# Patient Record
Sex: MALE | Race: WHITE
[De-identification: names, ages, dates, MRNs, and addresses within clinical notes are randomized per-mention and may not be internally consistent; named-entity substitution may affect disease eponyms.]

---

## 2017-02-20 ENCOUNTER — HOSPITAL ENCOUNTER (OUTPATIENT)
Dept: HOSPITAL 47 - LABWHC1 | Age: 77
Discharge: HOME | End: 2017-02-20
Attending: NURSE PRACTITIONER
Payer: MEDICARE

## 2017-02-20 DIAGNOSIS — Z00.00: Primary | ICD-10-CM

## 2017-02-20 DIAGNOSIS — N18.3: ICD-10-CM

## 2017-02-20 LAB
ANION GAP SERPL CALC-SCNC: 17 MMOL/L
BUN SERPL-SCNC: 50 MG/DL (ref 9–20)
CALCIUM SPEC-MCNC: 10.1 MG/DL (ref 8.4–10.2)
CHLORIDE SERPL-SCNC: 99 MMOL/L (ref 98–107)
CHOLEST SERPL-MCNC: 140 MG/DL (ref ?–200)
CO2 SERPL-SCNC: 25 MMOL/L (ref 22–30)
GLUCOSE SERPL-MCNC: 338 MG/DL (ref 74–99)
HDLC SERPL-MCNC: 49 MG/DL (ref 40–60)
NON-AFRICAN AMERICAN GFR(MDRD): 44
POTASSIUM SERPL-SCNC: 4.6 MMOL/L (ref 3.5–5.1)
PSA SERPL-MCNC: 0.5 NG/ML (ref 0–4)
SODIUM SERPL-SCNC: 141 MMOL/L (ref 137–145)
TRIGL SERPL-MCNC: 259 MG/DL (ref ?–150)

## 2017-02-20 PROCEDURE — 84153 ASSAY OF PSA TOTAL: CPT

## 2017-02-20 PROCEDURE — 80061 LIPID PANEL: CPT

## 2017-02-20 PROCEDURE — 80048 BASIC METABOLIC PNL TOTAL CA: CPT

## 2017-02-20 PROCEDURE — 36415 COLL VENOUS BLD VENIPUNCTURE: CPT

## 2017-03-03 ENCOUNTER — HOSPITAL ENCOUNTER (EMERGENCY)
Dept: HOSPITAL 47 - EC | Age: 77
Discharge: HOME | End: 2017-03-03
Payer: MEDICARE

## 2017-03-03 VITALS — TEMPERATURE: 97.8 F | RESPIRATION RATE: 20 BRPM

## 2017-03-03 VITALS — DIASTOLIC BLOOD PRESSURE: 78 MMHG | SYSTOLIC BLOOD PRESSURE: 128 MMHG | HEART RATE: 78 BPM

## 2017-03-03 DIAGNOSIS — Z95.5: ICD-10-CM

## 2017-03-03 DIAGNOSIS — N28.9: ICD-10-CM

## 2017-03-03 DIAGNOSIS — I10: ICD-10-CM

## 2017-03-03 DIAGNOSIS — E11.9: ICD-10-CM

## 2017-03-03 DIAGNOSIS — Z79.82: ICD-10-CM

## 2017-03-03 DIAGNOSIS — Z79.84: ICD-10-CM

## 2017-03-03 DIAGNOSIS — H10.13: Primary | ICD-10-CM

## 2017-03-03 DIAGNOSIS — E78.5: ICD-10-CM

## 2017-03-03 DIAGNOSIS — Z79.899: ICD-10-CM

## 2017-03-03 DIAGNOSIS — I25.2: ICD-10-CM

## 2017-03-03 DIAGNOSIS — Z87.891: ICD-10-CM

## 2017-03-03 PROCEDURE — 99283 EMERGENCY DEPT VISIT LOW MDM: CPT

## 2017-03-03 NOTE — ED
Eye Problem HPI





- General


Chief complaint: Eye Problems


Stated complaint: Eye Problems


Time Seen by Provider: 03/03/17 20:46


Source: patient, RN notes reviewed


Mode of arrival: ambulatory


Limitations: no limitations





- History of Present Illness


Initial comments: 





Patient is a 76-year-old male presents to the emergency room for evaluation of 

bilateral eye irritation.  Patient states that he had a routine eye exam by Dr. Montejo earlier this afternoon.  Patient states that they placed numbing drops in 

both of his eyes and examine them.  Patient denies them dilating his eyes 

today.  Patient states he passed his vision test was "flying colors".  Patient 

states around 3 PM he left his appointment.  Patient states around 7/8 PM he 

began having bilateral eye burning and tearing.  Patient states that he feels 

like he has sand in his eyes every time he blinks.  Patient states he's not 

sure if he is having ALLERGIC reaction to the eye drops that he was given.  

Patient denies changes in vision.  Patient denies any pain with movement of his 

eyes.  Patient denies getting anything in his eyes.  Patient states he's been 

trying to wash out his eyes in the shower with no relief of symptoms.  Patient 

states that his eyes will not stop tearing.





- Related Data


 Home Medications











 Medication  Instructions  Recorded  Confirmed


 


Aspirin EC [Ecotrin] 81 mg PO DAILY 08/23/14 03/03/17


 


Glimepiride [Amaryl] 1 mg PO AC-BRKFST 08/23/14 03/03/17


 


Hydrochlorothiazide [Hydrodiuril] 50 mg PO DAILY 08/23/14 03/03/17


 


Metoprolol Succinate (ER) [Toprol 50 mg PO DAILY 08/23/14 03/03/17





XL]   


 


Niacin [Slo-Niacin] 750 mg PO DAILY 08/23/14 03/03/17


 


Potassium Chloride [Klor-Con 10] 10 meq PO DAILY 08/23/14 03/03/17


 


Prasugrel [Effient] 10 mg PO DAILY 08/23/14 03/03/17


 


Quinapril HCl [Accupril] 10 mg PO DAILY 08/23/14 03/03/17


 


Simvastatin [Zocor] 80 mg PO HS 08/23/14 03/03/17


 


amLODIPine [Norvasc] 5 mg PO DAILY 08/23/14 03/03/17


 


buPROPion XL [Wellbutrin XL] 300 mg PO DAILY 08/23/14 03/03/17


 


metFORMIN HCL 1,000 mg PO BID 08/23/14 03/03/17








 Previous Rx's











 Medication  Instructions  Recorded


 


Ciprofloxacin HCl [Cipro] 500 mg PO Q12HR #20 tablet 08/23/14


 


Tobra-Dexamet 0.3-0.1% Eye [place] 1 drops BOTH EYES Q4H 7 Days 03/03/17





[Tobradex Ophth Susp]  











 Allergies











Allergy/AdvReac Type Severity Reaction Status Date / Time


 


No Known Allergies Allergy   Verified 03/03/17 20:44














Review of Systems


ROS Statement: 


Those systems with pertinent positive or pertinent negative responses have been 

documented in the HPI.





ROS Other: All systems not noted in ROS Statement are negative.





Past Medical History


Past Medical History: Diabetes Mellitus, Hyperlipidemia, Hypertension, 

Myocardial Infarction (MI), Renal Disease


Last Myocardial Infarction Date:: 2010


History of Any Multi-Drug Resistant Organisms: None Reported


Past Surgical History: Heart Catheterization With Stent, Joint Replacement


Date of Last Stent Placement:: 2012


Past Psychological History: No Psychological Hx Reported


Smoking Status: Former smoker


Past Alcohol Use History: None Reported


Past Drug Use History: None Reported





General Exam





- General Exam Comments


Initial Comments: 





Sitting in exam room in no acute distress.


Limitations: no limitations


General appearance: alert, in no apparent distress


Head exam: Present: atraumatic, normocephalic, normal inspection


  ** Expanded


Eyelids: Normal Inspection: Bilateral


Pupils: Regular, Round: Bilateral, Reactive: Bilateral


Sclera/Conjunctival: Injection: Bilateral (b/l tearing of eyes)


Visual acuity (R) = 20/: 20


Visual acuity (L) = 20/: 20


With correction: Yes


ENT exam: Present: normal exam


Neck exam: Present: normal inspection


Respiratory exam: Absent: respiratory distress


Extremities exam: Present: normal inspection


Back exam: Present: normal inspection


Neurological exam: Present: alert, oriented X3, CN II-XII intact, normal gait


Psychiatric exam: Present: normal affect, normal mood


Skin exam: Present: warm, dry, intact, normal color.  Absent: rash





Course


 Vital Signs











  03/03/17 03/03/17





  20:41 21:38


 


Temperature 97.8 F 97.8 F


 


Pulse Rate 88 78


 


Respiratory 20 20





Rate  


 


Blood Pressure 113/62 128/78


 


O2 Sat by Pulse 99 98





Oximetry  














Medical Decision Making





- Medical Decision Making





Patient is a 76-year-old male presents to the emergency room for evaluation of 

bilateral eye irritation.  It is likely that patient is having an ALLERGIC 

reaction to the eye drops placed in his eyes earlier this afternoon at Dr. Montejo

's office.  Patient has no changes in vision  Will place patient on TobraDex 

eyedrops to see if the steroid component will help his symptoms.  Advised 

patient to contact his ophthalmologist if symptoms are not improving in 48 

hours.  Patient states he understands everything that was discussed with him.  

Return parameters discussed.  Case discussed with Dr. Mclaughlin.





Disposition


Clinical Impression: 


 Allergic conjunctivitis





Disposition: HOME SELF-CARE


Condition: Good


Instructions:  Conjunctivitis (ED)


Additional Instructions: 


Apply eyedrops as directed.  Please follow up with eye specialist on Monday. If 

any new symptom arises or symptoms worsen, return to ER as soon as possible.  


Prescriptions: 


Tobra-Dexamet 0.3-0.1% Eye [place] [Tobradex Ophth Susp] 1 drops BOTH EYES Q4H 7 

Days


Referrals: 


Lemuel Ortiz MD [Primary Care Provider] - 1-2 days


Raul Montejo MD [STAFF PHYSICIAN] - 1-2 days


Time of Disposition: 21:23

## 2017-06-16 ENCOUNTER — HOSPITAL ENCOUNTER (OUTPATIENT)
Dept: HOSPITAL 47 - LABWHC1 | Age: 77
Discharge: HOME | End: 2017-06-16
Attending: NURSE PRACTITIONER
Payer: MEDICARE

## 2017-06-16 DIAGNOSIS — N18.3: Primary | ICD-10-CM

## 2017-06-16 LAB
ANION GAP SERPL CALC-SCNC: 15 MMOL/L
BUN SERPL-SCNC: 38 MG/DL (ref 9–20)
CALCIUM SPEC-MCNC: 9.6 MG/DL (ref 8.4–10.2)
CHLORIDE SERPL-SCNC: 104 MMOL/L (ref 98–107)
CO2 SERPL-SCNC: 23 MMOL/L (ref 22–30)
GLUCOSE SERPL-MCNC: 175 MG/DL (ref 74–99)
NON-AFRICAN AMERICAN GFR(MDRD): 49
POTASSIUM SERPL-SCNC: 4.1 MMOL/L (ref 3.5–5.1)
SODIUM SERPL-SCNC: 142 MMOL/L (ref 137–145)

## 2017-06-16 PROCEDURE — 36415 COLL VENOUS BLD VENIPUNCTURE: CPT

## 2017-06-16 PROCEDURE — 80048 BASIC METABOLIC PNL TOTAL CA: CPT

## 2017-07-12 ENCOUNTER — HOSPITAL ENCOUNTER (OUTPATIENT)
Dept: HOSPITAL 47 - LABWHC1 | Age: 77
Discharge: HOME | End: 2017-07-12
Payer: MEDICARE

## 2017-07-12 DIAGNOSIS — E11.65: Primary | ICD-10-CM

## 2017-07-12 LAB
ALP SERPL-CCNC: 99 U/L (ref 38–126)
ALT SERPL-CCNC: 37 U/L (ref 21–72)
ANION GAP SERPL CALC-SCNC: 13 MMOL/L
AST SERPL-CCNC: 25 U/L (ref 17–59)
BUN SERPL-SCNC: 21 MG/DL (ref 9–20)
CALCIUM SPEC-MCNC: 9.5 MG/DL (ref 8.4–10.2)
CHLORIDE SERPL-SCNC: 104 MMOL/L (ref 98–107)
CHOLEST SERPL-MCNC: 125 MG/DL (ref ?–200)
CO2 SERPL-SCNC: 25 MMOL/L (ref 22–30)
GLUCOSE SERPL-MCNC: 201 MG/DL (ref 74–99)
HDLC SERPL-MCNC: 51 MG/DL (ref 40–60)
NON-AFRICAN AMERICAN GFR(MDRD): 58
POTASSIUM SERPL-SCNC: 4 MMOL/L (ref 3.5–5.1)
PROT SERPL-MCNC: 7.1 G/DL (ref 6.3–8.2)
SODIUM SERPL-SCNC: 142 MMOL/L (ref 137–145)
TRIGL SERPL-MCNC: 152 MG/DL (ref ?–150)

## 2017-07-12 PROCEDURE — 36415 COLL VENOUS BLD VENIPUNCTURE: CPT

## 2017-07-12 PROCEDURE — 80053 COMPREHEN METABOLIC PANEL: CPT

## 2017-07-12 PROCEDURE — 80061 LIPID PANEL: CPT

## 2017-08-18 ENCOUNTER — HOSPITAL ENCOUNTER (OUTPATIENT)
Dept: HOSPITAL 47 - LABWHC1 | Age: 77
Discharge: HOME | End: 2017-08-18
Attending: NURSE PRACTITIONER
Payer: MEDICARE

## 2017-08-18 DIAGNOSIS — N25.81: ICD-10-CM

## 2017-08-18 DIAGNOSIS — M10.9: ICD-10-CM

## 2017-08-18 DIAGNOSIS — E11.9: ICD-10-CM

## 2017-08-18 DIAGNOSIS — N18.3: ICD-10-CM

## 2017-08-18 DIAGNOSIS — E83.42: ICD-10-CM

## 2017-08-18 DIAGNOSIS — N39.0: ICD-10-CM

## 2017-08-18 DIAGNOSIS — D50.9: Primary | ICD-10-CM

## 2017-08-18 LAB
ANION GAP SERPL CALC-SCNC: 17 MMOL/L
BASOPHILS # BLD AUTO: 0 K/UL (ref 0–0.2)
BASOPHILS NFR BLD AUTO: 1 %
BUN SERPL-SCNC: 37 MG/DL (ref 9–20)
CALCIUM SPEC-MCNC: 9.6 MG/DL (ref 8.4–10.2)
CH: 30
CHCM: 33.8
CHLORIDE SERPL-SCNC: 101 MMOL/L (ref 98–107)
CO2 SERPL-SCNC: 24 MMOL/L (ref 22–30)
EOSINOPHIL # BLD AUTO: 0.1 K/UL (ref 0–0.7)
EOSINOPHIL NFR BLD AUTO: 3 %
ERYTHROCYTE [DISTWIDTH] IN BLOOD BY AUTOMATED COUNT: 3.9 M/UL (ref 4.3–5.9)
ERYTHROCYTE [DISTWIDTH] IN BLOOD: 14.9 % (ref 11.5–15.5)
GLUCOSE SERPL-MCNC: 212 MG/DL (ref 74–99)
HCT VFR BLD AUTO: 34.8 % (ref 39–53)
HDW: 2.53
HEMOGLOBIN A1C: 7.9 % (ref 4.2–6.1)
HGB BLD-MCNC: 11.6 GM/DL (ref 13–17.5)
IRON SERPL-MCNC: 61 UG/DL (ref 49–181)
LUC NFR BLD AUTO: 3 %
LYMPHOCYTES # SPEC AUTO: 1.3 K/UL (ref 1–4.8)
LYMPHOCYTES NFR SPEC AUTO: 25 %
MAGNESIUM SPEC-SCNC: 1.6 MG/DL (ref 1.6–2.3)
MCH RBC QN AUTO: 29.7 PG (ref 25–35)
MCHC RBC AUTO-ENTMCNC: 33.3 G/DL (ref 31–37)
MCV RBC AUTO: 89.3 FL (ref 80–100)
MONOCYTES # BLD AUTO: 0.4 K/UL (ref 0–1)
MONOCYTES NFR BLD AUTO: 8 %
NEUTROPHILS # BLD AUTO: 3 K/UL (ref 1.3–7.7)
NEUTROPHILS NFR BLD AUTO: 60 %
NON-AFRICAN AMERICAN GFR(MDRD): 42
PH UR: 6 [PH] (ref 5–8)
PHOSPHATE SERPL-MCNC: 3.9 MG/DL (ref 2.5–4.5)
POTASSIUM SERPL-SCNC: 4.2 MMOL/L (ref 3.5–5.1)
SODIUM SERPL-SCNC: 142 MMOL/L (ref 137–145)
SP GR UR: 1.01 (ref 1–1.03)
TIBC SERPL-MCNC: 280 UG/DL (ref 261–462)
UA BILLING (MACRO VS. MICRO): (no result)
URATE SERPL-MCNC: 7.4 MG/DL (ref 3.5–8.5)
UROBILINOGEN UR QL STRIP: <2 MG/DL (ref ?–2)
WBC # BLD AUTO: 0.15 10*3/UL
WBC # BLD AUTO: 5 K/UL (ref 3.8–10.6)
WBC (PEROX): 5.56

## 2017-08-18 PROCEDURE — 82306 VITAMIN D 25 HYDROXY: CPT

## 2017-08-18 PROCEDURE — 85025 COMPLETE CBC W/AUTO DIFF WBC: CPT

## 2017-08-18 PROCEDURE — 83036 HEMOGLOBIN GLYCOSYLATED A1C: CPT

## 2017-08-18 PROCEDURE — 83550 IRON BINDING TEST: CPT

## 2017-08-18 PROCEDURE — 84550 ASSAY OF BLOOD/URIC ACID: CPT

## 2017-08-18 PROCEDURE — 83970 ASSAY OF PARATHORMONE: CPT

## 2017-08-18 PROCEDURE — 36415 COLL VENOUS BLD VENIPUNCTURE: CPT

## 2017-08-18 PROCEDURE — 83540 ASSAY OF IRON: CPT

## 2017-08-18 PROCEDURE — 84100 ASSAY OF PHOSPHORUS: CPT

## 2017-08-18 PROCEDURE — 83735 ASSAY OF MAGNESIUM: CPT

## 2017-08-18 PROCEDURE — 81003 URINALYSIS AUTO W/O SCOPE: CPT

## 2017-08-18 PROCEDURE — 82728 ASSAY OF FERRITIN: CPT

## 2017-08-18 PROCEDURE — 80048 BASIC METABOLIC PNL TOTAL CA: CPT

## 2017-10-21 ENCOUNTER — HOSPITAL ENCOUNTER (INPATIENT)
Dept: HOSPITAL 47 - EC | Age: 77
LOS: 2 days | Discharge: HOME | DRG: 641 | End: 2017-10-23
Attending: INTERNAL MEDICINE | Admitting: INTERNAL MEDICINE
Payer: MEDICARE

## 2017-10-21 VITALS — BODY MASS INDEX: 27.3 KG/M2

## 2017-10-21 DIAGNOSIS — Z95.5: ICD-10-CM

## 2017-10-21 DIAGNOSIS — I25.10: ICD-10-CM

## 2017-10-21 DIAGNOSIS — N17.9: ICD-10-CM

## 2017-10-21 DIAGNOSIS — Z87.891: ICD-10-CM

## 2017-10-21 DIAGNOSIS — E86.0: Primary | ICD-10-CM

## 2017-10-21 DIAGNOSIS — E87.2: ICD-10-CM

## 2017-10-21 DIAGNOSIS — I25.2: ICD-10-CM

## 2017-10-21 DIAGNOSIS — Z79.84: ICD-10-CM

## 2017-10-21 DIAGNOSIS — E78.5: ICD-10-CM

## 2017-10-21 DIAGNOSIS — Z79.82: ICD-10-CM

## 2017-10-21 DIAGNOSIS — E11.40: ICD-10-CM

## 2017-10-21 DIAGNOSIS — I10: ICD-10-CM

## 2017-10-21 DIAGNOSIS — R26.2: ICD-10-CM

## 2017-10-21 DIAGNOSIS — Z79.899: ICD-10-CM

## 2017-10-21 DIAGNOSIS — Z86.73: ICD-10-CM

## 2017-10-21 LAB
ALP SERPL-CCNC: 135 U/L (ref 38–126)
ALT SERPL-CCNC: 32 U/L (ref 21–72)
ANION GAP SERPL CALC-SCNC: 15 MMOL/L
APTT BLD: 22.3 SEC (ref 22–30)
AST SERPL-CCNC: 28 U/L (ref 17–59)
BASOPHILS # BLD AUTO: 0 K/UL (ref 0–0.2)
BASOPHILS NFR BLD AUTO: 1 %
BUN SERPL-SCNC: 49 MG/DL (ref 9–20)
CALCIUM SPEC-MCNC: 9.9 MG/DL (ref 8.4–10.2)
CH: 30.9
CHCM: 34.8
CHLORIDE SERPL-SCNC: 101 MMOL/L (ref 98–107)
CO2 SERPL-SCNC: 23 MMOL/L (ref 22–30)
EOSINOPHIL # BLD AUTO: 0.1 K/UL (ref 0–0.7)
EOSINOPHIL NFR BLD AUTO: 2 %
ERYTHROCYTE [DISTWIDTH] IN BLOOD BY AUTOMATED COUNT: 3.52 M/UL (ref 4.3–5.9)
ERYTHROCYTE [DISTWIDTH] IN BLOOD: 14.7 % (ref 11.5–15.5)
GLUCOSE SERPL-MCNC: 177 MG/DL (ref 74–99)
HCT VFR BLD AUTO: 31.4 % (ref 39–53)
HDW: 2.54
HGB BLD-MCNC: 11.1 GM/DL (ref 13–17.5)
INR PPP: 1.1 (ref ?–1.2)
LUC NFR BLD AUTO: 2 %
LYMPHOCYTES # SPEC AUTO: 0.9 K/UL (ref 1–4.8)
LYMPHOCYTES NFR SPEC AUTO: 18 %
MCH RBC QN AUTO: 31.6 PG (ref 25–35)
MCHC RBC AUTO-ENTMCNC: 35.4 G/DL (ref 31–37)
MCV RBC AUTO: 89.3 FL (ref 80–100)
MONOCYTES # BLD AUTO: 0.4 K/UL (ref 0–1)
MONOCYTES NFR BLD AUTO: 7 %
NEUTROPHILS # BLD AUTO: 3.7 K/UL (ref 1.3–7.7)
NEUTROPHILS NFR BLD AUTO: 71 %
NON-AFRICAN AMERICAN GFR(MDRD): 31
PH UR: 5.5 [PH] (ref 5–8)
POTASSIUM SERPL-SCNC: 4 MMOL/L (ref 3.5–5.1)
PROT SERPL-MCNC: 6.9 G/DL (ref 6.3–8.2)
PT BLD: 10.7 SEC (ref 9–12)
SODIUM SERPL-SCNC: 139 MMOL/L (ref 137–145)
SP GR UR: 1.02 (ref 1–1.03)
UA BILLING (MACRO VS. MICRO): (no result)
UROBILINOGEN UR QL STRIP: <2 MG/DL (ref ?–2)
WBC # BLD AUTO: 0.09 10*3/UL
WBC # BLD AUTO: 5.2 K/UL (ref 3.8–10.6)
WBC (PEROX): 5.83

## 2017-10-21 PROCEDURE — 85730 THROMBOPLASTIN TIME PARTIAL: CPT

## 2017-10-21 PROCEDURE — 95819 EEG AWAKE AND ASLEEP: CPT

## 2017-10-21 PROCEDURE — 70450 CT HEAD/BRAIN W/O DYE: CPT

## 2017-10-21 PROCEDURE — 84484 ASSAY OF TROPONIN QUANT: CPT

## 2017-10-21 PROCEDURE — 93880 EXTRACRANIAL BILAT STUDY: CPT

## 2017-10-21 PROCEDURE — 83036 HEMOGLOBIN GLYCOSYLATED A1C: CPT

## 2017-10-21 PROCEDURE — 85025 COMPLETE CBC W/AUTO DIFF WBC: CPT

## 2017-10-21 PROCEDURE — 94760 N-INVAS EAR/PLS OXIMETRY 1: CPT

## 2017-10-21 PROCEDURE — 80053 COMPREHEN METABOLIC PANEL: CPT

## 2017-10-21 PROCEDURE — 70551 MRI BRAIN STEM W/O DYE: CPT

## 2017-10-21 PROCEDURE — 99285 EMERGENCY DEPT VISIT HI MDM: CPT

## 2017-10-21 PROCEDURE — 81003 URINALYSIS AUTO W/O SCOPE: CPT

## 2017-10-21 PROCEDURE — 80048 BASIC METABOLIC PNL TOTAL CA: CPT

## 2017-10-21 PROCEDURE — 71010: CPT

## 2017-10-21 PROCEDURE — 85610 PROTHROMBIN TIME: CPT

## 2017-10-21 PROCEDURE — 80061 LIPID PANEL: CPT

## 2017-10-21 PROCEDURE — 83605 ASSAY OF LACTIC ACID: CPT

## 2017-10-21 PROCEDURE — 96360 HYDRATION IV INFUSION INIT: CPT

## 2017-10-21 PROCEDURE — 36415 COLL VENOUS BLD VENIPUNCTURE: CPT

## 2017-10-21 PROCEDURE — 96361 HYDRATE IV INFUSION ADD-ON: CPT

## 2017-10-21 PROCEDURE — 93005 ELECTROCARDIOGRAM TRACING: CPT

## 2017-10-21 RX ADMIN — ASPIRIN 325 MG ORAL TABLET STA: 325 PILL ORAL at 23:12

## 2017-10-21 RX ADMIN — ASPIRIN 325 MG ORAL TABLET STA MG: 325 PILL ORAL at 23:05

## 2017-10-21 NOTE — XR
EXAMINATION TYPE: XR chest 1V portable

 

DATE OF EXAM: 10/21/2017

 

COMPARISON: 4/29/2010

 

INDICATION: Dizziness fatigue

 

TECHNIQUE: Single frontal view of the chest is obtained.

 

FINDINGS:  

The heart size is normal.  

The pulmonary vasculature is normal.  

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  

No suspicious infiltrates or consolidations are evident.

 

IMPRESSION:  

1. No acute pulmonary process.

## 2017-10-21 NOTE — CT
EXAMINATION TYPE: CT brain wo con

 

DATE OF EXAM: 10/21/2017

 

COMPARISON: NONE

 

INDICATION: Dizziness x 3 weeks getting progressively worse.

 

DLP: 1121.00 mGycm, Automated exposure control for dose reduction was used.

 

CONTRAST: None

 

CT of the brain is performed utilizing 3 mm thick sections through the posterior fossa and 3 mm thick
 sections through the remaining calvarium.  Study is performed within 24 hours of arrival to the hosp
ital. 

 

No abnormal hyperdensity is present to suggest an acute intracranial hemorrhage.

No mass lesion is evident.

No acute infarcts are evident. There is periventricular white matter hypodensity, most likely on the 
basis of chronic white matter ischemic changes. Other etiologies could be considered. Subcortical inf
arct in the left posterior parietal region is not excluded. Series 3 image 36. MRI could be utilized 
for closer evaluation.

Ventricles and sulci are appropriate for the patient age.  

Paranasal sinuses and mastoid air cells within the field-of-view are clear.

 

IMPRESSIONS:

1.   Probable chronic periventricular white matter ischemic type changes. A subcortical infarct is no
t entirely excluded in the left parietal region. MRI could be performed if acute versus subacute vers
us chronic would be of use for the clinical management.

## 2017-10-21 NOTE — ED
General Adult HPI





- General


Chief complaint: Dizziness


Stated complaint: tires easily


Time Seen by Provider: 10/21/17 19:47


Source: patient, family, RN notes reviewed


Mode of arrival: wheelchair


Limitations: no limitations





- History of Present Illness


Initial comments: 





77-year-old male presents with a one-month history of dizziness and 

lightheadedness when standing.  Patient states this has steadily worsened.  

Patient also reports trouble with his gait.  Normally uses a cane at baseline, 

however over the past several weeks he has required a walker.  Patient REPORTS 

3 weeks of right-sided throat pain and ear pain.  Denies fever or chills.  

Denies nausea vomiting or diarrhea.  Denies chest pain or shortness of breath.





- Related Data


 Home Medications











 Medication  Instructions  Recorded  Confirmed


 


Aspirin EC [Ecotrin] 81 mg PO DAILY 08/23/14 03/03/17


 


Glimepiride [Amaryl] 1 mg PO AC-BRKFST 08/23/14 03/03/17


 


Hydrochlorothiazide [Hydrodiuril] 50 mg PO DAILY 08/23/14 03/03/17


 


Metoprolol Succinate (ER) [Toprol 50 mg PO DAILY 08/23/14 03/03/17





XL]   


 


Niacin [Slo-Niacin] 750 mg PO DAILY 08/23/14 03/03/17


 


Potassium Chloride [Klor-Con 10] 10 meq PO DAILY 08/23/14 03/03/17


 


Prasugrel [Effient] 10 mg PO DAILY 08/23/14 03/03/17


 


Quinapril HCl [Accupril] 10 mg PO DAILY 08/23/14 03/03/17


 


Simvastatin [Zocor] 80 mg PO HS 08/23/14 03/03/17


 


amLODIPine [Norvasc] 5 mg PO DAILY 08/23/14 03/03/17


 


buPROPion XL [Wellbutrin XL] 300 mg PO DAILY 08/23/14 03/03/17


 


metFORMIN HCL 1,000 mg PO BID 08/23/14 03/03/17








 Previous Rx's











 Medication  Instructions  Recorded


 


Ciprofloxacin HCl [Cipro] 500 mg PO Q12HR #20 tablet 08/23/14


 


Tobra-Dexamet 0.3-0.1% Eye [place] 1 drops BOTH EYES Q4H 7 Days  ml 03/03/17





[Tobradex Ophth Susp]  











 Allergies











Allergy/AdvReac Type Severity Reaction Status Date / Time


 


No Known Allergies Allergy   Verified 10/21/17 19:41














Review of Systems


ROS Statement: 


Those systems with pertinent positive or pertinent negative responses have been 

documented in the HPI.





ROS Other: All systems not noted in ROS Statement are negative.





Past Medical History


Past Medical History: Diabetes Mellitus, Hyperlipidemia, Hypertension, 

Myocardial Infarction (MI), Renal Disease


Last Myocardial Infarction Date:: 2010


History of Any Multi-Drug Resistant Organisms: None Reported


Past Surgical History: Heart Catheterization With Stent, Joint Replacement


Date of Last Stent Placement:: 2012


Past Psychological History: No Psychological Hx Reported


Smoking Status: Former smoker


Past Alcohol Use History: None Reported


Past Drug Use History: None Reported





General Exam


Limitations: no limitations


General appearance: alert, in no apparent distress


Head exam: Present: atraumatic, normocephalic


Eye exam: Present: normal appearance, PERRL, EOMI


ENT exam: Present: normal exam, mucous membranes dry, other (Right TM mildly 

opacified, no bulging or erythema)


Neck exam: Present: normal inspection.  Absent: tenderness, meningismus


Respiratory exam: Present: normal lung sounds bilaterally.  Absent: respiratory 

distress, wheezes


Cardiovascular Exam: Present: regular rate, normal rhythm


GI/Abdominal exam: Present: soft.  Absent: distended, tenderness


Extremities exam: Present: normal inspection, full ROM, normal capillary 

refill.  Absent: pedal edema, joint swelling


Neurological exam: Present: alert, oriented X3, other (Finger-to-nose ataxia on 

the left, heel-to-shin ataxia on the left)


Psychiatric exam: Present: normal affect, normal mood


Skin exam: Present: warm, dry, intact.  Absent: cyanosis, diaphoretic





Course


 Vital Signs











  10/21/17 10/21/17 10/21/17





  19:32 20:15 21:23


 


Temperature 98.5 F  


 


Pulse Rate 85  69


 


Pulse Rate [  78 





Sitting Cardiac   





Monitor]   


 


Pulse Rate [  82 





Standing   





Cardiac Monitor   





]   


 


Pulse Rate [  74 





Supine Cardiac   





Monitor]   


 


Respiratory 16  18





Rate   


 


Blood Pressure 128/63  123/65


 


Blood Pressure  112/63 





[Right Arm   





Sitting]   


 


Blood Pressure  102/56 





[Right Arm   





Standing]   


 


Blood Pressure  126/65 





[Right Arm   





Supine]   


 


O2 Sat by Pulse 98 99 98





Oximetry   














EKG Findings





- EKG Comments:


EKG Findings:: EKG shows normal sinus rhythm, right bundle-branch block, 

ventricular rate 77, OH interval 182, QRS duration 140, 





Medical Decision Making





- Medical Decision Making





77-year-old male presents with a one-month history of worsening dizziness and 

difficulty with ambulation.  On examination patient does have left upper and 

left lower limb ataxia.  No other focal neurological findings.  CT of the head 

shows possible subacute subcortical infarct.  Chest x-ray is negative.  

Creatinine 2.1 from baseline 1.6.  Mild lactic acidosis 2.6, these are 

consistent with dehydration.  Urinalysis is pending.  Patient is given an 

aspirin.  He will be admitted for neurology evaluation.











Diagnosis: Ataxia, concern for CVA





- Lab Data


Result diagrams: 


 10/21/17 19:55





 10/21/17 19:55


 Lab Results











  10/21/17 10/21/17 10/21/17 Range/Units





  19:50 19:55 19:55 


 


WBC   5.2   (3.8-10.6)  k/uL


 


RBC   3.52 L   (4.30-5.90)  m/uL


 


Hgb   11.1 L   (13.0-17.5)  gm/dL


 


Hct   31.4 L   (39.0-53.0)  %


 


MCV   89.3   (80.0-100.0)  fL


 


MCH   31.6   (25.0-35.0)  pg


 


MCHC   35.4   (31.0-37.0)  g/dL


 


RDW   14.7   (11.5-15.5)  %


 


Plt Count   227   (150-450)  k/uL


 


Neutrophils %   71   %


 


Lymphocytes %   18   %


 


Monocytes %   7   %


 


Eosinophils %   2   %


 


Basophils %   1   %


 


Neutrophils #   3.7   (1.3-7.7)  k/uL


 


Lymphocytes #   0.9 L   (1.0-4.8)  k/uL


 


Monocytes #   0.4   (0-1.0)  k/uL


 


Eosinophils #   0.1   (0-0.7)  k/uL


 


Basophils #   0.0   (0-0.2)  k/uL


 


PT     (9.0-12.0)  sec


 


INR     (<1.2)  


 


APTT     (22.0-30.0)  sec


 


Sodium     (137-145)  mmol/L


 


Potassium     (3.5-5.1)  mmol/L


 


Chloride     ()  mmol/L


 


Carbon Dioxide     (22-30)  mmol/L


 


Anion Gap     mmol/L


 


BUN     (9-20)  mg/dL


 


Creatinine     (0.66-1.25)  mg/dL


 


Est GFR (MDRD) Af Amer     (>60 ml/min/1.73 sqM)  


 


Est GFR (MDRD) Non-Af     (>60 ml/min/1.73 sqM)  


 


Glucose     (74-99)  mg/dL


 


Plasma Lactic Acid Khurram     (0.7-2.0)  mmol/L


 


Calcium     (8.4-10.2)  mg/dL


 


Total Bilirubin     (0.2-1.3)  mg/dL


 


AST     (17-59)  U/L


 


ALT     (21-72)  U/L


 


Alkaline Phosphatase     ()  U/L


 


Troponin I    <0.012  (0.000-0.034)  ng/mL


 


Total Protein     (6.3-8.2)  g/dL


 


Albumin     (3.5-5.0)  g/dL


 


Urine Color  Yellow    


 


Urine Appearance  Clear    (Clear)  


 


Urine pH  5.5    (5.0-8.0)  


 


Ur Specific Gravity  1.016    (1.001-1.035)  


 


Urine Protein  Negative    (Negative)  


 


Urine Glucose (UA)  Trace H    (Negative)  


 


Urine Ketones  Negative    (Negative)  


 


Urine Blood  Negative    (Negative)  


 


Urine Nitrite  Negative    (Negative)  


 


Urine Bilirubin  Negative    (Negative)  


 


Urine Urobilinogen  <2.0    (<2.0)  mg/dL


 


Ur Leukocyte Esterase  Negative    (Negative)  














  10/21/17 10/21/17 10/21/17 Range/Units





  19:55 19:55 19:55 


 


WBC     (3.8-10.6)  k/uL


 


RBC     (4.30-5.90)  m/uL


 


Hgb     (13.0-17.5)  gm/dL


 


Hct     (39.0-53.0)  %


 


MCV     (80.0-100.0)  fL


 


MCH     (25.0-35.0)  pg


 


MCHC     (31.0-37.0)  g/dL


 


RDW     (11.5-15.5)  %


 


Plt Count     (150-450)  k/uL


 


Neutrophils %     %


 


Lymphocytes %     %


 


Monocytes %     %


 


Eosinophils %     %


 


Basophils %     %


 


Neutrophils #     (1.3-7.7)  k/uL


 


Lymphocytes #     (1.0-4.8)  k/uL


 


Monocytes #     (0-1.0)  k/uL


 


Eosinophils #     (0-0.7)  k/uL


 


Basophils #     (0-0.2)  k/uL


 


PT   10.7   (9.0-12.0)  sec


 


INR   1.1   (<1.2)  


 


APTT   22.3   (22.0-30.0)  sec


 


Sodium    139  (137-145)  mmol/L


 


Potassium    4.0  (3.5-5.1)  mmol/L


 


Chloride    101  ()  mmol/L


 


Carbon Dioxide    23  (22-30)  mmol/L


 


Anion Gap    15  mmol/L


 


BUN    49 H  (9-20)  mg/dL


 


Creatinine    2.10 H  (0.66-1.25)  mg/dL


 


Est GFR (MDRD) Af Amer    37  (>60 ml/min/1.73 sqM)  


 


Est GFR (MDRD) Non-Af    31  (>60 ml/min/1.73 sqM)  


 


Glucose    177 H  (74-99)  mg/dL


 


Plasma Lactic Acid Khurram  2.6 H*    (0.7-2.0)  mmol/L


 


Calcium    9.9  (8.4-10.2)  mg/dL


 


Total Bilirubin    0.2  (0.2-1.3)  mg/dL


 


AST    28  (17-59)  U/L


 


ALT    32  (21-72)  U/L


 


Alkaline Phosphatase    135 H  ()  U/L


 


Troponin I     (0.000-0.034)  ng/mL


 


Total Protein    6.9  (6.3-8.2)  g/dL


 


Albumin    4.2  (3.5-5.0)  g/dL


 


Urine Color     


 


Urine Appearance     (Clear)  


 


Urine pH     (5.0-8.0)  


 


Ur Specific Gravity     (1.001-1.035)  


 


Urine Protein     (Negative)  


 


Urine Glucose (UA)     (Negative)  


 


Urine Ketones     (Negative)  


 


Urine Blood     (Negative)  


 


Urine Nitrite     (Negative)  


 


Urine Bilirubin     (Negative)  


 


Urine Urobilinogen     (<2.0)  mg/dL


 


Ur Leukocyte Esterase     (Negative)  














Disposition


Clinical Impression: 


 Cerebrovascular accident, Dehydration, Ataxia





Disposition: AD[facility]ITT[facility] IP TO [facility] [place]


Condition: Stable


Referrals: 


Lemuel Ortiz MD [Primary Care Provider] - 1-2 days


Decision to Admit Reason: Admit from EC


Decision Date: 10/21/17


Decision Time: 21:57

## 2017-10-22 LAB
ANION GAP SERPL CALC-SCNC: 12 MMOL/L
BUN SERPL-SCNC: 38 MG/DL (ref 9–20)
CALCIUM SPEC-MCNC: 9.4 MG/DL (ref 8.4–10.2)
CHLORIDE SERPL-SCNC: 105 MMOL/L (ref 98–107)
CHOLEST SERPL-MCNC: 109 MG/DL (ref ?–200)
CO2 SERPL-SCNC: 23 MMOL/L (ref 22–30)
GLUCOSE BLD-MCNC: 117 MG/DL (ref 75–99)
GLUCOSE BLD-MCNC: 142 MG/DL (ref 75–99)
GLUCOSE BLD-MCNC: 145 MG/DL (ref 75–99)
GLUCOSE BLD-MCNC: 194 MG/DL (ref 75–99)
GLUCOSE SERPL-MCNC: 145 MG/DL (ref 74–99)
HDLC SERPL-MCNC: 39 MG/DL (ref 40–60)
HEMOGLOBIN A1C: 7.6 % (ref 4.2–6.1)
NON-AFRICAN AMERICAN GFR(MDRD): 42
POTASSIUM SERPL-SCNC: 4.1 MMOL/L (ref 3.5–5.1)
SODIUM SERPL-SCNC: 140 MMOL/L (ref 137–145)

## 2017-10-22 RX ADMIN — GLIMEPIRIDE SCH MG: 1 TABLET ORAL at 08:54

## 2017-10-22 RX ADMIN — ASPIRIN 325 MG ORAL TABLET SCH MG: 325 PILL ORAL at 08:54

## 2017-10-22 NOTE — US
EXAMINATION TYPE: US carotid duplex BILAT

 

DATE OF EXAM: 10/22/2017

 

COMPARISON: NONE

 

CLINICAL HISTORY: Stenosis. Dizziness x 3 weeks that is getting worse

 

EXAM MEASUREMENTS: 

 

RIGHT:  Peak Systolic Velocity (PSV) cm/sec

----- Right CCA:  64.3  

----- Right ICA:  73.8     

----- Right ECA:  159.0   

ICA/CCA ratio:  1.1    

 

RIGHT:  End Diastole cm/sec

----- Right CCA:  13.0   

----- Right ICA:  16.1      

----- Right ECA:  9.2     

 

LEFT:  Peak Systolic Velocity (PSV) cm/sec

----- Left CCA:  36.6  

----- Left ICA:  90.8   

----- Left ECA:  48.1  

ICA/CCA ratio:  2.5  

 

LEFT:  End Diastole cm/sec

----- Left CCA:  10.8  

----- Left ICA:  32.6   

----- Left ECA:  6.1 

 

VERTEBRALS (direction of flow):

Right Vertebral: Antegrade

Left Vertebral: Antegrade

 

Rhythm:  Normal

 

Heterogeneous plaque at bilateral bulbs/bifurcation that does not cause any significant stenosis, inc
rease in left mid ICA velocity noted as vessel dives

 

IMPRESSION:  

1. I DO NOT SEE EVIDENCE OF A HEMODYNAMICALLY SIGNIFICANT STENOSIS IN EITHER COMMON OR INTERNAL CAROT
ID SYSTEM.

2. ELEVATED FLOW VELOCITY, RIGHT ECA.   

 

 

Criteria for Assigning % of Stenosis / Diameter reduction

(Estimation based on the indirect measurements of the internal carotid artery velocities (ICA PSV).

1.  Normal (no stenosis)=ICA PSV < 125 cm/s: ratio < 2.0: ICA EDV<40 cm/s.

2. Less than 50% stenosis=ICA PSV < 125 cm/s: ratio < 2.0: ICA EDV<40 cm/s.

3.  50 to 69% stenosis=ICA PSV of 125 to 230 cm/s: ration 2.0 ? 4.0: ICA EDV  cm/s.

4.  Greater than 70% stenosis to near occlusion= ICA PSV > 230 cm/s: ratio > 4.0: ICA EDV > 100 cm/s.
 

5.  Near occlusion= ICA PSV velocities may be low or undetectable: variable ratio and ICA EDV.

6.  Total occlusion=unable to detect flow.

## 2017-10-23 VITALS — RESPIRATION RATE: 18 BRPM | TEMPERATURE: 97.2 F

## 2017-10-23 VITALS — HEART RATE: 74 BPM | DIASTOLIC BLOOD PRESSURE: 67 MMHG | SYSTOLIC BLOOD PRESSURE: 128 MMHG

## 2017-10-23 LAB
ANION GAP SERPL CALC-SCNC: 11 MMOL/L
BASOPHILS # BLD AUTO: 0 K/UL (ref 0–0.2)
BASOPHILS NFR BLD AUTO: 1 %
BUN SERPL-SCNC: 27 MG/DL (ref 9–20)
CALCIUM SPEC-MCNC: 9.1 MG/DL (ref 8.4–10.2)
CH: 29.2
CHCM: 33.3
CHLORIDE SERPL-SCNC: 105 MMOL/L (ref 98–107)
CO2 SERPL-SCNC: 25 MMOL/L (ref 22–30)
EOSINOPHIL # BLD AUTO: 0.2 K/UL (ref 0–0.7)
EOSINOPHIL NFR BLD AUTO: 3 %
ERYTHROCYTE [DISTWIDTH] IN BLOOD BY AUTOMATED COUNT: 3.61 M/UL (ref 4.3–5.9)
ERYTHROCYTE [DISTWIDTH] IN BLOOD: 13.6 % (ref 11.5–15.5)
GLUCOSE BLD-MCNC: 107 MG/DL (ref 75–99)
GLUCOSE BLD-MCNC: 158 MG/DL (ref 75–99)
GLUCOSE SERPL-MCNC: 103 MG/DL (ref 74–99)
HCT VFR BLD AUTO: 31.8 % (ref 39–53)
HDW: 2.62
HGB BLD-MCNC: 10.5 GM/DL (ref 13–17.5)
LUC NFR BLD AUTO: 1 %
LYMPHOCYTES # SPEC AUTO: 1.4 K/UL (ref 1–4.8)
LYMPHOCYTES NFR SPEC AUTO: 23 %
MCH RBC QN AUTO: 29.2 PG (ref 25–35)
MCHC RBC AUTO-ENTMCNC: 33.1 G/DL (ref 31–37)
MCV RBC AUTO: 88.1 FL (ref 80–100)
MONOCYTES # BLD AUTO: 0.4 K/UL (ref 0–1)
MONOCYTES NFR BLD AUTO: 7 %
NEUTROPHILS # BLD AUTO: 4.1 K/UL (ref 1.3–7.7)
NEUTROPHILS NFR BLD AUTO: 66 %
NON-AFRICAN AMERICAN GFR(MDRD): 45
POTASSIUM SERPL-SCNC: 3.8 MMOL/L (ref 3.5–5.1)
SODIUM SERPL-SCNC: 141 MMOL/L (ref 137–145)
WBC # BLD AUTO: 0.09 10*3/UL
WBC # BLD AUTO: 6.2 K/UL (ref 3.8–10.6)
WBC (PEROX): 6.23

## 2017-10-23 RX ADMIN — INSULIN LISPRO SCH UNIT: 100 INJECTION, SOLUTION INTRAVENOUS; SUBCUTANEOUS at 12:19

## 2017-10-23 RX ADMIN — GLIMEPIRIDE SCH MG: 1 TABLET ORAL at 06:25

## 2017-10-23 RX ADMIN — ASPIRIN 325 MG ORAL TABLET SCH MG: 325 PILL ORAL at 08:54

## 2017-10-23 RX ADMIN — INSULIN LISPRO SCH: 100 INJECTION, SOLUTION INTRAVENOUS; SUBCUTANEOUS at 06:24

## 2017-10-23 NOTE — P.PN
Subjective


Progress Note Date: 10/23/17





This patient is a 77-year-old male who was admitted to Hospital yesterday with 

symptoms of dizziness and gait imbalance.  Patient had signs of mild ataxia on 

his use initial presentation to the hospital.  He was noted yesterday on 

neurological exam as having some finger nose dysmetria on his right side.  He 

was recommended to undergo further stroke evaluation to rule out acute stroke.  

He completed MRI of the brain today which is reported negative for acute stroke 

at this time.  We have reviewed the results today with the patient.  He is 

being considered for discharge home later today.  He should continue on aspirin 

daily for secondary stroke prevention.  Patient should be evaluated in the 

outpatient neurology clinic for possibility of peripheral neuropathy in the 

lower extremities on.  His overall prognosis at this time remains guarded.





Objective





- Vital Signs


Vital signs: 


 Vital Signs











Temp  97.2 F L  10/23/17 12:00


 


Pulse  74   10/23/17 12:00


 


Resp  18   10/23/17 12:00


 


BP  128/67   10/23/17 12:00


 


Pulse Ox  99   10/23/17 12:00








 Intake & Output











 10/22/17 10/23/17 10/23/17





 18:59 06:59 18:59


 


Intake Total 430  380


 


Output Total 1250 550 150


 


Balance -820 -550 230


 


Weight  84 kg 84 kg


 


Intake:   


 


  Oral 430  380


 


Output:   


 


  Urine 1250 550 150


 


Other:   


 


  Voiding Method  Urinal Urinal


 


  # Voids 2 1 














- Exam





Physical examination:





PHYSICAL EXAMINATION: Patient is resting comfortably in bed. 


VITAL SIGNS: Blood pressure is [128/67]. Heart rate is [74]. Respiration is [18]

. Temperature is [97.2].


HEENT: Head is atraumatic, neck is supple, there were no carotid bruits.


CHEST: Lungs are clear to auscultation and percussion.  


CARDIAC: S1, S2 normal rate and rhythm. There is no murmur.


ABDOMEN: Soft and nontender. Bowel sounds are present.


EXTREMITIES:  There is no pedal edema.  Peripheral pulses are present.





Neurological examination:





Patient's neurological examination is unchanged from yesterday.





- Labs


CBC & Chem 7: 


 10/23/17 05:47





 10/23/17 05:47


Labs: 


 Abnormal Lab Results - Last 24 Hours (Table)











  10/21/17 10/22/17 10/22/17 Range/Units





  19:55 16:27 21:16 


 


RBC     (4.30-5.90)  m/uL


 


Hgb     (13.0-17.5)  gm/dL


 


Hct     (39.0-53.0)  %


 


BUN     (9-20)  mg/dL


 


Creatinine     (0.66-1.25)  mg/dL


 


Glucose     (74-99)  mg/dL


 


POC Glucose (mg/dL)   145 H  142 H  (75-99)  mg/dL


 


Hemoglobin A1c  7.6 H    (4.2-6.1)  %














  10/23/17 10/23/17 10/23/17 Range/Units





  05:40 05:47 05:47 


 


RBC   3.61 L   (4.30-5.90)  m/uL


 


Hgb   10.5 L   (13.0-17.5)  gm/dL


 


Hct   31.8 L   (39.0-53.0)  %


 


BUN    27 H  (9-20)  mg/dL


 


Creatinine    1.50 H  (0.66-1.25)  mg/dL


 


Glucose    103 H  (74-99)  mg/dL


 


POC Glucose (mg/dL)  107 H    (75-99)  mg/dL


 


Hemoglobin A1c     (4.2-6.1)  %














  10/23/17 Range/Units





  11:59 


 


RBC   (4.30-5.90)  m/uL


 


Hgb   (13.0-17.5)  gm/dL


 


Hct   (39.0-53.0)  %


 


BUN   (9-20)  mg/dL


 


Creatinine   (0.66-1.25)  mg/dL


 


Glucose   (74-99)  mg/dL


 


POC Glucose (mg/dL)  158 H  (75-99)  mg/dL


 


Hemoglobin A1c   (4.2-6.1)  %














Assessment and Plan


(1) Cerebellar stroke syndrome


Current Visit: Yes   Status: Acute   SNOMED Code(s): 093006519


   





(2) Disequilibrium syndrome


Current Visit: Yes   Status: Acute   SNOMED Code(s): 735956768


   





(3) Diabetes mellitus


Current Visit: Yes   Status: Acute   SNOMED Code(s): 17968720


   





(4) Ataxia


Current Visit: Yes   Status: Acute   SNOMED Code(s): 71638016


   


Plan: 





This patient is a 77-year-old male who was admitted to hospital with a one-

month history of dizziness and gait imbalance.  Patient has been showing 

evidence of worsening gait ataxia over the past 3-4 weeks.  He is having 

trouble walking at home.  He was brought into the emergency room for further 

evaluation.  He underwent a computed tomography scan of the brain results of 

which are noted above.  The patient's neurological examination reveals him to 

have slight dysmetria on right finger-nose testing.  He also has dysmetria with 

right leg heel to shin testing.  These findings suggest possibility of 

cerebellar stroke.  We have recommended a complete stroke evaluation for the 

patient.  We would recommend placing the patient on 1 aspirin daily for 

secondary stroke prevention.  We would recommend an MRI of the brain to be done 

for further evaluation.  Patient was able to complete MRI of the brain this 

morning.  MRI report was reviewed and is reported negative for any evidence of 

acute stroke.  Patient still may have some degree of neuropathy in his lower 

extremities contributing to his gait problems.  This can be further evaluated 

in the outpatient neurology clinic.  Patient may be discharged home today and 

should follow-up in the outpatient neurology clinic in 3-4 weeks.  His overall 

prognosis at this time remains guarded.  We will continue close neurological 

follow-up with this patient during this admission.

## 2017-10-23 NOTE — MR
EXAMINATION TYPE: MR brain wo con

 

DATE OF EXAM: 10/23/2017

 

COMPARISON: CT brain from 2 days ago.

 

HISTORY: Dizziness, gait ataxia, rule out cerebellar stroke.

 

TECHNIQUE: Multiplanar, multisequence imaging of the brain and brainstem is performed without IV cont
rast.

 

FINDINGS:  

Diffusion weighted images demonstrate no evidence of a recent infarct or other diffusion abnormality.


 

There is no worrisome extra-axial fluid collection. There is ventricular and sulcal prominence consis
tent with mild diffuse age-related cerebral atrophy. There are focal and confluent areas of T2 hyperi
ntensity seen throughout the deep and periventricular white matter. Lesions are nonspecific in appear
ance and distribution but most likely on basis of product of chronic small vessel ischemic change in 
patient of this age.

 

Midline structures demonstrate normal morphology.  The craniocervical junction appears within normal 
limits. Normal vascular flow voids are present. Dominant left vertebral artery is seen. The visualize
d sinuses are clear and the globes are intact.

 

IMPRESSION: 

1. No evidence of a recent infarct.

2. There is background mild diffuse age-related cerebral atrophy and moderate to severe chronic small
 vessel ischemic change noted.

## 2017-10-23 NOTE — P.HPIM
History of Present Illness


H&P Date: 10/22/17


Chief Complaint: Dizziness








Patient is a 77-year-old male with a known history of coronary artery disease 

status post stent placement, hypertension, hyperlipidemia and diabetes type 2 

and history of CVA presents with a one-month history of dizziness and 

lightheadedness when standing.  Patient states this has steadily worsened.  

Patient also reports trouble with his gait.  Normally uses a cane at baseline, 

however over the past several weeks he has required a walker.  Denies fever or 

chills.  Denies nausea vomiting or diarrhea.  Denies chest pain or shortness of 

breath.  Denied any recent illnesses or sick contacts at home.  Neurology was 

consulted due to ataxia and trouble walking.





Chest x-ray showed no acute coronary process


CT head showed chronic Ventricular white matter ischemic changes.  A 

subcortical infarct is not entirely excluded in the left parietal region.





Review of Systems





Constitutional: Patient denies any fever or chills .  No generalized weakness 

or weight loss.  


Abdomen: Patient denied nausea vomiting and diarrhea and abdominal pain.


Cardiovascular: Patient denies any chest pain or short of breath no 

palpitations.


Respiratory: patient denied any cough is from production.  No shortness of 

breath


Neurologic: Dizziness.  Patient denied any numbness or tingling headache.


Musculoskeletal: Patient denies any complaints of joint swelling or deformity.


Skin: Negative


Psychiatric: Negative


Endocrine: No heat or cold intolerance.  No recent weight gain.


Genitourinary: No dysuria or hematuria.


All other 14 point ROS negative except the above





Past Medical History


Past Medical History: Diabetes Mellitus, Hyperlipidemia, Hypertension, 

Myocardial Infarction (MI), Renal Disease


Last Myocardial Infarction Date:: 2010


History of Any Multi-Drug Resistant Organisms: None Reported


Past Surgical History: Heart Catheterization With Stent, Joint Replacement


Past Anesthesia/Blood Transfusion Reactions: No Reported Reaction


Date of Last Stent Placement:: 2012


Past Psychological History: No Psychological Hx Reported


Smoking Status: Former smoker


Past Alcohol Use History: None Reported


Past Drug Use History: None Reported





- Past Family History


  ** Father


Family Medical History: Myocardial Infarction (MI)





  ** Mother


Family Medical History: Coronary Artery Disease (CAD), CVA/TIA





Medications and Allergies


 Home Medications











 Medication  Instructions  Recorded  Confirmed  Type


 


Aspirin EC [Ecotrin] 81 mg PO HS 08/23/14 10/22/17 History


 


Glimepiride [Amaryl] 1 mg PO AC-BRKFST 08/23/14 10/22/17 History


 


Hydrochlorothiazide [Hydrodiuril] 50 mg PO DAILY 08/23/14 10/22/17 History


 


Metoprolol Succinate (ER) [Toprol 50 mg PO BID 08/23/14 10/22/17 History





XL]    


 


Niacin [Slo-Niacin] 750 mg PO HS 08/23/14 10/22/17 History


 


Potassium Chloride [Klor-Con 10] 10 meq PO HS 08/23/14 10/22/17 History


 


Simvastatin [Zocor] 80 mg PO HS 08/23/14 10/22/17 History


 


amLODIPine [Norvasc] 5 mg PO DAILY 08/23/14 10/22/17 History


 


buPROPion XL [Wellbutrin XL] 300 mg PO DAILY 08/23/14 10/22/17 History


 


metFORMIN  mg PO BID 08/23/14 10/22/17 History


 


Cholecalciferol (Vitamin D3) 2,000 unit PO DAILY 10/21/17 10/22/17 History





[Vitamin D3]    


 


Ergocalciferol (Vitamin D2) 50,000 unit PO MINAYA 10/21/17 10/22/17 History





[Vitamin D2]    


 


Ferrous Sulfate [Iron] 325 mg PO DAILY 10/21/17 10/22/17 History


 


Ranitidine HCl [Zantac] 150 mg PO DAILY 10/21/17 10/22/17 History


 


Magnesium Oxide [Mag-Ox] 400 mg PO DAILY 10/22/17 10/22/17 History


 


Quinapril HCl [Accupril] 10 mg PO DAILY 10/22/17 10/22/17 History











 Allergies











Allergy/AdvReac Type Severity Reaction Status Date / Time


 


No Known Allergies Allergy   Verified 10/22/17 12:14














Physical Exam


Vitals: 


 Vital Signs











  Temp Pulse Pulse Pulse Pulse Resp BP


 


 10/22/17 08:48       


 


 10/22/17 08:00  97.2 F L   71    18 


 


 10/22/17 04:48    68    18 


 


 10/22/17 04:00    68    18 


 


 10/22/17 02:48  96.4 F L   68    18 


 


 10/22/17 00:39  97.5 F L   65    18 


 


 10/21/17 22:55  98.1 F  69     16  111/61


 


 10/21/17 21:23   69     18  123/65


 


 10/21/17 20:15    78  82  74  


 


 10/21/17 19:32  98.5 F  85     16  128/63














  BP BP BP Pulse Ox


 


 10/22/17 08:48  130/63   


 


 10/22/17 08:00  130/63    99


 


 10/22/17 04:48  136/63    98


 


 10/22/17 04:00    


 


 10/22/17 02:48  130/73    94 L


 


 10/22/17 00:39  127/72    99


 


 10/21/17 22:55     98


 


 10/21/17 21:23     98


 


 10/21/17 20:15  112/63  102/56  126/65  99


 


 10/21/17 19:32     98








 Intake and Output











 10/21/17 10/22/17 10/22/17





 22:59 06:59 14:59


 


Intake Total  450 180


 


Output Total  550 450


 


Balance  -100 -270


 


Intake:   


 


  IV  450 


 


    Sodium Chloride 0.9% 1,  450 





    000 ml @ 75 mls/hr IV .   





    [id number] STA Rx#:[id number]   


 


  Oral   180


 


Output:   


 


  Urine  550 450


 


Other:   


 


  # Voids   2


 


  Weight 81.647 kg 84.8 kg 














PHYSICAL EXAMINATION: 


Patient is lying in the bed comfortably, no acute distress, awake alert and 

oriented.. 


HEENT: Normocephalic. Neck is supple. Pupils reactive. Nostrils clear. Oral 

cavity is moist. Ears reveal no drainage. 


Neck reveals no JVD, carotid bruits, or thyromegaly. 


CHEST EXAMINATION: Trachea is central. Symmetrical expansion. Lung fields clear 

to auscultation and percussion. 


CARDIAC: Normal S1, S2 with no gallops. No murmurs 


ABDOMEN: Soft. Bowel sounds normal. No organomegaly. No abdominal bruits. 


Extremities: reveal no edema.  No clubbing or cyanosis


Neurologically awake, alert, oriented x3 with well-coordinated movements.  No 

focal deficits noted


Skin: No rash or skin lesions. 


Psychiatric: Operative.  Nonsuicidal


Musculoskeletal: No joint swelling or deformity.  Normal range of motion.








Results


CBC & Chem 7: 


 10/21/17 19:55





 10/22/17 11:53


Labs: 


 Abnormal Lab Results - Last 24 Hours (Table)











  10/21/17 10/21/17 10/21/17 Range/Units





  19:50 19:55 19:55 


 


RBC   3.52 L   (4.30-5.90)  m/uL


 


Hgb   11.1 L   (13.0-17.5)  gm/dL


 


Hct   31.4 L   (39.0-53.0)  %


 


Lymphocytes #   0.9 L   (1.0-4.8)  k/uL


 


BUN     (9-20)  mg/dL


 


Creatinine     (0.66-1.25)  mg/dL


 


Glucose     (74-99)  mg/dL


 


POC Glucose (mg/dL)     (75-99)  mg/dL


 


Plasma Lactic Acid Khurram    2.6 H*  (0.7-2.0)  mmol/L


 


Alkaline Phosphatase     ()  U/L


 


Triglycerides     (<150)  mg/dL


 


HDL Cholesterol     (40-60)  mg/dL


 


Urine Glucose (UA)  Trace H    (Negative)  














  10/21/17 10/21/17 10/22/17 Range/Units





  19:55 19:55 06:52 


 


RBC     (4.30-5.90)  m/uL


 


Hgb     (13.0-17.5)  gm/dL


 


Hct     (39.0-53.0)  %


 


Lymphocytes #     (1.0-4.8)  k/uL


 


BUN  49 H    (9-20)  mg/dL


 


Creatinine  2.10 H    (0.66-1.25)  mg/dL


 


Glucose  177 H    (74-99)  mg/dL


 


POC Glucose (mg/dL)    117 H  (75-99)  mg/dL


 


Plasma Lactic Acid Khurram     (0.7-2.0)  mmol/L


 


Alkaline Phosphatase  135 H    ()  U/L


 


Triglycerides   294 H   (<150)  mg/dL


 


HDL Cholesterol   39 L   (40-60)  mg/dL


 


Urine Glucose (UA)     (Negative)  














Thrombosis Risk Factor Assmnt





- DVT/VTE Prophylaxis


DVT/VTE Prophylaxis: Pharmacologic Prophylaxis ordered





- Choose All That Apply


Each Risk Factor Represents 3 Points: Age 75 years or older


Thrombosis Risk Factor Assessment Total Risk Factor Score: 3


Thrombosis Risk Factor Assessment Level: Moderate Risk





Assessment and Plan


Assessment: 





#1 dizziness likely due to dehydration and volume depletion.


#2 worsening gait imbalance.  Rule out CVA


#3 acute kidney injury.  Most likely prerenal


#4 lactic acidosis improved.


#5 diabetes type 2 we'll hold metformin due to lactic acidosis continue with 

insulin sliding scale


#6 hypertension controlled





Plan:


Patient was given fluid bolus in the ER and his creatinine level is improving.  

Will monitor lipid labs tomorrow morning.  Due to worsening weakness and 

dizziness and gait imbalance neurology was consulted.  CT head showed a 

subcortical infarct is not entirely excluded in the left Parietal region, c/w 

ASA.  Will start on PTOT and further recommendations based on the clinical 

course.


Time with Patient: Greater than 30

## 2017-10-23 NOTE — P.CNNES
History of Present Illness


Consult date: 10/22/17


Reason for Consult: Patient being evaluated for ataxia and possible stroke.


History of Present Illness: 





This patient is a 77-year-old right-handed white male who over the past 1 month 

has been having episodic bouts of dizziness.  Patient states that when he 

stands and ambulates at home he feels very much off balance.  The symptoms have 

been progressing over the past 1 month.  He does use a walker at home when he 

becomes more symptomatic.  Prior to using the walker he was able to get around 

fairly easily with the use of a cane.  He also has been explained seen crease 

symptoms or shortness of breath and fatigue with exertion.  We suggest he 

should follow-up with his cardiologist as well.  He denies any previous history 

of TIA or stroke.  He does have underlying history of diabetes mellitus and 

hyperlipidemia for which she is being treated.  He states his last hemoglobin 

A1c was in a good range at 7.1.  The patient states that most of his symptoms 

of dizziness are mostly a sense of disequilibrium.  He feels very much off 

balance.  He does not experience any severe nausea vomiting with these 

symptoms.  The patient states that since admission he has been able to  

the room but feels he is still very much off balance and difficult for him to 

get about even in the room.  He does have a history of having undergone a 

cardiac catheterization in the past with stent placement.  When questioned if 

the right side is more involved than the left side in terms of balance and 

equilibrium he does feel the right side gives him m more symptoms.  The patient 

denies any recent falls.  He was brought into the emergency room where he was 

evaluated by Dr. Anderson.  He was sent for a computed tomography scan of the 

brain which revealed chronic periventricular white matter changes.  There was 

possibility of a left parietal lobe infarct which was not clearly seen on the 

CAT scan of the brain.  Patient denies any previous history of stroke.  He 

currently has not experienced any right sided weakness but does complain of 

dysmetria and incoordination with the use of the hands and legs.  The patient 

is now been admitted for a full stroke workup.  Neurology is now been consulted 

for further evaluation and recommendations.  





Review of Systems


Constitutional: Denies chills, Denies fever


Eyes: denies blurred vision, denies pain


Ears, nose, mouth and throat: Denies headache, Denies sore throat


Cardiovascular: Denies chest pain, Denies shortness of breath


Respiratory: Denies cough


Gastrointestinal: Denies abdominal pain, Denies diarrhea, Denies nausea, Denies 

vomiting


Musculoskeletal: Denies myalgias


Integumentary: Denies pruritus, Denies rash


Neurological: Reports ataxia, Reports balance difficulties, Reports gait 

dysfunction, Reports lack of coordination, Denies numbness, Denies weakness


Psychiatric: Denies anxiety, Denies depression


Endocrine: Denies fatigue, Denies weight change





Past Medical History


Past Medical History: Diabetes Mellitus, Hyperlipidemia, Hypertension, 

Myocardial Infarction (MI), Renal Disease


Last Myocardial Infarction Date:: 2010


History of Any Multi-Drug Resistant Organisms: None Reported


Past Surgical History: Heart Catheterization With Stent, Joint Replacement


Past Anesthesia/Blood Transfusion Reactions: No Reported Reaction


Date of Last Stent Placement:: 2012


Past Psychological History: No Psychological Hx Reported


Smoking Status: Former smoker


Past Alcohol Use History: None Reported


Past Drug Use History: None Reported





- Past Family History


  ** Father


Family Medical History: Myocardial Infarction (MI)





  ** Mother


Family Medical History: Coronary Artery Disease (CAD), CVA/TIA





Medications and Allergies


 Home Medications











 Medication  Instructions  Recorded  Confirmed  Type


 


Aspirin EC [Ecotrin Low Dose] 81 mg PO HS 08/23/14 10/22/17 History


 


Glimepiride [Amaryl] 1 mg PO AC-BRKFST 08/23/14 10/22/17 History


 


Metoprolol Succinate (ER) [Toprol 50 mg PO BID 08/23/14 10/22/17 History





XL]    


 


Simvastatin [Zocor] 80 mg PO HS 08/23/14 10/22/17 History


 


amLODIPine [Norvasc] 5 mg PO DAILY 08/23/14 10/22/17 History


 


buPROPion XL [Wellbutrin XL] 300 mg PO DAILY 08/23/14 10/22/17 History


 


metFORMIN  mg PO BID 08/23/14 10/22/17 History


 


Cholecalciferol (Vitamin D3) 2,000 unit PO DAILY 10/21/17 10/22/17 History





[Vitamin D3]    


 


Ergocalciferol (Vitamin D2) 50,000 unit PO MINAYA 10/21/17 10/22/17 History





[Vitamin D2]    


 


Ferrous Sulfate [Iron] 325 mg PO DAILY 10/21/17 10/22/17 History


 


Ranitidine HCl [Zantac] 150 mg PO DAILY 10/21/17 10/22/17 History


 


Magnesium Oxide [Mag-Ox] 400 mg PO DAILY 10/22/17 10/22/17 History


 


Quinapril HCl [Accupril] 10 mg PO DAILY 10/22/17 10/22/17 History











 Allergies











Allergy/AdvReac Type Severity Reaction Status Date / Time


 


No Known Allergies Allergy   Verified 10/22/17 12:14














Physical Examination





- Vital Signs


Vital Signs: 


 Vital Signs











  Temp Pulse Pulse Pulse Pulse Resp BP


 


 10/22/17 08:48       


 


 10/22/17 08:00  97.2 F L   71    18 


 


 10/22/17 04:48    68    18 


 


 10/22/17 04:00    68    18 


 


 10/22/17 02:48  96.4 F L   68    18 


 


 10/22/17 00:39  97.5 F L   65    18 


 


 10/21/17 22:55  98.1 F  69     16  111/61


 


 10/21/17 21:23   69     18  123/65


 


 10/21/17 20:15    78  82  74  


 


 10/21/17 19:32  98.5 F  85     16  128/63














  BP BP BP Pulse Ox


 


 10/22/17 08:48  130/63   


 


 10/22/17 08:00  130/63    99


 


 10/22/17 04:48  136/63    98


 


 10/22/17 04:00    


 


 10/22/17 02:48  130/73    94 L


 


 10/22/17 00:39  127/72    99


 


 10/21/17 22:55     98


 


 10/21/17 21:23     98


 


 10/21/17 20:15  112/63  102/56  126/65  99


 


 10/21/17 19:32     98








 Intake and Output











 10/21/17 10/22/17 10/22/17





 22:59 06:59 14:59


 


Intake Total  450 180


 


Output Total  550 450


 


Balance  -100 -270


 


Intake:   


 


  IV  450 


 


    Sodium Chloride 0.9% 1,  450 





    000 ml @ 75 mls/hr IV .   





    [id number] STA Rx#:[id number]   


 


  Oral   180


 


Output:   


 


  Urine  550 450


 


Other:   


 


  # Voids   2


 


  Weight 81.647 kg 84.8 kg 














- Constitutional


General appearance: average body habitus, cooperative





- EENT


EENT: PERRL, mucous membranes moist





- Respiratory


Respiratory: lungs clear, normal breath sounds





- Cardiovascular


Cardiovascular: regular rate, normal S1, normal S2


Extremities: no peripheral edema bilaterally





- Gastrointestinal


Gastrointestinal: normoactive bowel sounds





- Integumentary


Integumentary: normal





- Neurologic


Cranial nerve examination: PERRL, EOMI, VFF, V1/V2/V3 grossly intact, face 

symmetric, intact shoulder shrug, intact gag reflex, intact corneal reflex, 

normal palatal elevation


Speech examination: intact


Sensorimotor examination: intact


Motor examination - right side: 4/5: biceps, triceps, wrist flexion, wrist 

extension, , hip flexors, knee extensors, dorsiflexion, toe extension (EHL)

, plantarflexion


Motor examination - left side: 4/5: biceps, triceps, wrist flexion, wrist 

extension, , hip flexors, knee extensors, dorsiflexion, toe extension (EHL)

, plantarflexion


Detailed sensory examination: intact


Reflex and gait examination: intact


Reflexes: 1+: ankle, bicep, knee, tricep





- Musculoskeletal


Musculoskeletal: no pain





- Psychiatric


Psychiatric: mood/affect appropriate, cooperative





Results





- Laboratory Findings


CBC and BMP: 


 10/23/17 05:47





 10/23/17 05:47


Abnormal Lab Findings: 


 Abnormal Labs











  10/21/17 10/21/17 10/21/17





  19:50 19:55 19:55


 


RBC   3.52 L 


 


Hgb   11.1 L 


 


Hct   31.4 L 


 


Lymphocytes #   0.9 L 


 


BUN   


 


Creatinine   


 


Glucose   


 


POC Glucose (mg/dL)   


 


Plasma Lactic Acid Khurram    2.6 H*


 


Alkaline Phosphatase   


 


Triglycerides   


 


HDL Cholesterol   


 


Urine Glucose (UA)  Trace H  














  10/21/17 10/21/17 10/22/17





  19:55 19:55 06:52


 


RBC   


 


Hgb   


 


Hct   


 


Lymphocytes #   


 


BUN  49 H  


 


Creatinine  2.10 H  


 


Glucose  177 H  


 


POC Glucose (mg/dL)    117 H


 


Plasma Lactic Acid Khurram   


 


Alkaline Phosphatase  135 H  


 


Triglycerides   294 H 


 


HDL Cholesterol   39 L 


 


Urine Glucose (UA)   














Assessment and Plan


(1) Cerebellar stroke syndrome


Current Visit: Yes   Status: Acute   SNOMED Code(s): 242472728


   





(2) Disequilibrium syndrome


Current Visit: Yes   Status: Acute   SNOMED Code(s): 554570920


   





(3) Diabetes mellitus


Current Visit: Yes   Status: Acute   SNOMED Code(s): 07741709


   





(4) Ataxia


Current Visit: Yes   Status: Acute   SNOMED Code(s): 89274664


   


Plan: 





This patient is a 77-year-old male who was admitted to hospital with a one-

month history of dizziness and gait imbalance.  Patient has been showing 

evidence of worsening gait ataxia over the past 3-4 weeks.  He is having 

trouble walking at home.  He was brought into the emergency room for further 

evaluation.  He underwent a computed tomography scan of the brain results of 

which are noted above.  The patient's neurological examination reveals him to 

have slight dysmetria on right finger-nose testing.  He also has dysmetria with 

right leg heel to shin testing.  These findings suggest possibility of 

cerebellar stroke.  We have recommended a complete stroke evaluation for the 

patient.  We would recommend placing the patient on 1 aspirin daily for 

secondary stroke prevention.  We would recommend an MRI of the brain to be done 

for further evaluation.  His overall prognosis at this time remains guarded.  

We will continue close neurological follow-up with this patient during this 

admission.








Time with Patient: Greater than 30

## 2017-10-24 NOTE — P.DS
Providers


Date of admission: 


10/21/17 21:48





Expected date of discharge: 10/23/17


Attending physician: 


Pernell Brewster





Consults: 





 





10/21/17 21:49


Consult Physician Urgent 


   Consulting Provider: Bethany Landon


   Consult Reason/Comments: Ataxia, CVA


   Do you want consulting provider notified?: Yes, Notify in am











Primary care physician: 


Lemuel Ortiz





Hospital Course: 





Discharge diagnosis


#1 dizziness likely due to dehydration and volume depletion.  Improved.  

Hydrochlorothiazide has been held.


#2 worsening gait imbalance and ataxia.  Ruled out CVA.  MRI negative.  

Possible underlying neuropathy


#3 acute kidney injury.  Most likely prerenal


#4 lactic acidosis improved.


#5 diabetes type 2 we'll hold metformin due to lactic acidosis continue with 

insulin sliding scale


#6 hypertension controlled








Patient is a 77-year-old male with a known history of coronary artery disease 

status post stent placement, hypertension, hyperlipidemia and diabetes type 2 

and history of CVA presents with a one-month history of dizziness and 

lightheadedness when standing.  Patient states this has steadily worsened.  

Patient also reports trouble with his gait.  Normally uses a cane at baseline, 

however over the past several weeks he has required a walker.  Denies fever or 

chills.  Denies nausea vomiting or diarrhea.  Denies chest pain or shortness of 

breath.  Denied any recent illnesses or sick contacts at home.  Neurology was 

consulted due to ataxia and trouble walking.





Chest x-ray showed no acute coronary process


CT head showed chronic Ventricular white matter ischemic changes.  A 

subcortical infarct is not entirely excluded in the left parietal region.





Patient was given fluid bolus in the ER and his creatinine level is improving.  

Due to worsening weakness and dizziness and gait imbalance neurology was 

consulted.  CT head showed a subcortical infarct is not entirely excluded in 

the left Parietal region, c/w ASA.  MRI of the head showed no acute CVA.  start 

ed on PTOT and recommended home with physical therapy.  Otherwise patient is 

stable to be discharged home and follow with neurology clinic.  EEG was ordered 

and done.  Follow-up as an outpatient.





Discharge physical examination was done


Patient Condition at Discharge: Stable





Plan - Discharge Summary


New Discharge Prescriptions: 


Continue


   Glimepiride [Amaryl] 1 mg PO AC-BRKFST


   amLODIPine [Norvasc] 5 mg PO DAILY


   Simvastatin [Zocor] 80 mg PO HS


   Metoprolol Succinate (ER) [Toprol XL] 50 mg PO BID


   Aspirin EC [Ecotrin Low Dose] 81 mg PO HS


   metFORMIN  mg PO BID


   buPROPion XL [Wellbutrin XL] 300 mg PO DAILY


   Ergocalciferol (Vitamin D2) [Vitamin D2] 50,000 unit PO MINAYA


   Ranitidine HCl [Zantac] 150 mg PO DAILY


   Cholecalciferol (Vitamin D3) [Vitamin D3] 2,000 unit PO DAILY


   Ferrous Sulfate [Iron] 325 mg PO DAILY


   Magnesium Oxide [Mag-Ox] 400 mg PO DAILY


   Quinapril HCl [Accupril] 10 mg PO DAILY





Discontinued


   Hydrochlorothiazide [Hydrodiuril] 50 mg PO DAILY


   Potassium Chloride [Klor-Con 10] 10 meq PO HS


   Niacin [Slo-Niacin] 750 mg PO HS


Discharge Medication List





Aspirin EC [Ecotrin Low Dose] 81 mg PO HS 08/23/14 [History]


Glimepiride [Amaryl] 1 mg PO AC-BRKFST 08/23/14 [History]


Metoprolol Succinate (ER) [Toprol XL] 50 mg PO BID 08/23/14 [History]


Simvastatin [Zocor] 80 mg PO HS 08/23/14 [History]


amLODIPine [Norvasc] 5 mg PO DAILY 08/23/14 [History]


buPROPion XL [Wellbutrin XL] 300 mg PO DAILY 08/23/14 [History]


metFORMIN  mg PO BID 08/23/14 [History]


Cholecalciferol (Vitamin D3) [Vitamin D3] 2,000 unit PO DAILY 10/21/17 [History]


Ergocalciferol (Vitamin D2) [Vitamin D2] 50,000 unit PO MINAYA 10/21/17 [History]


Ferrous Sulfate [Iron] 325 mg PO DAILY 10/21/17 [History]


Ranitidine HCl [Zantac] 150 mg PO DAILY 10/21/17 [History]


Magnesium Oxide [Mag-Ox] 400 mg PO DAILY 10/22/17 [History]


Quinapril HCl [Accupril] 10 mg PO DAILY 10/22/17 [History]








Follow up Appointment(s)/Referral(s): 


Lemuel Ortiz MD [Primary Care Provider] - 10/25/17 11:10 am


Bethany Landon MD [STAFF PHYSICIAN] - 1 Week (The office will call you with 

appointment date/time.)


Patient Instructions/Handouts:  Stroke (DC)


Discharge Disposition: HOME WITH HOME HEALTH SERVICES

## 2017-11-03 NOTE — EEG
ELECTROENCEPHALOGRAM REPORT



DATE OF EEG:

10/23/2017



REFERRING PHYSICIAN:

Dr. Brewster.



ELECTROENCEPHALOGRAPHIC EXAMINATION REPORT:



INDICATION FOR EXAMINATION:

This patient is a 77-year-old male being evaluated for dizziness and gait ataxia for

over 1 month.  The patient is being evaluated for possible stroke.



AGE: 77.



EEG FINDINGS:

A routine 21 channel awake digital EEG recording was accomplished utilizing the 10 - 20

international system with bipolar and referential montages.  The background activity in

the most alert resting state consists of a low to medium amplitude, fairly well-

developed and well-sustained 7 hertz activity over the posterior head regions.  This

posterior rhythm attenuates to eye opening.  There is a small amount of low amplitude

18 - 20 Hz beta activity seen maximally over the anterior head regions.  Muscle and

movement artifact was observed on a few occasions during the tracing.



Hyperventilation was not performed.  Photic stimulation at flash frequencies of 2 - 30

Hz produced a good symmetrical occipital driving response.  No epileptiform discharges

were seen.



IMPRESSION:

This EEG is within normal limits for the patient's age.  The EEG failed to reveal any

focal, lateralized, or epileptiform abnormalities.  Clinical correlation is

recommended.





MMODL / IJN: [id number] / Job#: [id number]

## 2018-01-05 ENCOUNTER — HOSPITAL ENCOUNTER (OUTPATIENT)
Dept: HOSPITAL 47 - LABWHC1 | Age: 78
Discharge: HOME | End: 2018-01-05
Attending: PHYSICIAN ASSISTANT
Payer: MEDICARE

## 2018-01-05 ENCOUNTER — HOSPITAL ENCOUNTER (OUTPATIENT)
Dept: HOSPITAL 47 - RADUSWWP | Age: 78
Discharge: HOME | End: 2018-01-05
Payer: MEDICARE

## 2018-01-05 DIAGNOSIS — N18.3: Primary | ICD-10-CM

## 2018-01-05 DIAGNOSIS — L89.95: ICD-10-CM

## 2018-01-05 DIAGNOSIS — N28.89: ICD-10-CM

## 2018-01-05 DIAGNOSIS — E11.9: Primary | ICD-10-CM

## 2018-01-05 LAB
ALBUMIN SERPL-MCNC: 3.7 G/DL (ref 3.5–5)
ALP SERPL-CCNC: 92 U/L (ref 38–126)
ALT SERPL-CCNC: 45 U/L (ref 21–72)
ANION GAP SERPL CALC-SCNC: 10 MMOL/L
AST SERPL-CCNC: 23 U/L (ref 17–59)
BASOPHILS # BLD AUTO: 0 K/UL (ref 0–0.2)
BASOPHILS NFR BLD AUTO: 0 %
BUN SERPL-SCNC: 42 MG/DL (ref 9–20)
CALCIUM SPEC-MCNC: 9.6 MG/DL (ref 8.4–10.2)
CHLORIDE SERPL-SCNC: 94 MMOL/L (ref 98–107)
CO2 SERPL-SCNC: 31 MMOL/L (ref 22–30)
EOSINOPHIL # BLD AUTO: 0.2 K/UL (ref 0–0.7)
EOSINOPHIL NFR BLD AUTO: 3 %
ERYTHROCYTE [DISTWIDTH] IN BLOOD BY AUTOMATED COUNT: 3.43 M/UL (ref 4.3–5.9)
ERYTHROCYTE [DISTWIDTH] IN BLOOD: 14.1 % (ref 11.5–15.5)
GLUCOSE SERPL-MCNC: 298 MG/DL (ref 74–99)
HCT VFR BLD AUTO: 30.8 % (ref 39–53)
HGB BLD-MCNC: 10 GM/DL (ref 13–17.5)
LYMPHOCYTES # SPEC AUTO: 1 K/UL (ref 1–4.8)
LYMPHOCYTES NFR SPEC AUTO: 17 %
MCH RBC QN AUTO: 29.2 PG (ref 25–35)
MCHC RBC AUTO-ENTMCNC: 32.5 G/DL (ref 31–37)
MCV RBC AUTO: 89.8 FL (ref 80–100)
MONOCYTES # BLD AUTO: 0.4 K/UL (ref 0–1)
MONOCYTES NFR BLD AUTO: 6 %
NEUTROPHILS # BLD AUTO: 4.6 K/UL (ref 1.3–7.7)
NEUTROPHILS NFR BLD AUTO: 73 %
PLATELET # BLD AUTO: 180 K/UL (ref 150–450)
POTASSIUM SERPL-SCNC: 4 MMOL/L (ref 3.5–5.1)
PROT SERPL-MCNC: 6.1 G/DL (ref 6.3–8.2)
SODIUM SERPL-SCNC: 135 MMOL/L (ref 137–145)
WBC # BLD AUTO: 6.2 K/UL (ref 3.8–10.6)

## 2018-01-05 PROCEDURE — 85652 RBC SED RATE AUTOMATED: CPT

## 2018-01-05 PROCEDURE — 85025 COMPLETE CBC W/AUTO DIFF WBC: CPT

## 2018-01-05 PROCEDURE — 80053 COMPREHEN METABOLIC PANEL: CPT

## 2018-01-05 PROCEDURE — 83036 HEMOGLOBIN GLYCOSYLATED A1C: CPT

## 2018-01-05 PROCEDURE — 76770 US EXAM ABDO BACK WALL COMP: CPT

## 2018-01-05 PROCEDURE — 36415 COLL VENOUS BLD VENIPUNCTURE: CPT

## 2018-01-05 NOTE — US
EXAMINATION TYPE: US kidneys/renal and bladder

 

DATE OF EXAM: 1/5/2018

 

COMPARISON: US

 

CLINICAL HISTORY: CKD Stage 3 N18.3. CKD

 

EXAM MEASUREMENTS:

 

Right Kidney:  9.6 x 5.2 x 5.1 cm

Left Kidney: 11.6 x 5.5 x 5.6 cm

 

 

**Difficult scan, pt in alot of pain and had to be scanned sitting upright**

 

Right Kidney: Cortical thinning  

Left Kidney: Cortical thinning, lower pole gassed out  

Bladder: wnl

**Bilateral Jets seen: No, only right jet visualized

 

 

There is no evidence for hydronephrosis at this point in time.  No nephrolithiasis is seen.  No german
s are identified.  The urinary bladder is anechoic.  Bilateral ureteral jets are seen.

 

 

 

IMPRESSION:

Bilateral renal cortical thinning.

## 2018-01-06 LAB — HBA1C MFR BLD: 7.4 % (ref 4–6)

## 2018-01-16 ENCOUNTER — HOSPITAL ENCOUNTER (OUTPATIENT)
Dept: HOSPITAL 47 - LABWHC1 | Age: 78
Discharge: HOME | End: 2018-01-16
Attending: NURSE PRACTITIONER
Payer: MEDICARE

## 2018-01-16 DIAGNOSIS — E83.42: ICD-10-CM

## 2018-01-16 DIAGNOSIS — N39.0: ICD-10-CM

## 2018-01-16 DIAGNOSIS — M10.9: ICD-10-CM

## 2018-01-16 DIAGNOSIS — D50.9: ICD-10-CM

## 2018-01-16 DIAGNOSIS — N25.81: ICD-10-CM

## 2018-01-16 DIAGNOSIS — E11.22: Primary | ICD-10-CM

## 2018-01-16 DIAGNOSIS — N18.3: ICD-10-CM

## 2018-01-16 LAB
ANION GAP SERPL CALC-SCNC: 9 MMOL/L
BASOPHILS # BLD AUTO: 0 K/UL (ref 0–0.2)
BASOPHILS NFR BLD AUTO: 0 %
BUN SERPL-SCNC: 27 MG/DL (ref 9–20)
CALCIUM SPEC-MCNC: 9.3 MG/DL (ref 8.4–10.2)
CHLORIDE SERPL-SCNC: 96 MMOL/L (ref 98–107)
CO2 SERPL-SCNC: 30 MMOL/L (ref 22–30)
EOSINOPHIL # BLD AUTO: 0.2 K/UL (ref 0–0.7)
EOSINOPHIL NFR BLD AUTO: 5 %
ERYTHROCYTE [DISTWIDTH] IN BLOOD BY AUTOMATED COUNT: 3.35 M/UL (ref 4.3–5.9)
ERYTHROCYTE [DISTWIDTH] IN BLOOD: 14.2 % (ref 11.5–15.5)
GLUCOSE SERPL-MCNC: 266 MG/DL (ref 74–99)
GLUCOSE UR QL: (no result)
HBA1C MFR BLD: 7.8 % (ref 4–6)
HCT VFR BLD AUTO: 29.9 % (ref 39–53)
HGB BLD-MCNC: 9.7 GM/DL (ref 13–17.5)
LYMPHOCYTES # SPEC AUTO: 0.6 K/UL (ref 1–4.8)
LYMPHOCYTES NFR SPEC AUTO: 17 %
MAGNESIUM SPEC-SCNC: 1.4 MG/DL (ref 1.6–2.3)
MCH RBC QN AUTO: 29 PG (ref 25–35)
MCHC RBC AUTO-ENTMCNC: 32.5 G/DL (ref 31–37)
MCV RBC AUTO: 89.3 FL (ref 80–100)
MONOCYTES # BLD AUTO: 0.3 K/UL (ref 0–1)
MONOCYTES NFR BLD AUTO: 7 %
NEUTROPHILS # BLD AUTO: 2.5 K/UL (ref 1.3–7.7)
NEUTROPHILS NFR BLD AUTO: 68 %
PH UR: 5.5 [PH] (ref 5–8)
PLATELET # BLD AUTO: 261 K/UL (ref 150–450)
POTASSIUM SERPL-SCNC: 4 MMOL/L (ref 3.5–5.1)
PTH-INTACT SERPL-MCNC: 31.1 PG/ML (ref 14–72)
SODIUM SERPL-SCNC: 135 MMOL/L (ref 137–145)
SP GR UR: 1.01 (ref 1–1.03)
URATE SERPL-MCNC: 6.5 MG/DL (ref 3.5–8.5)
UROBILINOGEN UR QL STRIP: <2 MG/DL (ref ?–2)
WBC # BLD AUTO: 3.7 K/UL (ref 3.8–10.6)

## 2018-01-16 PROCEDURE — 84100 ASSAY OF PHOSPHORUS: CPT

## 2018-01-16 PROCEDURE — 36415 COLL VENOUS BLD VENIPUNCTURE: CPT

## 2018-01-16 PROCEDURE — 85025 COMPLETE CBC W/AUTO DIFF WBC: CPT

## 2018-01-16 PROCEDURE — 83540 ASSAY OF IRON: CPT

## 2018-01-16 PROCEDURE — 83550 IRON BINDING TEST: CPT

## 2018-01-16 PROCEDURE — 83735 ASSAY OF MAGNESIUM: CPT

## 2018-01-16 PROCEDURE — 83970 ASSAY OF PARATHORMONE: CPT

## 2018-01-16 PROCEDURE — 84550 ASSAY OF BLOOD/URIC ACID: CPT

## 2018-01-16 PROCEDURE — 80048 BASIC METABOLIC PNL TOTAL CA: CPT

## 2018-01-16 PROCEDURE — 81003 URINALYSIS AUTO W/O SCOPE: CPT

## 2018-01-16 PROCEDURE — 82728 ASSAY OF FERRITIN: CPT

## 2018-01-16 PROCEDURE — 83036 HEMOGLOBIN GLYCOSYLATED A1C: CPT

## 2018-01-16 PROCEDURE — 82306 VITAMIN D 25 HYDROXY: CPT

## 2018-04-04 ENCOUNTER — HOSPITAL ENCOUNTER (OUTPATIENT)
Dept: HOSPITAL 47 - LABWHC1 | Age: 78
Discharge: HOME | End: 2018-04-04
Attending: NURSE PRACTITIONER
Payer: MEDICARE

## 2018-04-04 DIAGNOSIS — N18.3: Primary | ICD-10-CM

## 2018-04-04 LAB
ANION GAP SERPL CALC-SCNC: 12 MMOL/L
BUN SERPL-SCNC: 26 MG/DL (ref 9–20)
CALCIUM SPEC-MCNC: 9.3 MG/DL (ref 8.4–10.2)
CHLORIDE SERPL-SCNC: 101 MMOL/L (ref 98–107)
CO2 SERPL-SCNC: 28 MMOL/L (ref 22–30)
GLUCOSE SERPL-MCNC: 181 MG/DL (ref 74–99)
POTASSIUM SERPL-SCNC: 4 MMOL/L (ref 3.5–5.1)
SODIUM SERPL-SCNC: 141 MMOL/L (ref 137–145)

## 2018-04-04 PROCEDURE — 36415 COLL VENOUS BLD VENIPUNCTURE: CPT

## 2018-04-04 PROCEDURE — 80048 BASIC METABOLIC PNL TOTAL CA: CPT

## 2018-06-19 ENCOUNTER — HOSPITAL ENCOUNTER (OUTPATIENT)
Dept: HOSPITAL 47 - LABWHC1 | Age: 78
Discharge: HOME | End: 2018-06-19
Attending: NURSE PRACTITIONER
Payer: MEDICARE

## 2018-06-19 DIAGNOSIS — D50.9: ICD-10-CM

## 2018-06-19 DIAGNOSIS — E11.22: ICD-10-CM

## 2018-06-19 DIAGNOSIS — E83.42: ICD-10-CM

## 2018-06-19 DIAGNOSIS — M10.9: Primary | ICD-10-CM

## 2018-06-19 DIAGNOSIS — N18.3: ICD-10-CM

## 2018-06-19 DIAGNOSIS — N25.81: ICD-10-CM

## 2018-06-19 DIAGNOSIS — N39.0: ICD-10-CM

## 2018-06-19 LAB
ANION GAP SERPL CALC-SCNC: 12 MMOL/L
BASOPHILS # BLD AUTO: 0 K/UL (ref 0–0.2)
BASOPHILS NFR BLD AUTO: 1 %
BUN SERPL-SCNC: 39 MG/DL (ref 9–20)
CALCIUM SPEC-MCNC: 9.4 MG/DL (ref 8.4–10.2)
CHLORIDE SERPL-SCNC: 100 MMOL/L (ref 98–107)
CO2 SERPL-SCNC: 28 MMOL/L (ref 22–30)
EOSINOPHIL # BLD AUTO: 0.1 K/UL (ref 0–0.7)
EOSINOPHIL NFR BLD AUTO: 3 %
ERYTHROCYTE [DISTWIDTH] IN BLOOD BY AUTOMATED COUNT: 3.52 M/UL (ref 4.3–5.9)
ERYTHROCYTE [DISTWIDTH] IN BLOOD: 13.3 % (ref 11.5–15.5)
GLUCOSE SERPL-MCNC: 166 MG/DL (ref 74–99)
HBA1C MFR BLD: 7.6 % (ref 4–6)
HCT VFR BLD AUTO: 31.4 % (ref 39–53)
HGB BLD-MCNC: 10.6 GM/DL (ref 13–17.5)
LYMPHOCYTES # SPEC AUTO: 1.1 K/UL (ref 1–4.8)
LYMPHOCYTES NFR SPEC AUTO: 22 %
MAGNESIUM SPEC-SCNC: 2 MG/DL (ref 1.6–2.3)
MCH RBC QN AUTO: 30.3 PG (ref 25–35)
MCHC RBC AUTO-ENTMCNC: 33.9 G/DL (ref 31–37)
MCV RBC AUTO: 89.4 FL (ref 80–100)
MONOCYTES # BLD AUTO: 0.3 K/UL (ref 0–1)
MONOCYTES NFR BLD AUTO: 7 %
NEUTROPHILS # BLD AUTO: 3.2 K/UL (ref 1.3–7.7)
NEUTROPHILS NFR BLD AUTO: 66 %
PH UR: 7 [PH] (ref 5–8)
PLATELET # BLD AUTO: 230 K/UL (ref 150–450)
POTASSIUM SERPL-SCNC: 4.1 MMOL/L (ref 3.5–5.1)
PTH-INTACT SERPL-MCNC: 40.4 PG/ML (ref 14–72)
SODIUM SERPL-SCNC: 140 MMOL/L (ref 137–145)
SP GR UR: 1.01 (ref 1–1.03)
URATE SERPL-MCNC: 7 MG/DL (ref 3.5–8.5)
UROBILINOGEN UR QL STRIP: <2 MG/DL (ref ?–2)
WBC # BLD AUTO: 4.8 K/UL (ref 3.8–10.6)

## 2018-06-19 PROCEDURE — 80048 BASIC METABOLIC PNL TOTAL CA: CPT

## 2018-06-19 PROCEDURE — 83970 ASSAY OF PARATHORMONE: CPT

## 2018-06-19 PROCEDURE — 83540 ASSAY OF IRON: CPT

## 2018-06-19 PROCEDURE — 81003 URINALYSIS AUTO W/O SCOPE: CPT

## 2018-06-19 PROCEDURE — 83735 ASSAY OF MAGNESIUM: CPT

## 2018-06-19 PROCEDURE — 82306 VITAMIN D 25 HYDROXY: CPT

## 2018-06-19 PROCEDURE — 84550 ASSAY OF BLOOD/URIC ACID: CPT

## 2018-06-19 PROCEDURE — 85025 COMPLETE CBC W/AUTO DIFF WBC: CPT

## 2018-06-19 PROCEDURE — 82728 ASSAY OF FERRITIN: CPT

## 2018-06-19 PROCEDURE — 84100 ASSAY OF PHOSPHORUS: CPT

## 2018-06-19 PROCEDURE — 83036 HEMOGLOBIN GLYCOSYLATED A1C: CPT

## 2018-06-19 PROCEDURE — 83550 IRON BINDING TEST: CPT

## 2018-06-19 PROCEDURE — 36415 COLL VENOUS BLD VENIPUNCTURE: CPT

## 2018-07-05 ENCOUNTER — HOSPITAL ENCOUNTER (OUTPATIENT)
Dept: HOSPITAL 47 - RADXRMAIN | Age: 78
Discharge: HOME | End: 2018-07-05
Attending: PHYSICIAN ASSISTANT
Payer: MEDICARE

## 2018-07-05 DIAGNOSIS — S20.212A: Primary | ICD-10-CM

## 2018-07-05 PROCEDURE — 71046 X-RAY EXAM CHEST 2 VIEWS: CPT

## 2018-07-05 NOTE — XR
EXAMINATION TYPE: XR chest 2V

 

DATE OF EXAM: 7/5/2018

 

COMPARISON: Prior dated 10/21/2017

 

HISTORY: Chest contusion, trauma and pain

 

TECHNIQUE:  Frontal and lateral views of the chest are obtained.

 

FINDINGS:  There is no focal air space opacity, pleural effusion, or pneumothorax seen.  The cardiac 
silhouette size is within normal limits.   The osseous structures are intact. Calcified granuloma aga
in noted left upper lobe. Flowing anterior osteophytes in the anterior margin of the thoracic spine s
uggests diffuse idiopathic skeletal hyperostosis.

 

IMPRESSION:  No acute cardiopulmonary process.

## 2018-10-27 ENCOUNTER — HOSPITAL ENCOUNTER (EMERGENCY)
Dept: HOSPITAL 47 - EC | Age: 78
Discharge: HOME | End: 2018-10-27
Payer: MEDICARE

## 2018-10-27 VITALS — RESPIRATION RATE: 19 BRPM | HEART RATE: 75 BPM | DIASTOLIC BLOOD PRESSURE: 82 MMHG | SYSTOLIC BLOOD PRESSURE: 144 MMHG

## 2018-10-27 VITALS — TEMPERATURE: 98.7 F

## 2018-10-27 DIAGNOSIS — Z82.3: ICD-10-CM

## 2018-10-27 DIAGNOSIS — E78.5: ICD-10-CM

## 2018-10-27 DIAGNOSIS — G31.9: ICD-10-CM

## 2018-10-27 DIAGNOSIS — S01.01XA: Primary | ICD-10-CM

## 2018-10-27 DIAGNOSIS — Z91.048: ICD-10-CM

## 2018-10-27 DIAGNOSIS — I10: ICD-10-CM
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
## 2018-10-27 DIAGNOSIS — I25.2: ICD-10-CM

## 2018-10-27 DIAGNOSIS — Z87.891: ICD-10-CM

## 2018-10-27 DIAGNOSIS — Z79.84: ICD-10-CM

## 2018-10-27 DIAGNOSIS — Z79.899: ICD-10-CM

## 2018-10-27 DIAGNOSIS — E11.9: ICD-10-CM
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
## 2018-10-27 DIAGNOSIS — Z95.5: ICD-10-CM

## 2018-10-27 DIAGNOSIS — Z79.82: ICD-10-CM

## 2018-10-27 PROCEDURE — 99284 EMERGENCY DEPT VISIT MOD MDM: CPT

## 2018-10-27 PROCEDURE — 12001 RPR S/N/AX/GEN/TRNK 2.5CM/<: CPT

## 2018-10-27 PROCEDURE — 70450 CT HEAD/BRAIN W/O DYE: CPT

## 2018-10-27 NOTE — ED
General Adult HPI





- General


Chief complaint: Head Injury


Stated complaint: Fall


Time Seen by Provider: 10/27/18 19:19


Source: patient, family


Mode of arrival: wheelchair


Limitations: no limitations





- History of Present Illness


Initial comments: 





Patient is a 78-year-old male presenting for head trauma.  He states that 

approximately 5:10 PM, he was grocery shopping and loading stuff into his car.  

He was using his walker when he had it get stuck on the concrete and he fell 

into the grass.  However, he fell to the side and the left part of his head 

struck a fire hydrant.  There was no loss of consciousness and he also denies 

any headache, nausea/vomiting and states that his wife encouraged him to come 

here.





- Related Data


 Home Medications











 Medication  Instructions  Recorded  Confirmed


 


Aspirin EC [Ecotrin Low Dose] 81 mg PO HS 08/23/14 10/27/18


 


Simvastatin [Zocor] 80 mg PO HS 08/23/14 10/27/18


 


amLODIPine [Norvasc] 5 mg PO QAM 08/23/14 10/27/18


 


buPROPion XL [Wellbutrin XL] 300 mg PO QAM 08/23/14 10/27/18


 


Cholecalciferol (Vitamin D3) 2,000 unit PO DAILY 10/21/17 10/27/18





[Vitamin D3]   


 


Ergocalciferol (Vitamin D2) 50,000 unit PO MINAYA 10/21/17 10/27/18





[Vitamin D2]   


 


Ranitidine HCl [Zantac] 150 mg PO QAM 10/21/17 10/27/18


 


Magnesium Oxide [Mag-Ox] 400 mg PO DAILY 10/22/17 10/27/18


 


Quinapril HCl [Accupril] 10 mg PO QAM 10/22/17 10/27/18


 


Glimepiride [Amaryl] 6 mg PO QAM 12/18/17 10/27/18


 


Hydrochlorothiazide [Hydrodiuril] 50 mg PO QAM 12/18/17 10/27/18


 


Niacin [Slo-Niacin] 750 mg PO HS 12/18/17 10/27/18


 


Potassium Chloride [K-Tab ER] 10 meq PO HS 12/18/17 10/27/18


 


Gabapentin [Neurontin] 300 mg PO DAILY 01/26/18 10/27/18


 


Metoprolol Succinate (ER) [Toprol 50 mg PO BID 01/26/18 10/27/18





Xl]   








 Previous Rx's











 Medication  Instructions  Recorded


 


HYDROcodone/APAP 5-325MG [Norco 5] 1 - 2 each PO Q4-6H PRN #60 tab 12/20/17











 Allergies











Allergy/AdvReac Type Severity Reaction Status Date / Time


 


iodine Allergy  Rash/Hives Verified 10/27/18 19:16














Review of Systems


ROS Statement: 


Those systems with pertinent positive or pertinent negative responses have been 

documented in the HPI.


Constitutional: Negative for chills, fatigue and fever.





HENT: Negative for congestion. 





Respiratory: Negative for chest tightness, shortness of breath and wheezing. 

Negative for cough





Cardiovascular: Negative for chest pain and palpitations.





Gastrointestinal: Negative for abdominal pain. Negative for abdominal distention

, diarrhea, nausea and vomiting.





Genitourinary: Negative for dysuria.





Musculoskeletal: Negative for back pain, neck pain and neck stiffness.





Skin: Positive for wound and color change.





Neurological: Negative for dizziness, speech difficulty, weakness and light-

headedness.  Negative for headache





Psychiatric/Behavioral: Negative for agitation and confusion. Negative for 

anxiety


ROS Other: All systems not noted in ROS Statement are negative.





Past Medical History


Past Medical History: Blood Disorder, Diabetes Mellitus, Hyperlipidemia, 

Hypertension, Myocardial Infarction (MI), Renal Disease


Additional Past Medical History / Comment(s): ANEMIA.


Last Myocardial Infarction Date:: Nov 2009


History of Any Multi-Drug Resistant Organisms: None Reported


Past Surgical History: Heart Catheterization With Stent, Joint Replacement


Past Anesthesia/Blood Transfusion Reactions: No Reported Reaction


Date of Last Stent Placement:: 2012


Past Psychological History: No Psychological Hx Reported


Smoking Status: Former smoker


Past Alcohol Use History: None Reported


Past Drug Use History: None Reported





- Past Family History


  ** Father


Family Medical History: Myocardial Infarction (MI)





  ** Mother


Family Medical History: Coronary Artery Disease (CAD), CVA/TIA





General Exam





- General Exam Comments


Initial Comments: 





Constitutional: Pt is oriented to person, place, and time. Pt appears well-

developed and well-nourished. No distress.





HENT:





Head: Normocephalic with a 2 cm diameter hematoma on the left parietal 

occipital region.  There is a 1 cm laceration in the center of the hematoma.





Eyes: EOM are normal.  Pupils equal bilaterally measuring 3 mm and reactive





Neck: Normal range of motion. Neck supple.





Cardiovascular: Normal rate, regular rhythm, S1 normal, S2 normal and normal 

heart sounds.  Exam reveals no gallop and no friction rub. No murmur heard.





Pulmonary/Chest: Effort normal and breath sounds normal. No tachypnea and no 

bradypnea. No respiratory distress. No wheezes or rales noted.





Abdominal: Soft. Bowel sounds are normal. Pt exhibits no shifting dullness, no 

distension, no pulsatile liver, no fluid wave, no abdominal bruit and no 

ascites. There is no tenderness. There is no rigidity, no rebound, no guarding, 

no tenderness at McBurney's point and negative Heller's sign.





Musculoskeletal: Normal range of motion.





Neurological: Pt is alert and oriented to person, place, and time. No cranial 

nerve deficit.





Skin: Skin is warm and dry. No rash noted. Pt is not diaphoretic. No erythema. 

No pallor.





Psychiatric: Pt has a normal mood and affect. Pt behavior is normal. Thought 

content normal.


Limitations: no limitations





Course


 Vital Signs











  10/27/18 10/27/18





  19:10 21:25


 


Temperature 98.7 F 


 


Pulse Rate 76 75


 


Respiratory 20 19





Rate  


 


Blood Pressure 105/58 144/82


 


O2 Sat by Pulse 100 100





Oximetry  














Procedures





- Laceration


  ** Laceration #1


Time Out Performed: Yes


Indication: laceration


Site: scalp


Description: linear


Depth: simple, single layer


Sedation/Analgesia: none


Anesthetic Used: lidocaine 1%


Anesthesia Technique: local infiltration


Amount (mls): 2


Pre-repair: wound explored, irrigated extensively


Type of Sutures: other (staples)


Number of Sutures: 4


Patient Tolerated Procedure: well





Medical Decision Making





- Medical Decision Making





CT of the head was performed which showed no evidence of emergent pathology 

although did show atrophy consistent with patient's advanced age.  CT of the 

neck was not performed as the patient had no tenderness or pain.  Wound was 

repaired as noted in the procedure section of this note and patient and wife 

were counseled to look out for signs and symptoms including but not limited to 

altered mental status, nausea and vomiting or worsening headache and that if 

these occur, they should return to emergency department for further evaluation.

  They're also advised to follow up with PCP in next 1-2 days which they were 

agreeable to.





Disposition


Clinical Impression: 


 Closed head injury, Scalp laceration





Disposition: HOME SELF-CARE


Condition: Good


Instructions:  Fall Prevention (ED)


Is patient prescribed a controlled substance at d/c from ED?: No


Referrals: 


Lemuel Ortiz MD [Primary Care Provider] - 1-2 days


Time of Disposition: 21:10

## 2018-10-27 NOTE — CT
EXAMINATION TYPE: CT brain wo con

 

DATE OF EXAM: 10/27/2018

 

COMPARISON: 10/21/2017

 

HISTORY: Fall.

 

CT DLP: 1157.4 mGycm

Automated exposure control for dose reduction was used.

 

FINDINGS: 

There is cerebral cortical atrophy. There is no mass effect nor midline shift. There is no sign of in
tracranial hemorrhage. There is patchy hypodensity in the periventricular white matter. The calvarium
 is intact.

 

IMPRESSION: 

CEREBRAL ATROPHY AND CHRONIC SMALL VESSEL ISCHEMIA. NO CHANGE. NO ACUTE ABNORMALITY.

## 2018-12-21 ENCOUNTER — HOSPITAL ENCOUNTER (OUTPATIENT)
Dept: HOSPITAL 47 - EC | Age: 78
Setting detail: OBSERVATION
LOS: 3 days | Discharge: HOME HEALTH | End: 2018-12-24
Attending: HOSPITALIST | Admitting: HOSPITALIST
Payer: MEDICARE

## 2018-12-21 VITALS — BODY MASS INDEX: 27 KG/M2

## 2018-12-21 DIAGNOSIS — Z82.3: ICD-10-CM
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
## 2018-12-21 DIAGNOSIS — I10: ICD-10-CM

## 2018-12-21 DIAGNOSIS — Z98.41: ICD-10-CM

## 2018-12-21 DIAGNOSIS — M25.512: ICD-10-CM

## 2018-12-21 DIAGNOSIS — N28.9: ICD-10-CM

## 2018-12-21 DIAGNOSIS — E66.9: ICD-10-CM

## 2018-12-21 DIAGNOSIS — Z91.048: ICD-10-CM

## 2018-12-21 DIAGNOSIS — I25.10: ICD-10-CM

## 2018-12-21 DIAGNOSIS — M19.011: ICD-10-CM

## 2018-12-21 DIAGNOSIS — Z91.81: ICD-10-CM

## 2018-12-21 DIAGNOSIS — E11.9: ICD-10-CM

## 2018-12-21 DIAGNOSIS — M19.012: ICD-10-CM

## 2018-12-21 DIAGNOSIS — Z95.5: ICD-10-CM

## 2018-12-21 DIAGNOSIS — Z79.899: ICD-10-CM

## 2018-12-21 DIAGNOSIS — Z96.1: ICD-10-CM

## 2018-12-21 DIAGNOSIS — E78.5: ICD-10-CM

## 2018-12-21 DIAGNOSIS — E86.0: ICD-10-CM

## 2018-12-21 DIAGNOSIS — I25.2: ICD-10-CM

## 2018-12-21 DIAGNOSIS — Z87.891: ICD-10-CM

## 2018-12-21 DIAGNOSIS — M79.18: Primary | ICD-10-CM

## 2018-12-21 DIAGNOSIS — Z79.4: ICD-10-CM

## 2018-12-21 DIAGNOSIS — Z82.49: ICD-10-CM

## 2018-12-21 DIAGNOSIS — R29.6: ICD-10-CM

## 2018-12-21 DIAGNOSIS — D64.9: ICD-10-CM
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
## 2018-12-21 DIAGNOSIS — Z79.82: ICD-10-CM

## 2018-12-21 DIAGNOSIS — S00.81XA: ICD-10-CM

## 2018-12-21 DIAGNOSIS — Z98.42: ICD-10-CM

## 2018-12-21 DIAGNOSIS — N17.9: ICD-10-CM

## 2018-12-21 DIAGNOSIS — M25.511: ICD-10-CM

## 2018-12-21 LAB
ALBUMIN SERPL-MCNC: 3.9 G/DL (ref 3.5–5)
ALP SERPL-CCNC: 97 U/L (ref 38–126)
ALT SERPL-CCNC: 34 U/L (ref 21–72)
ANION GAP SERPL CALC-SCNC: 11 MMOL/L
AST SERPL-CCNC: 30 U/L (ref 17–59)
BASOPHILS # BLD AUTO: 0 K/UL (ref 0–0.2)
BASOPHILS NFR BLD AUTO: 0 %
BUN SERPL-SCNC: 50 MG/DL (ref 9–20)
CALCIUM SPEC-MCNC: 9.2 MG/DL (ref 8.4–10.2)
CHLORIDE SERPL-SCNC: 103 MMOL/L (ref 98–107)
CO2 SERPL-SCNC: 23 MMOL/L (ref 22–30)
EOSINOPHIL # BLD AUTO: 0.2 K/UL (ref 0–0.7)
EOSINOPHIL NFR BLD AUTO: 2 %
ERYTHROCYTE [DISTWIDTH] IN BLOOD BY AUTOMATED COUNT: 3.64 M/UL (ref 4.3–5.9)
ERYTHROCYTE [DISTWIDTH] IN BLOOD: 13.7 % (ref 11.5–15.5)
GLUCOSE BLD-MCNC: 198 MG/DL (ref 75–99)
GLUCOSE SERPL-MCNC: 176 MG/DL (ref 74–99)
HCT VFR BLD AUTO: 32.1 % (ref 39–53)
HGB BLD-MCNC: 10.7 GM/DL (ref 13–17.5)
INR PPP: 1 (ref ?–1.2)
LYMPHOCYTES # SPEC AUTO: 1 K/UL (ref 1–4.8)
LYMPHOCYTES NFR SPEC AUTO: 12 %
MCH RBC QN AUTO: 29.3 PG (ref 25–35)
MCHC RBC AUTO-ENTMCNC: 33.3 G/DL (ref 31–37)
MCV RBC AUTO: 88.2 FL (ref 80–100)
MONOCYTES # BLD AUTO: 0.6 K/UL (ref 0–1)
MONOCYTES NFR BLD AUTO: 7 %
NEUTROPHILS # BLD AUTO: 6.3 K/UL (ref 1.3–7.7)
NEUTROPHILS NFR BLD AUTO: 76 %
PLATELET # BLD AUTO: 230 K/UL (ref 150–450)
POTASSIUM SERPL-SCNC: 4.1 MMOL/L (ref 3.5–5.1)
PROT SERPL-MCNC: 6.7 G/DL (ref 6.3–8.2)
PT BLD: 11.1 SEC (ref 9–12)
SODIUM SERPL-SCNC: 137 MMOL/L (ref 137–145)
WBC # BLD AUTO: 8.4 K/UL (ref 3.8–10.6)

## 2018-12-21 PROCEDURE — 83036 HEMOGLOBIN GLYCOSYLATED A1C: CPT

## 2018-12-21 PROCEDURE — 96375 TX/PRO/DX INJ NEW DRUG ADDON: CPT

## 2018-12-21 PROCEDURE — 72125 CT NECK SPINE W/O DYE: CPT

## 2018-12-21 PROCEDURE — 96374 THER/PROPH/DIAG INJ IV PUSH: CPT

## 2018-12-21 PROCEDURE — 71250 CT THORAX DX C-: CPT

## 2018-12-21 PROCEDURE — 93005 ELECTROCARDIOGRAM TRACING: CPT

## 2018-12-21 PROCEDURE — 80048 BASIC METABOLIC PNL TOTAL CA: CPT

## 2018-12-21 PROCEDURE — 80053 COMPREHEN METABOLIC PANEL: CPT

## 2018-12-21 PROCEDURE — 96372 THER/PROPH/DIAG INJ SC/IM: CPT

## 2018-12-21 PROCEDURE — 73030 X-RAY EXAM OF SHOULDER: CPT

## 2018-12-21 PROCEDURE — 85610 PROTHROMBIN TIME: CPT

## 2018-12-21 PROCEDURE — 96361 HYDRATE IV INFUSION ADD-ON: CPT

## 2018-12-21 PROCEDURE — 97163 PT EVAL HIGH COMPLEX 45 MIN: CPT

## 2018-12-21 PROCEDURE — 99285 EMERGENCY DEPT VISIT HI MDM: CPT

## 2018-12-21 PROCEDURE — 97116 GAIT TRAINING THERAPY: CPT

## 2018-12-21 PROCEDURE — 36415 COLL VENOUS BLD VENIPUNCTURE: CPT

## 2018-12-21 PROCEDURE — 85025 COMPLETE CBC W/AUTO DIFF WBC: CPT

## 2018-12-21 PROCEDURE — 85027 COMPLETE CBC AUTOMATED: CPT

## 2018-12-21 PROCEDURE — 73080 X-RAY EXAM OF ELBOW: CPT

## 2018-12-21 PROCEDURE — 73060 X-RAY EXAM OF HUMERUS: CPT

## 2018-12-21 PROCEDURE — 70450 CT HEAD/BRAIN W/O DYE: CPT

## 2018-12-21 PROCEDURE — 96376 TX/PRO/DX INJ SAME DRUG ADON: CPT

## 2018-12-21 RX ADMIN — INSULIN DETEMIR SCH UNIT: 100 INJECTION, SOLUTION SUBCUTANEOUS at 23:43

## 2018-12-21 RX ADMIN — INSULIN ASPART SCH UNIT: 100 INJECTION, SOLUTION INTRAVENOUS; SUBCUTANEOUS at 23:42

## 2018-12-21 RX ADMIN — Medication SCH MG: at 23:38

## 2018-12-21 RX ADMIN — METOPROLOL SUCCINATE SCH MG: 50 TABLET, EXTENDED RELEASE ORAL at 23:38

## 2018-12-21 NOTE — XR
EXAMINATION TYPE: XR humerus bilateral

 

DATE OF EXAM: 12/21/2018

 

COMPARISON: NONE

 

HISTORY: Pain

 

TECHNIQUE: 2 views each humerus

 

FINDINGS: I see no fracture nor dislocation. There is no focal bone destruction.

 

IMPRESSION: No acute abnormality of the left and right humerus. Osteoarthritis noted in the right cindi
ulder joint.

## 2018-12-21 NOTE — XR
EXAMINATION TYPE: XR shoulder complete BILAT

 

DATE OF EXAM: 12/21/2018

 

COMPARISON: NONE

 

HISTORY: Shoulder pain

 

TECHNIQUE: 3 views each shoulder

 

FINDINGS: I see no fracture nor dislocation. There is slight widening of the left side AC joint space
. This is consistent with old injury. There is spurring at the glenohumeral joint bilaterally and mor
e on the right side.

 

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 No fracture seen.

## 2018-12-21 NOTE — CT
EXAMINATION TYPE: CT brain cspine wo con

 

DATE OF EXAM: 12/21/2018

 

COMPARISON: CT brain 10/20/1718

 

HISTORY: Fall.

 

CT DLP: 1396 mGycm

Automated exposure control for dose reduction was used.

 

TECHNIQUE: CT scan of the head and cervical spine are performed without contrast.

 

FINDINGS:   There is cerebral cortical atrophy. There is no mass effect nor midline shift. There is n
o sign of intracranial hemorrhage. Calvarium is intact. There is patchy hypodensity in the periventri
cular white matter.

 

There is extensive hypertrophic anterior bridging osteophyte formation from C3 to C6. There is anteri
or mild subluxation of C6 in relation to C7. There is no compression fracture. Skull base is intact. 
Facet joints are intact. There is hypertrophic facet arthropathy in the lower cervical spine.

 

IMPRESSION:

Cerebral atrophy and chronic small vessel ischemia. No change.

 

Spondylotic changes with large hypertrophic anterior bridging osteophytes. No fracture seen. Degenera
tive mild subluxation at C6-7.

## 2018-12-21 NOTE — ED
General Adult HPI





- General


Chief complaint: Fall


Stated complaint: Fall


Source: patient, family, EMS


Mode of arrival: EMS


Limitations: no limitations





- History of Present Illness


Initial comments: 





Dictation was produced using dragon dictation software. please excuse any 

grammatical, word or spelling errors. 





Chief Complaint: 78-year-old presents with bilateral shoulder pain.





History of Present Illness: Patient is an 78-year-old  male presents 

after fall.  Patient was about to use the bathroom when he tripped.  He fell 

forward causing him to strike his head against the toilet bowl.  Patient states 

it happened so fast.  After the event he states he had severe bilateral 

shoulder pain.  He states the pain radiates from bilateral shoulders down to 

bilateral elbows.  Patient denies any chest pain.  Denies any neck pain.  He 

does report that his head is slightly sore.  EMS was called patient was brought 

back to the emergency department.  He received 10 mg of IV morphine with 

minimal improvement of his symptoms.  The ports that he suffers from frequent 

falls.


The ROS documented in this emergency department record has been reviewed and 

confirmed by me.  Those systems with pertinent positive or negative responses 

have been documented in the HPI.  All other systems are other negative and/or 

noncontributory.





- Related Data


 Home Medications











 Medication  Instructions  Recorded  Confirmed


 


Aspirin EC [Ecotrin Low Dose] 81 mg PO HS 08/23/14 12/21/18


 


amLODIPine [Norvasc] 5 mg PO QAM 08/23/14 12/21/18


 


buPROPion XL [Wellbutrin XL] 300 mg PO QAM 08/23/14 12/21/18


 


Cholecalciferol (Vitamin D3) 2,000 unit PO DAILY 10/21/17 12/21/18





[Vitamin D3]   


 


Ergocalciferol (Vitamin D2) 50,000 unit PO MINAYA 10/21/17 12/21/18





[Vitamin D2]   


 


Ranitidine HCl [Zantac] 150 mg PO QAM 10/21/17 12/21/18


 


Quinapril HCl [Accupril] 10 mg PO QAM 10/22/17 12/21/18


 


Glimepiride [Amaryl] 4 mg PO QAM 12/18/17 12/21/18


 


Hydrochlorothiazide [Hydrodiuril] 50 mg PO QAM 12/18/17 12/21/18


 


Potassium Chloride [K-Tab ER] 10 meq PO HS 12/18/17 12/21/18


 


Docusate [Colace] 100 mg PO DAILY 12/21/18 12/21/18


 


Insulin Aspart [Novolog] 5 unit SQ QAM PRN 12/21/18 12/21/18


 


Insulin Glargine [Lantus] 6 - 8 unit SQ HS 12/21/18 12/21/18


 


Magnesium 200 mg PO BID 12/21/18 12/21/18


 


Metoprolol Succinate (ER) [Toprol 50 mg PO BID 12/21/18 12/21/18





Xl]   


 


Niacin 500 mg PO DAILY 12/21/18 12/21/18


 


Simvastatin 40 mg PO DAILY 12/21/18 12/21/18











 Allergies











Allergy/AdvReac Type Severity Reaction Status Date / Time


 


iodine Allergy  Rash/Hives Verified 12/21/18 18:13














Review of Systems


ROS Statement: 


Those systems with pertinent positive or pertinent negative responses have been 

documented in the HPI.





ROS Other: All systems not noted in ROS Statement are negative.





Past Medical History


Past Medical History: Blood Disorder, Diabetes Mellitus, Hyperlipidemia, 

Hypertension, Myocardial Infarction (MI), Renal Disease


Additional Past Medical History / Comment(s): ANEMIA.


Last Myocardial Infarction Date:: Nov 2009


History of Any Multi-Drug Resistant Organisms: None Reported


Past Surgical History: Heart Catheterization With Stent, Joint Replacement


Past Anesthesia/Blood Transfusion Reactions: No Reported Reaction


Date of Last Stent Placement:: 2012


Past Psychological History: No Psychological Hx Reported


Smoking Status: Former smoker


Past Alcohol Use History: None Reported


Past Drug Use History: None Reported





- Past Family History


  ** Father


Family Medical History: Myocardial Infarction (MI)





  ** Mother


Family Medical History: Coronary Artery Disease (CAD), CVA/TIA





General Exam





- General Exam Comments


Initial Comments: 





PHYSICAL EXAM:


General Impression: Alert and oriented x3, acute distress secondary to pain


HEENT: Abrasions to the anterior forehead, extra-ocular movements intact, 

pupils equal and reactive to light bilaterally, mucous membranes moist.


Cardiovascular: Heart regular rate and rhythm, S1&S2 audible, no murmurs, rubs 

or gallops


Chest: Lungs clear to auscultation bilaterally, no rhonchi, no wheeze, no rales


Abdomen: Bowel sounds present, abdomen soft, non-tender, non-distended, no 

organomegaly


Musculoskeletal: Pulses present and equal in all extremities, no peripheral 

edema, point tenderness of the bilateral shoulders, bilateral elbows bilateral 

humerus.


Motor: Power 5/5 bilaterally, no focal deficits noted


Neurological: CN II-XII grossly intact, no focal motor or sensory deficits noted


Skin: Intact with no visualized rashes


Psych: Normal affect and mood


Limitations: no limitations





Course


 Vital Signs











  12/21/18 12/21/18





  17:24 18:19


 


Temperature 97.5 F L 


 


Pulse Rate 79 78


 


Respiratory 18 18





Rate  


 


Blood Pressure 143/78 141/78


 


O2 Sat by Pulse 100 99





Oximetry  














Medical Decision Making





- Medical Decision Making











ED course: 78-year-old Male presents with bilateral upper body pain status post 

fall.  Vital signs upon arrival are within acceptable limits.Laboratory 

evaluation obtained.  CBC, coag panel, metabolic panel was obtained showing no 

acute processes.   has baseline renal markers.  Rest metabolic panel is 

unremarkable.  Elbow x-ray bilateral, shoulder x-ray bilateral, humerus x-ray 

bilateral, chest CT and head and C-spine CT shows no acute processes.  His have 

multiple areas of degenerative disease however.  Given Percocet.  Patient 

reevaluated with mild improvement in her pain symptoms.  Family did not feel 

comfortable taking him home given that he is a high fall risk.  Patient to be 

admitted for physical therapy, occupational therapy and pain control.  





EKG interpretation: Ventricular rate 76, sinus rhythm with right bundle branch 

block, VT interval 134, , QTc 501. No VT prolongation,  no ST or T-wave 

changes noted.  EKG compared to 10/21/2017 showing no changes.  Overall, this 

EKG is unremarkable








- Lab Data


Result diagrams: 


 12/21/18 18:01





 12/21/18 18:01


 Lab Results











  12/21/18 12/21/18 12/21/18 Range/Units





  18:01 18:01 18:01 


 


WBC  8.4    (3.8-10.6)  k/uL


 


RBC  3.64 L    (4.30-5.90)  m/uL


 


Hgb  10.7 L    (13.0-17.5)  gm/dL


 


Hct  32.1 L    (39.0-53.0)  %


 


MCV  88.2    (80.0-100.0)  fL


 


MCH  29.3    (25.0-35.0)  pg


 


MCHC  33.3    (31.0-37.0)  g/dL


 


RDW  13.7    (11.5-15.5)  %


 


Plt Count  230    (150-450)  k/uL


 


Neutrophils %  76    %


 


Lymphocytes %  12    %


 


Monocytes %  7    %


 


Eosinophils %  2    %


 


Basophils %  0    %


 


Neutrophils #  6.3    (1.3-7.7)  k/uL


 


Lymphocytes #  1.0    (1.0-4.8)  k/uL


 


Monocytes #  0.6    (0-1.0)  k/uL


 


Eosinophils #  0.2    (0-0.7)  k/uL


 


Basophils #  0.0    (0-0.2)  k/uL


 


PT    11.1  (9.0-12.0)  sec


 


INR    1.0  (<1.2)  


 


Sodium   137   (137-145)  mmol/L


 


Potassium   4.1   (3.5-5.1)  mmol/L


 


Chloride   103   ()  mmol/L


 


Carbon Dioxide   23   (22-30)  mmol/L


 


Anion Gap   11   mmol/L


 


BUN   50 H   (9-20)  mg/dL


 


Creatinine   1.65 H   (0.66-1.25)  mg/dL


 


Est GFR (CKD-EPI)AfAm   45   (>60 ml/min/1.73 sqM)  


 


Est GFR (CKD-EPI)NonAf   39   (>60 ml/min/1.73 sqM)  


 


Glucose   176 H   (74-99)  mg/dL


 


Calcium   9.2   (8.4-10.2)  mg/dL


 


Total Bilirubin   0.4   (0.2-1.3)  mg/dL


 


AST   30   (17-59)  U/L


 


ALT   34   (21-72)  U/L


 


Alkaline Phosphatase   97   ()  U/L


 


Total Protein   6.7   (6.3-8.2)  g/dL


 


Albumin   3.9   (3.5-5.0)  g/dL














Disposition


Clinical Impression: 


 Fall





Disposition: ADMITTED AS IP TO THIS HOSP


Condition: Fair


Referrals: 


Lemuel Ortiz MD [Primary Care Provider] - 1-2 days


Decision Time: 20:04

## 2018-12-21 NOTE — CT
EXAMINATION TYPE: CT chest wo con

 

DATE OF EXAM: 12/21/2018

 

COMPARISON: None

 

HISTORY: Bilateral shoulder pain.

 

CT DLP: 506 mGycm.  Automated Exposure Control for Dose Reduction was Utilized.

 

TECHNIQUE:  CT scan of the thorax is performed without IV contrast.

 

FINDINGS:

The lungs are clear of infiltrate. There are calcified splenic granulomata. There is no pleural effus
ion. Heart size is normal. There is no pericardial effusion. There are calcified mediastinal granulom
luz. There is no mediastinal adenopathy. There are no hilar masses. There is no evidence of a pulmona
ry mass. There is some narrowing at both shoulder joints with spur formation. I see no focal bone andre
truction. There is some spurring at the AC joints. There is partial visualization of a 2 cm lucent le
alba in the L2 vertebral body on the left side.

IMPRESSION: Old granulomatous disease. No acute abnormality in the chest. Nonobstructing small right 
renal calculus in the lower pole.

 

Lytic lesion in the L2 vertebra could be a change compared to the old MR scan of 12/31/2010 and 2017.
 Follow-up is recommended.

## 2018-12-21 NOTE — XR
EXAMINATION TYPE: XR elbow complete bilateral

 

DATE OF EXAM: 12/21/2018

 

COMPARISON: NONE

 

HISTORY: Bilateral pain

 

TECHNIQUE: 3 views each elbow

 

FINDINGS: There is spurring on the olecranon process of the ulna bilaterally. I see no fracture nor d
islocation. There is no sign of joint effusion. There are no erosions. Joint spaces are fairly normal
.

 

IMPRESSION: Degenerative spurring. No fracture seen.

## 2018-12-22 LAB
GLUCOSE BLD-MCNC: 161 MG/DL (ref 75–99)
GLUCOSE BLD-MCNC: 206 MG/DL (ref 75–99)
GLUCOSE BLD-MCNC: 229 MG/DL (ref 75–99)
GLUCOSE BLD-MCNC: 231 MG/DL (ref 75–99)
HBA1C MFR BLD: 7.7 % (ref 4–6)

## 2018-12-22 RX ADMIN — ACETAMINOPHEN PRN MG: 325 TABLET, FILM COATED ORAL at 20:45

## 2018-12-22 RX ADMIN — INSULIN ASPART SCH UNIT: 100 INJECTION, SOLUTION INTRAVENOUS; SUBCUTANEOUS at 12:41

## 2018-12-22 RX ADMIN — INSULIN ASPART SCH UNIT: 100 INJECTION, SOLUTION INTRAVENOUS; SUBCUTANEOUS at 22:36

## 2018-12-22 RX ADMIN — METOPROLOL SUCCINATE SCH MG: 50 TABLET, EXTENDED RELEASE ORAL at 22:32

## 2018-12-22 RX ADMIN — INSULIN DETEMIR SCH UNIT: 100 INJECTION, SOLUTION SUBCUTANEOUS at 22:36

## 2018-12-22 RX ADMIN — METOPROLOL SUCCINATE SCH MG: 50 TABLET, EXTENDED RELEASE ORAL at 08:02

## 2018-12-22 RX ADMIN — INSULIN ASPART SCH UNIT: 100 INJECTION, SOLUTION INTRAVENOUS; SUBCUTANEOUS at 08:03

## 2018-12-22 RX ADMIN — LIDOCAINE SCH PATCH: 50 PATCH TOPICAL at 16:27

## 2018-12-22 RX ADMIN — Medication SCH MG: at 08:02

## 2018-12-22 RX ADMIN — Medication SCH MG: at 22:31

## 2018-12-22 RX ADMIN — INSULIN ASPART SCH UNIT: 100 INJECTION, SOLUTION INTRAVENOUS; SUBCUTANEOUS at 18:08

## 2018-12-22 NOTE — P.HPIM
History of Present Illness


H&P Date: 12/22/18


Chief Complaint: Bilateral shoulder pain status post fall





78-year-old gentleman comes in with complaints of fall.  He said that he has 

issues with his back and he has problems with his balance.  He has a history of 

falls previously.  He says that he was trying to use the restroom when he 

tripped and fell in the bathroom he struck his head against the toilet bowel 

and he thinks that he also struck his bilateral shoulders but he is not sure.  

He said that he started to have pain and has forehead and has bilateral 

shoulder.  He was thus brought in here for further evaluation and management.  

Patient says that he does not complain of any pain in his legs but his 

bilateral shoulders are hurting him a lot even slight touch.  He otherwise does 

not complain of any chest pain or racing heart, no cough or shortness of breath

, no abdominal pain, no nausea and vomiting, no diarrhea constipation, no 

tingling numbness of any of the extremities, no itch or rash.





Review of Systems


All systems: negative





Past Medical History


Past Medical History: Blood Disorder, Diabetes Mellitus, Hyperlipidemia, 

Hypertension, Myocardial Infarction (MI), Renal Disease


Additional Past Medical History / Comment(s): ANEMIA.


Last Myocardial Infarction Date:: Nov 2009


History of Any Multi-Drug Resistant Organisms: None Reported


Past Surgical History: Heart Catheterization With Stent, Joint Replacement


Additional Past Surgical History / Comment(s): 3-4 stents, total right knee, 

cataracts with lens implants


Past Anesthesia/Blood Transfusion Reactions: No Reported Reaction


Date of Last Stent Placement:: 2012


Past Psychological History: No Psychological Hx Reported


Smoking Status: Former smoker


Past Alcohol Use History: None Reported


Past Drug Use History: None Reported





- Past Family History


  ** Father


Family Medical History: Myocardial Infarction (MI)





  ** Mother


Family Medical History: Coronary Artery Disease (CAD), CVA/TIA





Medications and Allergies


 Home Medications











 Medication  Instructions  Recorded  Confirmed  Type


 


Aspirin EC [Ecotrin Low Dose] 81 mg PO HS 08/23/14 12/21/18 History


 


amLODIPine [Norvasc] 5 mg PO QAM 08/23/14 12/21/18 History


 


buPROPion XL [Wellbutrin XL] 300 mg PO QAM 08/23/14 12/21/18 History


 


Cholecalciferol (Vitamin D3) 2,000 unit PO DAILY 10/21/17 12/21/18 History





[Vitamin D3]    


 


Ergocalciferol (Vitamin D2) 50,000 unit PO MINAYA 10/21/17 12/21/18 History





[Vitamin D2]    


 


Ranitidine HCl [Zantac] 150 mg PO QAM 10/21/17 12/21/18 History


 


Quinapril HCl [Accupril] 10 mg PO QAM 10/22/17 12/21/18 History


 


Glimepiride [Amaryl] 4 mg PO QAM 12/18/17 12/21/18 History


 


Hydrochlorothiazide [Hydrodiuril] 50 mg PO QAM 12/18/17 12/21/18 History


 


Potassium Chloride [K-Tab ER] 10 meq PO HS 12/18/17 12/21/18 History


 


Docusate [Colace] 100 mg PO DAILY 12/21/18 12/21/18 History


 


Insulin Aspart [Novolog] 5 unit SQ QAM PRN 12/21/18 12/21/18 History


 


Insulin Glargine [Lantus] 6 - 8 unit SQ HS 12/21/18 12/21/18 History


 


Magnesium 200 mg PO BID 12/21/18 12/21/18 History


 


Metoprolol Succinate (ER) [Toprol 50 mg PO BID 12/21/18 12/21/18 History





Xl]    


 


Niacin 500 mg PO DAILY 12/21/18 12/21/18 History


 


Simvastatin 40 mg PO DAILY 12/21/18 12/21/18 History











 Allergies











Allergy/AdvReac Type Severity Reaction Status Date / Time


 


iodine Allergy  Rash/Hives Verified 12/21/18 18:13














Physical Exam


Vitals: 


 Vital Signs











  Temp Pulse Pulse Pulse Resp BP BP


 


 12/22/18 07:00  97.0 F L    67  16   120/66


 


 12/21/18 23:00  97.6 F   83   18   185/83


 


 12/21/18 20:44  98.8 F  82    18  144/66 


 


 12/21/18 19:30   80    18  157/88 


 


 12/21/18 18:19   78    18  141/78 


 


 12/21/18 17:24  97.5 F L  79    18  143/78 














  Pulse Ox


 


 12/22/18 07:00  100


 


 12/21/18 23:00  98


 


 12/21/18 20:44  97


 


 12/21/18 19:30  98


 


 12/21/18 18:19  99


 


 12/21/18 17:24  100








 Intake and Output











 12/21/18 12/22/18 12/22/18





 22:59 06:59 14:59


 


Output Total 200 300 


 


Balance -200 -300 


 


Output:   


 


  Urine 200 300 


 


Other:   


 


  Weight 80.739 kg  











On exam, alert and oriented x3. 


HEENT:  Conjunctivae normal. eyes normal.  Patient is having a bruise on his 

left forehead with slight ecchymosis


NECK:  No JVD. No thyroid enlargement. No LNs


CARDIOVASCULAR:  S1, S2 muffled. No murmur


RESPIRATION: Breath sounds diminished in the bases. No rhonchi or crackles. No 

bronchial breathing.


ABDOMEN:  Soft,  nontender . No guarding. no masses palpable. No ascites, No 

hepatosplenomegaly.Bowel sounds heard.


LEGS:  No edema. no swelling 


NERVOUS SYSTEM:  Cranial N 2-12 grossly normal. Moves all 4 limbs. No focal 

deficits. No sensory deficit. No signs of cerebellar dysfucntion.


Skin: no ulcer no rash 


Joints: No active swelling. No inflammation.


Musculoskeletal : Patient is having pain in his bilateral shoulders.  He is 

able to move his shoulder joint but he's having pain on palpating his deltoid 

muscle and his bicep muscles more on the left side.


Lymphatic system. No LN neck axilla or groin.





Results


CBC & Chem 7: 


 12/21/18 18:01





 12/21/18 18:01


Labs: 


 Abnormal Lab Results - Last 24 Hours (Table)











  12/21/18 12/21/18 12/21/18 Range/Units





  18:01 18:01 22:02 


 


RBC  3.64 L    (4.30-5.90)  m/uL


 


Hgb  10.7 L    (13.0-17.5)  gm/dL


 


Hct  32.1 L    (39.0-53.0)  %


 


BUN   50 H   (9-20)  mg/dL


 


Creatinine   1.65 H   (0.66-1.25)  mg/dL


 


Glucose   176 H   (74-99)  mg/dL


 


POC Glucose (mg/dL)    198 H  (75-99)  mg/dL














  12/22/18 12/22/18 Range/Units





  07:35 11:18 


 


RBC    (4.30-5.90)  m/uL


 


Hgb    (13.0-17.5)  gm/dL


 


Hct    (39.0-53.0)  %


 


BUN    (9-20)  mg/dL


 


Creatinine    (0.66-1.25)  mg/dL


 


Glucose    (74-99)  mg/dL


 


POC Glucose (mg/dL)  161 H  206 H  (75-99)  mg/dL














Thrombosis Risk Factor Assmnt





- Choose All That Apply


Any of the Below Risk Factors Present?: Yes


Each Factor Represents 1 point: Obesity (BMI >25)


Other Risk Factors: Yes


Each Risk Factor Represents 3 Points: Age 75 years or older


Other congenital or acquired thrombophilia - If yes, enter type in comment: No


Thrombosis Risk Factor Assessment Total Risk Factor Score: 4


Thrombosis Risk Factor Assessment Level: Moderate Risk





Assessment and Plan


Assessment: 





- Fall


- Bilateral shoulder muscle pain


- Diabetes


- Hypertension


- Hyperlipidemia


- History of CAD


Plan: 





- We'll admit the patient MedSurg with telemetry


- The pain that the patient is having in the bilateral shoulder looks like 

muscular skeletal pain.


- PTOT is already consulted patient initially refused PTOT in the morning as he 

was having pain and he would not let the therapy touch


- The plan for now is pain control, have PTOT eval at the patient for possible 

placement.  The family and the patient really interested to go to short-term 

rehab.  


- We'll resume the patient's home medications 


- DVT and GI prophylaxis 


- We'll order for laboratory the morning 


- Patient is full code 





Time with Patient: Greater than 30

## 2018-12-23 LAB
ANION GAP SERPL CALC-SCNC: 9 MMOL/L
BUN SERPL-SCNC: 26 MG/DL (ref 9–20)
CALCIUM SPEC-MCNC: 9 MG/DL (ref 8.4–10.2)
CHLORIDE SERPL-SCNC: 103 MMOL/L (ref 98–107)
CO2 SERPL-SCNC: 27 MMOL/L (ref 22–30)
ERYTHROCYTE [DISTWIDTH] IN BLOOD BY AUTOMATED COUNT: 3.47 M/UL (ref 4.3–5.9)
ERYTHROCYTE [DISTWIDTH] IN BLOOD: 13.9 % (ref 11.5–15.5)
GLUCOSE BLD-MCNC: 120 MG/DL (ref 75–99)
GLUCOSE BLD-MCNC: 153 MG/DL (ref 75–99)
GLUCOSE BLD-MCNC: 209 MG/DL (ref 75–99)
GLUCOSE BLD-MCNC: 340 MG/DL (ref 75–99)
GLUCOSE SERPL-MCNC: 115 MG/DL (ref 74–99)
HCT VFR BLD AUTO: 31.1 % (ref 39–53)
HGB BLD-MCNC: 10.9 GM/DL (ref 13–17.5)
MCH RBC QN AUTO: 31.5 PG (ref 25–35)
MCHC RBC AUTO-ENTMCNC: 35.1 G/DL (ref 31–37)
MCV RBC AUTO: 89.6 FL (ref 80–100)
PLATELET # BLD AUTO: 198 K/UL (ref 150–450)
POTASSIUM SERPL-SCNC: 3.8 MMOL/L (ref 3.5–5.1)
SODIUM SERPL-SCNC: 139 MMOL/L (ref 137–145)
WBC # BLD AUTO: 7.9 K/UL (ref 3.8–10.6)

## 2018-12-23 RX ADMIN — INSULIN ASPART SCH: 100 INJECTION, SOLUTION INTRAVENOUS; SUBCUTANEOUS at 08:09

## 2018-12-23 RX ADMIN — INSULIN DETEMIR SCH UNIT: 100 INJECTION, SOLUTION SUBCUTANEOUS at 20:47

## 2018-12-23 RX ADMIN — METOPROLOL SUCCINATE SCH MG: 50 TABLET, EXTENDED RELEASE ORAL at 08:12

## 2018-12-23 RX ADMIN — FAMOTIDINE SCH MG: 10 INJECTION, SOLUTION INTRAVENOUS at 20:47

## 2018-12-23 RX ADMIN — INSULIN ASPART SCH UNIT: 100 INJECTION, SOLUTION INTRAVENOUS; SUBCUTANEOUS at 18:13

## 2018-12-23 RX ADMIN — METOPROLOL SUCCINATE SCH MG: 50 TABLET, EXTENDED RELEASE ORAL at 20:47

## 2018-12-23 RX ADMIN — INSULIN ASPART SCH UNIT: 100 INJECTION, SOLUTION INTRAVENOUS; SUBCUTANEOUS at 20:48

## 2018-12-23 RX ADMIN — Medication SCH MG: at 08:12

## 2018-12-23 RX ADMIN — ACETAMINOPHEN PRN MG: 325 TABLET, FILM COATED ORAL at 08:19

## 2018-12-23 RX ADMIN — INSULIN ASPART SCH UNIT: 100 INJECTION, SOLUTION INTRAVENOUS; SUBCUTANEOUS at 12:54

## 2018-12-23 RX ADMIN — HEPARIN SODIUM SCH UNIT: 5000 INJECTION, SOLUTION INTRAVENOUS; SUBCUTANEOUS at 20:47

## 2018-12-23 RX ADMIN — LIDOCAINE SCH PATCH: 50 PATCH TOPICAL at 08:13

## 2018-12-23 RX ADMIN — Medication SCH MG: at 20:47

## 2018-12-23 NOTE — P.PN
Subjective





78-year-old gentleman comes in with complaints of fall.  He said that he has 

issues with his back and he has problems with his balance.  He has a history of 

falls previously.  He says that he was trying to use the restroom when he 

tripped and fell in the bathroom he struck his head against the toilet bowel 

and he thinks that he also struck his bilateral shoulders but he is not sure.  

He said that he started to have pain and has forehead and has bilateral 

shoulder.  He was thus brought in here for further evaluation and management.  

Patient says that he does not complain of any pain in his legs but his 

bilateral shoulders are hurting him a lot even slight touch.  He otherwise does 

not complain of any chest pain or racing heart, no cough or shortness of breath

, no abdominal pain, no nausea and vomiting, no diarrhea constipation, no 

tingling numbness of any of the extremities, no itch or rash.





12/23/2018


Patient presents with fall and muscular pain.  He has negative x-ray of the 

elbow, shoulder and humerus for acute fracture or dislocation.  Imaging showing 

only osteoarthritis.  He had CT of the brain and the spine without contrast 

which was negative for acute event such showing only cerebral atrophy.  While 

CT of the chest without, contrast showing MINIMAL DISC DISEASE WITH NO ACUTE 

ABNORMALITY.  LABS REVIEWED WERE UNREMARKABLE EXCEPT FOR MILD ANEMIA AND 

HEMOGLOBIN 10.9.  HE HAD ACUTE KIDNEY INJURY WITH CREATININE 1.65, CAME DOWN TO 

1.1 WITH IV FLUIDS.  Today his pain is more improved and he couldn't move his 

both arms above his head.  He is eating and drinking better.  He didn't have 

bowel movement yet.  No urinary complaints.  He agrees to work with physical 

therapy








CONSTITUTIONAL: No fever, no malaise, no fatigue. 


HEENT: No recent visual problems or hearing problems. Denied any sore throat. 


CARDIOVASCULAR: No  orthopnea, PND, no palpitations, no syncope. 


PULMONARY: No shortness of breath, no cough, no hemoptysis. 


GASTROINTESTINAL: No diarrhea, no nausea, no vomiting, no abdominal pain. 

Normoactive bowel sounds. 


NEUROLOGICAL: No headaches, no weakness, no numbness. 


HEMATOLOGICAL: Denies any bleeding or petechiae. 


GENITOURINARY: Denies any burning micturition, frequency, or urgency. 


MUSCULOSKELETAL/RHEUMATOLOGICAL: Denies any joint pain, swelling, or any muscle 

pain. 


ENDOCRINE: Denies any polyuria or polydipsia.





Medications reviewed and including insulin, Zantac, and he is seen, Lantus, 

Colace, magnesium, vitamin D, aspirin, Norvasc, Zocor, potassium chloride, 

glimepiride, quinapril, Lopressor, Wellbutrin, and hydrochlorothiazide.








Objective





- Vital Signs


Vital signs: 


 Vital Signs











Temp  97.4 F L  12/23/18 05:57


 


Pulse  63   12/23/18 05:57


 


Resp  17   12/23/18 05:57


 


BP  168/80   12/23/18 05:57


 


Pulse Ox  99   12/23/18 05:57








 Intake & Output











 12/22/18 12/23/18 12/23/18





 18:59 06:59 18:59


 


Intake Total 800  


 


Output Total 600 650 


 


Balance 200 -650 


 


Intake:   


 


  Intake, IV Titration 800  





  Amount   


 


    Sodium Chloride 0.9% 1, 800  





    000 ml @ 100 mls/hr IV .   





    Q10H ONE Rx#:[id number]   


 


Output:   


 


  Urine 600 650 


 


Other:   


 


  Voiding Method  Toilet 





  Urinal 














- Exam





GENERAL: The patient is alert and oriented x3, not in any acute distress. Well 

developed, well nourished. 


HEENT: Pupils are round and equally reacting to light. EOMI. No scleral 

icterus. No conjunctival pallor. Normocephalic, atraumatic. No pharyngeal 

erythema. No thyromegaly. 


CARDIOVASCULAR: S1 and S2 present. No murmurs, rubs, or gallops. 


PULMONARY: Chest is clear to auscultation, no wheezing or crackles. 


ABDOMEN: Soft, nontender, nondistended, normoactive bowel sounds. No palpable 

organomegaly. 


MUSCULOSKELETAL: No joint swelling or deformity. 


EXTREMITIES: No cyanosis, clubbing, or pedal edema. 


NEUROLOGICAL: Gross neurological examination did not reveal any focal deficits. 


SKIN: No rashes.





- Labs


CBC & Chem 7: 


 12/23/18 07:03





 12/23/18 07:03


Labs: 


 Abnormal Lab Results - Last 24 Hours (Table)











  12/21/18 12/22/18 12/22/18 Range/Units





  18:01 16:58 20:39 


 


RBC     (4.30-5.90)  m/uL


 


Hgb     (13.0-17.5)  gm/dL


 


Hct     (39.0-53.0)  %


 


BUN     (9-20)  mg/dL


 


Glucose     (74-99)  mg/dL


 


POC Glucose (mg/dL)   231 H  229 H  (75-99)  mg/dL


 


Hemoglobin A1c  7.7 H    (4.0-6.0)  %














  12/23/18 12/23/18 12/23/18 Range/Units





  07:03 07:03 07:35 


 


RBC  3.47 L    (4.30-5.90)  m/uL


 


Hgb  10.9 L    (13.0-17.5)  gm/dL


 


Hct  31.1 L    (39.0-53.0)  %


 


BUN   26 H   (9-20)  mg/dL


 


Glucose   115 H   (74-99)  mg/dL


 


POC Glucose (mg/dL)    120 H  (75-99)  mg/dL


 


Hemoglobin A1c     (4.0-6.0)  %














Assessment and Plan


Assessment: 





- Fall


- Bilateral shoulder muscle pain


- Acute kidney injury, resolved


- Dehydration, resolving.


- Diabetes


- Hypertension


- Hyperlipidemia


- History of CAD





Plan: 





This is a pleasant 80 years old male who presents because of fall, we'll 

continue with physical and occupational evaluation.  Continue with sugar 

monitoring and insulin therapy.


Labs and medication were reviewed..  Continue same treatment.  Continue with 

symptomatic treatment.  Resume home medication.  Monitor lytes and vitals.  DVT 

and GI prophylaxis.  Further recommendations of the clinical course of the 

patient


DVT prophylaxis: Subcutaneous heparin


GI Prophylaxis: Pepcid


PT/OT: Pending


Prognosis is guarded








=

## 2018-12-24 VITALS
DIASTOLIC BLOOD PRESSURE: 75 MMHG | HEART RATE: 62 BPM | RESPIRATION RATE: 16 BRPM | SYSTOLIC BLOOD PRESSURE: 166 MMHG | TEMPERATURE: 98.1 F

## 2018-12-24 LAB
GLUCOSE BLD-MCNC: 221 MG/DL (ref 75–99)
GLUCOSE BLD-MCNC: 93 MG/DL (ref 75–99)

## 2018-12-24 RX ADMIN — INSULIN ASPART SCH UNIT: 100 INJECTION, SOLUTION INTRAVENOUS; SUBCUTANEOUS at 13:00

## 2018-12-24 RX ADMIN — HEPARIN SODIUM SCH UNIT: 5000 INJECTION, SOLUTION INTRAVENOUS; SUBCUTANEOUS at 07:51

## 2018-12-24 RX ADMIN — LIDOCAINE SCH PATCH: 50 PATCH TOPICAL at 07:50

## 2018-12-24 RX ADMIN — Medication SCH MG: at 07:51

## 2018-12-24 RX ADMIN — INSULIN ASPART SCH: 100 INJECTION, SOLUTION INTRAVENOUS; SUBCUTANEOUS at 07:32

## 2018-12-24 RX ADMIN — METOPROLOL SUCCINATE SCH MG: 50 TABLET, EXTENDED RELEASE ORAL at 07:51

## 2018-12-24 RX ADMIN — FAMOTIDINE SCH MG: 10 INJECTION, SOLUTION INTRAVENOUS at 07:51

## 2018-12-24 NOTE — P.DS
Providers


Date of admission: 


12/21/18 20:04





Expected date of discharge: 12/24/18


Attending physician: 


Dalila Gil





Primary care physician: 


Lemuel Ortiz





Hospital Course: 





Discharge diagnosis


- Status post mechanical Fall


- Bilateral shoulder muscle pain.  Improved


- Acute kidney injury, resolved


- Dehydration, resolving.  Hydrochlorothiazide will be held at home.


- Diabetes


- Hypertension


- Hyperlipidemia


- History of CAD





Hospital course


78-year-old gentleman comes in with complaints of fall.  He said that he has 

issues with his back and he has problems with his balance.  He has a history of 

falls previously.  He says that he was trying to use the restroom when he 

tripped and fell in the bathroom he struck his head against the toilet bowel 

and he thinks that he also struck his bilateral shoulders but he is not sure.  

He said that he started to have pain and has forehead and has bilateral 

shoulder.  He was thus brought in here for further evaluation and management.  

Patient says that he does not complain of any pain in his legs but his 

bilateral shoulders are hurting him a lot even slight touch.  He otherwise does 

not complain of any chest pain or racing heart, no cough or shortness of breath

, no abdominal pain, no nausea and vomiting, no diarrhea constipation, no 

tingling numbness of any of the extremities, no itch or rash.





12/23/2018


Patient presents with fall and muscular pain.  He has negative x-ray of the 

elbow, shoulder and humerus for acute fracture or dislocation.  Imaging showing 

only osteoarthritis.  He had CT of the brain and the spine without contrast 

which was negative for acute event such showing only cerebral atrophy.  While 

CT of the chest without, contrast showing MINIMAL DISC DISEASE WITH NO ACUTE 

ABNORMALITY.  LABS REVIEWED WERE UNREMARKABLE EXCEPT FOR MILD ANEMIA AND 

HEMOGLOBIN 10.9.  HE HAD ACUTE KIDNEY INJURY WITH CREATININE 1.65, CAME DOWN TO 

1.1 WITH IV FLUIDS.  Today his pain is more improved and he couldn't move his 

both arms above his head.  He is eating and drinking better.  He didn't have 

bowel movement yet.  No urinary complaints.  He agrees to work with physical 

therapy





12/24/2018


Patient denied any complaints of chest pain or shortness of breath.  Able to 

ambulate with walker.  Patient denied any inpatient rehab transfer.  Patient 

was continued on IV fluids and renal function is much improved now.  

Hydrochlorothiazide will be held at home.  Continue with metoprolol, Norvasc 

and Accupril at home.  Patient is otherwise stable to be discharged home with 

home physical therapy.  Discussed with his family at bedside in detail.  

Otherwise no acute overnight issues.





Physical examination


GENERAL: The patient is alert and oriented x3, not in any acute distress. Well 

developed, well nourished. 


HEENT: Pupils are round and equally reacting to light. EOMI. No scleral 

icterus. No conjunctival pallor. Normocephalic, atraumatic. No pharyngeal 

erythema. No thyromegaly. 


CARDIOVASCULAR: S1 and S2 present. No murmurs, rubs, or gallops. 


PULMONARY: Chest is clear to auscultation, no wheezing or crackles. 


ABDOMEN: Soft, nontender, nondistended, normoactive bowel sounds. No palpable 

organomegaly. 


MUSCULOSKELETAL: No joint swelling or deformity. 


EXTREMITIES: No cyanosis, clubbing, or pedal edema. 


NEUROLOGICAL: Gross neurological examination did not reveal any focal deficits. 


SKIN: No rashes.  Bruise on the forehead is resolving.








 Vital Signs - 24 hr











  12/23/18 12/24/18 12/24/18





  23:00 06:26 07:40


 


Temperature 98.5 F 98.6 F 


 


Pulse Rate [ 83 64 





Pulse Oximetery   





]   


 


Respiratory 18 18 18





Rate   


 


Blood Pressure 166/80 173/80 





[Left Arm]   


 


O2 Sat by Pulse 99 100 





Oximetry   














  12/24/18





  14:17


 


Temperature 98.1 F


 


Pulse Rate [ 62





Pulse Oximetery 





] 


 


Respiratory 16





Rate 


 


Blood Pressure 166/75





[Left Arm] 


 


O2 Sat by Pulse 98





Oximetry 











Patient Condition at Discharge: Fair





Plan - Discharge Summary


New Discharge Prescriptions: 


Continue


   amLODIPine [Norvasc] 5 mg PO QAM


   Aspirin EC [Ecotrin Low Dose] 81 mg PO HS


   buPROPion XL [Wellbutrin XL] 300 mg PO QAM


   Ergocalciferol (Vitamin D2) [Vitamin D2] 50,000 unit PO MINAYA


   Ranitidine HCl [Zantac] 150 mg PO QAM


   Cholecalciferol (Vitamin D3) [Vitamin D3] 2,000 unit PO DAILY


   Quinapril HCl [Accupril] 10 mg PO QAM


   Glimepiride [Amaryl] 4 mg PO QAM


   Niacin 500 mg PO DAILY


   Insulin Glargine [Lantus] 6 - 8 unit SQ HS


   Docusate [Colace] 100 mg PO DAILY


   Magnesium 200 mg PO BID


   Simvastatin 40 mg PO DAILY


   Metoprolol Succinate (ER) [Toprol XL] 50 mg PO BID


   Insulin Aspart [NovoLOG] 5 unit SQ QAM PRN


     PRN Reason: Blood Sugar - High





Discontinued


   Potassium Chloride [K-Tab ER] 10 meq PO HS


   Hydrochlorothiazide [Hydrodiuril] 50 mg PO QAM


Discharge Medication List





Aspirin EC [Ecotrin Low Dose] 81 mg PO HS 08/23/14 [History]


amLODIPine [Norvasc] 5 mg PO QAM 08/23/14 [History]


buPROPion XL [Wellbutrin XL] 300 mg PO QAM 08/23/14 [History]


Cholecalciferol (Vitamin D3) [Vitamin D3] 2,000 unit PO DAILY 10/21/17 [History]


Ergocalciferol (Vitamin D2) [Vitamin D2] 50,000 unit PO MINAYA 10/21/17 [History]


Ranitidine HCl [Zantac] 150 mg PO QAM 10/21/17 [History]


Quinapril HCl [Accupril] 10 mg PO QAM 10/22/17 [History]


Glimepiride [Amaryl] 4 mg PO QAM 12/18/17 [History]


Docusate [Colace] 100 mg PO DAILY 12/21/18 [History]


Insulin Aspart [NovoLOG] 5 unit SQ QAM PRN 12/21/18 [History]


Insulin Glargine [Lantus] 6 - 8 unit SQ HS 12/21/18 [History]


Magnesium 200 mg PO BID 12/21/18 [History]


Metoprolol Succinate (ER) [Toprol XL] 50 mg PO BID 12/21/18 [History]


Niacin 500 mg PO DAILY 12/21/18 [History]


Simvastatin 40 mg PO DAILY 12/21/18 [History]








Follow up Appointment(s)/Referral(s): 


Lemuel Ortiz MD [Primary Care Provider] - 1-2 days


[facility], [NON-STAFF] - 1 Week


Patient Instructions/Handouts:  Lumbar Spinal Stenosis (ED), Fall Prevention (DC

)


Discharge Disposition: HOME WITH HOME HEALTH SERVICES

## 2019-02-08 ENCOUNTER — HOSPITAL ENCOUNTER (OUTPATIENT)
Dept: HOSPITAL 47 - LABWHC1 | Age: 79
Discharge: HOME | End: 2019-02-08
Attending: INTERNAL MEDICINE
Payer: MEDICARE

## 2019-02-08 DIAGNOSIS — D63.1: ICD-10-CM

## 2019-02-08 DIAGNOSIS — N18.3: Primary | ICD-10-CM

## 2019-02-08 LAB
ANION GAP SERPL CALC-SCNC: 7.5 MMOL/L (ref 4–12)
BASOPHILS # BLD AUTO: 0.1 K/UL (ref 0–0.2)
BASOPHILS NFR BLD AUTO: 1 %
BUN SERPL-SCNC: 29 MG/DL (ref 9–27)
CALCIUM SPEC-MCNC: 9.4 MG/DL (ref 8.7–10.3)
CHLORIDE SERPL-SCNC: 105 MMOL/L (ref 96–109)
CO2 SERPL-SCNC: 27.5 MMOL/L (ref 21.6–31.8)
EOSINOPHIL # BLD AUTO: 0.2 K/UL (ref 0–0.7)
EOSINOPHIL NFR BLD AUTO: 4 %
ERYTHROCYTE [DISTWIDTH] IN BLOOD BY AUTOMATED COUNT: 3.66 M/UL (ref 4.3–5.9)
ERYTHROCYTE [DISTWIDTH] IN BLOOD: 14 % (ref 11.5–15.5)
GLUCOSE SERPL-MCNC: 133 MG/DL (ref 70–110)
HCT VFR BLD AUTO: 32.9 % (ref 39–53)
HGB BLD-MCNC: 10.5 GM/DL (ref 13–17.5)
LYMPHOCYTES # SPEC AUTO: 0.9 K/UL (ref 1–4.8)
LYMPHOCYTES NFR SPEC AUTO: 19 %
MAGNESIUM SPEC-SCNC: 1.8 MG/DL (ref 1.5–2.4)
MCH RBC QN AUTO: 28.8 PG (ref 25–35)
MCHC RBC AUTO-ENTMCNC: 32.1 G/DL (ref 31–37)
MCV RBC AUTO: 89.7 FL (ref 80–100)
MONOCYTES # BLD AUTO: 0.3 K/UL (ref 0–1)
MONOCYTES NFR BLD AUTO: 7 %
NEUTROPHILS # BLD AUTO: 3.1 K/UL (ref 1.3–7.7)
NEUTROPHILS NFR BLD AUTO: 67 %
PH UR: 5.5 [PH] (ref 5–8)
PLATELET # BLD AUTO: 206 K/UL (ref 150–450)
POTASSIUM SERPL-SCNC: 4.2 MMOL/L (ref 3.5–5.5)
PTH-INTACT SERPL-MCNC: 53.6 PG/ML (ref 14–72)
SODIUM SERPL-SCNC: 140 MMOL/L (ref 135–145)
SP GR UR: 1.01 (ref 1–1.03)
URATE SERPL-MCNC: 5.6 MG/DL (ref 3.7–8.7)
UROBILINOGEN UR QL STRIP: <2 MG/DL (ref ?–2)
WBC # BLD AUTO: 4.6 K/UL (ref 3.8–10.6)

## 2019-02-08 PROCEDURE — 81003 URINALYSIS AUTO W/O SCOPE: CPT

## 2019-02-08 PROCEDURE — 80048 BASIC METABOLIC PNL TOTAL CA: CPT

## 2019-02-08 PROCEDURE — 36415 COLL VENOUS BLD VENIPUNCTURE: CPT

## 2019-02-08 PROCEDURE — 84550 ASSAY OF BLOOD/URIC ACID: CPT

## 2019-02-08 PROCEDURE — 82728 ASSAY OF FERRITIN: CPT

## 2019-02-08 PROCEDURE — 82306 VITAMIN D 25 HYDROXY: CPT

## 2019-02-08 PROCEDURE — 83970 ASSAY OF PARATHORMONE: CPT

## 2019-02-08 PROCEDURE — 83735 ASSAY OF MAGNESIUM: CPT

## 2019-02-08 PROCEDURE — 83540 ASSAY OF IRON: CPT

## 2019-02-08 PROCEDURE — 83550 IRON BINDING TEST: CPT

## 2019-02-08 PROCEDURE — 84100 ASSAY OF PHOSPHORUS: CPT

## 2019-02-08 PROCEDURE — 85025 COMPLETE CBC W/AUTO DIFF WBC: CPT

## 2019-03-28 ENCOUNTER — HOSPITAL ENCOUNTER (INPATIENT)
Dept: HOSPITAL 47 - EC | Age: 79
LOS: 3 days | Discharge: HOME HEALTH | DRG: 247 | End: 2019-03-31
Attending: HOSPITALIST | Admitting: HOSPITALIST
Payer: MEDICARE

## 2019-03-28 VITALS — BODY MASS INDEX: 27.5 KG/M2

## 2019-03-28 DIAGNOSIS — E78.5: ICD-10-CM

## 2019-03-28 DIAGNOSIS — L97.519: ICD-10-CM

## 2019-03-28 DIAGNOSIS — E11.42: ICD-10-CM

## 2019-03-28 DIAGNOSIS — Z87.891: ICD-10-CM

## 2019-03-28 DIAGNOSIS — Z95.5: ICD-10-CM

## 2019-03-28 DIAGNOSIS — I25.2: ICD-10-CM

## 2019-03-28 DIAGNOSIS — Z88.8: ICD-10-CM

## 2019-03-28 DIAGNOSIS — L89.151: ICD-10-CM

## 2019-03-28 DIAGNOSIS — Z79.4: ICD-10-CM

## 2019-03-28 DIAGNOSIS — Z79.899: ICD-10-CM

## 2019-03-28 DIAGNOSIS — I35.0: ICD-10-CM

## 2019-03-28 DIAGNOSIS — Z96.651: ICD-10-CM

## 2019-03-28 DIAGNOSIS — Z82.3: ICD-10-CM

## 2019-03-28 DIAGNOSIS — I25.84: ICD-10-CM

## 2019-03-28 DIAGNOSIS — I45.10: ICD-10-CM

## 2019-03-28 DIAGNOSIS — I21.4: Primary | ICD-10-CM

## 2019-03-28 DIAGNOSIS — Z87.448: ICD-10-CM

## 2019-03-28 DIAGNOSIS — Z82.49: ICD-10-CM

## 2019-03-28 DIAGNOSIS — Z98.41: ICD-10-CM

## 2019-03-28 DIAGNOSIS — I10: ICD-10-CM

## 2019-03-28 DIAGNOSIS — E11.621: ICD-10-CM

## 2019-03-28 DIAGNOSIS — E11.628: ICD-10-CM

## 2019-03-28 DIAGNOSIS — Z96.1: ICD-10-CM

## 2019-03-28 DIAGNOSIS — L03.319: ICD-10-CM

## 2019-03-28 DIAGNOSIS — Z98.42: ICD-10-CM

## 2019-03-28 DIAGNOSIS — D64.9: ICD-10-CM

## 2019-03-28 DIAGNOSIS — I25.10: ICD-10-CM

## 2019-03-28 DIAGNOSIS — E11.51: ICD-10-CM

## 2019-03-28 DIAGNOSIS — Z79.82: ICD-10-CM

## 2019-03-28 LAB
ALBUMIN SERPL-MCNC: 3.8 G/DL (ref 3.5–5)
ALP SERPL-CCNC: 95 U/L (ref 38–126)
ALT SERPL-CCNC: 28 U/L (ref 21–72)
ANION GAP SERPL CALC-SCNC: 9 MMOL/L
APTT BLD: 18.5 SEC (ref 22–30)
AST SERPL-CCNC: 28 U/L (ref 17–59)
BASOPHILS # BLD AUTO: 0 K/UL (ref 0–0.2)
BASOPHILS NFR BLD AUTO: 0 %
BUN SERPL-SCNC: 26 MG/DL (ref 9–20)
CALCIUM SPEC-MCNC: 9.4 MG/DL (ref 8.4–10.2)
CHLORIDE SERPL-SCNC: 106 MMOL/L (ref 98–107)
CO2 SERPL-SCNC: 23 MMOL/L (ref 22–30)
EOSINOPHIL # BLD AUTO: 0.1 K/UL (ref 0–0.7)
EOSINOPHIL NFR BLD AUTO: 2 %
ERYTHROCYTE [DISTWIDTH] IN BLOOD BY AUTOMATED COUNT: 3.6 M/UL (ref 4.3–5.9)
ERYTHROCYTE [DISTWIDTH] IN BLOOD: 14 % (ref 11.5–15.5)
GLUCOSE BLD-MCNC: 114 MG/DL (ref 75–99)
GLUCOSE BLD-MCNC: 153 MG/DL (ref 75–99)
GLUCOSE SERPL-MCNC: 176 MG/DL (ref 74–99)
HCT VFR BLD AUTO: 32 % (ref 39–53)
HGB BLD-MCNC: 10.8 GM/DL (ref 13–17.5)
INR PPP: 1 (ref ?–1.2)
LYMPHOCYTES # SPEC AUTO: 0.8 K/UL (ref 1–4.8)
LYMPHOCYTES NFR SPEC AUTO: 15 %
MAGNESIUM SPEC-SCNC: 1.8 MG/DL (ref 1.6–2.3)
MCH RBC QN AUTO: 30.1 PG (ref 25–35)
MCHC RBC AUTO-ENTMCNC: 33.8 G/DL (ref 31–37)
MCV RBC AUTO: 88.9 FL (ref 80–100)
MONOCYTES # BLD AUTO: 0.3 K/UL (ref 0–1)
MONOCYTES NFR BLD AUTO: 6 %
NEUTROPHILS # BLD AUTO: 4.3 K/UL (ref 1.3–7.7)
NEUTROPHILS NFR BLD AUTO: 75 %
PLATELET # BLD AUTO: 201 K/UL (ref 150–450)
POTASSIUM SERPL-SCNC: 4.2 MMOL/L (ref 3.5–5.1)
PROT SERPL-MCNC: 6.4 G/DL (ref 6.3–8.2)
PT BLD: 10.6 SEC (ref 9–12)
SODIUM SERPL-SCNC: 138 MMOL/L (ref 137–145)
WBC # BLD AUTO: 5.8 K/UL (ref 3.8–10.6)

## 2019-03-28 PROCEDURE — 85025 COMPLETE CBC W/AUTO DIFF WBC: CPT

## 2019-03-28 PROCEDURE — 96365 THER/PROPH/DIAG IV INF INIT: CPT

## 2019-03-28 PROCEDURE — 71046 X-RAY EXAM CHEST 2 VIEWS: CPT

## 2019-03-28 PROCEDURE — 80061 LIPID PANEL: CPT

## 2019-03-28 PROCEDURE — 85730 THROMBOPLASTIN TIME PARTIAL: CPT

## 2019-03-28 PROCEDURE — 85610 PROTHROMBIN TIME: CPT

## 2019-03-28 PROCEDURE — 83735 ASSAY OF MAGNESIUM: CPT

## 2019-03-28 PROCEDURE — 94760 N-INVAS EAR/PLS OXIMETRY 1: CPT

## 2019-03-28 PROCEDURE — 71045 X-RAY EXAM CHEST 1 VIEW: CPT

## 2019-03-28 PROCEDURE — 93923 UPR/LXTR ART STDY 3+ LVLS: CPT

## 2019-03-28 PROCEDURE — 93458 L HRT ARTERY/VENTRICLE ANGIO: CPT

## 2019-03-28 PROCEDURE — 99285 EMERGENCY DEPT VISIT HI MDM: CPT

## 2019-03-28 PROCEDURE — 93005 ELECTROCARDIOGRAM TRACING: CPT

## 2019-03-28 PROCEDURE — 96376 TX/PRO/DX INJ SAME DRUG ADON: CPT

## 2019-03-28 PROCEDURE — 93922 UPR/L XTREMITY ART 2 LEVELS: CPT

## 2019-03-28 PROCEDURE — 80053 COMPREHEN METABOLIC PANEL: CPT

## 2019-03-28 PROCEDURE — 93306 TTE W/DOPPLER COMPLETE: CPT

## 2019-03-28 PROCEDURE — 80048 BASIC METABOLIC PNL TOTAL CA: CPT

## 2019-03-28 PROCEDURE — 96366 THER/PROPH/DIAG IV INF ADDON: CPT

## 2019-03-28 PROCEDURE — 36415 COLL VENOUS BLD VENIPUNCTURE: CPT

## 2019-03-28 PROCEDURE — 84484 ASSAY OF TROPONIN QUANT: CPT

## 2019-03-28 PROCEDURE — 84134 ASSAY OF PREALBUMIN: CPT

## 2019-03-28 RX ADMIN — METOPROLOL TARTRATE SCH MG: 25 TABLET, FILM COATED ORAL at 20:26

## 2019-03-28 RX ADMIN — NITROGLYCERIN PRN MG: 0.4 TABLET SUBLINGUAL at 18:06

## 2019-03-28 RX ADMIN — INSULIN DETEMIR SCH UNIT: 100 INJECTION, SOLUTION SUBCUTANEOUS at 22:34

## 2019-03-28 RX ADMIN — INSULIN ASPART SCH: 100 INJECTION, SOLUTION INTRAVENOUS; SUBCUTANEOUS at 21:29

## 2019-03-28 RX ADMIN — NITROGLYCERIN PRN MG: 0.4 TABLET SUBLINGUAL at 20:25

## 2019-03-28 NOTE — ED
General Adult HPI





- General


Chief complaint: Chest Pain


Stated complaint: Chest pain


Time Seen by Provider: 03/28/19 13:04


Source: patient


Mode of arrival: ambulatory


Limitations: no limitations





- History of Present Illness


Initial comments: 





Dictation was produced using dragon dictation software. please excuse any 

grammatical, word or spelling errors. 





Chief Complaint: 78-year-old male past medical history of diabetes dyslipidemia 

hypertension and cardiac infarction presents with chest heaviness.





History of Present Illness: Patient is 78-year-old male who presents with chest 

heaviness.  Patient states that the pain is located to the substernal area.  He 

doesn't complain of pain more a heaviness sensation to his chest.  He states the

pain is diffuse to his anterior chest.  No radiation to the shoulders or neck.  

Patient denies any diaphoresis or nausea.  Patient has history of coronary 

artery disease and has 3 stents.  His cardiologist is Dr. Arguello.  She did take 

sublingual nitroglycerin with improvement of his symptoms 1 hour prior to 

arrival.








The ROS documented in this emergency department record has been reviewed and 

confirmed by me.  Those systems with pertinent positive or negative responses 

have been documented in the HPI.  All other systems are other negative and/or 

noncontributory.








PHYSICAL EXAM:


General Impression: Alert and oriented x3, not in acute distress


HEENT: Normocephalic atraumatic, extra-ocular movements intact, pupils equal and

reactive to light bilaterally, mucous membranes moist.


Cardiovascular: Heart regular rate and rhythm, S1&S2 audible, no murmurs, rubs 

or gallops


Chest: Lungs clear to auscultation bilaterally, no rhonchi, no wheeze, no rales


Abdomen: Bowel sounds present, abdomen soft, non-tender, non-distended, no 

organomegaly


Musculoskeletal: Pulses present and equal in all extremities, no peripheral 

edema


Motor:  no focal deficits noted


Neurological: CN II-XII grossly intact, no focal motor or sensory deficits noted


Skin: Intact with no visualized rashes


Psych: Normal affect and mood





ED course: 78-year-old male with multiple cardiac risk factors presents with 

concerning chest pain relieved with nitroglycerin.  Vital signs upon arrival are

within acceptable limits.  Presentation is concerning for acute coronary 

syndrome.


Laboratory evaluation obtained.  CBC, coag panel unremarkable.  Metabolic panel 

is negative there is a glucose level 176.  Troponin 0.7.  EKG is not suggestive 

of ST segment elevation MI.  Patient does have findings of right bundle branch 

block that appeared chronic.  Patient given aspirin and started on heparin.  

Attempt is made to contact cardiology so that they may be aware of patient per 

request by hospitalist.  Multiple pages were sent out however we were not able 

to establish contact with cardiology.  Nonetheless, cardiology on consult.  

Patient to be maintained on cardiac monitor.





EKG interpretation: Ventricular rate 85, normal sinus rhythm, MT interval 180, 

, , right bundle branch block. No MT prolongation, no QTC 

prolongation, no ST or T-wave changes noted. Overall, this EKG is unremarkable








- Related Data


                                Home Medications











 Medication  Instructions  Recorded  Confirmed


 


Aspirin EC [Ecotrin Low Dose] 81 mg PO HS 08/23/14 03/28/19


 


amLODIPine [Norvasc] 5 mg PO QAM 08/23/14 03/28/19


 


buPROPion XL [Wellbutrin XL] 300 mg PO QAM 08/23/14 03/28/19


 


Cholecalciferol (Vitamin D3) 2,000 unit PO DAILY 10/21/17 03/28/19





[Vitamin D3]   


 


Ergocalciferol (Vitamin D2) 50,000 unit PO MINAYA 10/21/17 03/28/19





[Vitamin D2]   


 


Ranitidine HCl [Zantac] 150 mg PO QAM 10/21/17 03/28/19


 


Quinapril HCl [Accupril] 10 mg PO QAM 10/22/17 03/28/19


 


Glimepiride [Amaryl] 4 mg PO QAM 12/18/17 03/28/19


 


Docusate [Colace] 100 mg PO DAILY 12/21/18 03/28/19


 


Insulin Aspart [NovoLOG] 5 unit SQ QAM PRN 12/21/18 03/28/19


 


Insulin Glargine [Lantus] 6 - 8 unit SQ HS 12/21/18 03/28/19


 


Magnesium 200 mg PO BID 12/21/18 03/28/19


 


Metoprolol Succinate (ER) [Toprol 50 mg PO BID 12/21/18 03/28/19





XL]   


 


Simvastatin 40 mg PO DAILY 12/21/18 03/28/19











                                    Allergies











Allergy/AdvReac Type Severity Reaction Status Date / Time


 


iodine Allergy  Rash/Hives Verified 03/28/19 13:57














Review of Systems


ROS Statement: 


Those systems with pertinent positive or pertinent negative responses have been 

documented in the HPI.





ROS Other: All systems not noted in ROS Statement are negative.





Past Medical History


Past Medical History: Blood Disorder, Diabetes Mellitus, Hyperlipidemia, 

Hypertension, Myocardial Infarction (MI), Renal Disease


Additional Past Medical History / Comment(s): ANEMIA.


Last Myocardial Infarction Date:: Nov 2009


History of Any Multi-Drug Resistant Organisms: None Reported


Past Surgical History: Heart Catheterization With Stent, Joint Replacement


Additional Past Surgical History / Comment(s): 3-4 stents, total right knee, 

cataracts with lens implants


Past Anesthesia/Blood Transfusion Reactions: No Reported Reaction


Date of Last Stent Placement:: 2012


Past Psychological History: No Psychological Hx Reported


Smoking Status: Former smoker


Past Alcohol Use History: None Reported


Past Drug Use History: None Reported





- Past Family History


  ** Father


Family Medical History: Myocardial Infarction (MI)





  ** Mother


Family Medical History: Coronary Artery Disease (CAD), CVA/TIA





General Exam


Limitations: no limitations





Course


                                   Vital Signs











  03/28/19 03/28/19 03/28/19





  12:59 14:11 15:02


 


Temperature 97.8 F  


 


Pulse Rate 91 81 84


 


Respiratory 20 18 18





Rate   


 


Blood Pressure 135/69 149/70 154/75


 


O2 Sat by Pulse 99 99 100





Oximetry   














Medical Decision Making





- Lab Data


Result diagrams: 


                                 03/28/19 13:28





                                 03/28/19 13:28


                                   Lab Results











  03/28/19 03/28/19 03/28/19 Range/Units





  13:28 13:28 13:28 


 


WBC  5.8    (3.8-10.6)  k/uL


 


RBC  3.60 L    (4.30-5.90)  m/uL


 


Hgb  10.8 L    (13.0-17.5)  gm/dL


 


Hct  32.0 L    (39.0-53.0)  %


 


MCV  88.9    (80.0-100.0)  fL


 


MCH  30.1    (25.0-35.0)  pg


 


MCHC  33.8    (31.0-37.0)  g/dL


 


RDW  14.0    (11.5-15.5)  %


 


Plt Count  201    (150-450)  k/uL


 


Neutrophils %  75    %


 


Lymphocytes %  15    %


 


Monocytes %  6    %


 


Eosinophils %  2    %


 


Basophils %  0    %


 


Neutrophils #  4.3    (1.3-7.7)  k/uL


 


Lymphocytes #  0.8 L    (1.0-4.8)  k/uL


 


Monocytes #  0.3    (0-1.0)  k/uL


 


Eosinophils #  0.1    (0-0.7)  k/uL


 


Basophils #  0.0    (0-0.2)  k/uL


 


PT    10.6  (9.0-12.0)  sec


 


INR    1.0  (<1.2)  


 


APTT    18.5 L  (22.0-30.0)  sec


 


Sodium   138   (137-145)  mmol/L


 


Potassium   4.2   (3.5-5.1)  mmol/L


 


Chloride   106   ()  mmol/L


 


Carbon Dioxide   23   (22-30)  mmol/L


 


Anion Gap   9   mmol/L


 


BUN   26 H   (9-20)  mg/dL


 


Creatinine   1.19   (0.66-1.25)  mg/dL


 


Est GFR (CKD-EPI)AfAm   66   (>60 ml/min/1.73 sqM)  


 


Est GFR (CKD-EPI)NonAf   57   (>60 ml/min/1.73 sqM)  


 


Glucose   176 H   (74-99)  mg/dL


 


Calcium   9.4   (8.4-10.2)  mg/dL


 


Magnesium   1.8   (1.6-2.3)  mg/dL


 


Total Bilirubin   0.3   (0.2-1.3)  mg/dL


 


AST   28   (17-59)  U/L


 


ALT   28   (21-72)  U/L


 


Alkaline Phosphatase   95   ()  U/L


 


Troponin I     (0.000-0.034)  ng/mL


 


Total Protein   6.4   (6.3-8.2)  g/dL


 


Albumin   3.8   (3.5-5.0)  g/dL














  03/28/19 Range/Units





  13:28 


 


WBC   (3.8-10.6)  k/uL


 


RBC   (4.30-5.90)  m/uL


 


Hgb   (13.0-17.5)  gm/dL


 


Hct   (39.0-53.0)  %


 


MCV   (80.0-100.0)  fL


 


MCH   (25.0-35.0)  pg


 


MCHC   (31.0-37.0)  g/dL


 


RDW   (11.5-15.5)  %


 


Plt Count   (150-450)  k/uL


 


Neutrophils %   %


 


Lymphocytes %   %


 


Monocytes %   %


 


Eosinophils %   %


 


Basophils %   %


 


Neutrophils #   (1.3-7.7)  k/uL


 


Lymphocytes #   (1.0-4.8)  k/uL


 


Monocytes #   (0-1.0)  k/uL


 


Eosinophils #   (0-0.7)  k/uL


 


Basophils #   (0-0.2)  k/uL


 


PT   (9.0-12.0)  sec


 


INR   (<1.2)  


 


APTT   (22.0-30.0)  sec


 


Sodium   (137-145)  mmol/L


 


Potassium   (3.5-5.1)  mmol/L


 


Chloride   ()  mmol/L


 


Carbon Dioxide   (22-30)  mmol/L


 


Anion Gap   mmol/L


 


BUN   (9-20)  mg/dL


 


Creatinine   (0.66-1.25)  mg/dL


 


Est GFR (CKD-EPI)AfAm   (>60 ml/min/1.73 sqM)  


 


Est GFR (CKD-EPI)NonAf   (>60 ml/min/1.73 sqM)  


 


Glucose   (74-99)  mg/dL


 


Calcium   (8.4-10.2)  mg/dL


 


Magnesium   (1.6-2.3)  mg/dL


 


Total Bilirubin   (0.2-1.3)  mg/dL


 


AST   (17-59)  U/L


 


ALT   (21-72)  U/L


 


Alkaline Phosphatase   ()  U/L


 


Troponin I  0.700 H*  (0.000-0.034)  ng/mL


 


Total Protein   (6.3-8.2)  g/dL


 


Albumin   (3.5-5.0)  g/dL














Disposition


Clinical Impression: 


 NSTEMI (non-ST elevated myocardial infarction)





Disposition: ADMITTED AS IP TO THIS HOSP


Condition: Critical


Referrals: 


Lemuel Ortiz MD [Primary Care Provider] - 1-2 days


Decision Time: 16:16

## 2019-03-28 NOTE — XR
EXAMINATION TYPE: XR chest 2V

 

DATE OF EXAM: 3/28/2019

 

COMPARISON: 7/5/2018

 

HISTORY: 78-year-old male with chest pain

 

TECHNIQUE:  AP and lateral views

 

FINDINGS:  

Heart normal size. Aorta and pulmonary vasculature are within normal limits. Hypertrophic changes at 
the right greater than left first rib ends was present previously. Calcified granuloma left midlung. 
No consolidation or pleural effusion.

 

 

IMPRESSION:  

Prior granulomatous disease. Densities projecting at the medial upper lobes relating to bony hypertro
phy at the first rib ends. No acute process seen.

## 2019-03-29 LAB
CHOLEST SERPL-MCNC: 104 MG/DL (ref ?–200)
GLUCOSE BLD-MCNC: 140 MG/DL (ref 75–99)
GLUCOSE BLD-MCNC: 195 MG/DL (ref 75–99)
GLUCOSE BLD-MCNC: 244 MG/DL (ref 75–99)
GLUCOSE BLD-MCNC: 308 MG/DL (ref 75–99)
HDLC SERPL-MCNC: 55 MG/DL (ref 40–60)
LDLC SERPL CALC-MCNC: 30 MG/DL (ref 0–99)
TRIGL SERPL-MCNC: 96 MG/DL (ref ?–150)

## 2019-03-29 PROCEDURE — 0JBQ0ZZ EXCISION OF RIGHT FOOT SUBCUTANEOUS TISSUE AND FASCIA, OPEN APPROACH: ICD-10-PCS

## 2019-03-29 PROCEDURE — 4A023N7 MEASUREMENT OF CARDIAC SAMPLING AND PRESSURE, LEFT HEART, PERCUTANEOUS APPROACH: ICD-10-PCS

## 2019-03-29 PROCEDURE — B2111ZZ FLUOROSCOPY OF MULTIPLE CORONARY ARTERIES USING LOW OSMOLAR CONTRAST: ICD-10-PCS

## 2019-03-29 PROCEDURE — 027034Z DILATION OF CORONARY ARTERY, ONE ARTERY WITH DRUG-ELUTING INTRALUMINAL DEVICE, PERCUTANEOUS APPROACH: ICD-10-PCS

## 2019-03-29 RX ADMIN — GLIMEPIRIDE SCH MG: 4 TABLET ORAL at 10:06

## 2019-03-29 RX ADMIN — INSULIN DETEMIR SCH UNIT: 100 INJECTION, SOLUTION SUBCUTANEOUS at 21:53

## 2019-03-29 RX ADMIN — ATORVASTATIN CALCIUM SCH MG: 80 TABLET, FILM COATED ORAL at 21:51

## 2019-03-29 RX ADMIN — BUPROPION HYDROCHLORIDE SCH MG: 300 TABLET, FILM COATED, EXTENDED RELEASE ORAL at 10:09

## 2019-03-29 RX ADMIN — INSULIN ASPART SCH UNIT: 100 INJECTION, SOLUTION INTRAVENOUS; SUBCUTANEOUS at 11:58

## 2019-03-29 RX ADMIN — TICAGRELOR SCH MG: 90 TABLET ORAL at 21:51

## 2019-03-29 RX ADMIN — INSULIN ASPART SCH: 100 INJECTION, SOLUTION INTRAVENOUS; SUBCUTANEOUS at 07:55

## 2019-03-29 RX ADMIN — INSULIN ASPART SCH UNIT: 100 INJECTION, SOLUTION INTRAVENOUS; SUBCUTANEOUS at 17:22

## 2019-03-29 RX ADMIN — INSULIN ASPART SCH UNIT: 100 INJECTION, SOLUTION INTRAVENOUS; SUBCUTANEOUS at 21:52

## 2019-03-29 RX ADMIN — METOPROLOL TARTRATE SCH MG: 25 TABLET, FILM COATED ORAL at 21:51

## 2019-03-29 RX ADMIN — LISINOPRIL SCH MG: 5 TABLET ORAL at 10:06

## 2019-03-29 RX ADMIN — FAMOTIDINE SCH MG: 20 TABLET, FILM COATED ORAL at 10:06

## 2019-03-29 RX ADMIN — METOPROLOL TARTRATE SCH MG: 25 TABLET, FILM COATED ORAL at 10:06

## 2019-03-29 NOTE — CONS
CONSULTATION



DATE OF SERVICE:

03/29/2022.



REASON FOR CONSULTATION:

Bilateral feet and sacral wounds.



HISTORY OF PRESENT ILLNESS:

The patient is an 78-year-old  male presenting to the ER at [facility] yesterday, with chief complaints of chest pain and heaviness.  The patient's pain

has been in the substernal area with no significant radiation. The patient describes

the pain to be with intensity of about 4 to 5 over 10. The patient did have shortness

of breath.  No cough or sputum production.  No nausea, no vomiting.  No abdominal pain

or any diarrhea.  With these symptoms the patient has been evaluated by the ER

physician and has been admitted to the hospital with chest pain to  for acute MI.

The patient also has wound on the right left toe dorsal aspect which the patient has

had for a couple of weeks and apparently started after the patient used his _____ shoes

continuously for about 4 days when the weather was really cold.  The patient did have a

blister on the dorsal aspect of the right toe that subsequently came off with small

ulcer formation.  The patient denies having any foul-smelling drainage, fluid or

significant swelling or redness.  The patient did have x-rays of the right foot that

did not show evidence of any bony changes.  Subsequently has been evaluated by Podiatry

with removal of the dry scab.  Infectious Disease was consulted for further

recommendation and local wound care and need for antibiotic therapy.  The patient was

also complaining of irritation to his sacral area and did have previous history of

sacral pressure ulcer.  Currently with dull aching pain to the _____ and no radiation.

No skin breakdown or any drainage.



REVIEW OF SYSTEMS:

Positive points have been mentioned in HPI.  The rest of the systems review is

negative.



PAST MEDICAL HISTORY:

Hypertension, hyperlipidemia, diabetes mellitus, MI, renal insufficiency, possible

history PTCA with stent, right knee replacement, cataracts with lens implants.



SOCIAL HISTORY:

Remote history of smoking. No drinking or drug use.



FAMILY HISTORY:

Father had history of coronary artery disease.  Also history of CVA and TIA.



ALLERGIES:

To IODINE.



MEDICATIONS:

Medications include the patient is currently:

1. Tylenol.

2. Levsin.

3. Norvasc.

4. Aspirin.

5. Lipitor.

6. Wellbutrin.

7. Colace.

8. Pepcid.

9. Dilaudid.

10.NovoLog.

11.Levemir.

12.______.

13.Lopressor.

14.Brilinta.

15.Ambien.



PHYSICAL EXAMINATION:

Blood pressure is 115/71 with a pulse of 73, temperature 96.9, he is 93% on room air.

GENERAL DESCRIPTION: An elderly male lying in bed in no distress.  No tachypnea or

accessory muscle of respiration use.

HEENT:  Shows pallor. No scleral icterus.  Oral mucosa is dry.  No pharyngeal erythema

or thrush.

NECK: Trachea central. No thyromegaly.

LUNGS: Unlabored breathing. Clear to auscultation anteriorly.  No wheeze or crackle.

HEART: S1, S2.  Regular rate and rhythm.

ABDOMEN:  Soft. No tenderness or rigidity.

EXTREMITIES: No edema of the feet. Right great toe on the dorsum did have small wound

with no slough tissue, no surrounding erythema, fluctuation, induration or drainage.

Examination of sacral area, the patient did have erythema to the sacral area. No

evidence of any ulcer formation.

NEUROLOGIC: The patient is awake, alert, oriented x3. Mood and affect normal.



LABS:

Hemoglobin is 10.5, ______ 5.8.  BUN of 26, creatinine is 1.19.  Troponin elevated.

_____ normal. Electrolytes are normal.



DIAGNOSTIC IMPRESSION AND PLAN:

1. Patient with right diabetic foot infection _____ with no evidence of any

    cellulitis.  Will recommend local wound care.

2. The patient with stage I sacral pressure ulcer with _____ and cellulitis of the

    deep tissue but no evidence of any ulceration.



PLAN:

1. Calmoseptine lotion to the sacral area.

2. Local wound care to the right big toe dorsum with Medihoney, to be changed daily.

3. No need for any systemic antibiotic therapy.

4. We will follow up on clinical condition and adjust her medications further if

    needed.

Thank you for this consultation.  Will follow this patient along with you.





MMODL / IJN: [id number] / Job#: [id number]

## 2019-03-29 NOTE — PN
PROGRESS NOTE



DATE OF SERVICE:

03/29/2019



DATE OF SERVICE:

This 78-year-old gentleman who was admitted with chest pain with possible acute non ST

segment elevation myocardial infarction with troponin 2.400, had troponin being

elevated.  The patient underwent cardiac catheterization as well as stenting of the

ostium and proximal LAD with reduction of stenosis 99-0 percent by Dr. Jo.  The

patient was also seen by Dr. Manning for diabetic Camilo grade 2 ulceration of the right

foot with peripheral neuropathy.  There is no history of chest pain or palpitation.

The patient complaining of some shortness of breath at this time.



PAST MEDICAL HISTORY:

Reviewed.



REVIEW OF SYSTEM:

Cardiovascular System:  As mentioned earlier.  RESPIRATORY: As mentioned earlier. GI no

nausea or vomiting.  no dysuria.  Central nervous system: As mentioned earlier.



CURRENT MEDICATIONS:

Reviewed and include:

1. Tylenol #3 p.r.n.

2. Maalox 30 mL q.4 p.r.n.

3. Xanax 0.5 q.6h p.r.n.

4. Xanax 0.5 q.i.d.

5. Norvasc 5 mg q.a.m.

6. Aspirin 81 mg.

7. Lipitor 80 mg q.h.s.

8. Atropine p.r.n.

9. Wellbutrin 300 mg q.h.s.

10.Calmoseptine.

11.Colace.

12.Pepcid.

13.Amaryl.

14.Dilaudid p.r.n.

15.NovoLog.

16.Levemir.

17.Zestril.

18.Lopressor.

19.Nitrostat.

20.Brilinta.

21.Ambien.



PHYSICAL EXAM:

Patient is alert, oriented x3.  Pulse 76, blood pressure 109/64, respiration 18,

temperature 97 degrees, pulse ox 98% on 2 L.  HEENT:  Conjunctivae normal. Oral mucosa

moist.  Neck is no jugular venous distention.  No carotid bruit. No lymph node

enlargement.  Cardiovascular System: S1, S2 muffled.  Respirations:  Breath sounds

diminished in the bases. A few scattered rhonchi.  No crackles.

ABDOMEN:  Soft, nontender.  Legs:  Right foot diabetic ulcer present.  Nervous system:

Peripheral neuropathy.



LABS:

Creatinine is 1.19, otherwise glucose 244 and troponin 5.  PT and INR noted.

Hemoglobin 10.8.



ASSESSMENT:

1. Chest pain, acute non ST-segment elevation myocardial infarction with troponin 5

    status post cardiac cath and stenting of the LAD.

2. Wide-complex QRS complexes in the EKG.

3. Diabetes mellitus type 2.

4. Hypertension.

5. Hyperlipidemia.

6. History of coronary artery disease, stent.

7. Remote history of nicotine dependence.

8. Diabetic Camilo grade 2 ulceration on the right side with peripheral neuropathy.



RECOMMENDATIONS AND DISCUSSION:

In this 78-year-old gentleman who presented with multiple complex medical issues, at

this time.  We will continue to monitor.  Otherwise continue with antiplatelet agents,

Cardiology.  Repeat labs.  The blood sugar will be monitored closely and insulin to

scale will also be instituted and DVT prophylaxis.  Prognosis guarded because of

multiple complex medical issues.  Dual antiplatelet treatment and further

recommendations to follow. A copy of dictation being forwarded to Dr. Lemuel Ortiz who

is the primary care physician.





MMODL / IJN: [id number] / Job#: [id number]

## 2019-03-29 NOTE — P.GSHP
History of Present Illness


H&P Date: 03/29/19


Chief Complaint: Diabetic Camilo grade 2 ulceration right foot with peripheral 

neuropathy





Patient presents today for care of an ulceration on the right hallux.  He was 

admitted to the hospital yesterday with shortness of breath and this morning 

cardiac catheterization.  Patient is in bed and resting comfortably.  Review of 

past medical history shows no counter indication to foot care.  Patient states 

that this ulcer has been present for at least 3-4 weeks.  Patient states he does

not know the etiology.  He is being treated by his podiatrist who stated that he

should go to wound care for this as it was not responding.  He was to be seen in

the wound care center today but was admitted therefore I was consulted to see 

this at bedside.





Past Medical History


Past Medical History: Blood Disorder, Diabetes Mellitus, Hyperlipidemia, 

Hypertension, Myocardial Infarction (MI), Renal Disease


Additional Past Medical History / Comment(s): ANEMIA.


Last Myocardial Infarction Date:: Nov 2009


History of Any Multi-Drug Resistant Organisms: None Reported


Past Surgical History: Heart Catheterization With Stent, Joint Replacement


Additional Past Surgical History / Comment(s): 3-4 stents, total right knee, 

cataracts with lens implants


Past Anesthesia/Blood Transfusion Reactions: No Reported Reaction


Date of Last Stent Placement:: 2012


Past Psychological History: No Psychological Hx Reported


Smoking Status: Former smoker


Past Alcohol Use History: None Reported


Past Drug Use History: None Reported





- Past Family History


  ** Father


Family Medical History: Myocardial Infarction (MI)





  ** Mother


Family Medical History: Coronary Artery Disease (CAD), CVA/TIA





Medications and Allergies


                                Home Medications











 Medication  Instructions  Recorded  Confirmed  Type


 


Aspirin EC [Ecotrin Low Dose] 81 mg PO HS 08/23/14 03/28/19 History


 


amLODIPine [Norvasc] 5 mg PO QAM 08/23/14 03/28/19 History


 


buPROPion XL [Wellbutrin XL] 300 mg PO QAM 08/23/14 03/28/19 History


 


Cholecalciferol (Vitamin D3) 2,000 unit PO DAILY 10/21/17 03/28/19 History





[Vitamin D3]    


 


Ergocalciferol (Vitamin D2) 50,000 unit PO MINAYA 10/21/17 03/28/19 History





[Vitamin D2]    


 


Ranitidine HCl [Zantac] 150 mg PO QAM 10/21/17 03/28/19 History


 


Quinapril HCl [Accupril] 10 mg PO QAM 10/22/17 03/28/19 History


 


Glimepiride [Amaryl] 4 mg PO QAM 12/18/17 03/28/19 History


 


Docusate [Colace] 100 mg PO DAILY 12/21/18 03/28/19 History


 


Insulin Aspart [NovoLOG] 5 unit SQ QAM PRN 12/21/18 03/28/19 History


 


Insulin Glargine [Lantus] 6 - 8 unit SQ HS 12/21/18 03/28/19 History


 


Magnesium 200 mg PO BID 12/21/18 03/28/19 History


 


Metoprolol Succinate (ER) [Toprol 50 mg PO BID 12/21/18 03/28/19 History





XL]    


 


Simvastatin 40 mg PO DAILY 12/21/18 03/28/19 History








                                    Allergies











Allergy/AdvReac Type Severity Reaction Status Date / Time


 


iodine Allergy  Rash/Hives Verified 03/28/19 13:57














Surgical - Exam


                                   Vital Signs











Temp Pulse Resp BP Pulse Ox


 


 97.8 F   91   20   135/69   99 


 


 03/28/19 12:59  03/28/19 12:59  03/28/19 12:59  03/28/19 12:59  03/28/19 12:59














- Cardiovascular





Diminished pedal pulses bilateral with no digital hair 10.  Skin temperature 

texture tumor is decreased bilateral.  Sub plexus venous filling time is delayed

10.  There is no pedal edema bilateral.





- Integumentary





Patient has a full-thickness ulceration on the dorsal medial aspect of the IPJ 

of the right hallux.  There is no surrounding erythema or edema to suggest 

infection.  There is no purulence.  There is necrotic tissue surrounding the 

ulceration and it encompasses both the marginal and central area.  After 

debridement the ulcer was noted to be through the dermis into the deep fascia.  

There was no exposed osseous tissue or tendon.  The remaining integument was 

unremarkable





- Neurologic





Patient has diminished epicritic and pallesthetic sensations bilateral in a 

stocking and glove distribution up to including the mid foot bilateral.  Light 

touch deep tendon reflexes somewhat diminished bilateral.  2 point tactile dim

inished at the medial malleolus bilateral.  Vibratory diminished at the medial 

malleolus bilateral.





- Musculoskeletal





Patient has a gastroc equinus bilateral.  There is hammering of digits globally 

1 through 5 bilateral.  She has a flexible pes planus foot type bilateral.  All 

inverters everters plantar flexed dorsiflexors grossly normal and symmetrical 

bilateral.





Results





- Labs





                                 03/28/19 13:28





                                 03/28/19 13:28


                  Abnormal Lab Results - Last 24 Hours (Table)











  03/28/19 03/28/19 03/28/19 Range/Units





  13:28 13:28 13:28 


 


RBC  3.60 L    (4.30-5.90)  m/uL


 


Hgb  10.8 L    (13.0-17.5)  gm/dL


 


Hct  32.0 L    (39.0-53.0)  %


 


Lymphocytes #  0.8 L    (1.0-4.8)  k/uL


 


APTT    18.5 L  (22.0-30.0)  sec


 


BUN   26 H   (9-20)  mg/dL


 


Glucose   176 H   (74-99)  mg/dL


 


POC Glucose (mg/dL)     (75-99)  mg/dL


 


Troponin I     (0.000-0.034)  ng/mL














  03/28/19 03/28/19 03/28/19 Range/Units





  13:28 17:19 20:16 


 


RBC     (4.30-5.90)  m/uL


 


Hgb     (13.0-17.5)  gm/dL


 


Hct     (39.0-53.0)  %


 


Lymphocytes #     (1.0-4.8)  k/uL


 


APTT     (22.0-30.0)  sec


 


BUN     (9-20)  mg/dL


 


Glucose     (74-99)  mg/dL


 


POC Glucose (mg/dL)   114 H   (75-99)  mg/dL


 


Troponin I  0.700 H*   2.440 H*  (0.000-0.034)  ng/mL














  03/28/19 03/28/19 03/29/19 Range/Units





  20:16 20:32 02:19 


 


RBC     (4.30-5.90)  m/uL


 


Hgb     (13.0-17.5)  gm/dL


 


Hct     (39.0-53.0)  %


 


Lymphocytes #     (1.0-4.8)  k/uL


 


APTT  81.8 H    (22.0-30.0)  sec


 


BUN     (9-20)  mg/dL


 


Glucose     (74-99)  mg/dL


 


POC Glucose (mg/dL)   153 H   (75-99)  mg/dL


 


Troponin I    5.000 H*  (0.000-0.034)  ng/mL














  03/29/19 03/29/19 03/29/19 Range/Units





  06:04 06:33 11:32 


 


RBC     (4.30-5.90)  m/uL


 


Hgb     (13.0-17.5)  gm/dL


 


Hct     (39.0-53.0)  %


 


Lymphocytes #     (1.0-4.8)  k/uL


 


APTT   43.5 H   (22.0-30.0)  sec


 


BUN     (9-20)  mg/dL


 


Glucose     (74-99)  mg/dL


 


POC Glucose (mg/dL)  140 H   195 H  (75-99)  mg/dL


 


Troponin I     (0.000-0.034)  ng/mL








                                 Diabetes panel











  03/28/19 03/29/19 Range/Units





  13:28 06:33 


 


Sodium  138   (137-145)  mmol/L


 


Potassium  4.2   (3.5-5.1)  mmol/L


 


Chloride  106   ()  mmol/L


 


Carbon Dioxide  23   (22-30)  mmol/L


 


BUN  26 H   (9-20)  mg/dL


 


Creatinine  1.19   (0.66-1.25)  mg/dL


 


Glucose  176 H   (74-99)  mg/dL


 


Calcium  9.4   (8.4-10.2)  mg/dL


 


AST  28   (17-59)  U/L


 


ALT  28   (21-72)  U/L


 


Alkaline Phosphatase  95   ()  U/L


 


Total Protein  6.4   (6.3-8.2)  g/dL


 


Albumin  3.8   (3.5-5.0)  g/dL


 


Triglycerides   96  (<150)  mg/dL


 


HDL Cholesterol   55  (40-60)  mg/dL








                                  Calcium panel











  03/28/19 Range/Units





  13:28 


 


Calcium  9.4  (8.4-10.2)  mg/dL


 


Albumin  3.8  (3.5-5.0)  g/dL








                                 Pituitary panel











  03/28/19 Range/Units





  13:28 


 


Sodium  138  (137-145)  mmol/L


 


Potassium  4.2  (3.5-5.1)  mmol/L


 


Chloride  106  ()  mmol/L


 


Carbon Dioxide  23  (22-30)  mmol/L


 


BUN  26 H  (9-20)  mg/dL


 


Creatinine  1.19  (0.66-1.25)  mg/dL


 


Glucose  176 H  (74-99)  mg/dL


 


Calcium  9.4  (8.4-10.2)  mg/dL








                                  Adrenal panel











  03/28/19 Range/Units





  13:28 


 


Sodium  138  (137-145)  mmol/L


 


Potassium  4.2  (3.5-5.1)  mmol/L


 


Chloride  106  ()  mmol/L


 


Carbon Dioxide  23  (22-30)  mmol/L


 


BUN  26 H  (9-20)  mg/dL


 


Creatinine  1.19  (0.66-1.25)  mg/dL


 


Glucose  176 H  (74-99)  mg/dL


 


Calcium  9.4  (8.4-10.2)  mg/dL


 


Total Bilirubin  0.3  (0.2-1.3)  mg/dL


 


AST  28  (17-59)  U/L


 


ALT  28  (21-72)  U/L


 


Alkaline Phosphatase  95  ()  U/L


 


Total Protein  6.4  (6.3-8.2)  g/dL


 


Albumin  3.8  (3.5-5.0)  g/dL














Assessment and Plan


Assessment: 





Camilo grade 2 ulceration right hallux with diabetic neuropathy and peripheral 

vascular disease


Plan: 





Exam.  Discussed with patient findings and treatment plan.  Today using a 

sterile 15 blade we debrided the wound of all necrotic tissue please refer for 

to wound care note.  We will start the patient on medical Honey and offloading. 

Ordered appropriate labs radiographs and vascular studies.  Patient is to be 

followed up in the wound care center once discharge.  Thank you for this 

referral

## 2019-03-29 NOTE — CONS
CONSULTATION



Mr. Lau is an 78-year-old male with known history of hypertension,

hyperlipidemia, diabetes mellitus, who is followed on a regular basis with Dr. Mast,

has a history of coronary artery disease, prior percutaneous revascularization,

although details of that are not available to me, who presented to the emergency room

with symptoms of chest discomfort, not associated with any other symptoms.  The patient

took nitroglycerin with some improvement in his symptoms.  He is pain free at this

time.  Patient is limited in physical activity because of neuropathy.  He has no clear

PND, orthopnea.  No peripheral edema.  No significant dizziness.  No syncope.  His

coronary risk factors are positive for hypertension, hyperlipidemia and diabetes

mellitus.  He is a nonsmoker.



MEDICATION:

His medications at home include bupropion, Norvasc 5 mg daily, simvastatin 40 mg daily,

Zantac 150 mg daily, Accupril 10 mg daily, metoprolol succinate 50 mg twice a day,

insulin, glimepiride 4 mg daily, aspirin, Colace, and vitamin D.



REVIEW OF SYSTEMS:

RESPIRATORY SYSTEM: He has no recent documented asthma, emphysema or bronchitis.

GI SYSTEM: No recent GI bleeding.  No peptic ulcer disease.

 SYSTEM: No dysuria or hematuria.

NERVOUS SYSTEM: No stroke or seizure.



PHYSICAL EXAMINATION:

He is an 78-year-old male, alert, oriented, in no apparent distress.  Blood pressure

120/70 with the heart rate in the 80s.

HEAD:  Normocephalic.

EYES: Sclerae nonicteric.

NECK: Good carotid upstroke. No bruit. No jugular venous distention.

LUNGS:  Clear to auscultation.

HEART:  Regular rate and rhythm. S1, S2.  No S3 with systolic murmur heard at the base

2/6, ejection type. No diastolic murmur.

ABDOMEN:  Soft, nontender.  Positive bowel sounds. No organomegaly.

EXTREMITIES:  No edema.



LAB DATA:

Lab data revealed a peak troponin of 5, cholesterol 104, LDL of 30.  BUN and creatinine

of 26 and 1.19.  Hemoglobin of 10.8.



EKG revealed a sinus mechanism with right bundle branch block and T-wave and and ST

depression in the anterior precordial leads that is dynamic.



IMPRESSION:

1. Non ST-segment elevation myocardial infarction in a patient with known history of

    coronary artery disease.  Full details of his prior anatomy is not available to me

    at this time.

2. History of hypertension.

3. Hyperlipidemia.

4. Diabetes mellitus.

5. History of neuropathy.



RECOMMENDATION:

From the cardiac standpoint, I will obtain echocardiogram with Doppler.  I will

recommend to proceed with coronary angiography.  I discussed those findings with the

patient. He is in full understanding and agreement.  The procedure will be done by Dr. Mast.



Thank you for this consult.  We will follow with you.





MMPANL / IJN: [id number] / Job#: [id number]

## 2019-03-29 NOTE — XR
EXAMINATION TYPE: XR foot complete RT

 

DATE OF EXAM: 3/29/2019

 

COMPARISON: NONE

 

HISTORY: ulcer on top of great toe

 

TECHNIQUE: Three views are submitted.

 

FINDINGS:

The osseous structures are intact.   There is vascular calcifications and a calcaneal spur. No destru
ctive changes. Hammertoe deformities are noted. Soft tissue edema seen. There is no acute fracture or
 dislocation.   Joint spaces are preserved.

 

IMPRESSION:

1. Soft tissue edema without evidence of destructive change to suggest osteomyelitis. Correlate with 
triple phase bone scan as clinically warranted.

## 2019-03-29 NOTE — PTCA
PERCUTANEOUSTRANS CORORONARY ANGIOGRAPHY



Mr. Lau is an 78-year-old male with a known history of coronary artery disease,

multivessel coronary angioplasty and stenting in the past who presented with symptoms

of chest discomfort and evidence of non ST-segment elevation myocardial infarction.  In

view of his presentation symptoms, he underwent cardiac catheterization by Dr. Mast

and was found to have critical stenosis involving the ostium of the LAD with diffuse

disease and calcified vessels. In view of his anatomy and his presentation,

recommendation was made regarding coronary angioplasty and stenting. The procedure as

well as the risks and the complications were discussed with the patient who is in full

understanding and agreement.



PROCEDURE:

A 6-Kazakh FR4 guiding catheter introduced in the system after cannulating the left

main a 0.014 balanced medium weight J-wire was advanced and positioned in the distal

left circumflex.  Subsequently another 0.014 balanced medium weight J-wire was advanced

into the LAD and positioned distally. Subsequently a 2.5 x 12 mm Trek balloon was

advanced and one inflation at 10 atmospheres was done. Following that the balloon was

removed and a 2.75 x 15 mm Xience Juani stent was deployed, post dilated at 16

atmospheres. Following that, the balloon was removed and a 3.0 x 12 mm NC Trek balloon

was advanced and one inflation at a maximum of 14 atmospheres was done. After the last

inflation, after appropriate wait, the balloon and the guidewire were withdrawn back in

the guiding catheter.  Images were obtained, repeated.  Those images reveal stable

successful stenting.  At that point, the guiding catheter, the balloon and the

guidewire were removed.  The sheath was removed. Hemostasis was obtained with

deployment of an Angio-Seal.  There was no immediate complication. Patient is returned

to his room in stable condition.  Of note, the patient received Angiomax per protocol

as well as oral loading dose of Brilinta.  He had chest discomfort with the inflation

that resolved at the end of the procedure.



RESULTS:

Successful stenting of the ostium and proximal LAD with reduction of stenosis from 99%

to 0%.



RECOMMENDATION:

Patient will be continued on aspirin, Brilinta, beta blocker, ACE inhibitor, statin.

The importance of dual antiplatelet treatment were discussed with the patient and in

full understanding and agreement.



MMODL / IJN: [id number] / Job#: [id number]

## 2019-03-29 NOTE — P.WCSRGD
Wound Ctr Surgical Debridement


Date of Service: 03/29/19





Type of debridement: surgical


Pre op diagnosis: Camilo grade 2 ulcer of right hallux with arterial sclerotic 

vessel disease


Post op diagnosis:Same


Anesthesia: 5% lidocaine gel applied to the ulcer; 


Absence of obvious signs of infection: 


Extent of necrotic, devitalized or non-viable tissue: .  Moderate


Other material in the wound that is expected to inhibit healing or promote 

adjacent tissue breakdown: Necrotic tissue marginally and centrally


Degree of epithelialization: 30%


Tissue removal:  subcutaneous tissue 


Method and instrument: Surgical debridement with total removal of adherent 

necrotic material or exudate abnormal and necrotic wound bed tissue and abnormal

skin is requiring excision at the wound wound margin until healthy tissue was 

exposed utilizing a    scalpel cutting into the deep fascia planes and 

subcutaneous tissue into and including viable healthy living  tissue removing 

all necrotic tissue marginally and centrally


Character of the wound after debridement:  Dimensions: 0.6 x 1.0 x 0.2 cm 

Description of necrotic material present: Moderate Description of tissue 

removed: Necrotic tissue marginally and centrally Degree of epithelialization: 

30%


Pre-debridement measurement: 0.4 x 0.8 x 1.0 cm


Postoperative debridement measurement: Same as above


Control of bleeding:Bleeding was easily controlled with saline moistened gauze 

and light pressure less then 2 mL hematogenous fluid


Post debridement dressing: Medical Honey


Patient tolerated procedure well

## 2019-03-29 NOTE — HP
HISTORY AND PHYSICAL



DATE OF SERVICE:

03/28/2019



CHIEF COMPLAINT:

Chest pain.



HISTORY OF PRESENT ILLNESS:

This 78-year-old gentleman being followed by Dr. Lemuel Ortiz in the outpatient 
setting

had a past medical history of diabetes mellitus, hypertension, hyperlipidemia,

myocardial infarction, and CAD and stent history. The patient was admitted with 
severe

chest pain which was felt in the anterior part of the chest, which was pressure 
type of

pain without any radiation felt about 5 out of 10 in intensity.  Patient came to
[facility].  Initial troponin was found to be 0.700 and repeat was found to 
be

2.440.  The EKG showed wide-complex QRS complexes and as well as some ST-T 
changes.

The patient was given nitro with some pain earlier, but because of recurrence of
pain

nitro drip was initiated. The patient is being closely monitored. Cardiology has
also

been consulted.  There is no history of fever, rigors.  No history of headache, 
loss of

consciousness, or seizures. No history of any hematochezia or melena at this 
time.



PAST MEDICAL HISTORY:

History of diabetes mellitus, hypertension, hyperlipidemia, myocardial 
infarction,

history of renal disease, history of CAD, stent.



MEDICATIONS:

Home medications are:

1. Wellbutrin  mg q.a.m.

2. Norvasc 5 mg q.a.m.

3. Simvastatin 40 mg p.o. daily.

4. Zantac 150 mg q.a.m.

5. Accupril 10 mg q.a.m.

6. Toprol XL 50 mg p.o. b.i.d.

7. Magnesium 200 mg p.o. b.i.d.

8. Lantus 6 to 8 units subcu q.h.s.

9. NovoLog 5 units subcu p.r.n..

10.Amaryl 4 mg q.a.m.

11.Vitamin D 50,000 p.o. Sunday.

12.Colace 100 mg p.o. daily.

13.Vitamin D3, 2000 daily.

14.Ecotrin 81 mg q.h.s.



ALLERGIES:

IODINE.



FAMILY HISTORY:

History of myocardial infarction in the family.



SOCIAL HISTORY:

Previous history of smoking.



REVIEW OF SYSTEMS:

ENT: No diminished hearing or diminished vision.

CARDIOVASCULAR SYSTEM: As mentioned earlier.

RESPIRATORY SYSTEM:  No cough or hemoptysis.

GI:  As mentioned earlier.

: No dysuria.

NERVOUS SYSTEM:  No numbness or weakness.

ALLERGY/IMMUNOLOGY:  No asthma.

HEMATOLOGY/ONCOLOGY: No history of anemia.

ENDOCRINE:  Diabetes.

CONSTITUTIONAL: As mentioned earlier.

DERMATOLOGY: Negative.

RHEUMATOLOGY:  Negative.

PSYCHIATRY: As mentioned earlier.



PHYSICAL EXAMINATION:

The patient is alert and oriented x3. Pulse 89, blood pressure 159/89, 
respiration 18,

temperature 98.5, pulse ox 99% on 2 L.

HEENT: Conjunctivae normal.

NECK: No jugular venous distention.

CARDIOVASCULAR:  S1, S2 muffled. No murmur, no thrills. No S3, no S4.

RESPIRATORY:  Breath sounds diminished at the bases. A few scattered rhonchi and

crackles.

ABDOMEN:  Soft, nontender.  No mass palpable.

LEGS: No edema.  No swelling.

NERVOUS SYSTEM: Higher functions as mentioned earlier. Moves all 4 limbs. 
LYMPHATICS:

No lymphadenopathy of the neck, axillae or groin.

SKIN:  No ulcer, rash or bleeding.

JOINTS: No active deforming arthropathy.



LABS:

WBC 5.8, hemoglobin 10.8. Sodium 138, potassium 4.2. Glucose and troponin noted.
 EKG

reviewed.



ASSESSMENT:

1. Chest pain possible acute non-ST-segment elevation myocardial infarction with

    troponin 2.400.

2. Right complex QRS complexes on the EKG.

3. Diabetes mellitus type 2.

4. Hypertension.

5. Hyperlipidemia.

6. History of coronary artery disease, stent.

7. Remote history of nicotine dependence.



RECOMMENDATIONS AND DISCUSSION:

In this 78-year-old gentleman who presented with multiple complex medical 
issues, will

monitor the patient closely. Continue the current medications, continue 
symptomatic

treatment. Otherwise at this time I recommend close followup and IV heparin and 
IV

nitroglycerin.  Cardiology consultation.  We will also continue with 
antiplatelet

agents and as well as Lipitor and beta blockers too. Overall prognosis guarded 
because

of multiple complex medical issues. Further recommendations to follow. A copy of

dictation forwarded to Dr. Ortiz who is the primary physician.





MMLARA / HOMERN: [id number] / Job#: [id number]

[email or web address]

## 2019-03-29 NOTE — XR
EXAMINATION TYPE: XR chest 1V portable

 

DATE OF EXAM: 3/29/2019

 

COMPARISON: 3/28/2019

 

HISTORY: Heart failure

 

TECHNIQUE: Single frontal view of the chest is obtained.

 

FINDINGS: There is some minimal infiltrate in the medial left lower lobe. The other lung fields are c
lear. Heart and mediastinum are normal. There is no pleural effusion. IMPRESSION:  There is new small
 infiltrate left lower lobe compared to old exam. Normal heart.

## 2019-03-29 NOTE — CC
CARDIAC CATHETERIZATION REPORT



INDICATION:

Non ST-segment elevation MI in a patient with known coronary artery disease, status

post multivessel angioplasty.



PROCEDURE NOTE:

After obtaining informed consent, left heart catheterization, coronary angiogram are

performed via the right femoral artery using standard Veronika catheters. The patient

tolerated the procedure well without any obvious immediate complications.



FINDINGS:

1. HEMODYNAMICS: Left ventricular central aortic pressure is 130/70 mm.

2. LEFT VENTRICULOGRAM: Left ventriculogram is not performed.

3. ANGIOGRAPHIC DATA:

Left Main Coronary Artery: Left main coronary artery appears calcified, but is free of

significant stenosis.  Divides into left anterior descending coronary artery and

circumflex coronary artery.

LAD shows a 95% stenosis in its ostial portion with diffuse disease involving mid and

distal LAD.

Circumflex coronary artery shows moderate disease in the proximal part with a 70%

stenosis in the OM branch and diffuse disease in rest of the vessel. Previously stented

segment appears patent

Right coronary artery is a large dominant vessel that shows mild to moderate

atherosclerotic plaque in its proximal portion and subtotal occlusion of the distal

PDA.



CONCLUSION:

1. Severe three-vessel coronary artery disease as described above.

2. Proximal left anterior descending artery lesion, which is probably responsible for

    the patient's clinical presentation and acute myocardial infarction.



PLAN:

I reviewed angiographic data with the patient and the on-call interventionist.  We

believe the best option at this stage is to proceed with angioplasty of the proximal

LAD  understanding that it is high risk angioplasty. The patient is not a good surgical

candidate given the diffuse disease, advanced age and memory that seems somewhat

impaired.  I reviewed these issues at length with the patient.  Patient wishes to go

through angioplasty at this time.





MMODL / IJN: [id number] / Job#: [id number]

## 2019-03-30 LAB
ANION GAP SERPL CALC-SCNC: 10 MMOL/L
BASOPHILS # BLD AUTO: 0 K/UL (ref 0–0.2)
BASOPHILS NFR BLD AUTO: 0 %
BUN SERPL-SCNC: 29 MG/DL (ref 9–20)
CALCIUM SPEC-MCNC: 9.1 MG/DL (ref 8.4–10.2)
CHLORIDE SERPL-SCNC: 105 MMOL/L (ref 98–107)
CO2 SERPL-SCNC: 23 MMOL/L (ref 22–30)
EOSINOPHIL # BLD AUTO: 0.1 K/UL (ref 0–0.7)
EOSINOPHIL NFR BLD AUTO: 1 %
ERYTHROCYTE [DISTWIDTH] IN BLOOD BY AUTOMATED COUNT: 3.65 M/UL (ref 4.3–5.9)
ERYTHROCYTE [DISTWIDTH] IN BLOOD: 14.4 % (ref 11.5–15.5)
GLUCOSE BLD-MCNC: 177 MG/DL (ref 75–99)
GLUCOSE BLD-MCNC: 182 MG/DL (ref 75–99)
GLUCOSE BLD-MCNC: 186 MG/DL (ref 75–99)
GLUCOSE BLD-MCNC: 210 MG/DL (ref 75–99)
GLUCOSE SERPL-MCNC: 168 MG/DL (ref 74–99)
HCT VFR BLD AUTO: 32.4 % (ref 39–53)
HGB BLD-MCNC: 10.7 GM/DL (ref 13–17.5)
LYMPHOCYTES # SPEC AUTO: 0.7 K/UL (ref 1–4.8)
LYMPHOCYTES NFR SPEC AUTO: 5 %
MCH RBC QN AUTO: 29.4 PG (ref 25–35)
MCHC RBC AUTO-ENTMCNC: 33.1 G/DL (ref 31–37)
MCV RBC AUTO: 88.8 FL (ref 80–100)
MONOCYTES # BLD AUTO: 0.6 K/UL (ref 0–1)
MONOCYTES NFR BLD AUTO: 5 %
NEUTROPHILS # BLD AUTO: 12.3 K/UL (ref 1.3–7.7)
NEUTROPHILS NFR BLD AUTO: 89 %
PLATELET # BLD AUTO: 213 K/UL (ref 150–450)
POTASSIUM SERPL-SCNC: 4.1 MMOL/L (ref 3.5–5.1)
SODIUM SERPL-SCNC: 138 MMOL/L (ref 137–145)
WBC # BLD AUTO: 13.8 K/UL (ref 3.8–10.6)

## 2019-03-30 RX ADMIN — ASPIRIN 81 MG CHEWABLE TABLET SCH MG: 81 TABLET CHEWABLE at 09:15

## 2019-03-30 RX ADMIN — INSULIN DETEMIR SCH UNIT: 100 INJECTION, SOLUTION SUBCUTANEOUS at 20:48

## 2019-03-30 RX ADMIN — METOPROLOL TARTRATE SCH MG: 25 TABLET, FILM COATED ORAL at 20:49

## 2019-03-30 RX ADMIN — LISINOPRIL SCH MG: 5 TABLET ORAL at 09:15

## 2019-03-30 RX ADMIN — TICAGRELOR SCH MG: 90 TABLET ORAL at 09:15

## 2019-03-30 RX ADMIN — INSULIN ASPART SCH UNIT: 100 INJECTION, SOLUTION INTRAVENOUS; SUBCUTANEOUS at 12:13

## 2019-03-30 RX ADMIN — INSULIN ASPART SCH UNIT: 100 INJECTION, SOLUTION INTRAVENOUS; SUBCUTANEOUS at 06:43

## 2019-03-30 RX ADMIN — GLIMEPIRIDE SCH MG: 4 TABLET ORAL at 09:15

## 2019-03-30 RX ADMIN — FAMOTIDINE SCH MG: 20 TABLET, FILM COATED ORAL at 09:15

## 2019-03-30 RX ADMIN — ATORVASTATIN CALCIUM SCH MG: 80 TABLET, FILM COATED ORAL at 20:49

## 2019-03-30 RX ADMIN — INSULIN ASPART SCH UNIT: 100 INJECTION, SOLUTION INTRAVENOUS; SUBCUTANEOUS at 17:41

## 2019-03-30 RX ADMIN — METOPROLOL TARTRATE SCH MG: 25 TABLET, FILM COATED ORAL at 09:15

## 2019-03-30 RX ADMIN — BUPROPION HYDROCHLORIDE SCH MG: 300 TABLET, FILM COATED, EXTENDED RELEASE ORAL at 09:15

## 2019-03-30 RX ADMIN — INSULIN ASPART SCH UNIT: 100 INJECTION, SOLUTION INTRAVENOUS; SUBCUTANEOUS at 20:49

## 2019-03-30 RX ADMIN — TICAGRELOR SCH MG: 90 TABLET ORAL at 20:49

## 2019-03-30 NOTE — P.PN
Subjective


Progress Note Date: 03/30/19


This is an 78-year-old  gentleman with known history of hypertension, 

hyperlipidemia, diabetes, who follows regularly with Dr. Arguello in the office.  He

presented to the hospital with a non-ST elevation myocardial infarction, was 

taken to the cardiac catheterization lab yesterday where he underwent 

angioplasty and stenting of the left anterior descending artery.  The patient 

was seen and examined this morning, he is doing well, denies any chest pain or 

difficulty in breathing.  Hemodynamically he is stable.  Blood pressure 104/70 

with a heart rate in the 80s, 90% on room air.  White blood cell count 13.8, 

hemoglobin 10.7, platelet count 213.  Sodium 138, potassium 4.1, BUN 29 and 

creatinine 1.3.  EKG from this morning shows a normal sinus rhythm with no 

changes from post-PCI.  Patient is undergoing an arterial duplex study at this 

morning because of a wound on his right foot.








Objective





- Vital Signs


Vital signs: 


                                   Vital Signs











Temp  97.8 F   03/30/19 08:00


 


Pulse  85   03/30/19 08:00


 


Resp  18   03/30/19 08:00


 


BP  103/70   03/30/19 08:00


 


Pulse Ox  98   03/30/19 08:00








                                 Intake & Output











 03/29/19 03/30/19 03/30/19





 18:59 06:59 18:59


 


Intake Total 1371.891  


 


Output Total 350 350 


 


Balance 1021.891 -350 


 


Weight 82.1 kg 82.3 kg 


 


Intake:   


 


  .9  


 


  Intake, IV Titration 876.991  





  Amount   


 


    Heparin Sod,Pork in 0.45% 76.991  





    NaCl 25,000 unit In 0.45   





    % NaCl 1 250ml.bag @ 12   





    UNITS/KG/HR 9.798 mls/hr   





    IV .Q24H OPAL Rx#:   





    [id number]   


 


    Sodium Chloride 0.9% 1, 800  





    000 ml @ 75 mls/hr IV .   





    [id number] OPAL Rx#:[id number]   


 


  Oral 360  


 


Output:   


 


  Urine 350 350 














- Exam


PHYSICAL EXAMINATION: 





GENERAL: 78-year-old  gentleman in no acute distress at the time of my 

examination





HEENT: Head is atraumatic, normocephalic.  Pupils equal, round.  Sclera 

anicteric. Conjunctiva are clear.  Mucous membranes of the mouth are moist.  

Neck is supple.  There is no elevated jugular venous pressure.  No carotid  

bruit is heard.





HEART EXAMINATION: Heart S1 S2 1 systolic ejection murmur is heard





CHEST EXAMINATION: Lungs are clear to auscultation and precussion. No chest wall

tenderness is noted on palpation or with deep breathing.





ABDOMEN:  Soft, nontender. Bowel sounds are heard. No organomegaly noted.


 


EXTREMITIES: 1+ peripheral pulses with no evidence of peripheral edema and no 

calf tenderness noted.  Right groin is soft, no evidence of any hematoma.  

Patient does have a dressing in place to the toes on his right lower extremity.





NEUROLOGIC patient is awake, alert and oriented 3 .


 


.


 











- Labs


CBC & Chem 7: 


                                 03/30/19 07:30





                                 03/30/19 07:30


Labs: 


                  Abnormal Lab Results - Last 24 Hours (Table)











  03/29/19 03/29/19 03/29/19 Range/Units





  11:32 16:45 20:58 


 


WBC     (3.8-10.6)  k/uL


 


RBC     (4.30-5.90)  m/uL


 


Hgb     (13.0-17.5)  gm/dL


 


Hct     (39.0-53.0)  %


 


Neutrophils #     (1.3-7.7)  k/uL


 


Lymphocytes #     (1.0-4.8)  k/uL


 


BUN     (9-20)  mg/dL


 


Creatinine     (0.66-1.25)  mg/dL


 


Glucose     (74-99)  mg/dL


 


POC Glucose (mg/dL)  195 H  244 H  308 H  (75-99)  mg/dL














  03/30/19 03/30/19 03/30/19 Range/Units





  06:01 07:30 07:30 


 


WBC   13.8 H   (3.8-10.6)  k/uL


 


RBC   3.65 L   (4.30-5.90)  m/uL


 


Hgb   10.7 L   (13.0-17.5)  gm/dL


 


Hct   32.4 L   (39.0-53.0)  %


 


Neutrophils #   12.3 H   (1.3-7.7)  k/uL


 


Lymphocytes #   0.7 L   (1.0-4.8)  k/uL


 


BUN    29 H  (9-20)  mg/dL


 


Creatinine    1.34 H  (0.66-1.25)  mg/dL


 


Glucose    168 H  (74-99)  mg/dL


 


POC Glucose (mg/dL)  210 H    (75-99)  mg/dL














Assessment and Plan


Plan: 


Assessment and plan


#1 non-ST elevation MI, status post injury plasty and stenting of the left 

anterior descending artery


#2 hypertension


#3 diabetes


#4 hyperlipidemia


#5 diabetic foot ulcer on the right foot








Plan


Echocardiogram with Doppler study was performed which revealed a severely 

impaired left ventricular systolic function, ejection fraction 20-25% with 

evidence of anterior septal and apical hypokinesia.  LA is severely dilated, 

there is moderate aortic stenosis by gradient.  From cardiology's perspective, 

we'll recommend to continue the patient on his current medications.  We will 

continue to observe for another 24 hours and plan for discharge home in the 

morning if stable.








DNP note has been reviewed, I agree with a documented findings and plan of care.

 Patient was seen and examined.

## 2019-03-30 NOTE — PN
PROGRESS NOTE



DATE OF SERVICE:

03/30/2019



This 78-year-old gentleman who was admitted with acute non ST segment elevation

myocardial infarction, had cardiac catheterization and stenting at this time.  No chest

pain.  No palpitations.  No fever.  The patient also has diabetes mellitus and some

renal failure.  No chest pain.  No palpitations.  No fever.  The creatinine is at 1.34

at this time.



EXAM:

Alert and oriented x3.  Pulse 85, blood pressure 130/70, respiratory rate 16,

temperature 98.6, pulse ox 96% on room air.

HEENT: Conjunctivae normal.

NECK: No jugular venous distention.

CARDIOVASCULAR: S1, S2 muffled.

RESPIRATIONS: Breath sounds diminished in the bases. A few scattered rhonchi and

crackles.

ABDOMEN:  Soft, nontender.

CENTRAL NERVOUS SYSTEM: No focal deficits.



LAB:

WBC 13.8, hemoglobin is 10.7, creatinine is 1.34.  Troponin is noted.



ASSESSMENT:

1. Chest pain possible acute non ST segment elevation myocardial infarction with

    troponin elevation up to 5 status post cardiac catheterization and stenting of the

    left anterior descending coronary artery.

2. Wide-complex QRS complicated complex in the EKG.

3. Increased creatinine with possible acute renal failure possibly contrast induced.

4. Diabetes type 2.

5. Hypertension.

6. Hyperlipidemia.

7. History of coronary artery disease, stent.

8. Remote history of nicotine dependence.

9. 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



RECOMMENDATIONS AND DISCUSSION:

Continue current medications, management and symptomatic treatment, continue with IV

fluids.  Otherwise, I would recommend to monitor the patient closely and avoid the

nephro toxic medications.  Repeat labs tomorrow.  Guarded prognosis.  Further

recommendations to follow.  Closely follow with Cardiology.  Continue with dual

antiplatelet treatment.





MMODL / IJN: [id number] / Job#: [id number]

## 2019-03-30 NOTE — ECHOF
Referral Reason:mi



MEASUREMENTS

--------

HEIGHT: 172.7 cm

WEIGHT: 81.7 kg

BP: 120/78

IVSd:   1.5 cm     (0.6 - 1.1)

LVIDd:   4.3 cm     (3.9 - 5.3)

LVPWd:   1.4 cm     (0.6 - 1.1)

IVSs:   1.4 cm

LVIDs:   2.7 cm

LVPWs:   1.7 cm

LA Diam:   5.3 cm     (2.7 - 3.8)

LAESV Index (A-L):   41.65 ml/m

Ao Diam:   3.4 cm     (2.0 - 3.7)

MV EXCURSION:   28.807 mm     (> 18.000)

MV EF SLOPE:   90 mm/s     (70 - 150)

MV E Elias:   0.79 m/s

MV DecT:   175 ms

MV A Elias:   0.73 m/s

MV E/A Ratio:   1.08 

AV maxP.28 mmHg

AV meanP.44 mmHg

RAP:   5.00 mmHg

RVSP:   26.90 mmHg







FINDINGS

--------

Sinus rhythm.

This was a techncally difficult study with suboptimal views, , Lumason utilized for enhancement of im
ages.

The left ventricular size is normal.   There is borderline concentric left ventricular hypertrophy.  
 There is moderate global hypokinesis of LV .   Overall left ventricular systolic function is severel
y impaired with, an EF between 20 - 25 %.   Anterseptal Hypokinesis   Septal Hypokinesis   [place] Hypok
inesis.

The right ventricle is normal in size.

The left atrium is markedly dilated.   LA is severely dilated >40 ml/m2

The right atrial size is normal.

The aortic valve is very sclerotic. There is at least moderate aortic stenosis present by gradient, b
ut the stenosis could be under estimated due to poor LVEF.

Mild mitral annular calcification present.   Mild mitral regurgitation is present.

Mild tricuspid regurgitation present.   There is no evidence of pulmonary hypertension.   The right v
entricular systolic pressure, as measured by Doppler, is 26.90mmHg.

Trace/mild (physiologic)  pulmonic regurgitation.

The aortic root size is normal.

There is no pericardial effusion.



CONCLUSIONS

--------

1. This was a techncally difficult study with suboptimal views, , Lumason utilized for enhancement of
 images.

2. The left ventricular size is normal.

3. There is borderline concentric left ventricular hypertrophy.

4. There is moderate global hypokinesis of LV .

5. Overall left ventricular systolic function is severely impaired with, an EF between 20 - 25 %.

6. Anterseptal Hypokinesis

7. Septal Hypokinesis

8. [place] Hypokinesis.

9. The right ventricle is normal in size.

10. The left atrium is markedly dilated.

11. LA is severely dilated >40 ml/m2

12. The right atrial size is normal.

13. The aortic valve is very sclerotic. There is at least moderate aortic stenosis present by gradien
t, but the stenosis could be under estimated due to poor LVEF.

14. Mild mitral annular calcification present.

15. Mild mitral regurgitation is present.

16. Mild tricuspid regurgitation present.

17. There is no evidence of pulmonary hypertension.

18. The right ventricular systolic pressure, as measured by Doppler, is 26.90mmHg.

19. Trace/mild (physiologic)  pulmonic regurgitation.

20. The aortic root size is normal.

21. There is no pericardial effusion.





SONOGRAPHER: Omaira Ayala RDCS

## 2019-03-31 VITALS — HEART RATE: 66 BPM | TEMPERATURE: 98.4 F | DIASTOLIC BLOOD PRESSURE: 68 MMHG | SYSTOLIC BLOOD PRESSURE: 123 MMHG

## 2019-03-31 VITALS — RESPIRATION RATE: 18 BRPM

## 2019-03-31 LAB
ANION GAP SERPL CALC-SCNC: 7 MMOL/L
BASOPHILS # BLD AUTO: 0 K/UL (ref 0–0.2)
BASOPHILS NFR BLD AUTO: 0 %
BUN SERPL-SCNC: 31 MG/DL (ref 9–20)
CALCIUM SPEC-MCNC: 8.6 MG/DL (ref 8.4–10.2)
CHLORIDE SERPL-SCNC: 107 MMOL/L (ref 98–107)
CO2 SERPL-SCNC: 24 MMOL/L (ref 22–30)
EOSINOPHIL # BLD AUTO: 0.1 K/UL (ref 0–0.7)
EOSINOPHIL NFR BLD AUTO: 1 %
ERYTHROCYTE [DISTWIDTH] IN BLOOD BY AUTOMATED COUNT: 3.4 M/UL (ref 4.3–5.9)
ERYTHROCYTE [DISTWIDTH] IN BLOOD: 14.5 % (ref 11.5–15.5)
GLUCOSE BLD-MCNC: 74 MG/DL (ref 75–99)
GLUCOSE BLD-MCNC: 88 MG/DL (ref 75–99)
GLUCOSE SERPL-MCNC: 83 MG/DL (ref 74–99)
HCT VFR BLD AUTO: 29.9 % (ref 39–53)
HGB BLD-MCNC: 9.8 GM/DL (ref 13–17.5)
LYMPHOCYTES # SPEC AUTO: 0.8 K/UL (ref 1–4.8)
LYMPHOCYTES NFR SPEC AUTO: 8 %
MCH RBC QN AUTO: 29 PG (ref 25–35)
MCHC RBC AUTO-ENTMCNC: 32.9 G/DL (ref 31–37)
MCV RBC AUTO: 87.9 FL (ref 80–100)
MONOCYTES # BLD AUTO: 0.6 K/UL (ref 0–1)
MONOCYTES NFR BLD AUTO: 5 %
NEUTROPHILS # BLD AUTO: 9.5 K/UL (ref 1.3–7.7)
NEUTROPHILS NFR BLD AUTO: 86 %
PLATELET # BLD AUTO: 187 K/UL (ref 150–450)
POTASSIUM SERPL-SCNC: 4 MMOL/L (ref 3.5–5.1)
SODIUM SERPL-SCNC: 138 MMOL/L (ref 137–145)
WBC # BLD AUTO: 11.1 K/UL (ref 3.8–10.6)

## 2019-03-31 RX ADMIN — TICAGRELOR SCH MG: 90 TABLET ORAL at 07:46

## 2019-03-31 RX ADMIN — GLIMEPIRIDE SCH MG: 4 TABLET ORAL at 07:46

## 2019-03-31 RX ADMIN — INSULIN ASPART SCH: 100 INJECTION, SOLUTION INTRAVENOUS; SUBCUTANEOUS at 11:43

## 2019-03-31 RX ADMIN — FAMOTIDINE SCH MG: 20 TABLET, FILM COATED ORAL at 07:46

## 2019-03-31 RX ADMIN — METOPROLOL TARTRATE SCH MG: 25 TABLET, FILM COATED ORAL at 07:46

## 2019-03-31 RX ADMIN — ASPIRIN 81 MG CHEWABLE TABLET SCH MG: 81 TABLET CHEWABLE at 07:46

## 2019-03-31 RX ADMIN — INSULIN ASPART SCH: 100 INJECTION, SOLUTION INTRAVENOUS; SUBCUTANEOUS at 06:16

## 2019-03-31 RX ADMIN — BUPROPION HYDROCHLORIDE SCH MG: 300 TABLET, FILM COATED, EXTENDED RELEASE ORAL at 07:46

## 2019-03-31 NOTE — P.PN
Subjective


Progress Note Date: 03/31/19





IMPRESSION / ASSESSMENT: 


#1 non-ST elevation MI, status post injury plasty and stenting of the left anter

ior descending artery


#2 hypertension


#3 diabetes


#4 hyperlipidemia


#5 diabetic foot ulcer on the right foot








PLAN:


Patient is cleared from cardiology for discharge home


Continue aspirin 81 mg daily, Lipitor increased to 80 mg at bedtime, metoprolol 

50 mg twice daily, Brilinta 90 mg twice daily.  Continue Norvasc.


Follow-up with cardiology in 1 week





HPI


This is an 78-year-old  gentleman with known history of hypertension, 

hyperlipidemia, diabetes, who follows regularly with Dr. Arguello in the office.  He

presented to the hospital with a non-ST elevation myocardial infarction, was 

taken to the cardiac catheterization lab yesterday where he underwent 

angioplasty and stenting of the left anterior descending artery.  The patient 

was seen and examined this morning, he is doing well, denies any chest pain or 

difficulty in breathing.  Hemodynamically he is stable.  Blood pressure 104/70 

with a heart rate in the 80s, 90% on room air.  White blood cell count 13.8, 

hemoglobin 10.7, platelet count 213.  Sodium 138, potassium 4.1, BUN 29 and 

creatinine 1.3.  EKG from this morning shows a normal sinus rhythm with no 

changes from post-PCI.  Patient is undergoing an arterial duplex study at this 

morning because of a wound on his right foot.





3/31: Patient denies any shortness (he has been ambulating.  No shortness of 

breath, palpitations, lightheadedness or dizziness.  Cardiac monitor has been a 

sinus rhythm.  Heart rate in the 80s to 90s and blood pressure 128/74.  

Medications been reviewed and metoprolol will be increased to 50 mg twice daily 

which is his home dose.  Prescriptions will be sent to his pharmacy for Brilinta

and atorvastatin. Echocardiogram with Doppler study was performed which revealed

a severely impaired left ventricular systolic function, ejection fraction 20-25%

with evidence of anterior septal and apical hypokinesia.  LA is severely 

dilated, there is moderate aortic stenosis by gradient.





ROS: 


No fever chills or rigors, 


no cough, phlegm or expectoration, 


no nausea, vomiting or diarrhea, 


no hematuria, dysuria, 


no musculoskeletal complaints, 


no strokes or seizures, 


no skin lesions.





EXAMINATION:


Gen: This is an 78-year-old  male.  He is sitting up in a recliner 

appears to be comfortable and in no acute distress.


HEENT: Head is atraumatic, normocephalic. Pupils equal, round. Sclerae is 

anicteric. 


NECK: Supple. No JVD. No lymphadenopathy. No thyromegaly. 


LUNGS: Distant and faint crackles in the left base is been using with me.  No 

intercostal retractions.


HEART: Regular rate and rhythm. No murmur. 


ABDOMEN: Soft. Bowel sounds are present. No masses.  No tenderness.


EXTREMITIES: No pedal edema.  No calf tenderness.


NEUROLOGICAL: Patient is awake, alert and oriented x3. Cranial nerves 2 through 

12 are grossly intact. 











Nurse practitioner note has been reviewed, I agree with documented findings and 

plan of care.  Patient was seen and examined.








Objective





- Vital Signs


Vital signs: 


                                   Vital Signs











Temp  97.7 F   03/31/19 05:00


 


Pulse  91   03/31/19 07:52


 


Resp  18   03/31/19 07:52


 


BP  128/74   03/31/19 07:52


 


Pulse Ox  97   03/31/19 07:52








                                 Intake & Output











 03/30/19 03/31/19 03/31/19





 18:59 06:59 18:59


 


Intake Total 590  


 


Output Total 1950  


 


Balance -1360  


 


Weight  82.8 kg 


 


Intake:   


 


  Oral 590  


 


Output:   


 


  Urine 1950  


 


Other:   


 


  Voiding Method  Toilet 


 


  # Voids  1 














- Labs


CBC & Chem 7: 


                                 03/31/19 06:21





                                 03/31/19 06:21


Labs: 


                  Abnormal Lab Results - Last 24 Hours (Table)











  03/30/19 03/30/19 03/30/19 Range/Units





  11:48 17:00 20:34 


 


WBC     (3.8-10.6)  k/uL


 


RBC     (4.30-5.90)  m/uL


 


Hgb     (13.0-17.5)  gm/dL


 


Hct     (39.0-53.0)  %


 


Neutrophils #     (1.3-7.7)  k/uL


 


Lymphocytes #     (1.0-4.8)  k/uL


 


BUN     (9-20)  mg/dL


 


Creatinine     (0.66-1.25)  mg/dL


 


POC Glucose (mg/dL)  186 H  177 H  182 H  (75-99)  mg/dL














  03/31/19 03/31/19 Range/Units





  06:21 06:21 


 


WBC  11.1 H   (3.8-10.6)  k/uL


 


RBC  3.40 L   (4.30-5.90)  m/uL


 


Hgb  9.8 L   (13.0-17.5)  gm/dL


 


Hct  29.9 L   (39.0-53.0)  %


 


Neutrophils #  9.5 H   (1.3-7.7)  k/uL


 


Lymphocytes #  0.8 L   (1.0-4.8)  k/uL


 


BUN   31 H  (9-20)  mg/dL


 


Creatinine   1.45 H  (0.66-1.25)  mg/dL


 


POC Glucose (mg/dL)    (75-99)  mg/dL

## 2019-04-01 NOTE — DS
DISCHARGE SUMMARY



DATE OF SERVICE:

03/31/2019.



FINAL DIAGNOSES:

1. Chest pain possible acute non ST-segment elevation myocardial infarction with

    troponin elevated up to 5 status post cardiac catheterization, stenting of the LAD.

2. Wide-complex QRS complexes in the EKG.

3. Increased creatinine with possible acute renal failure possibly contrast induced

    improved.

4. Diabetes mellitus type 2.

5. Hypertension.

6. Hyperlipidemia.

7. History of coronary artery disease, stent.

8. Remote history of nicotine dependence.

9. Diabetes Wegener's grade 2 ulceration the right foot with peripheral neuropathy.



DISCHARGE DISPOSITION:

The patient is being discharged in stable condition with guarded prognosis.



HISTORY OF PRESENT ILLNESS:

This 78-year-old gentleman with a past medical history of multiple medical problems was

admitted with chest pain and acute features of acute non ST segment elevation

myocardial infarction.  Cardiology saw the patient.  Recommend cardiac catheterization

and stenting of the LAD.  The patient improved significantly.



On exam, vital signs stable.  Cardiovascular: S1, S2 muffled.  Abdomen soft.  Central

nervous system: No focal deficits.



Creatinine is 1.45, which is stable after IV fluids.  Cardiology cleared the patient

for discharge.  The patient will be discharged in stable condition with guarded

prognosis with further plans to follow up in the outpatient setting closely.



DISCHARGE ADVICE AND MEDICATIONS:

1. Discharge diet is cardiac diet.

2. Activity limited until followup.

3. Follow up with Cardiology as recommended.

4. Follow up with primary physician Dr. Ortiz in 2-3 days with CBC and BMP.

5. Follow the renal functions closely.



MEDICATIONS ARE:

1. Quinapril 10 mg q.a.m.

2. Amaryl 4 mg q.a.m.

3. Colace 100 mg b.i.d.

4. Ecotrin 81 mg q.h.s.

5. Lantus 68 units q.h.s.

6. Magnesium 200 mg.

7. Norvasc 5 mg q.a.m.

8. NovoLog 5 units a.c. q.a.m. p.r.n.

9. Toprol-XL 50 mg b.i.d.

10.Vitamin D2 50,000.

11.Vitamin D3 2000 daily.

12.Wellbutrin  mg q.a.m.

13.Zantac 150 mg q.a.m.

14.Brilinta 90 mg p.o. b.i.d.

15.Lipitor 80 mg p.o. daily.

Once again, the patient is being discharged in stable condition with guarded prognosis.

.





MMODL / IJN: [id number] / Job#: [id number]

## 2019-04-18 ENCOUNTER — HOSPITAL ENCOUNTER (OUTPATIENT)
Dept: HOSPITAL 47 - CATHCVL | Age: 79
Discharge: HOME | End: 2019-04-18
Attending: INTERNAL MEDICINE
Payer: MEDICARE

## 2019-04-18 VITALS — DIASTOLIC BLOOD PRESSURE: 58 MMHG | SYSTOLIC BLOOD PRESSURE: 115 MMHG

## 2019-04-18 VITALS — HEART RATE: 60 BPM

## 2019-04-18 VITALS — BODY MASS INDEX: 26.6 KG/M2

## 2019-04-18 VITALS — RESPIRATION RATE: 16 BRPM | TEMPERATURE: 98.3 F

## 2019-04-18 DIAGNOSIS — Z79.82: ICD-10-CM

## 2019-04-18 DIAGNOSIS — Z79.4: ICD-10-CM

## 2019-04-18 DIAGNOSIS — E78.2: ICD-10-CM

## 2019-04-18 DIAGNOSIS — Z72.0: ICD-10-CM

## 2019-04-18 DIAGNOSIS — Z79.02: ICD-10-CM

## 2019-04-18 DIAGNOSIS — Z79.899: ICD-10-CM

## 2019-04-18 DIAGNOSIS — Z95.5: ICD-10-CM

## 2019-04-18 DIAGNOSIS — I10: ICD-10-CM

## 2019-04-18 DIAGNOSIS — I25.10: ICD-10-CM

## 2019-04-18 DIAGNOSIS — Z82.49: ICD-10-CM

## 2019-04-18 DIAGNOSIS — E11.9: ICD-10-CM

## 2019-04-18 DIAGNOSIS — I08.3: Primary | ICD-10-CM

## 2019-04-18 LAB — GLUCOSE BLD-MCNC: 130 MG/DL (ref 75–99)

## 2019-04-18 PROCEDURE — 93312 ECHO TRANSESOPHAGEAL: CPT

## 2019-04-18 PROCEDURE — 93325 DOPPLER ECHO COLOR FLOW MAPG: CPT

## 2019-04-18 PROCEDURE — 93320 DOPPLER ECHO COMPLETE: CPT

## 2019-04-18 NOTE — ECHOT
TRANSESOPHAGEAL ECHOCARDIOGRAM



INDICATION:

Aortic stenosis.



PROCEDURE NOTE:

After obtaining informed consent, transesophageal echocardiogram was performed in left

lateral position using an Omniplane probe.  Local and IV sedation were obtained using

Xylocaine spray and intravenous Versed. Patient tolerated the procedure well without

any obvious immediate complications.  Patient received moderate conscious sedation.

Total sedation time was 15 minutes.



FINDINGS:

1. Aortic Valve: Aortic valve is a 3-leaflet valve appears calcified with severe

    restriction in leaflet mobility.  By planimetry, the valve area is about 0.9 square

    centimeters consistent with severe aortic stenosis.  There is mild aortic

    regurgitation noted.

2. Mitral valve shows _____calcification with mild mitral regurgitation.

3. Tricuspid valve shows mild tricuspid regurgitation.

4. Interatrial septum is free of left-to-right shunt by color-flow Doppler or right-to-

    left shunt by agitated saline contrast study.

5. Left ventricle has normal size. It shows hypokinesis involving the anteroseptum

    with mild LV dysfunction with an ejection fraction of around 50%.

6. Aorta shows mild-to-moderate atherosclerotic changes.



CONCLUSION:

Severe aortic stenosis involving a heavily calcified aortic valve.





MMODL / IJN: [id number] / Job#: [id number]

## 2019-09-25 ENCOUNTER — HOSPITAL ENCOUNTER (OUTPATIENT)
Dept: HOSPITAL 47 - RADXRMAIN | Age: 79
Discharge: HOME | End: 2019-09-25
Attending: PHYSICIAN ASSISTANT
Payer: MEDICARE

## 2019-09-25 DIAGNOSIS — J20.9: Primary | ICD-10-CM

## 2019-09-25 PROCEDURE — 71046 X-RAY EXAM CHEST 2 VIEWS: CPT

## 2019-09-25 NOTE — XR
EXAMINATION TYPE: XR chest 2V

 

DATE OF EXAM: 9/25/2019

 

COMPARISON: 3/29/2019

 

TECHNIQUE: PA and lateral views submitted.

 

HISTORY: Cough

 

FINDINGS:

The lungs are clear and  there is no pneumothorax, pleural effusion, or focal pneumonia.  Stable calc
ified granuloma left upper lobe. Arthropathy of the shoulders. No overt failure. Heart size normal. H
ypertrophic and degenerative change of the spine.

 

IMPRESSION: 

1. No acute process.

## 2019-10-11 ENCOUNTER — HOSPITAL ENCOUNTER (OUTPATIENT)
Dept: HOSPITAL 47 - LABWHC1 | Age: 79
Discharge: HOME | End: 2019-10-11
Attending: INTERNAL MEDICINE
Payer: MEDICARE

## 2019-10-11 DIAGNOSIS — E11.65: Primary | ICD-10-CM

## 2019-10-11 LAB
CHOLEST SERPL-MCNC: 97 MG/DL (ref 0–200)
HBA1C MFR BLD: 7.3 % (ref 4–6)
HDLC SERPL-MCNC: 44 MG/DL (ref 40–60)
LDLC SERPL CALC-MCNC: 39.2 MG/DL (ref 0–131)
TRIGL SERPL-MCNC: 69 MG/DL (ref 0–149)
VLDLC SERPL CALC-MCNC: 13.8 MG/DL (ref 5–40)

## 2019-10-11 PROCEDURE — 80061 LIPID PANEL: CPT

## 2019-10-11 PROCEDURE — 83036 HEMOGLOBIN GLYCOSYLATED A1C: CPT

## 2019-10-11 PROCEDURE — 84443 ASSAY THYROID STIM HORMONE: CPT

## 2019-10-11 PROCEDURE — 36415 COLL VENOUS BLD VENIPUNCTURE: CPT

## 2020-06-18 ENCOUNTER — HOSPITAL ENCOUNTER (INPATIENT)
Dept: HOSPITAL 47 - EC | Age: 80
LOS: 3 days | Discharge: HOME | DRG: 247 | End: 2020-06-21
Attending: HOSPITALIST | Admitting: HOSPITALIST
Payer: MEDICARE

## 2020-06-18 VITALS — BODY MASS INDEX: 26.5 KG/M2

## 2020-06-18 DIAGNOSIS — Z91.041: ICD-10-CM

## 2020-06-18 DIAGNOSIS — N18.3: ICD-10-CM

## 2020-06-18 DIAGNOSIS — Z96.651: ICD-10-CM

## 2020-06-18 DIAGNOSIS — Z87.891: ICD-10-CM

## 2020-06-18 DIAGNOSIS — Z95.5: ICD-10-CM

## 2020-06-18 DIAGNOSIS — Z79.02: ICD-10-CM

## 2020-06-18 DIAGNOSIS — D63.1: ICD-10-CM
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
## 2020-06-18 DIAGNOSIS — I25.10: ICD-10-CM

## 2020-06-18 DIAGNOSIS — Z96.642: ICD-10-CM

## 2020-06-18 DIAGNOSIS — E78.5: ICD-10-CM

## 2020-06-18 DIAGNOSIS — E87.1: ICD-10-CM

## 2020-06-18 DIAGNOSIS — M19.90: ICD-10-CM

## 2020-06-18 DIAGNOSIS — I42.9: ICD-10-CM

## 2020-06-18 DIAGNOSIS — F41.9: ICD-10-CM

## 2020-06-18 DIAGNOSIS — Z82.49: ICD-10-CM

## 2020-06-18 DIAGNOSIS — G47.30: ICD-10-CM

## 2020-06-18 DIAGNOSIS — T38.0X5A: ICD-10-CM

## 2020-06-18 DIAGNOSIS — E11.22: ICD-10-CM

## 2020-06-18 DIAGNOSIS — I08.0: ICD-10-CM

## 2020-06-18 DIAGNOSIS — T82.855A: ICD-10-CM

## 2020-06-18 DIAGNOSIS — Z11.59: ICD-10-CM

## 2020-06-18 DIAGNOSIS — Z79.82: ICD-10-CM

## 2020-06-18 DIAGNOSIS — E11.65: ICD-10-CM

## 2020-06-18 DIAGNOSIS — Z96.1: ICD-10-CM

## 2020-06-18 DIAGNOSIS — I21.4: Primary | ICD-10-CM

## 2020-06-18 DIAGNOSIS — Z82.3: ICD-10-CM

## 2020-06-18 DIAGNOSIS — G47.00: ICD-10-CM

## 2020-06-18 DIAGNOSIS — E11.649: ICD-10-CM

## 2020-06-18 DIAGNOSIS — I13.0: ICD-10-CM

## 2020-06-18 DIAGNOSIS — I27.20: ICD-10-CM

## 2020-06-18 DIAGNOSIS — Z80.9: ICD-10-CM

## 2020-06-18 DIAGNOSIS — I25.2: ICD-10-CM

## 2020-06-18 DIAGNOSIS — K21.9: ICD-10-CM

## 2020-06-18 DIAGNOSIS — I50.22: ICD-10-CM

## 2020-06-18 DIAGNOSIS — Z79.4: ICD-10-CM

## 2020-06-18 DIAGNOSIS — Z79.899: ICD-10-CM

## 2020-06-18 LAB
ALBUMIN SERPL-MCNC: 3.7 G/DL (ref 3.5–5)
ALP SERPL-CCNC: 155 U/L (ref 38–126)
ALT SERPL-CCNC: 21 U/L (ref 4–49)
ANION GAP SERPL CALC-SCNC: 12 MMOL/L
APTT BLD: 22 SEC (ref 22–30)
AST SERPL-CCNC: 39 U/L (ref 17–59)
BASOPHILS # BLD AUTO: 0 K/UL (ref 0–0.2)
BASOPHILS NFR BLD AUTO: 0 %
BUN SERPL-SCNC: 37 MG/DL (ref 9–20)
CALCIUM SPEC-MCNC: 8.9 MG/DL (ref 8.4–10.2)
CHLORIDE SERPL-SCNC: 100 MMOL/L (ref 98–107)
CO2 SERPL-SCNC: 19 MMOL/L (ref 22–30)
EOSINOPHIL # BLD AUTO: 0 K/UL (ref 0–0.7)
EOSINOPHIL NFR BLD AUTO: 0 %
ERYTHROCYTE [DISTWIDTH] IN BLOOD BY AUTOMATED COUNT: 3.48 M/UL (ref 4.3–5.9)
ERYTHROCYTE [DISTWIDTH] IN BLOOD: 14.3 % (ref 11.5–15.5)
GLUCOSE SERPL-MCNC: 466 MG/DL (ref 74–99)
GLUCOSE UR QL: (no result)
HCT VFR BLD AUTO: 31.3 % (ref 39–53)
HGB BLD-MCNC: 10.3 GM/DL (ref 13–17.5)
INR PPP: 1 (ref ?–1.2)
LYMPHOCYTES # SPEC AUTO: 0.5 K/UL (ref 1–4.8)
LYMPHOCYTES NFR SPEC AUTO: 4 %
MAGNESIUM SPEC-SCNC: 2.1 MG/DL (ref 1.6–2.3)
MCH RBC QN AUTO: 29.5 PG (ref 25–35)
MCHC RBC AUTO-ENTMCNC: 32.8 G/DL (ref 31–37)
MCV RBC AUTO: 90 FL (ref 80–100)
MONOCYTES # BLD AUTO: 0.5 K/UL (ref 0–1)
MONOCYTES NFR BLD AUTO: 4 %
NEUTROPHILS # BLD AUTO: 11.5 K/UL (ref 1.3–7.7)
NEUTROPHILS NFR BLD AUTO: 92 %
PH UR: 5 [PH] (ref 5–8)
PLATELET # BLD AUTO: 240 K/UL (ref 150–450)
POTASSIUM SERPL-SCNC: 4.9 MMOL/L (ref 3.5–5.1)
PROT SERPL-MCNC: 6.5 G/DL (ref 6.3–8.2)
PT BLD: 10.3 SEC (ref 9–12)
SODIUM SERPL-SCNC: 131 MMOL/L (ref 137–145)
SP GR UR: 1.02 (ref 1–1.03)
UROBILINOGEN UR QL STRIP: <2 MG/DL (ref ?–2)
WBC # BLD AUTO: 12.5 K/UL (ref 3.8–10.6)

## 2020-06-18 PROCEDURE — 80048 BASIC METABOLIC PNL TOTAL CA: CPT

## 2020-06-18 PROCEDURE — 85025 COMPLETE CBC W/AUTO DIFF WBC: CPT

## 2020-06-18 PROCEDURE — 85347 COAGULATION TIME ACTIVATED: CPT

## 2020-06-18 PROCEDURE — 99285 EMERGENCY DEPT VISIT HI MDM: CPT

## 2020-06-18 PROCEDURE — 93458 L HRT ARTERY/VENTRICLE ANGIO: CPT

## 2020-06-18 PROCEDURE — 93005 ELECTROCARDIOGRAM TRACING: CPT

## 2020-06-18 PROCEDURE — 84484 ASSAY OF TROPONIN QUANT: CPT

## 2020-06-18 PROCEDURE — 96365 THER/PROPH/DIAG IV INF INIT: CPT

## 2020-06-18 PROCEDURE — 85730 THROMBOPLASTIN TIME PARTIAL: CPT

## 2020-06-18 PROCEDURE — 83735 ASSAY OF MAGNESIUM: CPT

## 2020-06-18 PROCEDURE — 71046 X-RAY EXAM CHEST 2 VIEWS: CPT

## 2020-06-18 PROCEDURE — 36415 COLL VENOUS BLD VENIPUNCTURE: CPT

## 2020-06-18 PROCEDURE — 96366 THER/PROPH/DIAG IV INF ADDON: CPT

## 2020-06-18 PROCEDURE — 83690 ASSAY OF LIPASE: CPT

## 2020-06-18 PROCEDURE — 81003 URINALYSIS AUTO W/O SCOPE: CPT

## 2020-06-18 PROCEDURE — 96361 HYDRATE IV INFUSION ADD-ON: CPT

## 2020-06-18 PROCEDURE — 85610 PROTHROMBIN TIME: CPT

## 2020-06-18 PROCEDURE — 80053 COMPREHEN METABOLIC PANEL: CPT

## 2020-06-18 PROCEDURE — 96376 TX/PRO/DX INJ SAME DRUG ADON: CPT

## 2020-06-18 PROCEDURE — 93306 TTE W/DOPPLER COMPLETE: CPT

## 2020-06-18 PROCEDURE — 82009 KETONE BODYS QUAL: CPT

## 2020-06-18 NOTE — XR
EXAMINATION TYPE: XR chest 2V

 

DATE OF EXAM: 6/18/2020

 

COMPARISON: 9/25/2019

 

HISTORY: Cough

 

TECHNIQUE: 2 views

 

FINDINGS: The heart and mediastinum are normal. Lungs are clear. Diaphragm is normal. Bony thorax is 
intact. There is small calcified granuloma in the left upper lobe.

 

IMPRESSION: No active cardiopulmonary disease. No change.

## 2020-06-18 NOTE — ED
Chest Pain HPI





- General


Chief Complaint: Chest Pain


Stated Complaint: Chest pain


Time Seen by Provider: 06/18/20 17:35


Source: patient, family, EMS


Mode of arrival: EMS


Limitations: no limitations





- History of Present Illness


Initial Comments: 





The patient is an 79-year-old male with past medical history of diabetes, 

hypertension and MI who presents to the emergency room with reported chest pain.

 He states that he felt a chest pressure last night however it has been 

intermittent.  Pain has been a little bit more persistent today.  He describes 

it as a 2 out of 10.  Denies chest pain or shortness of breath.  No ripping or 

tearing sensation to his back.  Denies any unilateral numbness or weakness.  He 

denies cough or hemoptysis.  No fevers or chills.  Does have a history of MI in 

the past.  Has 4 stents in his heart.  Follows with Dr. Mast.  Last cath was 

2018.  Upon EMS arrival he is given aspirin and nitro.  He also got a to 50 mL 

fluid bolus.  Accu-Chek was performed and demonstrates a sugar of 517.  Patient 

is unsure if he took his insulin today.  Per his medication record states he's 

was be on Lantus and NovoLog.  Patient is unsure if he he took either of these 

medications.  He denies any abdominal pain.  There are no other alleviating, 

precipitating or modifying factors





- Related Data


                                Home Medications











 Medication  Instructions  Recorded  Confirmed


 


Aspirin EC [Ecotrin Low Dose] 81 mg PO HS 08/23/14 06/18/20


 


amLODIPine [Norvasc] 5 mg PO QAM 08/23/14 06/18/20


 


buPROPion XL [Wellbutrin XL] 300 mg PO QAM 08/23/14 06/18/20


 


Quinapril HCl [Accupril] 10 mg PO QAM 10/22/17 06/18/20


 


Docusate [Colace] 100 mg PO DAILY PRN 12/21/18 06/18/20


 


Insulin Aspart [NovoLOG] 5 unit SQ QAM PRN 12/21/18 06/18/20


 


Insulin Glargine [Lantus] 4 - 6 unit SQ HS 12/21/18 06/18/20


 


Metoprolol Succinate (ER) [Toprol 50 mg PO BID 12/21/18 06/18/20





XL]   


 


Atorvastatin [Lipitor] 80 mg PO HS 06/18/20 06/18/20


 


Cholecalciferol [Vitamin D3 (25 1,000 unit PO QAM 06/18/20 06/18/20





Mcg = 1000 Iu)]   


 


Clopidogrel [Plavix] 75 mg PO QAM 06/18/20 06/18/20


 


Fish Oil/Dha/Epa [Fish Oil 1,200 1 cap PO QAM 06/18/20 06/18/20





mg Fish Oil]   


 


Furosemide [Lasix] 20 mg PO DAILY 06/18/20 06/18/20


 


Glimepiride [Amaryl] 4 mg PO QAM 06/18/20 06/18/20


 


Magnesium Oxide [Mag-Ox] 200 mg PO BID 06/18/20 06/18/20


 


Potassium Chloride ER [K-Dur 10] 10 meq PO DAILY 06/18/20 06/18/20


 


traMADol HCL [Ultram] 50 mg PO Q4-6H PRN 06/18/20 06/18/20








                                  Previous Rx's











 Medication  Instructions  Recorded


 


Nitroglycerin Sl Tabs [Nitrostat] 0.4 mg SUBLINGUAL Q5M PRN #20 tab 06/21/20











                                    Allergies











Allergy/AdvReac Type Severity Reaction Status Date / Time


 


iodine Allergy Unknown Unknown Verified 06/18/20 19:31














Review of Systems


ROS Statement: 


Those systems with pertinent positive or pertinent negative responses have been 

documented in the HPI.





ROS Other: All systems not noted in ROS Statement are negative.





EKG Findings





- EKG Comments:


EKG Findings:: EKG demonstrates normal sinus rhythm with a ventricular rate of 

85.  .  .  QTC of 497.  There is a right bundle branch block 

present.  Q wave with inverted T-wave in lead 3.  This is present on previous 

EKG.  EKG appears improved in the anterior and lateral leads as there is no more

 ST depression.





Past Medical History


Past Medical History: Diabetes Mellitus, GERD/Reflux, Hyperlipidemia, Myocardial

 Infarction (MI), Osteoarthritis (OA), Renal Disease, Sleep Apnea/CPAP/BIPAP


Additional Past Medical History / Comment(s): ANEMIA. MI x 2, heart murmer, no 

cpap used, constipation,


Last Myocardial Infarction Date:: 3/28/19


History of Any Multi-Drug Resistant Organisms: None Reported


Past Surgical History: Heart Catheterization With Stent, Joint Replacement, 

Orthopedic Surgery


Additional Past Surgical History / Comment(s): 4 cardiac stents, total right 

knee replacement, monse cataracts with lens implants, left shoulder surgery, left 

hip replacement,


Past Anesthesia/Blood Transfusion Reactions: No Reported Reaction


Date of Last Stent Placement:: 3/2019


Past Psychological History: No Psychological Hx Reported


Smoking Status: Former smoker





- Past Family History


  ** Father


Family Medical History: Myocardial Infarction (MI)





  ** Mother


Family Medical History: Coronary Artery Disease (CAD), CVA/TIA





  ** Sister(s)


Family Medical History: Cancer





General Exam


Limitations: no limitations


General appearance: alert, in no apparent distress


Head exam: Present: atraumatic, normocephalic, normal inspection


Eye exam: Present: normal appearance, PERRL, EOMI.  Absent: scleral icterus, 

conjunctival injection, periorbital swelling


ENT exam: Present: normal exam, mucous membranes moist


Neck exam: Present: normal inspection.  Absent: tenderness, meningismus, 

lymphadenopathy


Respiratory exam: Present: normal lung sounds bilaterally.  Absent: respiratory 

distress, wheezes, rales, rhonchi, stridor


Cardiovascular Exam: Present: regular rate, normal rhythm, normal heart sounds. 

 Absent: systolic murmur, diastolic murmur, rubs, gallop, clicks


GI/Abdominal exam: Present: soft, normal bowel sounds.  Absent: distended, 

tenderness, guarding, rebound, rigid


Extremities exam: Present: normal inspection, full ROM, normal capillary refill.

  Absent: tenderness, pedal edema, joint swelling, calf tenderness


Back exam: Present: normal inspection


Neurological exam: Present: alert, oriented X3, CN II-XII intact


Psychiatric exam: Present: normal affect, normal mood


Skin exam: Present: warm, dry, intact, normal color.  Absent: rash





Course


                                   Vital Signs











  06/18/20 06/18/20 06/18/20





  17:33 19:45 21:30


 


Temperature 97.9 F  


 


Pulse Rate 89 69 66


 


Respiratory 18 18 16





Rate   


 


Blood Pressure 125/72 126/72 130/80


 


O2 Sat by Pulse 98 97 97





Oximetry   














Chest Pain MDM





- MDM





Upon arrival the patient is placed into room 3.  A thorough history and physical

 exam was performed.  12-lead EKG is performed and demonstrates improvement in 

ischemic findings.  Laboratory studies are 12.5.  Sodium 131.  Creatinine 1.5.  

Glucose is 466.  Troponin elevated at 2.5.  Acetone is negative.  Chest x-ray 

demonstrates no acute cardio process.  Patient is reevaluated and continues to 

have chest pain however is graded as a 1 out of 10.  I did discuss the case with

 Dr. Mast at 2018.  He does agree to heparinization and admission.  Patient 

will remain nothing by mouth.  I discussed the case with [facility] who agreed to admit

 the patient.  Patient was then transferred to the floor in stable condition





Disposition


Clinical Impression: 


 Chest pain, Acute non-ST elevation myocardial infarction (NSTEMI), 

Hyperglycemia





Disposition: ADMITTED AS IP TO THIS HOSP


Condition: Stable


Is patient prescribed a controlled substance at d/c from ED?: No


Decision to Admit Reason: Admit from EC


Decision Date: 06/18/20


Decision Time: 20:21

## 2020-06-19 LAB
ANION GAP SERPL CALC-SCNC: 5 MMOL/L
ANION GAP SERPL CALC-SCNC: 6 MMOL/L
APTT BLD: 88.2 SEC (ref 22–30)
BASOPHILS # BLD AUTO: 0 K/UL (ref 0–0.2)
BASOPHILS NFR BLD AUTO: 0 %
BUN SERPL-SCNC: 33 MG/DL (ref 9–20)
BUN SERPL-SCNC: 33 MG/DL (ref 9–20)
CALCIUM SPEC-MCNC: 8.6 MG/DL (ref 8.4–10.2)
CALCIUM SPEC-MCNC: 8.7 MG/DL (ref 8.4–10.2)
CHLORIDE SERPL-SCNC: 107 MMOL/L (ref 98–107)
CHLORIDE SERPL-SCNC: 108 MMOL/L (ref 98–107)
CO2 SERPL-SCNC: 23 MMOL/L (ref 22–30)
CO2 SERPL-SCNC: 24 MMOL/L (ref 22–30)
EOSINOPHIL # BLD AUTO: 0 K/UL (ref 0–0.7)
EOSINOPHIL NFR BLD AUTO: 0 %
ERYTHROCYTE [DISTWIDTH] IN BLOOD BY AUTOMATED COUNT: 3.37 M/UL (ref 4.3–5.9)
ERYTHROCYTE [DISTWIDTH] IN BLOOD: 14.6 % (ref 11.5–15.5)
GLUCOSE BLD-MCNC: 126 MG/DL (ref 75–99)
GLUCOSE BLD-MCNC: 202 MG/DL (ref 75–99)
GLUCOSE BLD-MCNC: 208 MG/DL (ref 75–99)
GLUCOSE BLD-MCNC: 288 MG/DL (ref 75–99)
GLUCOSE BLD-MCNC: 308 MG/DL (ref 75–99)
GLUCOSE SERPL-MCNC: 143 MG/DL (ref 74–99)
GLUCOSE SERPL-MCNC: 168 MG/DL (ref 74–99)
HCT VFR BLD AUTO: 30 % (ref 39–53)
HGB BLD-MCNC: 9.5 GM/DL (ref 13–17.5)
INR PPP: 1.1 (ref ?–1.2)
LYMPHOCYTES # SPEC AUTO: 0.6 K/UL (ref 1–4.8)
LYMPHOCYTES NFR SPEC AUTO: 5 %
MCH RBC QN AUTO: 28.3 PG (ref 25–35)
MCHC RBC AUTO-ENTMCNC: 31.7 G/DL (ref 31–37)
MCV RBC AUTO: 89.1 FL (ref 80–100)
MONOCYTES # BLD AUTO: 0.6 K/UL (ref 0–1)
MONOCYTES NFR BLD AUTO: 4 %
NEUTROPHILS # BLD AUTO: 12.4 K/UL (ref 1.3–7.7)
NEUTROPHILS NFR BLD AUTO: 90 %
PLATELET # BLD AUTO: 217 K/UL (ref 150–450)
POTASSIUM SERPL-SCNC: 4.2 MMOL/L (ref 3.5–5.1)
POTASSIUM SERPL-SCNC: 4.6 MMOL/L (ref 3.5–5.1)
PT BLD: 11.2 SEC (ref 9–12)
SODIUM SERPL-SCNC: 136 MMOL/L (ref 137–145)
SODIUM SERPL-SCNC: 137 MMOL/L (ref 137–145)
WBC # BLD AUTO: 13.7 K/UL (ref 3.8–10.6)

## 2020-06-19 PROCEDURE — 4A023N7 MEASUREMENT OF CARDIAC SAMPLING AND PRESSURE, LEFT HEART, PERCUTANEOUS APPROACH: ICD-10-PCS

## 2020-06-19 PROCEDURE — B2111ZZ FLUOROSCOPY OF MULTIPLE CORONARY ARTERIES USING LOW OSMOLAR CONTRAST: ICD-10-PCS

## 2020-06-19 PROCEDURE — 027034Z DILATION OF CORONARY ARTERY, ONE ARTERY WITH DRUG-ELUTING INTRALUMINAL DEVICE, PERCUTANEOUS APPROACH: ICD-10-PCS

## 2020-06-19 RX ADMIN — INSULIN ASPART SCH: 100 INJECTION, SOLUTION INTRAVENOUS; SUBCUTANEOUS at 13:59

## 2020-06-19 RX ADMIN — METOPROLOL SUCCINATE SCH MG: 50 TABLET, EXTENDED RELEASE ORAL at 08:36

## 2020-06-19 RX ADMIN — FENTANYL CITRATE ONE MCG: 50 INJECTION, SOLUTION INTRAMUSCULAR; INTRAVENOUS at 12:01

## 2020-06-19 RX ADMIN — FENTANYL CITRATE ONE MCG: 50 INJECTION, SOLUTION INTRAMUSCULAR; INTRAVENOUS at 11:35

## 2020-06-19 RX ADMIN — Medication SCH MG: at 20:37

## 2020-06-19 RX ADMIN — Medication SCH MG: at 08:36

## 2020-06-19 RX ADMIN — INSULIN ASPART SCH UNIT: 100 INJECTION, SOLUTION INTRAVENOUS; SUBCUTANEOUS at 17:40

## 2020-06-19 RX ADMIN — INSULIN DETEMIR SCH UNIT: 100 INJECTION, SOLUTION SUBCUTANEOUS at 20:37

## 2020-06-19 RX ADMIN — ATORVASTATIN CALCIUM SCH: 80 TABLET, FILM COATED ORAL at 08:45

## 2020-06-19 RX ADMIN — ASPIRIN 81 MG CHEWABLE TABLET SCH: 81 TABLET CHEWABLE at 12:52

## 2020-06-19 RX ADMIN — CLOPIDOGREL BISULFATE SCH MG: 75 TABLET ORAL at 08:36

## 2020-06-19 RX ADMIN — LISINOPRIL SCH MG: 10 TABLET ORAL at 09:02

## 2020-06-19 RX ADMIN — FUROSEMIDE SCH MG: 20 TABLET ORAL at 08:36

## 2020-06-19 RX ADMIN — INSULIN ASPART SCH UNIT: 100 INJECTION, SOLUTION INTRAVENOUS; SUBCUTANEOUS at 20:37

## 2020-06-19 RX ADMIN — BUPROPION HYDROCHLORIDE SCH MG: 300 TABLET, FILM COATED, EXTENDED RELEASE ORAL at 08:34

## 2020-06-19 RX ADMIN — METOPROLOL SUCCINATE SCH MG: 50 TABLET, EXTENDED RELEASE ORAL at 20:38

## 2020-06-19 RX ADMIN — INSULIN ASPART SCH: 100 INJECTION, SOLUTION INTRAVENOUS; SUBCUTANEOUS at 14:00

## 2020-06-19 RX ADMIN — INSULIN ASPART SCH UNIT: 100 INJECTION, SOLUTION INTRAVENOUS; SUBCUTANEOUS at 07:00

## 2020-06-19 NOTE — PTCA
PERCUTANEOUSTRANS CORORONARY ANGIOGRAPHY



Mr. Lau is an 79-year-old male with known history of coronary artery disease

who presented with non-ST-segment-elevation myocardial infarction, underwent cardiac

catheterization and was found to have critical stenosis involving the proximal right

coronary artery.  In view of that, recommendation was made regarding angioplasty and

stenting.  The procedure, its risks and complications were discussed with the patient,

who was in full understanding and agreement.



PROCEDURE DESCRIPTION:

A 6-Frisian FR4 guiding catheter was introduced into the system. After cannulating the

right coronary artery, a 0.014 balanced medium weight J-wire was advanced across the

lesion, positioned distally, then a 2.5 x 12 mm Trek balloon was advanced and one

inflation at 10 atmospheres was done. Following that the balloon was removed and a 3.0

x 15 mm Xience Juani stent was deployed, post-dilated at 16 atmospheres.  Following

that, a 3.5 x 12 mm NC Emerge balloon was advanced and one inflation at 16 atmospheres

was done.  Following that, the balloon was removed and a 3.75 x 12 mm NC Trek balloon

was advanced and one inflation at 14 atmospheres was done.  After the last inflation,

after appropriate wait, the balloon and the guidewire were withdrawn back into the

guiding catheter.  Images were obtained and repeated. Those images revealed stable

successful stenting.  At that point, left ventricular end-diastolic pressure was

calculated.  Following that, catheter and sheath were removed.  Hemostasis was obtained

with deployment of a TR band.  There was no immediate complication. Patient was

returned to his room in stable condition.  Of note, the patient had no chest

discomfort, but he had severe right hip discomfort, which is chronic for him.  He

received a total of 8000 units of intravenous heparin and his ACT was monitored.



RESULTS:

Successful stenting of the proximal segment of the right coronary artery with reduction

of stenosis from 99% to less than 5%.



RECOMMENDATIONS:

Patient will be continued on aspirin, Plavix, beta blockers and statin. The importance

of dual antiplatelet treatment was discussed with the patient, who is in full

understanding and agreement.  He will be re-evaluated regarding the need to undergo re-

evaluation of his LAD territory.



Duration of procedure was 44 minutes.



DONNIE / HOMERN: [id number] / Job#: [id number]

## 2020-06-19 NOTE — ECHOF
Referral Reason:NSTEMI



MEASUREMENTS

--------

HEIGHT: 177.8 cm

WEIGHT: 83.5 kg

BP: 123/64

RVIDd:   4.2 cm     (< 3.3)

IVSd:   1.9 cm     (0.6 - 1.1)

LVIDd:   4.0 cm     (3.9 - 5.3)

LVPWd:   1.6 cm     (0.6 - 1.1)

IVSs:   2.1 cm

LVIDs:   2.3 cm

LVPWs:   2.2 cm

LAESV Index (A-L):   60.43 ml/m

Ao Diam:   3.2 cm     (2.0 - 3.7)

AV Cusp:   1.4 cm     (1.5 - 2.6)

MV EXCURSION:   15.618 mm     (> 18.000)

MV EF SLOPE:   123 mm/s     (70 - 150)

EPSS:   0.5 cm

MV E Elias:   1.25 m/s

MV DecT:   276 ms

MV A Elias:   0.61 m/s

MV E/A Ratio:   2.06 

AV maxP.50 mmHg

AV meanP.91 mmHg

RAP:   5.00 mmHg

RVSP:   32.02 mmHg







FINDINGS

--------

This was a technically difficult study with suboptimal apical views.

The left ventricular size is normal.   There is moderate concentric left ventricular hypertrophy.   O
verall left ventricular systolic function is normal with, an EF between 55 - 60 %.   The diastolic fi
lling pattern is normal for the age of the patient 25.11.

The right ventricle is moderately enlarged.

LA is severely dilated >40 ml/m2

The right atrium is mildly enlarged.

xx ml of Lumason was utilized for enhancement of images.

Interatrial and interventricular septum intact.

Trace amount of aortic regurgitation.    There is moderate-to-severe aortic stenosis present.   Peak/
mean gradient across the Aortic Valve is 62.50mmHg / 36.91mmHg.

Mild mitral annular calcification present.   Moderate mitral regurgitation is present.

Mild tricuspid regurgitation present.   There is borderline pulmonary hypertension.   The right ventr
icular systolic pressure, as measured by Doppler, is 32.02mmHg.

There is no pulmonic regurgitation present.

The aortic root size is normal.

IVC Not well visulized.

There is no pericardial effusion.



CONCLUSIONS

--------

1. This was a technically difficult study with suboptimal apical views.

2. The left ventricular size is normal.

3. There is moderate concentric left ventricular hypertrophy.

4. Overall left ventricular systolic function is normal with, an EF between 55 - 60 %.

5. The diastolic filling pattern is normal for the age of the patient 25.11

6. The right ventricle is moderately enlarged.

7. LA is severely dilated >40 ml/m2

8. The right atrium is mildly enlarged.

9. xx ml of Lumason was utilized for enhancement of images.

10. Interatrial and interventricular septum intact.

11. Trace amount of aortic regurgitation.

12. There is moderate-to-severe aortic stenosis present.

13. Peak/mean gradient across the Aortic Valve is 62.50mmHg / 36.91mmHg.

14. Mild mitral annular calcification present.

15. Moderate mitral regurgitation is present.

16. Mild tricuspid regurgitation present.

17. There is borderline pulmonary hypertension.

18. The right ventricular systolic pressure, as measured by Doppler, is 32.02mmHg.

19. There is no pulmonic regurgitation present.

20. The aortic root size is normal.

21. IVC Not well visulized.

22. There is no pericardial effusion.





SONOGRAPHER: Carey Murray RDCS

## 2020-06-19 NOTE — P.HPIM
History of Present Illness


Patient is a pleasant 79-year-old male came in with compensative chest pain 

pressure-like sensation patient does have significant EKG changes of T-wave 

inversions in inferior leads and the ST depressions in anteroseptal leads.  

Patient is found to have elevated troponin up to now around 30 patient chest 

pain resolved at this time.  Patient was also having highly elevated blood 

sugars, patient was started on prednisone leading to elevated blood sugars 

hasn't spread and patient was started on prednisone for his hip pain patient is 

a due to undergo hip surgery in month of September.  Patient denied any fever 

chills nausea vomiting abdominal pain.





Review of Systems








REVIEW OF SYSTEMS: 


CONSTITUTIONAL: No fever, no malaise, no fatigue. 


HEENT: No recent visual problems or hearing problems. Denied any sore throat. 


CARDIOVASCULAR: No  orthopnea, PND, no palpitations, no syncope. 


PULMONARY: No shortness of breath, no cough, no hemoptysis. 


GASTROINTESTINAL: No diarrhea, no nausea, no vomiting, no abdominal pain. 


NEUROLOGICAL: No headaches, no weakness, no numbness. 


HEMATOLOGICAL: Denies any bleeding or petechiae. 


GENITOURINARY: Denies any burning micturition, frequency, or urgency. 


MUSCULOSKELETAL/RHEUMATOLOGICAL: Denies any joint pain, swelling, or any muscle 

pain. 


ENDOCRINE: Denies any polyuria or polydipsia. 





The rest of the 14-point review of systems is negative.











Past Medical History


Past Medical History: Diabetes Mellitus, GERD/Reflux, Hyperlipidemia, Myocardial

Infarction (MI), Osteoarthritis (OA), Renal Disease, Sleep Apnea/CPAP/BIPAP


Additional Past Medical History / Comment(s): ANEMIA. MI x 2, heart murmer, no 

cpap used, constipation,


Last Myocardial Infarction Date:: 3/28/19


History of Any Multi-Drug Resistant Organisms: None Reported


Past Surgical History: Heart Catheterization With Stent, Joint Replacement, 

Orthopedic Surgery


Additional Past Surgical History / Comment(s): 4 cardiac stents, total right 

knee replacement, monse cataracts with lens implants, left shoulder surgery, left 

hip replacement,


Past Anesthesia/Blood Transfusion Reactions: No Reported Reaction


Date of Last Stent Placement:: 3/2019


Past Psychological History: No Psychological Hx Reported


Smoking Status: Former smoker





- Past Family History


  ** Father


Family Medical History: Myocardial Infarction (MI)





  ** Mother


Family Medical History: Coronary Artery Disease (CAD), CVA/TIA





  ** Sister(s)


Family Medical History: Cancer





Medications and Allergies


                                Home Medications











 Medication  Instructions  Recorded  Confirmed  Type


 


Aspirin EC [Ecotrin Low Dose] 81 mg PO HS 08/23/14 06/18/20 History


 


amLODIPine [Norvasc] 5 mg PO QAM 08/23/14 06/18/20 History


 


buPROPion XL [Wellbutrin XL] 300 mg PO QAM 08/23/14 06/18/20 History


 


Quinapril HCl [Accupril] 10 mg PO QAM 10/22/17 06/18/20 History


 


Docusate [Colace] 100 mg PO DAILY PRN 12/21/18 06/18/20 History


 


Insulin Aspart [NovoLOG] 5 unit SQ QAM PRN 12/21/18 06/18/20 History


 


Insulin Glargine [Lantus] 4 - 6 unit SQ HS 12/21/18 06/18/20 History


 


Metoprolol Succinate (ER) [Toprol 50 mg PO BID 12/21/18 06/18/20 History





XL]    


 


Atorvastatin [Lipitor] 80 mg PO HS 06/18/20 06/18/20 History


 


Cholecalciferol [Vitamin D3 (25 1,000 unit PO QAM 06/18/20 06/18/20 History





Mcg = 1000 Iu)]    


 


Clopidogrel [Plavix] 75 mg PO QAM 06/18/20 06/18/20 History


 


Fish Oil/Dha/Epa [Fish Oil 1,200 1 cap PO QAM 06/18/20 06/18/20 History





mg Fish Oil]    


 


Furosemide [Lasix] 20 mg PO DAILY 06/18/20 06/18/20 History


 


Glimepiride [Amaryl] 4 mg PO QAM 06/18/20 06/18/20 History


 


Magnesium Oxide [Mag-Ox] 200 mg PO BID 06/18/20 06/18/20 History


 


Potassium Chloride ER [K-Dur 10] 10 meq PO DAILY 06/18/20 06/18/20 History


 


predniSONE [Deltasone] See Taper PO DAILY 06/18/20 06/18/20 History


 


traMADol HCL [Ultram] 50 mg PO Q4-6H PRN 06/18/20 06/18/20 History








                                    Allergies











Allergy/AdvReac Type Severity Reaction Status Date / Time


 


iodine Allergy Unknown Unknown Verified 06/18/20 19:31














Physical Exam


Vitals: 


                                   Vital Signs











  Temp Pulse Pulse Resp BP BP Pulse Ox


 


 06/19/20 03:43  98.2 F   64  18   140/63  99


 


 06/19/20 00:00  98.1 F   71  18   149/67  99


 


 06/18/20 21:30   66   16  130/80   97


 


 06/18/20 19:45   69   18  126/72   97


 


 06/18/20 17:33  97.9 F  89   18  125/72   98








                                Intake and Output











 06/18/20 06/19/20 06/19/20





 22:59 06:59 14:59


 


Intake Total  292 


 


Output Total  125 


 


Balance  167 


 


Intake:   


 


  Intake, IV Titration  292 





  Amount   


 


    Heparin Sod,Pork in 0.45%  67 





    NaCl 25,000 unit In 0.45   





    % NaCl 1 250ml.bag @ 11.   





    789 UNITS/KG/HR 10 mls/hr   





    IV .Q24H OPAL Rx#:   





    [id number]   


 


    Sodium Chloride 0.9% 1,  225 





    000 ml @ 75 mls/hr IV .   





    P66Q31H OPAL Rx#:[id number]   


 


Output:   


 


  Urine  125 


 


Other:   


 


  Voiding Method  Urinal 


 


  Weight 84.822 kg 83.9 kg 

















PHYSICAL EXAMINATION: 





GENERAL: The patient is alert and oriented x3, not in any acute distress. Well 

developed, well nourished. 


HEENT: Pupils are round and equally reacting to light. EOMI. No scleral icterus.

 No conjunctival pallor. Normocephalic, atraumatic. No pharyngeal erythema. No 

thyromegaly. 


CARDIOVASCULAR: S1 and S2 present. No murmurs, rubs, or gallops. 


PULMONARY: Chest is clear to auscultation, no wheezing or crackles. 


ABDOMEN: Soft, nontender, nondistended, normoactive bowel sounds. No palpable 

organomegaly. 


MUSCULOSKELETAL: No joint swelling or deformity.


EXTREMITIES: No cyanosis, clubbing, or pedal edema. 


NEUROLOGICAL: Gross neurological examination did not reveal any focal deficits. 


SKIN: No rashes. 

















Results


CBC & Chem 7: 


                                 06/18/20 17:50





                                 06/18/20 17:50


Labs: 


                  Abnormal Lab Results - Last 24 Hours (Table)











  06/18/20 06/18/20 06/18/20 Range/Units





  17:50 17:50 17:50 


 


WBC  12.5 H    (3.8-10.6)  k/uL


 


RBC  3.48 L    (4.30-5.90)  m/uL


 


Hgb  10.3 L    (13.0-17.5)  gm/dL


 


Hct  31.3 L    (39.0-53.0)  %


 


Neutrophils #  11.5 H    (1.3-7.7)  k/uL


 


Lymphocytes #  0.5 L    (1.0-4.8)  k/uL


 


APTT     (22.0-30.0)  sec


 


Sodium   131 L   (137-145)  mmol/L


 


Carbon Dioxide   19 L   (22-30)  mmol/L


 


BUN   37 H   (9-20)  mg/dL


 


Creatinine   1.51 H   (0.66-1.25)  mg/dL


 


Glucose   466 H   (74-99)  mg/dL


 


POC Glucose (mg/dL)     (75-99)  mg/dL


 


Alkaline Phosphatase   155 H   ()  U/L


 


Troponin I    2.540 H*  (0.000-0.034)  ng/mL


 


Urine Glucose (UA)     (Negative)  














  06/18/20 06/19/20 06/19/20 Range/Units





  18:31 00:23 01:12 


 


WBC     (3.8-10.6)  k/uL


 


RBC     (4.30-5.90)  m/uL


 


Hgb     (13.0-17.5)  gm/dL


 


Hct     (39.0-53.0)  %


 


Neutrophils #     (1.3-7.7)  k/uL


 


Lymphocytes #     (1.0-4.8)  k/uL


 


APTT    37.3 H  (22.0-30.0)  sec


 


Sodium     (137-145)  mmol/L


 


Carbon Dioxide     (22-30)  mmol/L


 


BUN     (9-20)  mg/dL


 


Creatinine     (0.66-1.25)  mg/dL


 


Glucose     (74-99)  mg/dL


 


POC Glucose (mg/dL)   308 H   (75-99)  mg/dL


 


Alkaline Phosphatase     ()  U/L


 


Troponin I     (0.000-0.034)  ng/mL


 


Urine Glucose (UA)  4+ H    (Negative)  














  06/19/20 06/19/20 Range/Units





  01:12 06:57 


 


WBC    (3.8-10.6)  k/uL


 


RBC    (4.30-5.90)  m/uL


 


Hgb    (13.0-17.5)  gm/dL


 


Hct    (39.0-53.0)  %


 


Neutrophils #    (1.3-7.7)  k/uL


 


Lymphocytes #    (1.0-4.8)  k/uL


 


APTT    (22.0-30.0)  sec


 


Sodium    (137-145)  mmol/L


 


Carbon Dioxide    (22-30)  mmol/L


 


BUN    (9-20)  mg/dL


 


Creatinine    (0.66-1.25)  mg/dL


 


Glucose    (74-99)  mg/dL


 


POC Glucose (mg/dL)   208 H  (75-99)  mg/dL


 


Alkaline Phosphatase    ()  U/L


 


Troponin I  32.600 H*   (0.000-0.034)  ng/mL


 


Urine Glucose (UA)    (Negative)  














Thrombosis Risk Factor Assmnt





- Choose All That Apply


Any of the Below Risk Factors Present?: Yes


Each Factor Represents 1 point: Acute MI


Other Risk Factors: Yes


Each Risk Factor Represents 3 Points: Age 75 years or older


Other congenital or acquired thrombophilia - If yes, enter type in comment: No


Thrombosis Risk Factor Assessment Total Risk Factor Score: 4


Thrombosis Risk Factor Assessment Level: Moderate Risk





Assessment and Plan


Plan: 


-Acute non-ST elevation microinfarction: Patient is on IV heparin which will be 

continued patient probably will undergo cardiac catheterization later today.


-Hypoglycemia secondary to prednisone patient was started on home regimen but 

increase the dose of Lantus tonight and the patient the will be monitored with 

the before meals and at bedtime blood sugars.  Patient will also be on sliding 

scale remains


-Possibility of acute renal failure.  Patient appears to have chronic kidney 

disease stage III baseline creatinine is between 1.2 and 1.4 present creatinine 

is 1.5 patient received IV fluids overnight.  Which were discontinued at this 

time


-Hyponatremia possibly of hypovolemic hyponatremia expected to have improved by 

now with IV fluids


-Congestive heart failure chronic systolic dysfunction ischemic myopathy EF of 

around 20-25% not in acute exacerbation at this time patient is actually 

hypovolemic probably from diuresis from hyperglycemia, for Now I discontinued IV

 fluids they can be restarted with close monitoring if needed from cardiology 

perspective


-Diabetic nephropathy next and-hyperlipidemia


-Sleep apnea uses CPAP machine which will be continued

## 2020-06-19 NOTE — CC
CARDIAC CATHETERIZATION REPORT



Mr. Lau is an 79-year-old male with a known history of coronary artery disease,

status post multivessel stenting, who presented with symptoms of chest discomfort and

evidence of non ST-segment elevation myocardial infarction.  In view of that,

recommendation made regarding cardiac catheterization.  The procedures, risks, and

complication were discussed with the patient who is in full understanding and

agreement.



PROCEDURE:

Patient was brought to the cath lab in a fasting semi-sedated state after receiving

fentanyl and Benadryl and achieving moderate conscious sedated state.  Using Xylocaine

anesthesia and Seldinger technique, a 6-[place] sheath was introduced in the right

radial artery.  Selective right and left coronary angiography performed using 5-[place]

3.5 bend right and left Veronika catheter, multiple views of the coronary artery

including hemiaxial views were obtained. Following that, angioplasty and stenting was

performed.  Following that, a 5-[place] tight pigtail catheter was introduced in the

left ventricle and left ventricular end-diastolic pressure was calculated.  Following

that, catheter and sheath were removed.  Hemostasis was obtained with deployment of a

TR band.  There was no immediate complication.  Patient is returned to his room in

stable condition.  Of note, the patient received a total of 8000 units of intravenous

heparin during the procedure.  His ACT was monitored and he received intra-arterial

verapamil.



FINDINGS:

FLUOROSCOPY: There was severe calcification involving all the coronary arteries.



LEFT MAIN:  This is a large-sized vessel, bifurcating into left circumflex, left

anterior descending artery.  Left main coronary artery has no evidence of high-grade

stenosis.



LEFT ANTERIOR DESCENDING ARTERY:  This is a heavily calcified vessel.  The ostium of

the LAD that was stented has in-stent restenosis of about 60%.  There is another plaque

in the mid LAD of 40% to 50%.  The mid and distal LAD is diffusely diseased.



LEFT CIRCUMFLEX:  This is a nondominant vessel giving rise to 3 obtuse marginal branch.

The left circumflex is diffusely diseased.  The stented segment in the mid circumflex

is patent.  The proximal circumflex has an area of stenosis of about 70%.  The rest of

the vessel has no high-grade stenosis.



RIGHT CORONARY ARTERY:  This is a large dominant vessel, bifurcating distally PDA and

posterolateral segment and branches.  The right coronary artery proximally has an

eccentric 99% stenosis.  There is mild intimal disease in the mid and distal segment.



COLLATERALS:  There is collateral from the left coronary system toward the right

coronary artery distally.



HEMODYNAMICS: There was a 20-25 mm peak gradient across the aortic valve.  The left

ventricular end-diastolic pressure was 20 mmHg.



CONCLUSION:

1. Heavily calcified coronary arteries.

2. Critical stenosis involving the right coronary artery proximally.

3. Moderate in-stent 3 stenosis of the proximal LAD with moderate to significant

    disease in the proximal left circumflex.

4. Mild to moderate aortic stenosis.



RECOMMENDATION:

In view of finding anatomy, I recommend proceeding with angioplasty and stenting of the

LAD.  The procedures, risks, and complications were discussed with the patient who is

in full understanding and agreement.





MMODL / IJN: [id number] / Job#: [id number]

## 2020-06-19 NOTE — CONS
CONSULTATION



Mr. Lau is an 79-year-old male who is followed by Dr. Mast, has a known

history of coronary artery disease who presented with symptoms of severe chest

discomfort and had elevation of his troponin.  The patient last underwent cardiac

catheterization in March 2019.  He has a known history of coronary artery disease,

status post percutaneous revascularization and presented at that time with symptoms of

non ST-segment elevation myocardial infarction, underwent cardiac catheterization by

Dr. Mast and was found to have coronary artery disease and underwent stenting of his

proximal ostial LAD using a 2.75 x 15 mm Xience Juani stent that was post-dilated with

a 3.0 balloon.  The patient is limited in his inactivity, has severe hip discomfort and

has been evaluated to undergo surgical intervention by Dr. Witt yesterday, had

severe discomfort in the chest.  His discomfort was associated with dyspnea.  He denies

any dizziness or palpitation.  He has a history of cardiomyopathy according to his last

echocardiogram as well as aortic stenosis.  He denies any dizziness or palpitation.  No

syncope.  No PND, orthopnea nor peripheral edema.  His coronary risk factor is negative

for smoking.  He has a history of hypertension, hyperlipidemia and diabetes mellitus.



His medications include aspirin once a day, Lipitor 80 mg daily, Plavix 75 mg daily,

Lasix 20 mg daily, Amaryl, insulin, metoprolol succinate 50 mg twice a day, potassium,

Accupril 10 mg daily, amlodipine 5 mg daily, bupropion and Ultram.



REVIEW OF SYSTEMS:

RESPIRATORY SYSTEM: He has dyspnea on exertion.  No recent wheezing, cough.

GI SYSTEM:  No recent GI bleeding, no peptic ulcer disease.

 SYSTEM:  No dysuria or hematuria.

NERVOUS SYSTEM:  No history of seizure.



PHYSICAL EXAMINATION:

He is an 79-year-old male, alert, oriented, in no apparent distress.  Blood pressure

130/60 with a heart rate in the 60s.

HEAD:  Normocephalic.

EYES:  Sclerae nonicteric.

NECK:  With bruit noted on the right side.

LUNGS:  Clear to auscultation.

HEART:  Regular rate and rhythm, S1, S2.  No S3 with systolic ejection murmur, 2/6 late

peaking, no diastolic murmur, no rub.

ABDOMEN:  Soft, nontender, positive bowel sounds, no organomegaly.

EXTREMITIES:  No edema.



LAB DATA:

Revealed BUN and creatinine of 37 and 1.51.  Troponin 2.4 and then 32.6, hemoglobin of

14.9, hemoglobin of 10.3. EKG revealed a sinus mechanism with right bundle branch block

and QS in the inferior leads.  Compared to the EKG from last time he had significant T-

wave inversion anteriorly, that has resolved.  The T-wave inversion inferiorly appears

slightly more prominent.  His chest x-ray shows no acute infiltrate.



IMPRESSION:

1. Acute non ST-segment elevation myocardial infarction in a patient with known

    history of multivessel coronary artery disease.

2. Aortic stenosis.

3. Cardiomyopathy.

4. Severe related pain.

5. Hypertension.

6. Hyperlipidemia.

7. Diabetes mellitus.



RECOMMENDATION:

I have recommended to proceed with coronary angiography to assess his status and guide

his treatment.  The rationale behind the procedures, risks, and complication were

discussed with the patient who is in full understanding and agreement.



Thank you for this consult.  Will follow with you.





DONNIE / KARON: [id number] / Job#: [id number]

## 2020-06-20 LAB
ANION GAP SERPL CALC-SCNC: 7 MMOL/L
BASOPHILS # BLD AUTO: 0 K/UL (ref 0–0.2)
BASOPHILS NFR BLD AUTO: 0 %
BUN SERPL-SCNC: 37 MG/DL (ref 9–20)
CALCIUM SPEC-MCNC: 8.3 MG/DL (ref 8.4–10.2)
CHLORIDE SERPL-SCNC: 108 MMOL/L (ref 98–107)
CO2 SERPL-SCNC: 23 MMOL/L (ref 22–30)
EOSINOPHIL # BLD AUTO: 0 K/UL (ref 0–0.7)
EOSINOPHIL NFR BLD AUTO: 0 %
ERYTHROCYTE [DISTWIDTH] IN BLOOD BY AUTOMATED COUNT: 3.09 M/UL (ref 4.3–5.9)
ERYTHROCYTE [DISTWIDTH] IN BLOOD: 14.6 % (ref 11.5–15.5)
GLUCOSE BLD-MCNC: 138 MG/DL (ref 75–99)
GLUCOSE BLD-MCNC: 187 MG/DL (ref 75–99)
GLUCOSE BLD-MCNC: 213 MG/DL (ref 75–99)
GLUCOSE BLD-MCNC: 222 MG/DL (ref 75–99)
GLUCOSE SERPL-MCNC: 165 MG/DL (ref 74–99)
HCT VFR BLD AUTO: 27.9 % (ref 39–53)
HGB BLD-MCNC: 9.1 GM/DL (ref 13–17.5)
LYMPHOCYTES # SPEC AUTO: 0.6 K/UL (ref 1–4.8)
LYMPHOCYTES NFR SPEC AUTO: 5 %
MCH RBC QN AUTO: 29.6 PG (ref 25–35)
MCHC RBC AUTO-ENTMCNC: 32.8 G/DL (ref 31–37)
MCV RBC AUTO: 90.2 FL (ref 80–100)
MONOCYTES # BLD AUTO: 0.6 K/UL (ref 0–1)
MONOCYTES NFR BLD AUTO: 5 %
NEUTROPHILS # BLD AUTO: 10.6 K/UL (ref 1.3–7.7)
NEUTROPHILS NFR BLD AUTO: 88 %
PLATELET # BLD AUTO: 214 K/UL (ref 150–450)
POTASSIUM SERPL-SCNC: 4.5 MMOL/L (ref 3.5–5.1)
SODIUM SERPL-SCNC: 138 MMOL/L (ref 137–145)
WBC # BLD AUTO: 12 K/UL (ref 3.8–10.6)

## 2020-06-20 RX ADMIN — METOPROLOL SUCCINATE SCH MG: 50 TABLET, EXTENDED RELEASE ORAL at 20:47

## 2020-06-20 RX ADMIN — INSULIN DETEMIR SCH UNIT: 100 INJECTION, SOLUTION SUBCUTANEOUS at 20:58

## 2020-06-20 RX ADMIN — Medication SCH MG: at 20:46

## 2020-06-20 RX ADMIN — Medication SCH MG: at 09:19

## 2020-06-20 RX ADMIN — INSULIN ASPART SCH UNIT: 100 INJECTION, SOLUTION INTRAVENOUS; SUBCUTANEOUS at 20:47

## 2020-06-20 RX ADMIN — METOPROLOL SUCCINATE SCH MG: 50 TABLET, EXTENDED RELEASE ORAL at 09:19

## 2020-06-20 RX ADMIN — BUPROPION HYDROCHLORIDE SCH MG: 300 TABLET, FILM COATED, EXTENDED RELEASE ORAL at 09:20

## 2020-06-20 RX ADMIN — ASPIRIN 81 MG CHEWABLE TABLET SCH MG: 81 TABLET CHEWABLE at 20:46

## 2020-06-20 RX ADMIN — FUROSEMIDE SCH MG: 20 TABLET ORAL at 09:20

## 2020-06-20 RX ADMIN — LISINOPRIL SCH MG: 10 TABLET ORAL at 09:19

## 2020-06-20 RX ADMIN — INSULIN ASPART SCH UNIT: 100 INJECTION, SOLUTION INTRAVENOUS; SUBCUTANEOUS at 17:30

## 2020-06-20 RX ADMIN — INSULIN ASPART SCH UNIT: 100 INJECTION, SOLUTION INTRAVENOUS; SUBCUTANEOUS at 12:47

## 2020-06-20 RX ADMIN — INSULIN ASPART SCH UNIT: 100 INJECTION, SOLUTION INTRAVENOUS; SUBCUTANEOUS at 07:06

## 2020-06-20 RX ADMIN — CLOPIDOGREL BISULFATE SCH MG: 75 TABLET ORAL at 09:20

## 2020-06-20 RX ADMIN — ATORVASTATIN CALCIUM SCH MG: 80 TABLET, FILM COATED ORAL at 20:46

## 2020-06-20 NOTE — P.PN
Subjective


Progress Note Date: 06/20/20





This is an 79-year-old  male with past medical history of coronary 

artery disease status post multivessel stenting,


Patient presented to the hospital with chest discomfort and evidence of non-ST 

elevated myocardial infarction.  He subsequently underwent heart catheterization

that revealed heavily calcified coronary arteries.  Critical stenosis involving 

the right coronary artery proximally.  Moderate in-stent restenosis of the 

proximal LAD with moderate to significant disease in the proximal left 

circumflex.  Mild to moderate aortic stenosis.  Patient's subsequent underwent 

angioplasty and stenting of the LAD.  At the time of this evaluation, patient 

denies having any chest pain or shortness of breath.  He does complain of some 

hip pain that has been ongoing and chronic.  Echocardiogram reveals EF of 55-

60%, moderate to severe aortic stenosis, moderate mitral regurgitation, mild 

tricuspid regurgitation, borderline pulmonary hypertension.  Patient has been 

afebrile, heart rate in the 60s and 70s, blood pressure 120/57, pulse ox 99% on 

room air.





Physical Examination


Gen: This is an 79-year-old  male.  He is resting in bed and appears to

be comfortable and in no acute distress.


HEENT: Head is atraumatic, normocephalic. Pupils equal, round. Sclerae is 

anicteric. 


NECK: Supple. No JVD. No lymphadenopathy. No thyromegaly. 


LUNGS: Clear to auscultation. No wheezes or rhonchi.  No intercostal 

retractions.


HEART: Regular rate and rhythm.  Systolic ejection murmur. 


ABDOMEN: Soft. Bowel sounds are present. No masses.  No tenderness.


EXTREMITIES: No pedal edema.  No calf tenderness.


NEUROLOGICAL: Patient is awake, alert and oriented x3. Cranial nerves 2 through 

12 are grossly intact. 





Assessment and Plan


Acute non-ST elevated myocardial infarction


History of multivessel coronary artery disease


Aortic stenosis


Cardiomyopathy


Hypertension


Hyperlipidemia


Diabetes mellitus type 2





Plan:


Continue Plavix, aspirin 81 mg daily, Lipitor 80 mg daily, lisinopril 10 mg 

daily, Toprol-XL 50 g twice daily


Continue Lasix 20 mg oral daily


Continue to monitor patient overnight with anticipate discharge tomorrow


Further recommendations to follow based upon clinical course





Impression and plan of care have been directed as dictated by the signing 

physician.  Ciara Miranda nurse practitioner acting as scribe for signing 

physician.








Objective





- Vital Signs


Vital signs: 


                                   Vital Signs











Temp  98.0 F   06/20/20 08:00


 


Pulse  70   06/20/20 08:00


 


Resp  20   06/20/20 08:00


 


BP  119/64   06/20/20 08:00


 


Pulse Ox  98   06/20/20 08:00








                                 Intake & Output











 06/19/20 06/20/20 06/20/20





 18:59 06:59 18:59


 


Intake Total 976.644 740 240


 


Output Total 500  


 


Balance 476.644 740 240


 


Weight 83.9 kg 85.3 kg 


 


Intake:   


 


    


 


    Sodium Chloride 0.9% 1, 500  





    000 ml In Empty Bag 1 bag   





    @ 1 ML/KG/HR 83.9 mls/hr   





    IV .X01K03L ONE Rx#:   





    [id number]   


 


  Intake, IV Titration 90.644 200 





  Amount   


 


    Heparin Sod,Pork in 0.45% 90.644  





    NaCl 25,000 unit In 0.45   





    % NaCl 1 250ml.bag @ 11.   





    789 UNITS/KG/HR 10 mls/hr   





    IV .Q24H [facility] Rx#:   





    [id number]   


 


    Sodium Chloride 0.9% 1,  200 





    000 ml @ 100 mls/hr IV .   





    Q10H [facility] Rx#:[id number]   


 


  Oral 236 540 240


 


Output:   


 


  Urine 500  


 


Other:   


 


  Voiding Method Urinal Urinal 


 


  # Voids   0


 


  # Bowel Movements   0














- Labs


CBC & Chem 7: 


                                 06/20/20 06:35





                                 06/20/20 06:35


Labs: 


                  Abnormal Lab Results - Last 24 Hours (Table)











  06/19/20 06/19/20 06/19/20 Range/Units





  13:58 17:04 20:12 


 


WBC     (3.8-10.6)  k/uL


 


RBC     (4.30-5.90)  m/uL


 


Hgb     (13.0-17.5)  gm/dL


 


Hct     (39.0-53.0)  %


 


Neutrophils #     (1.3-7.7)  k/uL


 


Lymphocytes #     (1.0-4.8)  k/uL


 


Chloride     ()  mmol/L


 


BUN     (9-20)  mg/dL


 


Creatinine     (0.66-1.25)  mg/dL


 


Glucose     (74-99)  mg/dL


 


POC Glucose (mg/dL)  126 H  202 H  288 H  (75-99)  mg/dL


 


Calcium     (8.4-10.2)  mg/dL














  06/20/20 06/20/20 06/20/20 Range/Units





  06:31 06:35 06:35 


 


WBC   12.0 H   (3.8-10.6)  k/uL


 


RBC   3.09 L   (4.30-5.90)  m/uL


 


Hgb   9.1 L   (13.0-17.5)  gm/dL


 


Hct   27.9 L   (39.0-53.0)  %


 


Neutrophils #   10.6 H   (1.3-7.7)  k/uL


 


Lymphocytes #   0.6 L   (1.0-4.8)  k/uL


 


Chloride    108 H  ()  mmol/L


 


BUN    37 H  (9-20)  mg/dL


 


Creatinine    1.30 H  (0.66-1.25)  mg/dL


 


Glucose    165 H  (74-99)  mg/dL


 


POC Glucose (mg/dL)  187 H    (75-99)  mg/dL


 


Calcium    8.3 L  (8.4-10.2)  mg/dL

## 2020-06-20 NOTE — P.PN
Subjective


Progress Note Date: 06/20/20


Principal diagnosis: 





coronary artery disease status post stenting





Mr. Lau is a pleasant 79-year-old male with a past medical history of 

diabetes mellitus, hypertension, hyperlipidemia, sleep apnea admitted to the 

hospital with chest pain.  Patient was found to have T-wave inversions in the 

inferior leads and ST depression in anteroseptal leads with elevated troponin.  

He subsequently had a heart catheterization that revealed heavily calcified 

coronary arteries with critical stenosis of the right coronary artery and 

moderate in-stent restenosis of proximal LAD so he underwent stenting of LAD.  

Patient also had echocardiogram done showing ejection fraction of 55-60% with 

moderate to severe aortic stenosis, moderate mitral regurgitation and mild 

tricuspid regurgitation and borderline pulmonary hypertension.





On 06/20/2020 - patient is sitting up in his bed and having his dinner.  He does

not appear to be in acute distress.  No acute issues reported by nursing staff 

overnight.  On review of systems patient denies having any fevers chills or 

rigors.  No chest pain or palpitations.  No cough or difficulty in breathing. No

nausea vomiting or diarrhea.  No dysuria or hematuria.on reviewing the patient's

vitals temperature is 97.8, heart rate 65, respiratory rate 17, blood pressure 

120/77,  saturating at 99% on room air.labs have been reviewed and show a white 

count of 12 hemoglobin stable around 9.  Creatinine is 1.30.





Active Medications





Al Hydroxide/Mg Hydroxide (Maalox)  30 ml PO Q4HR PRN


   PRN Reason: Heartburn


Alprazolam (Xanax)  0.25 mg PO Q6HR PRN


   PRN Reason: Mild Anxiety


Alprazolam (Xanax)  0.5 mg PO Q6HR PRN


   PRN Reason: Moderate Anxiety


Amlodipine Besylate (Norvasc)  5 mg PO [facility]


   Last Admin: 06/20/20 09:21 Dose:  5 mg


   Documented by: 


Aspirin (Aspirin)  81 mg PO [facility]


   Last Admin: 06/19/20 12:52 Dose:  Not Given


   Documented by: 


Atorvastatin Calcium (Lipitor)  80 mg PO [facility]


   Last Admin: 06/19/20 08:45 Dose:  Not Given


   Documented by: 


Atropine Sulfate (Atropine)  0.5 mg IV ONCE PRN


   PRN Reason: Symptomatic Bradycardia


Bupropion HCl (Wellbutrin Xl)  300 mg PO [facility]


   Last Admin: 06/20/20 09:20 Dose:  300 mg


   Documented by: 


Clopidogrel Bisulfate (Plavix)  75 mg PO QAM [facility]


   Last Admin: 06/20/20 09:20 Dose:  75 mg


   Documented by: 


Furosemide (Lasix)  20 mg PO DAILY [facility]


   Last Admin: 06/20/20 09:20 Dose:  20 mg


   Documented by: 


Insulin Aspart (Novolog)  5 unit SQ QAM PRN


   PRN Reason: Blood Sugar over 150


Insulin Aspart (Novolog)  0 unit SQ ACHS [facility]; Protocol


   Last Admin: 06/20/20 17:30 Dose:  3 unit


   Documented by: 


Insulin Aspart (Novolog)  5 unit SQ AC-TID [facility]


   Last Admin: 06/20/20 17:30 Dose:  5 unit


   Documented by: 


Insulin Detemir (Levemir)  10 unit SQ HS [facility]


   Last Admin: 06/19/20 20:37 Dose:  10 unit


   Documented by: 


Lisinopril (Zestril)  10 mg PO QAM [facility]


   Last Admin: 06/20/20 09:19 Dose:  10 mg


   Documented by: 


Magnesium Oxide (Mag-Ox)  200 mg PO BID [facility]


   Last Admin: 06/20/20 09:19 Dose:  200 mg


   Documented by: 


Metoprolol Succinate (Toprol Xl)  50 mg PO BID [facility]


   Last Admin: 06/20/20 09:19 Dose:  50 mg


   Documented by: 


Miscellaneous Information (Rx Info: Iv Contrast Was Given)  1 each MISCELLANE 

DAILY PRN


   PRN Reason: Per Protocol


   Stop: 06/21/20 12:49


Naloxone HCl (Narcan)  0.2 mg IV Q2M PRN


   PRN Reason: Opioid Reversal


Nitroglycerin (Nitrostat)  0.4 mg SUBLINGUAL Q5M PRN


   PRN Reason: Chest Pain


Nitroglycerin (Nitrostat)  0.4 mg SUBLINGUAL Q5M PRN


   PRN Reason: Chest Pain


Tramadol HCl (Ultram)  50 mg PO Q4H PRN


   PRN Reason: Pain


   Last Admin: 06/20/20 09:20 Dose:  50 mg


   Documented by: 


Zolpidem Tartrate (Ambien)  5 mg PO HS PRN


   PRN Reason: Insomnia











Objective





- Vital Signs


Vital signs: 


                                   Vital Signs











Temp  97.8 F   06/20/20 12:00


 


Pulse  67   06/20/20 15:28


 


Resp  19   06/20/20 15:28


 


BP  132/67   06/20/20 15:28


 


Pulse Ox  97   06/20/20 15:28








                                 Intake & Output











 06/19/20 06/20/20 06/20/20





 18:59 06:59 18:59


 


Intake Total 976.644 740 480


 


Output Total 500  


 


Balance 476.644 740 480


 


Weight 83.9 kg 85.3 kg 


 


Intake:   


 


    


 


    Sodium Chloride 0.9% 1, 500  





    000 ml In Empty Bag 1 bag   





    @ 1 ML/KG/HR 83.9 mls/hr   





    IV .[id number] [facility] Rx#:   





    [id number]   


 


  Intake, IV Titration 90.644 200 





  Amount   


 


    Heparin Sod,Pork in 0.45% 90.644  





    NaCl 25,000 unit In 0.45   





    % NaCl 1 250ml.bag @ 11.   





    789 UNITS/KG/HR 10 mls/hr   





    IV .Q24H [facility] Rx#:   





    [id number]   


 


    Sodium Chloride 0.9% 1,  200 





    000 ml @ 100 mls/hr IV .   





    Q10H [facility] Rx#:[id number]   


 


  Oral 236 540 480


 


Output:   


 


  Urine 500  


 


Other:   


 


  Voiding Method Urinal Urinal 


 


  # Voids   0


 


  # Bowel Movements   0














- Exam


PHYSICAL EXAM





Gen: Appears to be of appropriate age.He is resting in bed and appears to be 

comfortable and in no acute distress.


HEENT: Head is atraumatic, normocephalic. Pupils equal, round. Sclerae is 

anicteric. 


NECK: Supple. No JVD. No lymphadenopathy. No thyromegaly. 


LUNGS: Clear to auscultation. No wheezes or rhonchi.  No intercostal 

retractions.


HEART: Regular rate and rhythm.  Systolic ejection murmur. 


ABDOMEN: Soft. Bowel sounds are present. No masses.  No tenderness.


EXTREMITIES: No pedal edema.  No calf tenderness.


NEUROLOGICAL: Patient is awake, alert and oriented x3. no focal deficits on 

gross exam.








- Labs


CBC & Chem 7: 


                                 06/20/20 06:35





                                 06/20/20 06:35


Labs: 


                  Abnormal Lab Results - Last 24 Hours (Table)











  06/19/20 06/20/20 06/20/20 Range/Units





  20:12 06:31 06:35 


 


WBC    12.0 H  (3.8-10.6)  k/uL


 


RBC    3.09 L  (4.30-5.90)  m/uL


 


Hgb    9.1 L  (13.0-17.5)  gm/dL


 


Hct    27.9 L  (39.0-53.0)  %


 


Neutrophils #    10.6 H  (1.3-7.7)  k/uL


 


Lymphocytes #    0.6 L  (1.0-4.8)  k/uL


 


Chloride     ()  mmol/L


 


BUN     (9-20)  mg/dL


 


Creatinine     (0.66-1.25)  mg/dL


 


Glucose     (74-99)  mg/dL


 


POC Glucose (mg/dL)  288 H  187 H   (75-99)  mg/dL


 


Calcium     (8.4-10.2)  mg/dL














  06/20/20 06/20/20 06/20/20 Range/Units





  06:35 12:09 16:35 


 


WBC     (3.8-10.6)  k/uL


 


RBC     (4.30-5.90)  m/uL


 


Hgb     (13.0-17.5)  gm/dL


 


Hct     (39.0-53.0)  %


 


Neutrophils #     (1.3-7.7)  k/uL


 


Lymphocytes #     (1.0-4.8)  k/uL


 


Chloride  108 H    ()  mmol/L


 


BUN  37 H    (9-20)  mg/dL


 


Creatinine  1.30 H    (0.66-1.25)  mg/dL


 


Glucose  165 H    (74-99)  mg/dL


 


POC Glucose (mg/dL)   222 H  213 H  (75-99)  mg/dL


 


Calcium  8.3 L    (8.4-10.2)  mg/dL














Assessment and Plan


Assessment: 





ASSESSMENT





Acute non-ST elevation MI


History of multiple basal coronary artery disease


Moderate to severe aortic stenosis


CK D stage III


Cardiomyopathy


Hyperglycemia due to his recent steroid use


Hypertension


Arthritis mellitus


Hyperlipidemia


Normocytic anemia





PLAN: patient is status post stenting of LAD to be continued on dual antip

latelet therapy, statin, ACE inhibitor and beta blocker.  Echocardiogram done 

yesterday showing ejection fraction of 55-60%.  Cardiology on board following 

the patient.Continue the current medication regimen. Anticipate per discharge in

the next 24-48 hours.

## 2020-06-21 VITALS
TEMPERATURE: 98.6 F | SYSTOLIC BLOOD PRESSURE: 132 MMHG | DIASTOLIC BLOOD PRESSURE: 70 MMHG | HEART RATE: 65 BPM | RESPIRATION RATE: 19 BRPM

## 2020-06-21 LAB
ANION GAP SERPL CALC-SCNC: 7 MMOL/L
BASOPHILS # BLD AUTO: 0 K/UL (ref 0–0.2)
BASOPHILS NFR BLD AUTO: 0 %
BUN SERPL-SCNC: 33 MG/DL (ref 9–20)
CALCIUM SPEC-MCNC: 8.4 MG/DL (ref 8.4–10.2)
CHLORIDE SERPL-SCNC: 106 MMOL/L (ref 98–107)
CO2 SERPL-SCNC: 25 MMOL/L (ref 22–30)
EOSINOPHIL # BLD AUTO: 0.1 K/UL (ref 0–0.7)
EOSINOPHIL NFR BLD AUTO: 1 %
ERYTHROCYTE [DISTWIDTH] IN BLOOD BY AUTOMATED COUNT: 3.25 M/UL (ref 4.3–5.9)
ERYTHROCYTE [DISTWIDTH] IN BLOOD: 14.5 % (ref 11.5–15.5)
GLUCOSE BLD-MCNC: 82 MG/DL (ref 75–99)
GLUCOSE BLD-MCNC: 91 MG/DL (ref 75–99)
GLUCOSE SERPL-MCNC: 73 MG/DL (ref 74–99)
HCT VFR BLD AUTO: 28.9 % (ref 39–53)
HGB BLD-MCNC: 9.6 GM/DL (ref 13–17.5)
LYMPHOCYTES # SPEC AUTO: 1 K/UL (ref 1–4.8)
LYMPHOCYTES NFR SPEC AUTO: 11 %
MCH RBC QN AUTO: 29.6 PG (ref 25–35)
MCHC RBC AUTO-ENTMCNC: 33.2 G/DL (ref 31–37)
MCV RBC AUTO: 89 FL (ref 80–100)
MONOCYTES # BLD AUTO: 0.5 K/UL (ref 0–1)
MONOCYTES NFR BLD AUTO: 5 %
NEUTROPHILS # BLD AUTO: 6.9 K/UL (ref 1.3–7.7)
NEUTROPHILS NFR BLD AUTO: 81 %
PLATELET # BLD AUTO: 200 K/UL (ref 150–450)
POTASSIUM SERPL-SCNC: 3.9 MMOL/L (ref 3.5–5.1)
SODIUM SERPL-SCNC: 138 MMOL/L (ref 137–145)
WBC # BLD AUTO: 8.5 K/UL (ref 3.8–10.6)

## 2020-06-21 RX ADMIN — INSULIN ASPART SCH: 100 INJECTION, SOLUTION INTRAVENOUS; SUBCUTANEOUS at 07:46

## 2020-06-21 RX ADMIN — LISINOPRIL SCH MG: 10 TABLET ORAL at 08:35

## 2020-06-21 RX ADMIN — CLOPIDOGREL BISULFATE SCH MG: 75 TABLET ORAL at 08:35

## 2020-06-21 RX ADMIN — METOPROLOL SUCCINATE SCH MG: 50 TABLET, EXTENDED RELEASE ORAL at 08:35

## 2020-06-21 RX ADMIN — BUPROPION HYDROCHLORIDE SCH MG: 300 TABLET, FILM COATED, EXTENDED RELEASE ORAL at 08:35

## 2020-06-21 RX ADMIN — FUROSEMIDE SCH MG: 20 TABLET ORAL at 08:35

## 2020-06-21 RX ADMIN — INSULIN ASPART SCH UNIT: 100 INJECTION, SOLUTION INTRAVENOUS; SUBCUTANEOUS at 12:36

## 2020-06-21 RX ADMIN — Medication SCH MG: at 08:36

## 2020-06-21 RX ADMIN — INSULIN ASPART SCH: 100 INJECTION, SOLUTION INTRAVENOUS; SUBCUTANEOUS at 12:31

## 2020-06-21 NOTE — P.PN
Subjective


Progress Note Date: 06/21/20





This is an 79-year-old  male with past medical history of coronary 

artery disease status post multivessel stenting,


Patient presented to the hospital with chest discomfort and evidence of non-ST 

elevated myocardial infarction.  He subsequently underwent heart catheterization

that revealed heavily calcified coronary arteries.  Critical stenosis involving 

the right coronary artery proximally.  Moderate in-stent restenosis of the 

proximal LAD with moderate to significant disease in the proximal left 

circumflex.  Mild to moderate aortic stenosis.  Patient's subsequent underwent 

angioplasty and stenting of the LAD.  At the time of this evaluation, patient 

denies having any chest pain or shortness of breath.  He does complain of some 

hip pain that has been ongoing and chronic.  Echocardiogram reveals EF of 55-

60%, moderate to severe aortic stenosis, moderate mitral regurgitation, mild 

tricuspid regurgitation, borderline pulmonary hypertension.  Patient has been 

afebrile, heart rate in the 60s and 70s, blood pressure 120/57, pulse ox 99% on 

room air.





6/21: Patient is seen today in follow-up.  He is denying any chest pain or 

shortness of breath.  No lightheadedness or dizziness.  He has been 

hemodynamically stable, afebrile, heart rate 65, blood pressure 132/70, pulse ox

99% on room air.  Repeat lab work reveals hemoglobin of 9.6, BUN 33 and 

creatinine 1.32.





Physical Examination


Gen: This is an 79-year-old  male.  He is resting in bed and appears to

be comfortable and in no acute distress.


HEENT: Head is atraumatic, normocephalic. Pupils equal, round. Sclerae is 

anicteric. 


NECK: Supple. No JVD. No lymphadenopathy. No thyromegaly. 


LUNGS: Clear to auscultation. No wheezes or rhonchi.  No intercostal 

retractions.


HEART: Regular rate and rhythm.  Systolic ejection murmur. 


ABDOMEN: Soft. Bowel sounds are present. No masses.  No tenderness.


EXTREMITIES: No pedal edema.  No calf tenderness.


NEUROLOGICAL: Patient is awake, alert and oriented x3. Cranial nerves 2 through 

12 are grossly intact. 





Assessment and Plan


Acute non-ST elevated myocardial infarction


History of multivessel coronary artery disease


Aortic stenosis


Cardiomyopathy


Hypertension


Hyperlipidemia


Diabetes mellitus type 2





Plan:


Continue Plavix, aspirin 81 mg daily, Lipitor 80 mg daily, lisinopril 10 mg 

daily, Toprol-XL 50 g twice daily


Continue Lasix 20 mg oral daily


Patient is cleared for discharge home.  Continue current medications.  Follow up

with Dr. Jo in one week





Impression and plan of care have been directed as dictated by the signing 

physician.  Ciara Miranda nurse practitioner acting as scribe for signing 

physician.








Objective





- Vital Signs


Vital signs: 


                                   Vital Signs











Temp  98.6 F   06/21/20 08:00


 


Pulse  65   06/21/20 08:00


 


Resp  19   06/21/20 08:00


 


BP  132/70   06/21/20 08:00


 


Pulse Ox  99   06/21/20 08:00








                                 Intake & Output











 06/20/20 06/21/20 06/21/20





 18:59 06:59 18:59


 


Intake Total 720  240


 


Output Total 1200 1700 


 


Balance -480 -1700 240


 


Weight  84.5 kg 


 


Intake:   


 


  Oral 720  240


 


Output:   


 


  Urine 1200 1700 


 


Other:   


 


  Voiding Method Urinal Urinal Urinal


 


  # Voids 0 2 1


 


  # Bowel Movements 0 1 














- Labs


CBC & Chem 7: 


                                 06/21/20 06:16





                                 06/21/20 06:16


Labs: 


                  Abnormal Lab Results - Last 24 Hours (Table)











  06/20/20 06/20/20 06/20/20 Range/Units





  12:09 16:35 20:27 


 


RBC     (4.30-5.90)  m/uL


 


Hgb     (13.0-17.5)  gm/dL


 


Hct     (39.0-53.0)  %


 


BUN     (9-20)  mg/dL


 


Creatinine     (0.66-1.25)  mg/dL


 


Glucose     (74-99)  mg/dL


 


POC Glucose (mg/dL)  222 H  213 H  138 H  (75-99)  mg/dL














  06/21/20 06/21/20 Range/Units





  06:16 06:16 


 


RBC  3.25 L   (4.30-5.90)  m/uL


 


Hgb  9.6 L   (13.0-17.5)  gm/dL


 


Hct  28.9 L   (39.0-53.0)  %


 


BUN   33 H  (9-20)  mg/dL


 


Creatinine   1.32 H  (0.66-1.25)  mg/dL


 


Glucose   73 L  (74-99)  mg/dL


 


POC Glucose (mg/dL)    (75-99)  mg/dL

## 2020-06-22 NOTE — P.DS
Providers


Date of admission: 


06/18/20 20:21





Attending physician: 


Dalila Gil





Consults: 





                                        





06/18/20 20:25


Consult Physician Urgent 


   Consulting Provider: [facility]


   Consult Reason/Comments: acute chest pain, nstemi


   Do you want consulting provider notified?: Yes





06/19/20 12:49


Consult Physician Routine 


   Consulting Provider: [facility]


   Consult Reason/Comments: Post Interventional patient


   Do you want consulting provider notified?: Already Contacted











Primary care physician: 


Lemuel Ortiz





[facility] Course: 





Mr. Lau is a pleasant 79-year-old male with a past medical history of 

diabetes mellitus, hypertension, hyperlipidemia, sleep apnea admitted to the 

hospital with chest pain.  Patient was found to have T-wave inversions in the 

inferior leads and ST depression in anteroseptal leads with elevated troponin.  

He subsequently had a heart catheterization that revealed heavily calcified 

coronary arteries with critical stenosis of the right coronary artery and 

moderate in-stent restenosis of proximal LAD so he underwent stenting of LAD.  

Patient also had echocardiogram done showing ejection fraction of 55-60% with 

moderate to severe aortic stenosis, moderate mitral regurgitation and mild 

tricuspid regurgitation and borderline pulmonary hypertension.Post-cath patient 

did not have any complications.  He was cleared by cardiology to be discharged 

home.








                               Vital Signs - 24 hr











  06/21/20 06/21/20





  04:00 08:00


 


Temperature 98.1 F 98.6 F


 


Pulse Rate [ 73 65





Pulse Oximetery  





]  


 


Respiratory 18 19





Rate  


 


Blood Pressure 130/64 132/70





[Left Arm]  


 


O2 Sat by Pulse 95 99





Oximetry  














PHYSICAL EXAM





Gen: Appears to be of appropriate age.He is resting in bed and appears to be 

comfortable and in no acute distress.


HEENT: Head is atraumatic, normocephalic. Pupils equal, round. Sclerae is 

anicteric. 


NECK: Supple. No JVD. No lymphadenopathy. No thyromegaly. 


LUNGS: Clear to auscultation. No wheezes or rhonchi.  No intercostal 

retractions.


HEART: Regular rate and rhythm.  Systolic ejection murmur. 


ABDOMEN: Soft. Bowel sounds are present. No masses.  No tenderness.


EXTREMITIES: No pedal edema.  No calf tenderness.


NEUROLOGICAL: Patient is awake, alert and oriented x3. no focal deficits on 

gross exam.





DISCHARGE DIAGNOSIS





Acute non-ST elevation MI


History of multiple basal coronary artery disease


Moderate to severe aortic stenosis


CK D stage III


Cardiomyopathy


Hyperglycemia due to his recent steroid use


Hypertension


Arthritis mellitus


Hyperlipidemia


Normocytic anemia








PLAN: Discussed in detail with the patient regarding compliance with dual 

antiplatelet therapy (aspirin and Plavix).  Advised follow-up with cardiology in

1 to 2 weeks.  Advised follow-up with primary care physician in 3 to 4 days.


Patient Condition at Discharge: Stable





Plan - Discharge Summary


Discharge Rx Participant: No


New Discharge Prescriptions: 


New


   Nitroglycerin Sl Tabs [Nitrostat] 0.4 mg SUBLINGUAL Q5M PRN #20 tab


     PRN Reason: Chest Pain





Continue


   amLODIPine [Norvasc] 5 mg PO QAM


   Aspirin EC [Ecotrin Low Dose] 81 mg PO HS


   buPROPion XL [Wellbutrin XL] 300 mg PO QAM


   Quinapril HCl [Accupril] 10 mg PO QAM


   Insulin Glargine [Lantus] 4 - 6 unit SQ HS


   Docusate [Colace] 100 mg PO DAILY PRN


     PRN Reason: Constipation


   Metoprolol Succinate (ER) [Toprol XL] 50 mg PO BID


   Insulin Aspart [NovoLOG] 5 unit SQ QAM PRN


     PRN Reason: Blood Sugar over 150


   Atorvastatin [Lipitor] 80 mg PO HS


   Cholecalciferol [Vitamin D3 (25 Mcg = 1000 Iu)] 1,000 unit PO QAM


   traMADol HCL [Ultram] 50 mg PO Q4-6H PRN


     PRN Reason: Pain


   Potassium Chloride ER [K-Dur 10] 10 meq PO DAILY


   Magnesium Oxide [Mag-Ox] 200 mg PO BID


   Glimepiride [Amaryl] 4 mg PO QAM


   Furosemide [Lasix] 20 mg PO DAILY


   Fish Oil/Dha/Epa [Fish Oil 1,200 mg Fish Oil] 1 cap PO QAM


   Clopidogrel [Plavix] 75 mg PO QAM





Discontinued


   predniSONE [Deltasone] See Taper PO DAILY


Discharge Medication List





Aspirin EC [Ecotrin Low Dose] 81 mg PO HS 08/23/14 [History]


amLODIPine [Norvasc] 5 mg PO QAM 08/23/14 [History]


buPROPion XL [Wellbutrin XL] 300 mg PO QAM 08/23/14 [History]


Quinapril HCl [Accupril] 10 mg PO QAM 10/22/17 [History]


Docusate [Colace] 100 mg PO DAILY PRN 12/21/18 [History]


Insulin Aspart [NovoLOG] 5 unit SQ QAM PRN 12/21/18 [History]


Insulin Glargine [Lantus] 4 - 6 unit SQ HS 12/21/18 [History]


Metoprolol Succinate (ER) [Toprol XL] 50 mg PO BID 12/21/18 [History]


Atorvastatin [Lipitor] 80 mg PO HS 06/18/20 [History]


Cholecalciferol [Vitamin D3 (25 Mcg = 1000 Iu)] 1,000 unit PO QAM 06/18/20 [

History]


Clopidogrel [Plavix] 75 mg PO QAM 06/18/20 [History]


Fish Oil/Dha/Epa [Fish Oil 1,200 mg Fish Oil] 1 cap PO QAM 06/18/20 [History]


Furosemide [Lasix] 20 mg PO DAILY 06/18/20 [History]


Glimepiride [Amaryl] 4 mg PO QAM 06/18/20 [History]


Magnesium Oxide [Mag-Ox] 200 mg PO BID 06/18/20 [History]


Potassium Chloride ER [K-Dur 10] 10 meq PO DAILY 06/18/20 [History]


traMADol HCL [Ultram] 50 mg PO Q4-6H PRN 06/18/20 [History]


Nitroglycerin Sl Tabs [Nitrostat] 0.4 mg SUBLINGUAL Q5M PRN #20 tab 06/21/20 

[Rx]








Follow up Appointment(s)/Referral(s): 


Lemuel Ortiz MD [Primary Care Provider] - 1-2 days (Please call the office and

make a follow up appointment; office closed at time of discharge)


Josue Jo MD [STAFF PHYSICIAN] - 1 Week


(Please call the office to make an appoinment; office closed at time of 

discharge.


)


[facility] Visiting Nurse, [NON-STAFF] -  (Visiting nurses will contact you within 24 

hours of discharge to schedule a time to come see you at home.)


Patient Instructions/Handouts:  Heart Attack (DC), Chest Pain (DC), Heart 

Healthy Diet (DC), Diabetic Hyperglycemia (DC)


Discharge Disposition: HOME SELF-CARE

## 2020-06-27 ENCOUNTER — HOSPITAL ENCOUNTER (INPATIENT)
Dept: HOSPITAL 47 - EC | Age: 80
LOS: 4 days | Discharge: NURSING, REHAB OR LONG-TERM CARE | DRG: 91 | End: 2020-07-01
Attending: INTERNAL MEDICINE | Admitting: INTERNAL MEDICINE
Payer: MEDICARE

## 2020-06-27 DIAGNOSIS — G47.30: ICD-10-CM

## 2020-06-27 DIAGNOSIS — E78.5: ICD-10-CM

## 2020-06-27 DIAGNOSIS — M43.16: ICD-10-CM

## 2020-06-27 DIAGNOSIS — Z95.5: ICD-10-CM

## 2020-06-27 DIAGNOSIS — Z82.49: ICD-10-CM

## 2020-06-27 DIAGNOSIS — E86.0: ICD-10-CM

## 2020-06-27 DIAGNOSIS — I42.9: ICD-10-CM

## 2020-06-27 DIAGNOSIS — Z79.02: ICD-10-CM

## 2020-06-27 DIAGNOSIS — M19.90: ICD-10-CM

## 2020-06-27 DIAGNOSIS — E87.1: ICD-10-CM

## 2020-06-27 DIAGNOSIS — Z98.41: ICD-10-CM

## 2020-06-27 DIAGNOSIS — Z79.899: ICD-10-CM

## 2020-06-27 DIAGNOSIS — E11.22: ICD-10-CM

## 2020-06-27 DIAGNOSIS — T40.605A: ICD-10-CM

## 2020-06-27 DIAGNOSIS — Z91.19: ICD-10-CM

## 2020-06-27 DIAGNOSIS — I35.0: ICD-10-CM

## 2020-06-27 DIAGNOSIS — I45.10: ICD-10-CM

## 2020-06-27 DIAGNOSIS — Z87.891: ICD-10-CM

## 2020-06-27 DIAGNOSIS — D63.1: ICD-10-CM

## 2020-06-27 DIAGNOSIS — Z79.4: ICD-10-CM

## 2020-06-27 DIAGNOSIS — E11.41: ICD-10-CM

## 2020-06-27 DIAGNOSIS — Z82.3: ICD-10-CM

## 2020-06-27 DIAGNOSIS — M51.26: ICD-10-CM

## 2020-06-27 DIAGNOSIS — K59.00: ICD-10-CM

## 2020-06-27 DIAGNOSIS — Z79.82: ICD-10-CM

## 2020-06-27 DIAGNOSIS — Z96.642: ICD-10-CM

## 2020-06-27 DIAGNOSIS — E11.649: ICD-10-CM

## 2020-06-27 DIAGNOSIS — Z90.5: ICD-10-CM

## 2020-06-27 DIAGNOSIS — K21.9: ICD-10-CM

## 2020-06-27 DIAGNOSIS — I12.9: ICD-10-CM

## 2020-06-27 DIAGNOSIS — N18.3: ICD-10-CM

## 2020-06-27 DIAGNOSIS — F05: ICD-10-CM

## 2020-06-27 DIAGNOSIS — G89.29: ICD-10-CM

## 2020-06-27 DIAGNOSIS — Z91.041: ICD-10-CM

## 2020-06-27 DIAGNOSIS — I21.4: ICD-10-CM

## 2020-06-27 DIAGNOSIS — M16.11: ICD-10-CM

## 2020-06-27 DIAGNOSIS — Z11.59: ICD-10-CM

## 2020-06-27 DIAGNOSIS — F41.9: ICD-10-CM

## 2020-06-27 DIAGNOSIS — G92: Primary | ICD-10-CM

## 2020-06-27 DIAGNOSIS — I25.10: ICD-10-CM

## 2020-06-27 DIAGNOSIS — Z96.1: ICD-10-CM

## 2020-06-27 DIAGNOSIS — Z98.42: ICD-10-CM

## 2020-06-27 DIAGNOSIS — Z96.651: ICD-10-CM

## 2020-06-27 LAB
ALBUMIN SERPL-MCNC: 3.7 G/DL (ref 3.5–5)
ALP SERPL-CCNC: 105 U/L (ref 38–126)
ALT SERPL-CCNC: 17 U/L (ref 4–49)
ANION GAP SERPL CALC-SCNC: 10 MMOL/L
APTT BLD: 23.7 SEC (ref 22–30)
AST SERPL-CCNC: 21 U/L (ref 17–59)
BASOPHILS # BLD AUTO: 0 K/UL (ref 0–0.2)
BASOPHILS NFR BLD AUTO: 0 %
BUN SERPL-SCNC: 24 MG/DL (ref 9–20)
CALCIUM SPEC-MCNC: 8.8 MG/DL (ref 8.4–10.2)
CHLORIDE SERPL-SCNC: 97 MMOL/L (ref 98–107)
CO2 SERPL-SCNC: 25 MMOL/L (ref 22–30)
EOSINOPHIL # BLD AUTO: 0.1 K/UL (ref 0–0.7)
EOSINOPHIL NFR BLD AUTO: 1 %
ERYTHROCYTE [DISTWIDTH] IN BLOOD BY AUTOMATED COUNT: 3.48 M/UL (ref 4.3–5.9)
ERYTHROCYTE [DISTWIDTH] IN BLOOD: 14.6 % (ref 11.5–15.5)
GLUCOSE BLD-MCNC: 148 MG/DL (ref 75–99)
GLUCOSE SERPL-MCNC: 201 MG/DL (ref 74–99)
GLUCOSE UR QL: (no result)
HCT VFR BLD AUTO: 30.4 % (ref 39–53)
HGB BLD-MCNC: 9.9 GM/DL (ref 13–17.5)
INR PPP: 1 (ref ?–1.2)
LYMPHOCYTES # SPEC AUTO: 0.9 K/UL (ref 1–4.8)
LYMPHOCYTES NFR SPEC AUTO: 12 %
MAGNESIUM SPEC-SCNC: 2 MG/DL (ref 1.6–2.3)
MCH RBC QN AUTO: 28.3 PG (ref 25–35)
MCHC RBC AUTO-ENTMCNC: 32.4 G/DL (ref 31–37)
MCV RBC AUTO: 87.5 FL (ref 80–100)
MONOCYTES # BLD AUTO: 0.5 K/UL (ref 0–1)
MONOCYTES NFR BLD AUTO: 7 %
NEUTROPHILS # BLD AUTO: 6.1 K/UL (ref 1.3–7.7)
NEUTROPHILS NFR BLD AUTO: 79 %
PH UR: 6 [PH] (ref 5–8)
PLATELET # BLD AUTO: 260 K/UL (ref 150–450)
POTASSIUM SERPL-SCNC: 4.6 MMOL/L (ref 3.5–5.1)
PROT SERPL-MCNC: 6.3 G/DL (ref 6.3–8.2)
PT BLD: 10.4 SEC (ref 9–12)
SODIUM SERPL-SCNC: 132 MMOL/L (ref 137–145)
SP GR UR: 1.01 (ref 1–1.03)
UROBILINOGEN UR QL STRIP: <2 MG/DL (ref ?–2)
WBC # BLD AUTO: 7.8 K/UL (ref 3.8–10.6)

## 2020-06-27 PROCEDURE — 85025 COMPLETE CBC W/AUTO DIFF WBC: CPT

## 2020-06-27 PROCEDURE — 71046 X-RAY EXAM CHEST 2 VIEWS: CPT

## 2020-06-27 PROCEDURE — 85730 THROMBOPLASTIN TIME PARTIAL: CPT

## 2020-06-27 PROCEDURE — 83605 ASSAY OF LACTIC ACID: CPT

## 2020-06-27 PROCEDURE — 99285 EMERGENCY DEPT VISIT HI MDM: CPT

## 2020-06-27 PROCEDURE — 36415 COLL VENOUS BLD VENIPUNCTURE: CPT

## 2020-06-27 PROCEDURE — 85610 PROTHROMBIN TIME: CPT

## 2020-06-27 PROCEDURE — 84443 ASSAY THYROID STIM HORMONE: CPT

## 2020-06-27 PROCEDURE — 96374 THER/PROPH/DIAG INJ IV PUSH: CPT

## 2020-06-27 PROCEDURE — 84439 ASSAY OF FREE THYROXINE: CPT

## 2020-06-27 PROCEDURE — 93306 TTE W/DOPPLER COMPLETE: CPT

## 2020-06-27 PROCEDURE — 80053 COMPREHEN METABOLIC PANEL: CPT

## 2020-06-27 PROCEDURE — 70450 CT HEAD/BRAIN W/O DYE: CPT

## 2020-06-27 PROCEDURE — 82746 ASSAY OF FOLIC ACID SERUM: CPT

## 2020-06-27 PROCEDURE — 93005 ELECTROCARDIOGRAM TRACING: CPT

## 2020-06-27 PROCEDURE — 80048 BASIC METABOLIC PNL TOTAL CA: CPT

## 2020-06-27 PROCEDURE — 81003 URINALYSIS AUTO W/O SCOPE: CPT

## 2020-06-27 PROCEDURE — 83036 HEMOGLOBIN GLYCOSYLATED A1C: CPT

## 2020-06-27 PROCEDURE — 93880 EXTRACRANIAL BILAT STUDY: CPT

## 2020-06-27 PROCEDURE — 83735 ASSAY OF MAGNESIUM: CPT

## 2020-06-27 PROCEDURE — 82607 VITAMIN B-12: CPT

## 2020-06-27 RX ADMIN — HEPARIN SODIUM SCH UNIT: 5000 INJECTION, SOLUTION INTRAVENOUS; SUBCUTANEOUS at 21:57

## 2020-06-27 RX ADMIN — METOPROLOL SUCCINATE SCH: 50 TABLET, EXTENDED RELEASE ORAL at 21:43

## 2020-06-27 RX ADMIN — ASPIRIN 81 MG CHEWABLE TABLET SCH MG: 81 TABLET CHEWABLE at 21:57

## 2020-06-27 RX ADMIN — Medication SCH: at 21:43

## 2020-06-27 RX ADMIN — FAMOTIDINE SCH MG: 10 INJECTION, SOLUTION INTRAVENOUS at 21:57

## 2020-06-27 RX ADMIN — INSULIN ASPART SCH UNIT: 100 INJECTION, SOLUTION INTRAVENOUS; SUBCUTANEOUS at 21:57

## 2020-06-27 RX ADMIN — ATORVASTATIN CALCIUM SCH: 80 TABLET, FILM COATED ORAL at 21:44

## 2020-06-27 NOTE — ED
General Adult HPI





- General


Chief complaint: Weakness


Stated complaint: Unsteady Gait


Time Seen by Provider: 06/27/20 16:24


Source: patient, EMS, RN notes reviewed, old records reviewed


Mode of arrival: EMS


Limitations: no limitations





- History of Present Illness


Initial comments: 





79-year-old male presents with gait instability, weakness, difficult getting 

around at home.  His symptoms seem to have worsened over the past 24 hours.  He 

states that he has some weakness in his right leg and has had chronic pain 

issues in this leg for some time and is scheduled for a hip replacement in 

August.  He denies new or worsening pain.  Denies fever or chills.  Denies chest

pain or dyspnea.  Denies upper extremity weakness.  Denies abdominal pain.  

Patient is uncertain if he were to fall if his wife will be able to help him up.





- Related Data


                                Home Medications











 Medication  Instructions  Recorded  Confirmed


 


Aspirin EC [Ecotrin Low Dose] 81 mg PO HS 08/23/14 06/18/20


 


amLODIPine [Norvasc] 5 mg PO QAM 08/23/14 06/18/20


 


buPROPion XL [Wellbutrin XL] 300 mg PO QAM 08/23/14 06/18/20


 


Quinapril HCl [Accupril] 10 mg PO QAM 10/22/17 06/18/20


 


Docusate [Colace] 100 mg PO DAILY PRN 12/21/18 06/18/20


 


Insulin Aspart [NovoLOG] 5 unit SQ QAM PRN 12/21/18 06/18/20


 


Insulin Glargine [Lantus] 4 - 6 unit SQ HS 12/21/18 06/18/20


 


Metoprolol Succinate (ER) [Toprol 50 mg PO BID 12/21/18 06/18/20





XL]   


 


Atorvastatin [Lipitor] 80 mg PO HS 06/18/20 06/18/20


 


Cholecalciferol [Vitamin D3 (25 1,000 unit PO QAM 06/18/20 06/18/20





Mcg = 1000 Iu)]   


 


Clopidogrel [Plavix] 75 mg PO QAM 06/18/20 06/18/20


 


Fish Oil/Dha/Epa [Fish Oil 1,200 1 cap PO QAM 06/18/20 06/18/20





mg Fish Oil]   


 


Furosemide [Lasix] 20 mg PO DAILY 06/18/20 06/18/20


 


Glimepiride [Amaryl] 4 mg PO QAM 06/18/20 06/18/20


 


Magnesium Oxide [Mag-Ox] 200 mg PO BID 06/18/20 06/18/20


 


Potassium Chloride ER [K-Dur 10] 10 meq PO DAILY 06/18/20 06/18/20


 


traMADol HCL [Ultram] 50 mg PO Q4-6H PRN 06/18/20 06/18/20








                                  Previous Rx's











 Medication  Instructions  Recorded


 


Nitroglycerin Sl Tabs [Nitrostat] 0.4 mg SUBLINGUAL Q5M PRN #20 tab 06/21/20











                                    Allergies











Allergy/AdvReac Type Severity Reaction Status Date / Time


 


iodine Allergy Unknown Unknown Verified 06/18/20 19:31














Review of Systems


ROS Statement: 


Those systems with pertinent positive or pertinent negative responses have been 

documented in the HPI.





ROS Other: All systems not noted in ROS Statement are negative.





Past Medical History


Past Medical History: Diabetes Mellitus, GERD/Reflux, Hyperlipidemia, Myocardial

 Infarction (MI), Osteoarthritis (OA), Renal Disease, Sleep Apnea/CPAP/BIPAP


Additional Past Medical History / Comment(s): ANEMIA. MI x 2, heart murmer, no 

cpap used, constipation,


Last Myocardial Infarction Date:: 3/28/19


History of Any Multi-Drug Resistant Organisms: None Reported


Past Surgical History: Heart Catheterization With Stent, Joint Replacement, 

Orthopedic Surgery


Additional Past Surgical History / Comment(s): 4 cardiac stents, total right 

knee replacement, monse cataracts with lens implants, left shoulder surgery, left 

hip replacement,


Past Anesthesia/Blood Transfusion Reactions: No Reported Reaction


Date of Last Stent Placement:: 3/2019


Past Psychological History: No Psychological Hx Reported


Smoking Status: Former smoker





- Past Family History


  ** Father


Family Medical History: Myocardial Infarction (MI)





  ** Mother


Family Medical History: Coronary Artery Disease (CAD), CVA/TIA





  ** Sister(s)


Family Medical History: Cancer





General Exam


Limitations: no limitations





Course


                                   Vital Signs











  06/27/20 06/27/20





  16:25 18:03


 


Temperature 98.0 F 


 


Pulse Rate 68 68


 


Respiratory 16 16





Rate  


 


Blood Pressure 128/63 144/73


 


O2 Sat by Pulse 98 97





Oximetry  














EKG Findings





- EKG Comments:


EKG Findings:: EKG: Sinus rhythm with a very small P wave, right bundle branch 

block, rate of 66, TX interval appears normal, QRS duration is 136, QTC is 471, 

no ST segment elevation.  No significant change compared to prior 10 days ago.





Medical Decision Making





- Medical Decision Making





79-year-old male with gait instability, difficulty ambulating, chronic right hip

 pain.  Patient is having increased difficulty at home with his wife ambulating,

 and there is concern for fall.  He has chronic right hip pain and is scheduled 

for hip replacement in August.  He had recent stent placement and is currently 

on Plavix.  Workup in the emergency department reveals sinus blood which is 

improved from prior at 9.9, no leukocytosis, sodium of 132, creatinine 1.4 which

 is baseline.  Mild lactic acid 2.7.  Urinalysis is pending.








Given the patient's difficulty ambulating, worsening weakness and gait 

instability will be admitted for possible placement.  Patient and wife are a

greeable with this decision.  He is discussed with Dr. De Jesus who will admit.





- Lab Data


Result diagrams: 


                                 06/27/20 17:00





                                 06/27/20 17:00


                                   Lab Results











  06/27/20 06/27/20 06/27/20 Range/Units





  17:00 17:00 17:00 


 


WBC  7.8    (3.8-10.6)  k/uL


 


RBC  3.48 L    (4.30-5.90)  m/uL


 


Hgb  9.9 L    (13.0-17.5)  gm/dL


 


Hct  30.4 L    (39.0-53.0)  %


 


MCV  87.5    (80.0-100.0)  fL


 


MCH  28.3    (25.0-35.0)  pg


 


MCHC  32.4    (31.0-37.0)  g/dL


 


RDW  14.6    (11.5-15.5)  %


 


Plt Count  260    (150-450)  k/uL


 


Neutrophils %  79    %


 


Lymphocytes %  12    %


 


Monocytes %  7    %


 


Eosinophils %  1    %


 


Basophils %  0    %


 


Neutrophils #  6.1    (1.3-7.7)  k/uL


 


Lymphocytes #  0.9 L    (1.0-4.8)  k/uL


 


Monocytes #  0.5    (0-1.0)  k/uL


 


Eosinophils #  0.1    (0-0.7)  k/uL


 


Basophils #  0.0    (0-0.2)  k/uL


 


PT   10.4   (9.0-12.0)  sec


 


INR   1.0   (<1.2)  


 


APTT   23.7   (22.0-30.0)  sec


 


Sodium    132 L  (137-145)  mmol/L


 


Potassium    4.6  (3.5-5.1)  mmol/L


 


Chloride    97 L  ()  mmol/L


 


Carbon Dioxide    25  (22-30)  mmol/L


 


Anion Gap    10  mmol/L


 


BUN    24 H  (9-20)  mg/dL


 


Creatinine    1.40 H  (0.66-1.25)  mg/dL


 


Est GFR (CKD-EPI)AfAm    54  (>60 ml/min/1.73 sqM)  


 


Est GFR (CKD-EPI)NonAf    47  (>60 ml/min/1.73 sqM)  


 


Glucose    201 H  (74-99)  mg/dL


 


Plasma Lactic Acid Khurram     (0.7-2.0)  mmol/L


 


Calcium    8.8  (8.4-10.2)  mg/dL


 


Magnesium    2.0  (1.6-2.3)  mg/dL


 


Total Bilirubin    0.4  (0.2-1.3)  mg/dL


 


AST    21  (17-59)  U/L


 


ALT    17  (4-49)  U/L


 


Alkaline Phosphatase    105  ()  U/L


 


Total Protein    6.3  (6.3-8.2)  g/dL


 


Albumin    3.7  (3.5-5.0)  g/dL














  06/27/20 Range/Units





  17:00 


 


WBC   (3.8-10.6)  k/uL


 


RBC   (4.30-5.90)  m/uL


 


Hgb   (13.0-17.5)  gm/dL


 


Hct   (39.0-53.0)  %


 


MCV   (80.0-100.0)  fL


 


MCH   (25.0-35.0)  pg


 


MCHC   (31.0-37.0)  g/dL


 


RDW   (11.5-15.5)  %


 


Plt Count   (150-450)  k/uL


 


Neutrophils %   %


 


Lymphocytes %   %


 


Monocytes %   %


 


Eosinophils %   %


 


Basophils %   %


 


Neutrophils #   (1.3-7.7)  k/uL


 


Lymphocytes #   (1.0-4.8)  k/uL


 


Monocytes #   (0-1.0)  k/uL


 


Eosinophils #   (0-0.7)  k/uL


 


Basophils #   (0-0.2)  k/uL


 


PT   (9.0-12.0)  sec


 


INR   (<1.2)  


 


APTT   (22.0-30.0)  sec


 


Sodium   (137-145)  mmol/L


 


Potassium   (3.5-5.1)  mmol/L


 


Chloride   ()  mmol/L


 


Carbon Dioxide   (22-30)  mmol/L


 


Anion Gap   mmol/L


 


BUN   (9-20)  mg/dL


 


Creatinine   (0.66-1.25)  mg/dL


 


Est GFR (CKD-EPI)AfAm   (>60 ml/min/1.73 sqM)  


 


Est GFR (CKD-EPI)NonAf   (>60 ml/min/1.73 sqM)  


 


Glucose   (74-99)  mg/dL


 


Plasma Lactic Acid Khurram  2.7 H*  (0.7-2.0)  mmol/L


 


Calcium   (8.4-10.2)  mg/dL


 


Magnesium   (1.6-2.3)  mg/dL


 


Total Bilirubin   (0.2-1.3)  mg/dL


 


AST   (17-59)  U/L


 


ALT   (4-49)  U/L


 


Alkaline Phosphatase   ()  U/L


 


Total Protein   (6.3-8.2)  g/dL


 


Albumin   (3.5-5.0)  g/dL














Disposition


Clinical Impression: 


 Right hip pain, Right leg weakness, Gait instability





Disposition: ADMITTED AS IP TO THIS [facility]


Condition: Stable


Is patient prescribed a controlled substance at d/c from ED?: No


Referrals: 


Lemuel Ortiz MD [Primary Care Provider] - 1-2 days


Decision to Admit Reason: Admit from EC


Decision Date: 06/27/20


Decision Time: 18:16

## 2020-06-27 NOTE — CT
EXAMINATION TYPE: CT brain wo con

 

DATE OF EXAM: 6/27/2020

 

COMPARISON: 12/21/2018

 

HISTORY: UNSTEADY GAIT, WEAKNESS

 

CT DLP: 1056.4 mGycm

Automated exposure control for dose reduction was used.

 

There is cerebral cortical atrophy. There is no mass effect nor midline shift. There is no sign of in
tracranial hemorrhage. There is patchy hypodensity in the periventricular white matter. The calvarium
 is intact.

 

IMPRESSION:

Cerebral atrophy and chronic small vessel ischemia. No acute intracranial abnormality. No change comp
ared to old exam.

## 2020-06-27 NOTE — XR
EXAMINATION TYPE: XR chest 2V

 

DATE OF EXAM: 6/27/2020

 

COMPARISON: NONE

 

HISTORY: Chest tightness

 

TECHNIQUE: 2 views

 

FINDINGS: Heart and mediastinum are normal. Lungs are clear of infiltrate. There are small calcified 
pulmonary granulomata. There are no hilar masses. Bony thorax is intact.

 

IMPRESSION: No active cardiopulmonary disease.

## 2020-06-27 NOTE — US
EXAMINATION TYPE: US carotid duplex BILAT

 

DATE OF EXAM: 6/27/2020

 

COMPARISON: US

 

CLINICAL HISTORY: stroke. Stroke

 

EXAM MEASUREMENTS: 

 

RIGHT:  Peak Systolic Velocity (PSV) cm/sec

----- Right CCA:  62.7  

----- Right ICA:  95.6     

----- Right ECA:  204.2   

ICA/CCA ratio:  1.5    

 

RIGHT:  End Diastole cm/sec

----- Right CCA:  12.1   

----- Right ICA:  25.8      

----- Right ECA:  18.4     

 

LEFT:  Peak Systolic Velocity (PSV) cm/sec

----- Left CCA:  46.2  

----- Left ICA:  95.6   

----- Left ECA:  287.0  

ICA/CCA ratio:  2.1  

 

LEFT:  End Diastole cm/sec

----- Left CCA:  12.1  

----- Left ICA:  19.3   

----- Left ECA:  22.9 

 

VERTEBRALS (direction of flow):

Right Vertebral: Antegrade

Left Vertebral: Antegrade

 

Rhythm:  Normal

 

Elevated velocities bilateral ECA's, otherwise no significant stenosis seen

 

 

 

IMPRESSION:

Calcified plaque obscuring to some degree the carotid artery bifurcations. Images and measurements thomas
ggest less than 50% stenosis in the internal carotid arteries. There is more than 70% stenosis in bot
h external carotid arteries.

 

There is antegrade flow in the vertebral arteries.

 

Criteria for Assigning % of Stenosis / Diameter reduction

(Estimation based on the indirect measurements of the internal carotid artery velocities (ICA PSV).

1.  Normal (no stenosis)=ICA PSV < 125 cm/s: ratio < 2.0: ICA EDV<40 cm/s.

2. Less than 50% stenosis=ICA PSV < 125 cm/s: ratio < 2.0: ICA EDV<40 cm/s.

3.  50 to 69% stenosis=ICA PSV of 125 to 230 cm/s: ration 2.0 ? 4.0: ICA EDV  cm/s.

4.  Greater than 70% stenosis to near occlusion= ICA PSV > 230 cm/s: ratio > 4.0: ICA EDV > 100 cm/s.
 

5.  Near occlusion= ICA PSV velocities may be low or undetectable: variable ratio and ICA EDV.

6.  Total occlusion=unable to detect flow.

## 2020-06-27 NOTE — HP
HISTORY AND PHYSICAL



DATE OF SERVICE:

06/27/2020



CHIEF COMPLAINT:

Weakness and unsteadiness of gait and slurring of speech.



HISTORY OF PRESENT ILLNESS:

This 79-year-old gentleman with a past medical history of multiple medical problems

including diabetes, GERD, hyperlipidemia, MI, DJD, sleep apnea, anemia, being followed

by Dr. Lemuel Ortiz in the outpatient setting is slated to have a hip surgery by Dr. Witt in the near future.  The patient had significant weakness and the patient had

difficulty walking and the patient apparently had falls and the patient had some

slurring of speech.  The patient came to [facility] and admitted for further

evaluation.  Patient had weight loss of 100 pounds.  The patient sugars are in 500

previously.  The patient had recent myocardial infarction.  The patient is apparently

not very compliant with medications either.  The patient was recently discharged from

[facility] with acute non-ST-segment-elevation myocardial infarction and

moderate to severe aortic stenosis.  Patient had chronic kidney stage 3.  There is no

history of fever, rigors.  No history of headache, loss of consciousness, seizures.



PAST MEDICAL HISTORY:

History of diabetes, history of recent myocardial infarction, GERD, history of DJD,

history of aortic stenosis.



MEDICATIONS:

Prior to admission include:  Ultram 50 mg p.o. p.r.n.,Wellbutrin XL, Norvasc, Accupril,

K-Dur 10 mEq,  Nitrostat, Toprol-XL, magnesium oxide, Lantus, NovoLog, Amaryl, Lasix,

fish oil, Colace, Plavix, vitamin D3, Lipitor and Ecotrin.



ALLERGIES:

None.



FAMILY HISTORY:

History of myocardial infarction in the family.



SOCIAL HISTORY:

No history of current smoking.  Previous history of smoking.  No history of alcohol

intake.



REVIEW OF SYSTEMS:

ENT:  Diminished vision. Diminished hearing.

Cardiovascular as mentioned earlier.

Respiratory as mentioned earlier.

GI no nausea or vomiting.

 no dysuria.

Nervous system: As mentioned earlier.

Allergy/Immunology: No asthma or hayfever.

Musculoskeletal as mentioned earlier.

HEMATOLOGY/ONCOLOGY:  No history of anemia.

Endocrine as mentioned earlier.

CONSTITUTIONAL: As mentioned earlier.

Dermatology negative.

Rheumatology negative.

PSYCHIATRIC as mentioned earlier.



PHYSICAL EXAMINATION:

Alert and oriented x3.  Pulse 65. Blood pressure 158/84, respirations 18, temperature

97.9, pulse ox 98% on room air.

HEENT: Conjunctivae normal.  Oral mucosa moist.

NECK is no jugular venous distention.  No carotid bruit. No lymph node enlargement.

Cardiovascular: S1, S2 muffled.

RESPIRATION: Breath sounds diminished in the bases.  A few scattered rhonchi and

crackles.

ABDOMEN:  Soft, nontender.  No mass palpable.

Legs no edema, no swelling.

Nervous system: Higher functions as mentioned earlier. Moves all 4 limbs.  No focal

motor or sensory deficits.

LYMPHATICS:  No lymph nodes palpable in the neck, axillae or groin.

JOINTS: No active deforming arthropathy.



LABS:

WBC 7.2, hemoglobin 9.9, sodium 132, potassium 4.2, creatinine is 1.40.



ASSESSMENT:

1. Weakness and slurring of speech.  Rule out acute stroke or transient ischemic

    attack.

2. Hyponatremia.

3. Dehydration.

4. Chronic kidney disease stage III.

5. Elevated lactic acid of undetermined etiology.

6. Anemia, normocytic.

7. History of recent non [id number] myocardial infarction.

8. History of multiple coronary artery disease.

9. Moderate to severe aortic stenosis.

10.Chronic kidney stage III.

11.Cardiomyopathy.

12.Diabetes mellitus type 2 with uncontrolled with hypoglycemia history.

13.History of gastroesophageal reflux disease.

14.Hyperlipidemia.

15.Gait dysfunction.

16.Severe degenerative joint disease right hip.

17.History of myocardial infarction.

18.History of sleep apnea.

19.History of anemia.

20.History of cardiac murmur.

21.History of constipation.

22.History of coronary artery disease/stent.

23.History of right total knee replacement.

24.Remote history of nicotine dependence.

25.History of noncompliance.

26.FULL CODE.



RECOMMENDATIONS AND DISCUSSION:

This 79-year-old gentleman who presented with multiple complex medical issues, we will

monitor the patient closely, continue the current medication and symptomatic treatment.

Recommend neurovascular workup.  2D echo.  Neuro checks.  Carotid Doppler.  PT/OT

evaluation, possible ECF rehab.  Resume the home medications. Orthostatic vitals.

Importance of compliance is stressed with the patient.  Repeat labs.  DVT prophylaxis.

Overall prognosis guarded because of multiple complex medical issues.  Discussed with

the patient who understands and agrees. A copy of dictation being forwarded to Dr. Lemuel Ortiz who is the primary physician.





MMODL / HOMERN: [id number] / Job#: [id number]

## 2020-06-28 LAB
ANION GAP SERPL CALC-SCNC: 8 MMOL/L
BASOPHILS # BLD AUTO: 0 K/UL (ref 0–0.2)
BASOPHILS NFR BLD AUTO: 0 %
BUN SERPL-SCNC: 18 MG/DL (ref 9–20)
CALCIUM SPEC-MCNC: 8.6 MG/DL (ref 8.4–10.2)
CHLORIDE SERPL-SCNC: 101 MMOL/L (ref 98–107)
CO2 SERPL-SCNC: 25 MMOL/L (ref 22–30)
EOSINOPHIL # BLD AUTO: 0.1 K/UL (ref 0–0.7)
EOSINOPHIL NFR BLD AUTO: 2 %
ERYTHROCYTE [DISTWIDTH] IN BLOOD BY AUTOMATED COUNT: 3.23 M/UL (ref 4.3–5.9)
ERYTHROCYTE [DISTWIDTH] IN BLOOD: 14.5 % (ref 11.5–15.5)
GLUCOSE BLD-MCNC: 120 MG/DL (ref 75–99)
GLUCOSE BLD-MCNC: 151 MG/DL (ref 75–99)
GLUCOSE BLD-MCNC: 160 MG/DL (ref 75–99)
GLUCOSE BLD-MCNC: 214 MG/DL (ref 75–99)
GLUCOSE SERPL-MCNC: 127 MG/DL (ref 74–99)
HCT VFR BLD AUTO: 28.8 % (ref 39–53)
HGB BLD-MCNC: 9.5 GM/DL (ref 13–17.5)
LYMPHOCYTES # SPEC AUTO: 0.7 K/UL (ref 1–4.8)
LYMPHOCYTES NFR SPEC AUTO: 10 %
MCH RBC QN AUTO: 29.3 PG (ref 25–35)
MCHC RBC AUTO-ENTMCNC: 32.9 G/DL (ref 31–37)
MCV RBC AUTO: 89.2 FL (ref 80–100)
MONOCYTES # BLD AUTO: 0.4 K/UL (ref 0–1)
MONOCYTES NFR BLD AUTO: 6 %
NEUTROPHILS # BLD AUTO: 5.6 K/UL (ref 1.3–7.7)
NEUTROPHILS NFR BLD AUTO: 81 %
PLATELET # BLD AUTO: 244 K/UL (ref 150–450)
POTASSIUM SERPL-SCNC: 4.6 MMOL/L (ref 3.5–5.1)
SODIUM SERPL-SCNC: 134 MMOL/L (ref 137–145)
WBC # BLD AUTO: 7 K/UL (ref 3.8–10.6)

## 2020-06-28 RX ADMIN — POTASSIUM CHLORIDE SCH MEQ: 750 TABLET, EXTENDED RELEASE ORAL at 09:16

## 2020-06-28 RX ADMIN — FUROSEMIDE SCH MG: 20 TABLET ORAL at 09:17

## 2020-06-28 RX ADMIN — INSULIN ASPART SCH UNIT: 100 INJECTION, SOLUTION INTRAVENOUS; SUBCUTANEOUS at 12:11

## 2020-06-28 RX ADMIN — CLOPIDOGREL BISULFATE SCH MG: 75 TABLET ORAL at 09:17

## 2020-06-28 RX ADMIN — HEPARIN SODIUM SCH UNIT: 5000 INJECTION, SOLUTION INTRAVENOUS; SUBCUTANEOUS at 21:18

## 2020-06-28 RX ADMIN — HYDROCODONE BITARTRATE AND ACETAMINOPHEN PRN EACH: 5; 325 TABLET ORAL at 09:37

## 2020-06-28 RX ADMIN — ASPIRIN 81 MG CHEWABLE TABLET SCH MG: 81 TABLET CHEWABLE at 21:18

## 2020-06-28 RX ADMIN — INSULIN ASPART SCH UNIT: 100 INJECTION, SOLUTION INTRAVENOUS; SUBCUTANEOUS at 21:19

## 2020-06-28 RX ADMIN — METOPROLOL SUCCINATE SCH MG: 50 TABLET, EXTENDED RELEASE ORAL at 21:18

## 2020-06-28 RX ADMIN — INSULIN ASPART SCH: 100 INJECTION, SOLUTION INTRAVENOUS; SUBCUTANEOUS at 09:07

## 2020-06-28 RX ADMIN — CEFAZOLIN SCH: 330 INJECTION, POWDER, FOR SOLUTION INTRAMUSCULAR; INTRAVENOUS at 00:01

## 2020-06-28 RX ADMIN — Medication SCH MG: at 09:16

## 2020-06-28 RX ADMIN — CEFAZOLIN SCH MLS/HR: 330 INJECTION, POWDER, FOR SOLUTION INTRAMUSCULAR; INTRAVENOUS at 16:36

## 2020-06-28 RX ADMIN — BUPROPION HYDROCHLORIDE SCH MG: 300 TABLET, FILM COATED, EXTENDED RELEASE ORAL at 09:17

## 2020-06-28 RX ADMIN — METOPROLOL SUCCINATE SCH MG: 50 TABLET, EXTENDED RELEASE ORAL at 09:17

## 2020-06-28 RX ADMIN — GLIMEPIRIDE SCH MG: 2 TABLET ORAL at 09:16

## 2020-06-28 RX ADMIN — PANTOPRAZOLE SODIUM SCH MG: 40 TABLET, DELAYED RELEASE ORAL at 09:16

## 2020-06-28 RX ADMIN — LISINOPRIL SCH MG: 10 TABLET ORAL at 09:17

## 2020-06-28 RX ADMIN — Medication SCH UNIT: at 09:17

## 2020-06-28 RX ADMIN — Medication SCH MG: at 21:18

## 2020-06-28 RX ADMIN — FAMOTIDINE SCH MG: 10 INJECTION, SOLUTION INTRAVENOUS at 09:17

## 2020-06-28 RX ADMIN — ATORVASTATIN CALCIUM SCH MG: 80 TABLET, FILM COATED ORAL at 21:18

## 2020-06-28 RX ADMIN — HEPARIN SODIUM SCH UNIT: 5000 INJECTION, SOLUTION INTRAVENOUS; SUBCUTANEOUS at 09:15

## 2020-06-28 RX ADMIN — INSULIN ASPART SCH: 100 INJECTION, SOLUTION INTRAVENOUS; SUBCUTANEOUS at 16:34

## 2020-06-28 NOTE — PN
PROGRESS NOTE



DATE OF SERVICE:

06/28/2020



This 79-year-old gentleman being followed by Dr. Lemuel Ortiz in the outpatient setting

admitted with weakness and gait dysfunction.  Patient also found to be confused.

Multiple consultants following the patient closely including cardiology.  The patient

had multiple electrolytes abnormalities and dehydration also present on admission.

Patient closely monitored at this time.



Past medical history reviewed.



Review of systems could not be taken, the patient is confused.



CURRENT MEDICATIONS:

Reviewed and include: Tylenol, Norco 5 mg, Xanax 0.5 t.i.d. p.r.n., Norvasc, Aspirin,

Lipitor,

Wellbutrin XL, Plavix and Lasix, Amaryl, NovoLog, Zestril, magnesium, Toprol-XL,

Morphine sulfate, Narcan, Nitrostat, Protonix and K-Dur, Ultram.



PHYSICAL EXAM:

Patient is alert, oriented x 2.  Pulse is 63. Blood pressure 127/63.  Respirations 16,

temp 97.9, pulse ox 98% on room air.

HEENT:  Conjunctivae normal.

NECK: No JVD.

CARDIOVASCULAR: S1, S2 muffled.

RESPIRATORY: Breath sounds diminished in the bases.  A few scattered rhonchi and

crackles.

ABDOMEN:  Soft.

LEGS: No edema. No swelling.

NERVOUS SYSTEM:  Diffusely weak and restless.



LABS:

WBC 7, hemoglobin 9.5, sodium 134.  Glucose 151, 214.



ASSESSMENT:

1. Weakness and slurring of speech.  Rule out acute stroke or transient ischemic

    attack, possible acute metabolic encephalopathy, multifactorial.

2. Hyponatremia.

3. Dehydration.

4. Chronic kidney stage III.

5. Elevated lactic acid of undetermined etiology, possibly secondary to dehydration.

6. Anemia, normocytic.

7. History of recent non-ST-segment-elevation myocardial infarction.

8. History of multiple coronary artery disease/stents.

9. Moderate to severe aortic stenosis history.

10.Chronic kidney stage III.

11.Cardiomyopathy history.

12.Diabetes mellitus type 2, uncontrolled with hypoglycemia history.

13.History of gastroesophageal reflux disease.

14.Hyperlipidemia.

15.Gait dysfunction.

16.Severe degenerative joint disease.

17.History of myocardial infarction.

18.History of sleep apnea.

19.History of anemia.

20.History of cardiac murmur.

21.History of constipation.

22.History of coronary artery disease/stent.

23.History of right total knee arthroplasty.

24.Remote history of nicotine dependence.

25.History of noncompliance.

26.FULL CODE.



RECOMMENDATIONS AND DISCUSSION:

Continue current medications, monitoring and symptomatic treatment. Otherwise, at this

time, I recommend to continue with the current medication.  Continue symptomatic

treatment.  Otherwise, I would recommend PT/OT evaluation.  Supplement vitamins.  See

orders for details.  Prognosis guarded.  [facility] rehab.  Discussed with the daughter at the

bedside.  Guarded prognosis.  Further recommendations to follow.





MMODL / IJN: [id number] / Job#: [id number]

## 2020-06-28 NOTE — CONS
CONSULTATION



CHIEF COMPLAINT:

Increased weakness and confusion.



This is an 79-year-old gentleman with history of multivessel coronary artery disease,

status post recent angioplasty, hypertension, diabetes, dyslipidemia, who presented to

hospital with weakness, fatigue and confusion.  Cardiology has been consulted because

of the recent coronary intervention.  At the time of my evaluation, patient denies any

chest pain or difficulty in breathing.  He seems pleasantly confused.



PAST MEDICAL HISTORY:

Significant for coronary artery disease, status post recent angioplasty, hypertension,

diabetes, dyslipidemia and hip pain.



MEDICATIONS:

Medications at home included Ultram, Wellbutrin, Norvasc, Accupril, K-Dur, Toprol,

insulin, Amaryl, Lasix, Plavix and Lipitor.



ALLERGIES:

TO IV DYE.



FAMILY HISTORY:

Negative for premature coronary artery disease.



SOCIAL HISTORY:

Negative for current smoking, EtOH abuse, or drug abuse.



REVIEW OF SYSTEMS:

HEENT is unremarkable.

Cardiac as described above.

Respiratory as described above.

GI negative.

Genitourinary negative.

ALLERGY/IMMUNOLOGY: Negative.

SKIN: Negative.

MUSCULOSKELETAL significant for arthritis.  PSYCHOSOCIAL negative.

ENDOCRINE negative.

DERM negative.

CONSTITUTIONAL:  Negative

ONCOLOGICAL negative.

CNS negative.



Rest of the system review is not relevant.



PHYSICAL EXAM:

Patient is comfortable at rest.  Afebrile.  Heart rate is 70 beats per minute.  Blood

pressure is 158/78, respiratory is 18. There is no jugular venous distention.  Carotid

upstroke is normal.  There is no bruit.  Chest exam reveals good air entry bilaterally.

Heart exam reveals first and second heart sounds.  Systolic murmur at the left lower

sternal border.

Abdomen is soft.  Exam of extremities did not reveal any edema.  Peripheral pulses are

felt.



LABS:

Show a hemoglobin of 9.5, platelet count is 244, potassium is 4.6, creatinine is 1.2.

EKG shows sinus rhythm with right bundle branch block.  The carotid duplex study

revealed 70% external carotid artery stenosis and less than 50% internal carotid artery

stenosis.



CONCLUSIONS:

1. Coronary artery disease status post multivessel angioplasty.

2. Confusion, rule out transient ischemic attack.

3. Abnormal EKG.

4. Hypertension.

5. Dyslipidemia.



PLAN:

From cardiac standpoint, patient does not require any further workup.  We will follow

the patient on an as-needed basis.





MMODL / IJN: [id number] / Job#: [id number]

## 2020-06-29 LAB
ANION GAP SERPL CALC-SCNC: 7 MMOL/L
BASOPHILS # BLD AUTO: 0 K/UL (ref 0–0.2)
BASOPHILS NFR BLD AUTO: 0 %
BUN SERPL-SCNC: 17 MG/DL (ref 9–20)
CALCIUM SPEC-MCNC: 8.4 MG/DL (ref 8.4–10.2)
CHLORIDE SERPL-SCNC: 103 MMOL/L (ref 98–107)
CO2 SERPL-SCNC: 27 MMOL/L (ref 22–30)
EOSINOPHIL # BLD AUTO: 0.1 K/UL (ref 0–0.7)
EOSINOPHIL NFR BLD AUTO: 1 %
ERYTHROCYTE [DISTWIDTH] IN BLOOD BY AUTOMATED COUNT: 3.19 M/UL (ref 4.3–5.9)
ERYTHROCYTE [DISTWIDTH] IN BLOOD: 14.4 % (ref 11.5–15.5)
GLUCOSE BLD-MCNC: 167 MG/DL (ref 75–99)
GLUCOSE BLD-MCNC: 193 MG/DL (ref 75–99)
GLUCOSE BLD-MCNC: 209 MG/DL (ref 75–99)
GLUCOSE BLD-MCNC: 86 MG/DL (ref 75–99)
GLUCOSE SERPL-MCNC: 79 MG/DL (ref 74–99)
HBA1C MFR BLD: 7.3 % (ref 4–6)
HCT VFR BLD AUTO: 28.2 % (ref 39–53)
HGB BLD-MCNC: 9.4 GM/DL (ref 13–17.5)
LYMPHOCYTES # SPEC AUTO: 0.8 K/UL (ref 1–4.8)
LYMPHOCYTES NFR SPEC AUTO: 10 %
MCH RBC QN AUTO: 29.4 PG (ref 25–35)
MCHC RBC AUTO-ENTMCNC: 33.3 G/DL (ref 31–37)
MCV RBC AUTO: 88.2 FL (ref 80–100)
MONOCYTES # BLD AUTO: 0.4 K/UL (ref 0–1)
MONOCYTES NFR BLD AUTO: 4 %
NEUTROPHILS # BLD AUTO: 6.9 K/UL (ref 1.3–7.7)
NEUTROPHILS NFR BLD AUTO: 84 %
PLATELET # BLD AUTO: 243 K/UL (ref 150–450)
POTASSIUM SERPL-SCNC: 3.7 MMOL/L (ref 3.5–5.1)
SODIUM SERPL-SCNC: 137 MMOL/L (ref 137–145)
WBC # BLD AUTO: 8.3 K/UL (ref 3.8–10.6)

## 2020-06-29 RX ADMIN — Medication SCH MG: at 11:01

## 2020-06-29 RX ADMIN — CEFAZOLIN SCH MLS/HR: 330 INJECTION, POWDER, FOR SOLUTION INTRAMUSCULAR; INTRAVENOUS at 11:02

## 2020-06-29 RX ADMIN — HEPARIN SODIUM SCH UNIT: 5000 INJECTION, SOLUTION INTRAVENOUS; SUBCUTANEOUS at 21:21

## 2020-06-29 RX ADMIN — INSULIN ASPART SCH UNIT: 100 INJECTION, SOLUTION INTRAVENOUS; SUBCUTANEOUS at 17:20

## 2020-06-29 RX ADMIN — BUPROPION HYDROCHLORIDE SCH MG: 300 TABLET, FILM COATED, EXTENDED RELEASE ORAL at 07:47

## 2020-06-29 RX ADMIN — Medication SCH MG: at 07:46

## 2020-06-29 RX ADMIN — CLOPIDOGREL BISULFATE SCH MG: 75 TABLET ORAL at 07:46

## 2020-06-29 RX ADMIN — LISINOPRIL SCH MG: 10 TABLET ORAL at 07:46

## 2020-06-29 RX ADMIN — ASPIRIN 81 MG CHEWABLE TABLET SCH MG: 81 TABLET CHEWABLE at 21:21

## 2020-06-29 RX ADMIN — Medication SCH MG: at 21:21

## 2020-06-29 RX ADMIN — FOLIC ACID SCH MG: 1 TABLET ORAL at 11:01

## 2020-06-29 RX ADMIN — GLIMEPIRIDE SCH MG: 2 TABLET ORAL at 07:46

## 2020-06-29 RX ADMIN — METOPROLOL SUCCINATE SCH MG: 50 TABLET, EXTENDED RELEASE ORAL at 21:21

## 2020-06-29 RX ADMIN — HYDROCODONE BITARTRATE AND ACETAMINOPHEN PRN EACH: 5; 325 TABLET ORAL at 17:19

## 2020-06-29 RX ADMIN — FUROSEMIDE SCH MG: 20 TABLET ORAL at 07:47

## 2020-06-29 RX ADMIN — POTASSIUM CHLORIDE SCH MEQ: 750 TABLET, EXTENDED RELEASE ORAL at 07:46

## 2020-06-29 RX ADMIN — INSULIN ASPART SCH: 100 INJECTION, SOLUTION INTRAVENOUS; SUBCUTANEOUS at 07:36

## 2020-06-29 RX ADMIN — HEPARIN SODIUM SCH UNIT: 5000 INJECTION, SOLUTION INTRAVENOUS; SUBCUTANEOUS at 07:47

## 2020-06-29 RX ADMIN — METOPROLOL SUCCINATE SCH MG: 50 TABLET, EXTENDED RELEASE ORAL at 07:45

## 2020-06-29 RX ADMIN — ATORVASTATIN CALCIUM SCH MG: 80 TABLET, FILM COATED ORAL at 21:21

## 2020-06-29 RX ADMIN — PANTOPRAZOLE SODIUM SCH MG: 40 TABLET, DELAYED RELEASE ORAL at 07:45

## 2020-06-29 RX ADMIN — INSULIN ASPART SCH UNIT: 100 INJECTION, SOLUTION INTRAVENOUS; SUBCUTANEOUS at 12:19

## 2020-06-29 RX ADMIN — INSULIN ASPART SCH UNIT: 100 INJECTION, SOLUTION INTRAVENOUS; SUBCUTANEOUS at 21:21

## 2020-06-29 RX ADMIN — THERA TABS SCH EACH: TAB at 11:01

## 2020-06-29 RX ADMIN — Medication SCH UNIT: at 07:47

## 2020-06-29 NOTE — ECHOF
Referral Reason:Stroke



MEASUREMENTS

--------

HEIGHT: 172.7 cm

WEIGHT: 83.5 kg

BP: 156/82

RVIDd:   3.0 cm     (< 3.3)

IVSd:   1.3 cm     (0.6 - 1.1)

LVIDd:   4.9 cm     (3.9 - 5.3)

LVPWd:   1.3 cm     (0.6 - 1.1)

IVSs:   1.8 cm

LVIDs:   2.8 cm

LVPWs:   1.6 cm

LA Diam:   4.4 cm     (2.7 - 3.8)

LAESV Index (A-L):   46.19 ml/m

Ao Diam:   3.3 cm     (2.0 - 3.7)

AV Cusp:   1.6 cm     (1.5 - 2.6)

MV EXCURSION:   15.856 mm     (> 18.000)

MV EF SLOPE:   115 mm/s     (70 - 150)

EPSS:   0.7 cm

MV E Elias:   1.32 m/s

MV DecT:   145 ms

MV A Elias:   0.67 m/s

MV E/A Ratio:   1.97 

AV maxP.66 mmHg

AV meanP.22 mmHg

RAP:   5.00 mmHg

RVSP:   44.90 mmHg







FINDINGS

--------

Sinus rhythm.

This was a technically adequate study.

The left ventricular size is normal.   There is mild concentric left ventricular hypertrophy.   Overa
ll left ventricular systolic function is normal with, an EF between 60 - 65 %.

The right ventricle is normal in size.

LA is severely dilated >40 ml/m2

The right atrium is normal in size.

Interatrial and interventricular septum intact.

There is severe aortic valve sclerosis.   Trace amount of aortic regurgitation.    There is moderate 
aortic stenosis present.   Peak/mean gradient across the Aortic Valve is 59.66mmHg / 31.22mmHg.

The mitral valve leaflets are mildly thickened.   Mild mitral annular calcification present.   Mild-t
o-moderate mitral regurgitation is present.

Mild tricuspid regurgitation present.   There is mild to moderate pulmonary hypertension.   The right
 ventricular systolic pressure, as measured by Doppler, is 44.90mmHg.

The pulmonic valve was not well visualized.

The aortic root size is normal.

IVC Not well visulized.

There is no pericardial effusion.



CONCLUSIONS

--------

1. Sinus rhythm.

2. This was a technically adequate study.

3. The left ventricular size is normal.

4. There is mild concentric left ventricular hypertrophy.

5. Overall left ventricular systolic function is normal with, an EF between 60 - 65 %.

6. The right ventricle is normal in size.

7. LA is severely dilated >40 ml/m2

8. The right atrium is normal in size.

9. Interatrial and interventricular septum intact.

10. There is severe aortic valve sclerosis.

11. There is moderate aortic stenosis present.

12. Peak/mean gradient across the Aortic Valve is 59.66mmHg / 31.22mmHg.

13. The mitral valve leaflets are mildly thickened.

14. Mild mitral annular calcification present.

15. Mild-to-moderate mitral regurgitation is present.

16. Mild tricuspid regurgitation present.

17. There is mild to moderate pulmonary hypertension.

18. The right ventricular systolic pressure, as measured by Doppler, is 44.90mmHg.

19. The pulmonic valve was not well visualized.

20. The aortic root size is normal.

21. IVC Not well visulized.

22. There is no pericardial effusion.





SONOGRAPHER: TAINA Tapia

## 2020-06-29 NOTE — P.CNNES
History of Present Illness


Consult date: 06/29/20


Requesting physician: Hung E Sheet


Reason for Consult: Possible TIA


History of Present Illness: 





Patient is a 79-year-old male, came to the hospital by ambulance on 06/27/2020 

for gait instability, weakness, difficulty getting around at home.  His symptoms

seem to have worsened over the past 24 hours prior to arrival.  Patient tells me

that he has history of right drop foot since early 2020 which he attributes to 

possible problems at the level of lumbar spine.patient states that he was having

problems with his bowel and bladder issues.  He was hospitalized and his blood 

sugar went up to over 500 after he was given certain medication.  While in the 

hospital he developed acute MI.  He had a fourth stent put in.  His sugars have 

come under control.  He feels his memory is getting worse.  He claims his 

walking and balance problem is related to the head, which needs replacement.  He

denies any significant low back pain.





While in the hospital patient's intermittent confusional episodes seems to have 

gotten worse.  As per nursing report, he was holding remote in the hand, asking 

how to drive the car with a remote.  Per nurse, evening time is worse.  Patient 

also has been receiving some pain medication like tramadol or Norco, producing 

confusion.





Patient is scheduled for a hip replacement in August.





patient underwentCT head showed cerebral atrophy and chronic small vessel 

ischemia.  No acute intracranial abnormality.  No changes as compared to the old

exam from 12/21/2018.  EKG showed wide QRS rhythm.  Right bundle branch block.  

Carotid Doppler showed calcified plaque obscuring to some degree the carotid 

artery bifurcations.  Images in measurements suggest less than 50% stenosis in 

the internal carotid arteries.  There is more than 70% stenosis in both external

carotid arteries.  Antegrade flow in both vertebral arteries.  Chest x-ray 

showed no acute cardiopulmonary disease.  2-D echo shows sinus rhythm, left 

ventricle size is normal.  Mild concentric LVH.  EF is between 60-65%.  Left 

atrium is severely dilated.  Right atrium is normal in size.  Interatrial and 

interventricular septum intact.  Severe aortic valve sclerosis.  Moderate aortic

stenosis.  Mild to moderate MR.  Patient's blood test shows normal WBC 8.3 

hemoglobin 9.4, platelets 243.  Chem-7 is normal.  Hemoglobin A1c 7.3 on 

06/29/2020.  Patient had severely elevated troponin 27.0 on 06/19/2020.   

Urinalysis is normal, patient had a normal TSH on 10/11/2019.  His total 

cholesterol is 112, LDL 48, HDL 36 and triglycerides 138 on 06/12/2020.





Patient had an MRI of lumbar spine, which revealed multi level disc protrusion 

or disc herniation suspected at multiple levels moderate to severe degenerative 

disc disease.  There is multilevel foraminal encroachment.  Grade 1 

anterolisthesis L4 on L5.





patient has history of diabetes for almost 20 years, which is controlled.  He 

quit tobacco and alcohol in 1961.





Review of Systems





as mentioned in detail in HPI.  All other 14 review of systems reviewed and 

unremarkable.  Patient denies any chest pain shortness of breath at this time.  

Denies any abdominal pain nausea vomiting.  Denies any slurred speech, facial 

droop, double vision or loss of vision.  Denies any stroke symptoms.





Past Medical History


Past Medical History: Diabetes Mellitus, GERD/Reflux, Hyperlipidemia, Myocardial

Infarction (MI), Osteoarthritis (OA), Renal Disease, Sleep Apnea/CPAP/BIPAP


Additional Past Medical History / Comment(s): ANEMIA. MI x 2, heart murmer, no 

cpap used, constipation,


Last Myocardial Infarction Date:: 3/28/19


History of Any Multi-Drug Resistant Organisms: None Reported


Past Surgical History: Heart Catheterization With Stent, Joint Replacement, 

Orthopedic Surgery


Additional Past Surgical History / Comment(s): 4 cardiac stents, total right 

knee replacement, monse cataracts with lens implants, left shoulder surgery, left 

hip replacement,


Past Anesthesia/Blood Transfusion Reactions: No Reported Reaction


Date of Last Stent Placement:: 6/2020


Past Psychological History: No Psychological Hx Reported


Smoking Status: Never smoker


Past Alcohol Use History: None Reported


Additional Past Alcohol Use History / Comment(s): quit smoking 1964, smoked for 

6-7 yrs


Past Drug Use History: None Reported





- Past Family History


  ** Father


Family Medical History: Myocardial Infarction (MI)





  ** Mother


Family Medical History: Coronary Artery Disease (CAD), CVA/TIA





  ** Sister(s)


Family Medical History: Cancer





Medications and Allergies


                                Home Medications











 Medication  Instructions  Recorded  Confirmed  Type


 


Aspirin EC [Ecotrin Low Dose] 81 mg PO HS 08/23/14 06/27/20 History


 


amLODIPine [Norvasc] 5 mg PO QAM 08/23/14 06/27/20 History


 


buPROPion XL [Wellbutrin XL] 300 mg PO QAM 08/23/14 06/27/20 History


 


Quinapril HCl [Accupril] 10 mg PO QAM 10/22/17 06/27/20 History


 


Docusate [Colace] 100 mg PO DAILY PRN 12/21/18 06/27/20 History


 


Insulin Aspart [NovoLOG] 5 unit SQ QAM PRN 12/21/18 06/27/20 History


 


Insulin Glargine [Lantus] 4 - 6 unit SQ HS 12/21/18 06/27/20 History


 


Metoprolol Succinate (ER) [Toprol 50 mg PO BID 12/21/18 06/27/20 History





XL]    


 


Atorvastatin [Lipitor] 80 mg PO HS 06/18/20 06/27/20 History


 


Cholecalciferol [Vitamin D3 (25 1,000 unit PO QAM 06/18/20 06/27/20 History





Mcg = 1000 Iu)]    


 


Clopidogrel [Plavix] 75 mg PO QAM 06/18/20 06/27/20 History


 


Fish Oil/Dha/Epa [Fish Oil 1,200 1 cap PO QAM 06/18/20 06/27/20 History





mg Fish Oil]    


 


Furosemide [Lasix] 20 mg PO DAILY 06/18/20 06/27/20 History


 


Glimepiride [Amaryl] 4 mg PO QAM 06/18/20 06/27/20 History


 


Magnesium Oxide [Mag-Ox] 200 mg PO BID 06/18/20 06/27/20 History


 


Potassium Chloride ER [K-Dur 10] 10 meq PO DAILY 06/18/20 06/27/20 History


 


traMADol HCL [Ultram] 50 mg PO Q4-6H PRN 06/18/20 06/27/20 History


 


Nitroglycerin Sl Tabs [Nitrostat] 0.4 mg SUBLINGUAL Q5M PRN #20 tab 06/21/20 06/27/20 Rx








                                    Allergies











Allergy/AdvReac Type Severity Reaction Status Date / Time


 


iodine Allergy Unknown Unknown Verified 06/27/20 19:03














Physical Examination





- Vital Signs


Vital Signs: 


                                   Vital Signs











  Temp Pulse Resp BP Pulse Ox


 


 06/29/20 18:54  98.6 F  75  16  125/70  97


 


 06/29/20 16:15    16  


 


 06/29/20 15:00  98.5 F  77  12  125/64  96


 


 06/29/20 08:00   66  16  


 


 06/29/20 07:00  97.9 F  66  16  136/68  97


 


 06/29/20 01:15  97.8 F  63  20  156/82  100


 


 06/28/20 20:21  98.2 F  70  18  157/78  97








                                Intake and Output











 06/29/20 06/29/20 06/29/20





 06:59 14:59 22:59


 


Intake Total 200 200 300


 


Output Total  400 


 


Balance 200 -200 300


 


Intake:   


 


  Oral 200 200 300


 


Output:   


 


  Urine  400 


 


Other:   


 


  Voiding Method Urinal Urinal Urinal





 Diaper Diaper Diaper


 


  # Voids 4  














on examination patient is an elderly  male, in no acute distress.  

Patient is alert and awake fully oriented.  He knows it is and of June 2020 and 

that he is in [facility] in [facility].  He knows name of the 

current president.  Speech and language functions are normal.  Attention and 

concentration fund of knowledge is adequate.  On cranial examination pupils are 

round and reactive to light, visual fields are full on confrontation.  

Extraocular muscles intact with no nystagmus.  Face is symmetric, tongue 

protrudes the midline.  Palatal elevation and sensation normal hearing appears 

normal for routine conversation, shoulder shrug normal. 





on muscle strength testing the strength is completely normal in both arms 

distally and proximally.  Patient's strength is normal in the left leg distally 

and proximally.  Patient's hip flexion could not be tested because of pain.  His

 hip adduction, hip abduction, knee extension are normal bilaterally.  Ankle 

dorsiflexion is about 4-, inversion 5-, peroneal muscle also 4-.  Patient is 

areflexic.  No ataxia for finger-to-nose testing.  Tone of muscles normal.  Bulk

 of muscles reveals some atrophy of the gastrocnemius muscles.  Sensory touch is

 equal.  Gait deferred.  There is no obvious bruit, S1 and S2 audible.  

Peripheral pulses are present.  No peripheral edema.  Abdominal soft nontender. 

 Chest is clear.





Results





- Laboratory Findings


CBC and BMP: 


                                 06/29/20 07:08





                                 06/29/20 07:08


Abnormal Lab Findings: 


                                  Abnormal Labs











  06/27/20 06/27/20 06/27/20





  17:00 17:00 17:00


 


RBC  3.48 L  


 


Hgb  9.9 L  


 


Hct  30.4 L  


 


Lymphocytes #  0.9 L  


 


Sodium   132 L 


 


Chloride   97 L 


 


BUN   24 H 


 


Creatinine   1.40 H 


 


Glucose   201 H 


 


POC Glucose (mg/dL)   


 


Hemoglobin A1c   


 


Plasma Lactic Acid Khurram    2.7 H*


 


Urine Glucose (UA)   














  06/27/20 06/27/20 06/27/20





  18:22 20:26 21:51


 


RBC   


 


Hgb   


 


Hct   


 


Lymphocytes #   


 


Sodium   


 


Chloride   


 


BUN   


 


Creatinine   


 


Glucose   


 


POC Glucose (mg/dL)    148 H


 


Hemoglobin A1c   


 


Plasma Lactic Acid Khurram   2.2 H* 


 


Urine Glucose (UA)  2+ H  














  06/28/20 06/28/20 06/28/20





  06:14 06:14 07:17


 


RBC  3.23 L  


 


Hgb  9.5 L  


 


Hct  28.8 L  


 


Lymphocytes #  0.7 L  


 


Sodium   134 L 


 


Chloride   


 


BUN   


 


Creatinine   


 


Glucose   127 H 


 


POC Glucose (mg/dL)    151 H


 


Hemoglobin A1c   


 


Plasma Lactic Acid Khurram   


 


Urine Glucose (UA)   














  06/28/20 06/28/20 06/28/20





  11:41 16:33 20:43


 


RBC   


 


Hgb   


 


Hct   


 


Lymphocytes #   


 


Sodium   


 


Chloride   


 


BUN   


 


Creatinine   


 


Glucose   


 


POC Glucose (mg/dL)  214 H  120 H  160 H


 


Hemoglobin A1c   


 


Plasma Lactic Acid Khurram   


 


Urine Glucose (UA)   














  06/29/20 06/29/20 06/29/20





  07:08 07:08 11:47


 


RBC   3.19 L 


 


Hgb   9.4 L 


 


Hct   28.2 L 


 


Lymphocytes #   0.8 L 


 


Sodium   


 


Chloride   


 


BUN   


 


Creatinine   


 


Glucose   


 


POC Glucose (mg/dL)    193 H


 


Hemoglobin A1c  7.3 H  


 


Plasma Lactic Acid Khurram   


 


Urine Glucose (UA)   














  06/29/20





  17:02


 


RBC 


 


Hgb 


 


Hct 


 


Lymphocytes # 


 


Sodium 


 


Chloride 


 


BUN 


 


Creatinine 


 


Glucose 


 


POC Glucose (mg/dL)  209 H


 


Hemoglobin A1c 


 


Plasma Lactic Acid Khurram 


 


Urine Glucose (UA) 














Assessment and Plan


Assessment: 





* Altered mental status, likely related to delirium.  This is likely related to 

  pain medications, Norco, tramadol that he has been receiving.


* Gait dysfunction, likely related to right hip pathology.


* Right foot drop, probably related to peroneal nerve compression at the fibular

   head, as patient have usually sits with legs crossed.  Lumbar radiculopathy 

  less likely.  Patient has normal hip adduction and hip abduction.


* Diabetes


* Coronary artery disease, status post acute MI.


Plan: 





* Continue dual antiplatelet medication for stroke prevention.


* Carotid Doppler showed no significant stenosis.


* Avoid narcotics, sedatives, hypnotics due to concern of worsening underlying 

  delirium.


* Patient's right foot drop is possibly related to peroneal nerve compression at

   the fibular head.  He was recommended not to sit with legs crossed.  He may 

  need EMG and nerve conduction of right lower extremity as outpatient.


* Will check B12, folate and TSH.


* Your medical management.

## 2020-06-29 NOTE — PN
PROGRESS NOTE



This is a 65-year-old gentleman with known coronary artery disease, hypertension,

dyslipidemia that we are following in the hospital, patient had an episode of syncope

and shortness of breath and his cardiac workup has been negative.



EXAM:

Comfortable at rest.  Heart rate is normal at 86.  Blood pressure is elevated at

149/76.  There is no jugular venous distention.  Chest exam reveals diminished air

entry at the bases.  Heart exam reveals first and second heart sounds.  No gallop.

Exam of extremities did not reveal any edema.



The patient is currently on Norvasc 10 daily, Lipitor 40 daily, Toprol- daily.



ASSESSMENT:

1. Coronary artery disease status post angioplasty, status post nephrectomy.

2. Uncontrolled hypertension.



PLAN:

I am going to add hydralazine 25 mg q.8 hours.  Continue the Norvasc and Toprol that he

is on along with the Lipitor.





MMODL / IJN: [id number] / Job#: [id number]

## 2020-06-30 LAB
ANION GAP SERPL CALC-SCNC: 7 MMOL/L
BASOPHILS # BLD AUTO: 0 K/UL (ref 0–0.2)
BASOPHILS NFR BLD AUTO: 0 %
BUN SERPL-SCNC: 18 MG/DL (ref 9–20)
CALCIUM SPEC-MCNC: 8.6 MG/DL (ref 8.4–10.2)
CHLORIDE SERPL-SCNC: 105 MMOL/L (ref 98–107)
CO2 SERPL-SCNC: 26 MMOL/L (ref 22–30)
EOSINOPHIL # BLD AUTO: 0.1 K/UL (ref 0–0.7)
EOSINOPHIL NFR BLD AUTO: 2 %
ERYTHROCYTE [DISTWIDTH] IN BLOOD BY AUTOMATED COUNT: 3.18 M/UL (ref 4.3–5.9)
ERYTHROCYTE [DISTWIDTH] IN BLOOD: 14.6 % (ref 11.5–15.5)
GLUCOSE BLD-MCNC: 105 MG/DL (ref 75–99)
GLUCOSE BLD-MCNC: 142 MG/DL (ref 75–99)
GLUCOSE BLD-MCNC: 174 MG/DL (ref 75–99)
GLUCOSE BLD-MCNC: 184 MG/DL (ref 75–99)
GLUCOSE SERPL-MCNC: 89 MG/DL (ref 74–99)
HCT VFR BLD AUTO: 27.9 % (ref 39–53)
HGB BLD-MCNC: 9.3 GM/DL (ref 13–17.5)
LYMPHOCYTES # SPEC AUTO: 1 K/UL (ref 1–4.8)
LYMPHOCYTES NFR SPEC AUTO: 13 %
MCH RBC QN AUTO: 29.2 PG (ref 25–35)
MCHC RBC AUTO-ENTMCNC: 33.3 G/DL (ref 31–37)
MCV RBC AUTO: 87.7 FL (ref 80–100)
MONOCYTES # BLD AUTO: 0.4 K/UL (ref 0–1)
MONOCYTES NFR BLD AUTO: 5 %
NEUTROPHILS # BLD AUTO: 6.2 K/UL (ref 1.3–7.7)
NEUTROPHILS NFR BLD AUTO: 79 %
PLATELET # BLD AUTO: 238 K/UL (ref 150–450)
POTASSIUM SERPL-SCNC: 4.1 MMOL/L (ref 3.5–5.1)
SODIUM SERPL-SCNC: 138 MMOL/L (ref 137–145)
T4 FREE SERPL-MCNC: 1.39 NG/DL (ref 0.78–2.19)
VIT B12 SERPL-MCNC: 357 PG/ML (ref 200–944)
WBC # BLD AUTO: 7.8 K/UL (ref 3.8–10.6)

## 2020-06-30 RX ADMIN — HEPARIN SODIUM SCH UNIT: 5000 INJECTION, SOLUTION INTRAVENOUS; SUBCUTANEOUS at 20:47

## 2020-06-30 RX ADMIN — CEFAZOLIN SCH MLS/HR: 330 INJECTION, POWDER, FOR SOLUTION INTRAMUSCULAR; INTRAVENOUS at 07:07

## 2020-06-30 RX ADMIN — Medication SCH UNIT: at 07:06

## 2020-06-30 RX ADMIN — LISINOPRIL SCH MG: 10 TABLET ORAL at 07:06

## 2020-06-30 RX ADMIN — METOPROLOL SUCCINATE SCH MG: 50 TABLET, EXTENDED RELEASE ORAL at 07:07

## 2020-06-30 RX ADMIN — THERA TABS SCH EACH: TAB at 12:30

## 2020-06-30 RX ADMIN — HEPARIN SODIUM SCH UNIT: 5000 INJECTION, SOLUTION INTRAVENOUS; SUBCUTANEOUS at 07:07

## 2020-06-30 RX ADMIN — INSULIN ASPART SCH UNIT: 100 INJECTION, SOLUTION INTRAVENOUS; SUBCUTANEOUS at 20:47

## 2020-06-30 RX ADMIN — METOPROLOL SUCCINATE SCH MG: 50 TABLET, EXTENDED RELEASE ORAL at 20:46

## 2020-06-30 RX ADMIN — Medication SCH MG: at 07:06

## 2020-06-30 RX ADMIN — PANTOPRAZOLE SODIUM SCH MG: 40 TABLET, DELAYED RELEASE ORAL at 07:06

## 2020-06-30 RX ADMIN — Medication SCH MG: at 20:47

## 2020-06-30 RX ADMIN — FOLIC ACID SCH MG: 1 TABLET ORAL at 12:30

## 2020-06-30 RX ADMIN — GLIMEPIRIDE SCH MG: 2 TABLET ORAL at 07:06

## 2020-06-30 RX ADMIN — INSULIN ASPART SCH UNIT: 100 INJECTION, SOLUTION INTRAVENOUS; SUBCUTANEOUS at 12:37

## 2020-06-30 RX ADMIN — Medication SCH MG: at 12:30

## 2020-06-30 RX ADMIN — ATORVASTATIN CALCIUM SCH MG: 80 TABLET, FILM COATED ORAL at 20:47

## 2020-06-30 RX ADMIN — INSULIN ASPART SCH UNIT: 100 INJECTION, SOLUTION INTRAVENOUS; SUBCUTANEOUS at 17:46

## 2020-06-30 RX ADMIN — BUPROPION HYDROCHLORIDE SCH MG: 300 TABLET, FILM COATED, EXTENDED RELEASE ORAL at 07:06

## 2020-06-30 RX ADMIN — POTASSIUM CHLORIDE SCH MEQ: 750 TABLET, EXTENDED RELEASE ORAL at 07:06

## 2020-06-30 RX ADMIN — ASPIRIN 81 MG CHEWABLE TABLET SCH MG: 81 TABLET CHEWABLE at 20:47

## 2020-06-30 RX ADMIN — CLOPIDOGREL BISULFATE SCH MG: 75 TABLET ORAL at 07:06

## 2020-06-30 RX ADMIN — FUROSEMIDE SCH MG: 20 TABLET ORAL at 07:07

## 2020-06-30 RX ADMIN — INSULIN ASPART SCH: 100 INJECTION, SOLUTION INTRAVENOUS; SUBCUTANEOUS at 06:57

## 2020-06-30 NOTE — P.PN
Subjective


Progress Note Date: 06/29/20


Principal diagnosis: 


This is a 79 year old male who was recently admitted with weakness and gait 

dysfunction and is being closely monitored. Patient is also confused. Multiple 

medical consultations following. Neurology consulted and pending at this time. 

PT/OT following and patient continues to be weak requiring assistance with gait.

Case management and social work following for possible ECF placement. Patient 

underwent an echo showing overall left ventricular systolic function is normal 

with an EF of 60-65%. Medication adjustments have been made. Current sodium is 

137 with a potassium of 3.7 and creatinine is currently 1.12. Blood sugars are 

being closely monitored as well. Current hgbA1C is 7.3.





Active Medications





Acetaminophen (Tylenol Tab)  650 mg PO Q6HR PRN


   PRN Reason: Mild Pain or Fever > 100.5


Hydrocodone Bitart/Acetaminophen (Norco 5-325)  1 each PO Q6HR PRN


   PRN Reason: Pain


   Last Admin: 06/29/20 17:19 Dose:  1 each


   Documented by: 


Alprazolam (Xanax)  0.25 mg PO TID PRN


   PRN Reason: Anxiety


Amlodipine Besylate (Norvasc)  5 mg PO [facility]


   Last Admin: 06/29/20 07:47 Dose:  5 mg


   Documented by: 


Aspirin (Aspirin)  81 mg PO [facility]


   Last Admin: 06/29/20 21:21 Dose:  81 mg


   Documented by: 


Atorvastatin Calcium (Lipitor)  80 mg PO [facility]


   Last Admin: 06/29/20 21:21 Dose:  80 mg


   Documented by: 


Bupropion HCl (Wellbutrin Xl)  300 mg PO [facility]


   Last Admin: 06/29/20 07:47 Dose:  300 mg


   Documented by: 


Cholecalciferol (Vitamin D3 (25 Mcg = 1000 Iu))  1,000 unit PO [facility]


   Last Admin: 06/29/20 07:47 Dose:  1,000 unit


   Documented by: 


Clopidogrel Bisulfate (Plavix)  75 mg PO [facility]


   Last Admin: 06/29/20 07:46 Dose:  75 mg


   Documented by: 


Docusate Sodium (Colace)  100 mg PO DAILY PRN


   PRN Reason: Constipation


   Last Admin: 06/29/20 19:24 Dose:  100 mg


   Documented by: 


Folic Acid (Folic Acid)  1 mg PO DAILY@1200 [facility]


   Last Admin: 06/29/20 11:01 Dose:  1 mg


   Documented by: 


Furosemide (Lasix)  20 mg PO DAILY [facility]


   Last Admin: 06/29/20 07:47 Dose:  20 mg


   Documented by: 


Glimepiride (Amaryl)  4 mg PO QAM [facility]


   Last Admin: 06/29/20 07:46 Dose:  4 mg


   Documented by: 


Heparin Sodium (Porcine) (Heparin)  5,000 unit SQ Q12HR [facility]


   Last Admin: 06/29/20 21:21 Dose:  5,000 unit


   Documented by: 


Sodium Chloride (Saline 0.9%)  1,000 mls @ 50 mls/hr IV .Q20H [facility]


   Last Admin: 06/29/20 11:02 Dose:  50 mls/hr


   Documented by: 


Insulin Aspart (Novolog)  0 unit SQ ACHS [facility]; Protocol


   Last Admin: 06/29/20 21:21 Dose:  2 unit


   Documented by: 


Lisinopril (Zestril)  10 mg PO QAM [facility]


   Last Admin: 06/29/20 07:46 Dose:  10 mg


   Documented by: 


Magnesium Oxide (Mag-Ox)  200 mg PO BID [facility]


   Last Admin: 06/29/20 21:21 Dose:  200 mg


   Documented by: 


Metoprolol Succinate (Toprol Xl)  50 mg PO BID [facility]


   Last Admin: 06/29/20 21:21 Dose:  50 mg


   Documented by: 


Multivitamins (Theragran)  1 each PO DAILY@1200 [facility]


   Last Admin: 06/29/20 11:01 Dose:  1 each


   Documented by: 


Naloxone HCl (Narcan)  0.2 mg IV Q2M PRN


   PRN Reason: Opioid Reversal


Nitroglycerin (Nitrostat)  0.4 mg SUBLINGUAL Q5M PRN


   PRN Reason: Chest Pain


Pantoprazole Sodium (Protonix)  40 mg PO AC-BRKFST [facility]


   Last Admin: 06/29/20 07:45 Dose:  40 mg


   Documented by: 


Potassium Chloride (K-Dur 10)  10 meq PO DAILY [facility]


   Last Admin: 06/29/20 07:46 Dose:  10 meq


   Documented by: 


Risperidone (Risperdal)  0.125 mg PO HS [facility]


   Last Admin: 06/29/20 21:22 Dose:  0.125 mg


   Documented by: 


Thiamine HCl (Vitamin B-1)  100 mg PO DAILY@1200 [facility]


   Last Admin: 06/29/20 11:01 Dose:  100 mg


   Documented by: 


Tramadol HCl (Ultram)  50 mg PO Q6H PRN


   PRN Reason: Pain














Objective





- Vital Signs


Vital signs: 


                                   Vital Signs











Temp  97.9 F   06/29/20 07:00


 


Pulse  66   06/29/20 08:00


 


Resp  16   06/29/20 08:00


 


BP  136/68   06/29/20 07:00


 


Pulse Ox  97   06/29/20 07:00








                                 Intake & Output











 06/28/20 06/29/20 06/29/20





 18:59 06:59 18:59


 


Intake Total  300 200


 


Output Total 400  400


 


Balance -400 300 -200


 


Intake:   


 


  Oral  300 200


 


Output:   


 


  Urine 400  400


 


Other:   


 


  Voiding Method Urinal Urinal Urinal





  Diaper Diaper


 


  # Voids 3 4 














- Exam





Gen: This is a 79 year old male sitting up in the chair. Awake, alert and 

oriented x1-2. Temp is 97.9, pulse is 66, resp are 16, blood pressure is 136/68,

pulse ox is 97% RA


HEENT: Head is atraumatic, normocephalic. Pupils equal, round. Sclerae is 

anicteric. 


NECK: Supple. No JVD. No lymphadenopathy. No thyromegaly. 


LUNGS: diminished breath sounds at the bases with no wheezes or rhonchi.  No 

intercostal retractions.


HEART: S1, S2 muffled 


ABDOMEN: Soft. Bowel sounds are present. No masses.  No tenderness.


EXTREMITIES: No pedal edema.  No calf tenderness.


NEUROLOGICAL: Patient is awake, alert and oriented x1-2. diffusely weak





- Labs


CBC & Chem 7: 


                                 06/29/20 07:08





                                 06/29/20 07:08


Labs: 


                  Abnormal Lab Results - Last 24 Hours (Table)











  06/28/20 06/28/20 06/29/20 Range/Units





  16:33 20:43 07:08 


 


RBC     (4.30-5.90)  m/uL


 


Hgb     (13.0-17.5)  gm/dL


 


Hct     (39.0-53.0)  %


 


Lymphocytes #     (1.0-4.8)  k/uL


 


POC Glucose (mg/dL)  120 H  160 H   (75-99)  mg/dL


 


Hemoglobin A1c    7.3 H  (4.0-6.0)  %














  06/29/20 06/29/20 Range/Units





  07:08 11:47 


 


RBC  3.19 L   (4.30-5.90)  m/uL


 


Hgb  9.4 L   (13.0-17.5)  gm/dL


 


Hct  28.2 L   (39.0-53.0)  %


 


Lymphocytes #  0.8 L   (1.0-4.8)  k/uL


 


POC Glucose (mg/dL)   193 H  (75-99)  mg/dL


 


Hemoglobin A1c    (4.0-6.0)  %














Assessment and Plan


Assessment: 





Weakness and slurring of speech.  Rule out acute stroke or transient ischemic 

attack, possible acute metabolic encephalopathy, multifactorial


Hyponatremia


Dehydration


Chronic kidney disease stage III


Elevated lactic acid of undetermined etiology, possibly secondary to dehydration


Anemia, normocytic


History of recent non-ST segment elevation myocardial infarction


History of multiple coronary artery disease, stents


Moderate to severe aortic stenosis history


Chronic kidney disease stage III


Cardiomyopathy history


Diabetes mellitus type 2, uncontrolled with hypoglycemia history


History of gastroesophageal reflux disease


Hyperlipidemia


Gait dysfunction


Severe degenerative joint disease


History of myocardial infarction


history of sleep apnea


History of anemia


History of cardiac murmur


History of constipation


History of total right knee arthroplasty


remote history of nicotine dependence


History of noncompliance


Full code





Recommendations and discussion:


Recommend to continue current medications, management, and symptomatic 

treatment. PT/OT to evaluate. Neurology consulted and pending. Case management 

and social work to follow for possible ECF placement. Further recommendations to

follow. Will repeat am labs.

## 2020-06-30 NOTE — CDI
Documentation Clarification Form



Date: 06/30/2020 07:47:09 AM

From: Joann Cm CCS, CCDS

Phone:  [phone]

MRN: [id number]

Admit Date: 06/27/2020 06:12:00 PM

Patient Name: Martin Lau

Visit Number: [id number]

Discharge Date:  





ATTENTION: The Clinical Documentation Specialists (CDI) and [facility] Coding Staff 
appreciate your assistance in clarifying documentation. Please respond to the 
clarification below the line at the bottom and electronically sign. The CDI & 
[facility] Coding staff will review the response and follow-up if needed. Please note: 
Queries are made part of the Legal Health Record. If you have any questions, 
please contact the author of this message via ITS.



Dr. Dalila Gil: 



Per the 6/27 History & Physical: The patient had recent MI, was recently 
discharged from ([facility]) with acute non-STEMI & moderate to severe 
aortic stenosis.



History/Risk Factors: Recent MI (nos), CAD, Aortic Stenosis, IDDM II, 
Hypertension, CKD III, GERD, Anemia, Osteoarthritis.

Clinical Indicators: Presented to the ED on 6/27 with weakness & unsteady gait 
via EMS, weakness in right leg & chronic pain (scheduled for right hip surgery).
Admit to rule out TIA or Stroke, Hyponatremia & Dehydration.. 

Lab findings 6/27: Hgb 9.9, Na 132*, BUN 24^, Cr 1.40^, Glucose 201^. 

Radiology findings: 6/27 CXR: No active cardiopulmonary disease.

Vital signs stable on admission.



Treatment: IV Morphine, IV fluid bolus 500 mls @ 999/hr, Heparin SQ. Home meds. 

Cardiology Consult: History of multivessel CAD status post recent angioplasty. 

Neurology Consult: Altered mental status likely related to delirium, held Norco,
Tramadol. 



In your professional opinion, can you please clarify the date of the patient's 
recent NSTEMI?



    Date of recent myocardial infarction: __________________

    Unable to determine





(Last Revision: April 2018)

___________________________________________________________________________

Query response documented in 6/30 Progress Note: Dr MEL Lee. CDI: cherie ENGEL

## 2020-06-30 NOTE — CDI
Documentation Clarification Form



Date: 06/30/2020 08:02:18 AM

From: Joann Cm CCS, CCDS

Phone:  [phone]

MRN: [id number]

Admit Date: 06/27/2020 06:12:00 PM

Patient Name: Martin Lau

Visit Number: [id number]

Discharge Date:  





ATTENTION: The Clinical Documentation Specialists (CDI) and [facility] Coding Staff 
appreciate your assistance in clarifying documentation. Please respond to the 
clarification below the line at the bottom and electronically sign. The CDI & 
[facility] Coding staff will review the response and follow-up if needed. Please note: 
Queries are made part of the Legal Health Record. If you have any questions, 
please contact the author of this message via ITS.



Dr. Dalila Gil: 



Per the 6/27 History & Physical: Anemia, normocytic is documented without 
further specificity.



History/Risk Factors: Recent MI (nos), CAD, Aortic Stenosis, IDDM II, 
Hypertension, CKD III, GERD, Anemia, Osteoarthritis.

Clinical Indicators: Presented to the ED on 6/27 with weakness & unsteady gait 
via EMS, weakness in right leg & chronic pain (scheduled for right hip surgery).
Admit to rule out TIA or Stroke, Hyponatremia, Dehydration.. 

Lab findings 6/27: Hgb 9.9, Na 132*, BUN 24^, Cr 1.40^, Glucose 201^.

Hgb 6/27 - 6/30: 9/9* - 9.5* - 9.4* - 9.3*

Hct 6/27 - 6/30: 30.4* - 28.8* - 28.2* - 27.9*

Radiology findings: 6/27 CXR: No active cardiopulmonary disease.

Vital signs stable on admission.



Treatment: IV Morphine, IV fluid bolus 500 mls @ 999/hr, Heparin SQ. Home meds. 

Cardiology Consult: History of multivessel CAD status post recent angioplasty. 

Neurology Consult: Altered mental status likely related to delirium, held Norco,
Tramadol. 



In order to capture the severity of condition, please clarify the type of anemia
and etiology if known:.



    Chronic blood loss anemia

    Drug induced anemia

    Nutritional anemia

    Anemia of chronic kidney disease

    Unable to determine

    Other, please specify ___________





(Last Form Revision: March 2020)

___________________________________________________________________________

Query response documented in 6/30 Progress Note, Dr. MEL Lee.  CDI: ma

[email or web address]

## 2020-06-30 NOTE — P.PN
Subjective


Progress Note Date: 06/30/20


Principal diagnosis: 


This is a 79 year old male who was recently admitted with weakness and gait 

dysfunction and is being closely monitored. Patient is also confused. Multiple 

medical consultations following. Neurology consulted and pending at this time. 

PT/OT following and patient continues to be weak requiring assistance with gait.

Case management and social work following for possible ECF placement. Patient 

underwent an echo showing overall left ventricular systolic function is normal 

with an EF of 60-65%. Medication adjustments have been made. Current sodium is 

137 with a potassium of 3.7 and creatinine is currently 1.12. Blood sugars are 

being closely monitored as well. Current hgbA1C is 7.3.





06/30/2020


Patient is seen and evaluated in follow-up today much more alert and awake and 

oriented today.  Narcotics and CNS agents have been discontinued.  Patient 

continues to have weakness requiring assistance with gait and position changes. 

Patient is awaiting authorization for [facility] for continued PT/OT therapy.  

Currently no reports of chest pain, shortness of breath, or palpitations.  

Patient is afebrile.  No reports of nausea or vomiting and patient is tolerating

diet.  Sodium is 138 today, potassium is 4.1, creatinine is 1.22.  TSH is 

slightly elevated at 5.110 although free T4 is normal at 1.39. 














Objective





- Vital Signs


Vital signs: 


                                   Vital Signs











Temp  98.2 F   06/30/20 14:50


 


Pulse  68   06/30/20 14:50


 


Resp  16   06/30/20 14:50


 


BP  105/95   06/30/20 14:50


 


Pulse Ox  100   06/30/20 14:50








                                 Intake & Output











 06/29/20 06/30/20 06/30/20





 18:59 06:59 18:59


 


Intake Total 500  480


 


Output Total 400 500 


 


Balance 100 -500 480


 


Intake:   


 


  Oral 500  480


 


Output:   


 


  Urine 400 500 


 


Other:   


 


  Voiding Method Urinal Urinal Urinal





 Diaper Diaper Diaper


 


  # Bowel Movements   1














- Exam





Gen: This is a 79 year old male sitting up in the chair. Awake, alert and 

oriented x3. 


HEENT: Head is atraumatic, normocephalic. Pupils equal, round. Sclerae is 

anicteric. 


NECK: Supple. No JVD. No lymphadenopathy. No thyromegaly. 


LUNGS: diminished breath sounds at the bases with no wheezes or rhonchi.  No 

intercostal retractions.


HEART: S1, S2 muffled 


ABDOMEN: Soft. Bowel sounds are present. No masses.  No tenderness.


EXTREMITIES: No pedal edema.  No calf tenderness.  Right hip pain


NEUROLOGICAL: Patient is awake, alert and oriented x1-2. diffusely weak





- Labs


CBC & Chem 7: 


                                 06/30/20 06:43





                                 06/30/20 06:43


Labs: 


                  Abnormal Lab Results - Last 24 Hours (Table)











  06/29/20 06/29/20 06/30/20 Range/Units





  17:02 20:34 06:43 


 


RBC    3.18 L  (4.30-5.90)  m/uL


 


Hgb    9.3 L  (13.0-17.5)  gm/dL


 


Hct    27.9 L  (39.0-53.0)  %


 


POC Glucose (mg/dL)  209 H  167 H   (75-99)  mg/dL


 


TSH     (0.465-4.680)  mIU/L














  06/30/20 06/30/20 06/30/20 Range/Units





  06:43 06:47 12:10 


 


RBC     (4.30-5.90)  m/uL


 


Hgb     (13.0-17.5)  gm/dL


 


Hct     (39.0-53.0)  %


 


POC Glucose (mg/dL)   105 H  142 H  (75-99)  mg/dL


 


TSH  5.110 H    (0.465-4.680)  mIU/L














Assessment and Plan


Assessment: 





Weakness and slurring of speech.  Ruled out acute stroke or transient ischemic 

attack


possible acute metabolic encephalopathy, multifactorial, secondary to narcotics


Hyponatremia, approved


Dehydration


Chronic kidney disease stage III


Elevated lactic acid of undetermined etiology, possibly secondary to dehydration


Anemia, normocytic of chronic disease


History of recent non-ST segment elevation myocardial infarction, 06/18/2020


History of multiple coronary artery disease, stents


Moderate to severe aortic stenosis history


Chronic kidney disease stage III


Cardiomyopathy history


Diabetes mellitus type 2, uncontrolled with hypoglycemia history


History of gastroesophageal reflux disease


Hyperlipidemia


Gait dysfunction


Severe degenerative joint disease


History of myocardial infarction


history of sleep apnea


History of anemia


History of cardiac murmur


History of constipation


History of total right knee arthroplasty


remote history of nicotine dependence


History of noncompliance


Full code





Recommendations and discussion:


Recommend to continue current medications, management, and symptomatic 

treatment. PT/OT following. Neurology following. Case management and social work

to follow for possible ECF placement.  Currently awaiting authorization from 

insurance for [facility]. Further recommendations to follow. Will repeat am labs.  

Possible discharge in 24-48 hours.

## 2020-07-01 VITALS — DIASTOLIC BLOOD PRESSURE: 65 MMHG | SYSTOLIC BLOOD PRESSURE: 149 MMHG | TEMPERATURE: 97.7 F

## 2020-07-01 VITALS — RESPIRATION RATE: 16 BRPM | HEART RATE: 65 BPM

## 2020-07-01 LAB
ANION GAP SERPL CALC-SCNC: 7 MMOL/L
BUN SERPL-SCNC: 19 MG/DL (ref 9–20)
CALCIUM SPEC-MCNC: 8.7 MG/DL (ref 8.4–10.2)
CHLORIDE SERPL-SCNC: 104 MMOL/L (ref 98–107)
CO2 SERPL-SCNC: 25 MMOL/L (ref 22–30)
GLUCOSE BLD-MCNC: 100 MG/DL (ref 75–99)
GLUCOSE BLD-MCNC: 195 MG/DL (ref 75–99)
GLUCOSE SERPL-MCNC: 90 MG/DL (ref 74–99)
POTASSIUM SERPL-SCNC: 4.2 MMOL/L (ref 3.5–5.1)
SODIUM SERPL-SCNC: 136 MMOL/L (ref 137–145)

## 2020-07-01 RX ADMIN — Medication SCH MG: at 11:40

## 2020-07-01 RX ADMIN — FUROSEMIDE SCH MG: 20 TABLET ORAL at 07:28

## 2020-07-01 RX ADMIN — METOPROLOL SUCCINATE SCH MG: 50 TABLET, EXTENDED RELEASE ORAL at 07:27

## 2020-07-01 RX ADMIN — POTASSIUM CHLORIDE SCH MEQ: 750 TABLET, EXTENDED RELEASE ORAL at 07:27

## 2020-07-01 RX ADMIN — INSULIN ASPART SCH: 100 INJECTION, SOLUTION INTRAVENOUS; SUBCUTANEOUS at 07:20

## 2020-07-01 RX ADMIN — FOLIC ACID SCH MG: 1 TABLET ORAL at 11:40

## 2020-07-01 RX ADMIN — LISINOPRIL SCH MG: 10 TABLET ORAL at 07:28

## 2020-07-01 RX ADMIN — Medication SCH MG: at 07:27

## 2020-07-01 RX ADMIN — HEPARIN SODIUM SCH UNIT: 5000 INJECTION, SOLUTION INTRAVENOUS; SUBCUTANEOUS at 07:28

## 2020-07-01 RX ADMIN — PANTOPRAZOLE SODIUM SCH MG: 40 TABLET, DELAYED RELEASE ORAL at 07:27

## 2020-07-01 RX ADMIN — CEFAZOLIN SCH: 330 INJECTION, POWDER, FOR SOLUTION INTRAMUSCULAR; INTRAVENOUS at 07:23

## 2020-07-01 RX ADMIN — THERA TABS SCH EACH: TAB at 11:40

## 2020-07-01 RX ADMIN — Medication SCH UNIT: at 07:28

## 2020-07-01 RX ADMIN — INSULIN ASPART SCH UNIT: 100 INJECTION, SOLUTION INTRAVENOUS; SUBCUTANEOUS at 11:40

## 2020-07-01 RX ADMIN — CLOPIDOGREL BISULFATE SCH MG: 75 TABLET ORAL at 07:28

## 2020-07-01 RX ADMIN — GLIMEPIRIDE SCH MG: 2 TABLET ORAL at 07:28

## 2020-07-01 RX ADMIN — BUPROPION HYDROCHLORIDE SCH MG: 300 TABLET, FILM COATED, EXTENDED RELEASE ORAL at 07:27

## 2020-07-01 NOTE — P.DS
Providers


Date of admission: 


06/27/20 18:12





Expected date of discharge: 07/01/20


Attending physician: 


Hung De Jesus MD





Consults: 





                                        





06/27/20 19:41


Consult Physician Routine 


   Consulting Provider: Douglas Mast


   Consult Reason/Comments: recent nstemi


   Do you want consulting provider notified?: Yes





06/28/20 10:38


Consult Physician Routine 


   Consulting Provider: Rupa Bennett


   Consult Reason/Comments: possible tia


   Do you want consulting provider notified?: Yes











Primary care physician: 


Lemuel Ortiz





[facility] Course: 





Final diagnosis





Weakness and slurring of speech.  Ruled out acute stroke or transient ischemic 

attack


possible acute metabolic encephalopathy, multifactorial, secondary to narcotics


Hyponatremia, approved


Dehydration


Chronic kidney disease stage III


Elevated lactic acid of undetermined etiology, possibly secondary to dehydration


Anemia, normocytic of chronic disease


History of recent non-ST segment elevation myocardial infarction, 06/18/2020


History of multiple coronary artery disease, stents


Moderate to severe aortic stenosis history


Chronic kidney disease stage III


Cardiomyopathy history


Diabetes mellitus type 2, uncontrolled with hypoglycemia history


History of gastroesophageal reflux disease


Hyperlipidemia


Gait dysfunction


Severe degenerative joint disease


History of myocardial infarction


history of sleep apnea


History of anemia


History of cardiac murmur


History of constipation


History of total right knee arthroplasty


remote history of nicotine dependence


History of noncompliance


Full code





Discharge disposition


Patient is being discharged in a stable condition with guarded prognosis to 

[facility] for continued PT/OT therapy.  Patient will follow-up with Dr. Ortiz upon discharge.  Total time taken is 35 minutes.





History of present illness


This is an 79-year-old male who was recently admitted with weakness and gait 

dysfunction and also found to be confused and was being closely monitored.  

Patient had been taking Norco and Ultram and confusion was most likely acute 

delirium from narcotic use.  Will continue to avoid narcotics and CNS agents.  

Patient was seen and evaluated by neurology during hospitalization and patient 

was ruled out of an acute stroke or TIA.  Patient's mentation has improved 

although gait continues to be unsteady and weak requiring assistance.  Patient 

will be going to [facility] for continued PT/OT therapy.  Patient will also continue 

monitoring blood sugars before meals at bedtime and treat accordingly with 

sliding scale.  Currently no reports of chest pain, shortness of breath, or 

palpitations.  Patient is afebrile.  No reports of nausea or vomiting and 

patient is tolerating diet.  Patient will be going to [facility] today.





On exam vital signs are stable.  Temp is 97.7F, pulse is 65, respirations are 

16, blood pressure is 149/65, oxygen saturation is 98% on room air.  Cardio S1, 

S2 are muffled.  Respiratory shows diminished breath sounds at the bases with no

wheezing or rhonchi noted.  Abdomen is soft and nontender.  Nervous system shows

mild diffuse weakness.





Please refer to medication reconciliation sheet for a list of medications.





Patient Condition at Discharge: Stable





Plan - Discharge Summary


Discharge Rx Participant: Yes


New Discharge Prescriptions: 


New


   Folic Acid 1 mg PO DAILY@1200  tab


   Multivitamins, Thera [Multivitamin (formulary)] 1 each PO DAILY@1200  tab


   INSULIN ASPART (NovoLOG) [NovoLOG (formulary)] 0 unit SQ ACHS  vial


   Pantoprazole [Protonix] 40 mg PO AC-BRKFST  tablet.


   risperiDONE [RisperDAL] 0.125 mg PO HS #3 tab


   Acetaminophen Tab [Tylenol] 650 mg PO Q6HR PRN  tab


     PRN Reason: Mild Pain Or Fever > 100.5


   Thiamine [Vitamin B-1] 100 mg PO DAILY@1200  tab





Continue


   amLODIPine [Norvasc] 5 mg PO QAM


   Aspirin EC [Ecotrin Low Dose] 81 mg PO HS


   buPROPion XL [Wellbutrin XL] 300 mg PO QAM


   Quinapril HCl [Accupril] 10 mg PO QAM


   Docusate [Colace] 100 mg PO DAILY PRN


     PRN Reason: Constipation


   Metoprolol Succinate (ER) [Toprol XL] 50 mg PO BID


   Atorvastatin [Lipitor] 80 mg PO HS


   Cholecalciferol [Vitamin D3 (25 Mcg = 1000 Iu)] 1,000 unit PO QAM


   Potassium Chloride ER [K-Dur 10] 10 meq PO DAILY


   Magnesium Oxide [Mag-Ox] 200 mg PO BID


   Glimepiride [Amaryl] 4 mg PO QAM


   Furosemide [Lasix] 20 mg PO DAILY


   Fish Oil/Dha/Epa [Fish Oil 1,200 mg Fish Oil] 1 cap PO QAM


   Clopidogrel [Plavix] 75 mg PO QAM


   Nitroglycerin Sl Tabs [Nitrostat] 0.4 mg SUBLINGUAL Q5M PRN #20 tab


     PRN Reason: Chest Pain





Discontinued


   Insulin Glargine [Lantus] 4 - 6 unit SQ HS


   Insulin Aspart [NovoLOG] 5 unit SQ QAM PRN


     PRN Reason: Blood Sugar over 150


   traMADol HCL [Ultram] 50 mg PO Q4-6H PRN


     PRN Reason: Pain


Discharge Medication List





Aspirin EC [Ecotrin Low Dose] 81 mg PO HS 08/23/14 [History]


amLODIPine [Norvasc] 5 mg PO QAM 08/23/14 [History]


buPROPion XL [Wellbutrin XL] 300 mg PO QAM 08/23/14 [History]


Quinapril HCl [Accupril] 10 mg PO QAM 10/22/17 [History]


Docusate [Colace] 100 mg PO DAILY PRN 12/21/18 [History]


Metoprolol Succinate (ER) [Toprol XL] 50 mg PO BID 12/21/18 [History]


Atorvastatin [Lipitor] 80 mg PO HS 06/18/20 [History]


Cholecalciferol [Vitamin D3 (25 Mcg = 1000 Iu)] 1,000 unit PO QAM 06/18/20 

[History]


Clopidogrel [Plavix] 75 mg PO QAM 06/18/20 [History]


Fish Oil/Dha/Epa [Fish Oil 1,200 mg Fish Oil] 1 cap PO QAM 06/18/20 [History]


Furosemide [Lasix] 20 mg PO DAILY 06/18/20 [History]


Glimepiride [Amaryl] 4 mg PO QAM 06/18/20 [History]


Magnesium Oxide [Mag-Ox] 200 mg PO BID 06/18/20 [History]


Potassium Chloride ER [K-Dur 10] 10 meq PO DAILY 06/18/20 [History]


Nitroglycerin Sl Tabs [Nitrostat] 0.4 mg SUBLINGUAL Q5M PRN #20 tab 06/21/20 

[Rx]


Acetaminophen Tab [Tylenol] 650 mg PO Q6HR PRN  tab 07/01/20 [Rx]


Folic Acid 1 mg PO DAILY@1200  tab 07/01/20 [Rx]


INSULIN ASPART (NovoLOG) [NovoLOG (formulary)] 0 unit SQ ACHS  vial 07/01/20 

[Rx]


Multivitamins, Thera [Multivitamin (formulary)] 1 each PO DAILY@1200  tab 

07/01/20 [Rx]


Pantoprazole [Protonix] 40 mg PO AC-BRKFST  tablet. 07/01/20 [Rx]


Thiamine [Vitamin B-1] 100 mg PO DAILY@1200  tab 07/01/20 [Rx]


risperiDONE [RisperDAL] 0.125 mg PO HS #3 tab 07/01/20 [Rx]








Follow up Appointment(s)/Referral(s): 


Lemuel Ortiz MD [Primary Care Provider] - 1-2 days


Patient Instructions/Handouts:  Weakness (DC), Fall Prevention (DC)


Activity/Diet/Wound Care/Special Instructions: 


Patient is going to [facility]


Activity as tolerated


Continue current heart healthy diet


Continue monitoring blood sugars before meals and at bedtime and treat 

accordingly with sliding scale


Follow-up with primary care provider upon discharge





Discharge Disposition: TRANSFER TO SNF/ECF

## 2020-07-01 NOTE — P.PN
Subjective


Progress Note Date: 07/01/20





Patient states he is feeling much better.  Continues to complain of right hip 

pain.  No new focal symptoms.  Per nursing report, his mentation is much more 

clear.  No further hallucinations as compared to yesterday.  Patient did one 

time he got out of bed and took the IV line out.  Otherwise he is fully o

riented.  No hallucinations.





Objective





- Vital Signs


Vital signs: 


                                   Vital Signs











Temp  97.7 F   07/01/20 07:00


 


Pulse  65   07/01/20 07:00


 


Resp  16   07/01/20 07:00


 


BP  149/65   07/01/20 07:00


 


Pulse Ox  98   07/01/20 07:00








                                 Intake & Output











 06/30/20 07/01/20 07/01/20





 18:59 06:59 18:59


 


Intake Total 480  


 


Output Total 300 700 700


 


Balance 180 -700 -700


 


Intake:   


 


  Oral 480  


 


Output:   


 


  Urine 300 700 700


 


Other:   


 


  Voiding Method Urinal Urinal Urinal





 Diaper Diaper 


 


  # Voids 4  


 


  # Bowel Movements 1  














- Exam





Essentially and changes yesterday's exam.  Patient's mental status, speech and 

language functions are normal.  Cranial nerves are normal.  Muscle strength is 

normal in the arms.  In the lower limbs, his right ankle dorsiflexion is 3, 

inversion 5-, eversion 4-.  Toe extension is also very weak in the right foot.  

Right hip not checked because of severe pain.





- Labs


CBC & Chem 7: 


                                 06/30/20 06:43





                                 07/01/20 07:12


Labs: 


                  Abnormal Lab Results - Last 24 Hours (Table)











  06/30/20 06/30/20 06/30/20 Range/Units





  12:10 17:18 20:35 


 


Sodium     (137-145)  mmol/L


 


POC Glucose (mg/dL)  142 H  174 H  184 H  (75-99)  mg/dL














  07/01/20 07/01/20 07/01/20 Range/Units





  06:57 07:12 11:19 


 


Sodium   136 L   (137-145)  mmol/L


 


POC Glucose (mg/dL)  100 H   195 H  (75-99)  mg/dL














Assessment and Plan


Assessment: 





* Acute delirium, almost resolved.  This was likely related to pain medications,

  Norco, tramadol that he was previously receiving.


* Gait dysfunction, likely related to right hip pathology.


* Right foot drop, possibly related to peroneal nerve compression at the fibular

  head.  Patient states that he has not been sitting with legs crossed for 

  almost a year since his right hip started hurting.  Diabetic asymmetric 

  neuropathy versus lumbar radiculopathy also in the differential, although less

  likely.  Patient has normal hip adduction and hip abduction.


* Diabetes


* Coronary artery disease, status post acute MI.


Plan: 





* Patient's delirium has mostly resolved. 


* Avoid narcotics, sedatives, hypnotics to prevent delirium. 


* Continue dual antiplatelet medication for stroke prevention.


* Carotid Doppler showed no significant stenosis.


* For patient's right foot drop, he would need EMG and nerve conductions of 

  right lower extremity as outpatient.  


* B12 357, folate 11.3 and TSH mildly abnormal 5.11, with normal free T4 1.39.


* Neurologically clear for discharge.  Possible transfer to [facility].

## 2020-07-04 NOTE — CDI
Documentation Clarification Form

Date: 7/4/2020

From: Orlando Herrera

Phone: If you have a question about this query, please contact Piedad Sethi, 
 at [phone] between 8am and 5pm.

Admit Date: 6/27/2020

Discharge Date: 7/1/2020 

Patient Name: Martin Lau

Visit Number: [id number]



ATTENTION: The Clinical Documentation Specialists (CDI) and [facility] Coding Staff 
appreciate your assistance in clarifying documentation. Please respond to the 
clarification below the line at the bottom and electronically sign. The CDI & 
[facility] Coding staff will review the response and follow-up if needed. Please note: 
Queries are made part of the Legal Health Record. If you have any questions, 
please contact the author of this message via ITS.



Dear Barb Frausto.,



Weakness and slurring of speech.Ruled out acute stroke or transient ischemic 
attack possible acute metabolic 

encephalopathy, multifactorial, secondary to narcotics.

Weakness and unsteadiness of gait and slurring of speech.



Patient history/risk factors: Anemia, normocytic of chronic disease, DM CKD, CKD
stage 3, Deliirium, NSTEMI

Vital Signs: 

Temperature 98.0 F 

Pulse Rate 68 68 

Respiratory 16 16 

Rate 

Blood Pressure 128/63 144/73 

O2 Sat by Pulse 98 97



Patient had been taking Norco and Ultram and confusion was most likely acute 
delirium from narcotic use. acute metabolic encephalopathy, multifactorial, 
secondary to narcotics.

Will continue to avoid narcotics and CNS agents.



In your professional opinion, Can you please clarify the diagnosis as below___



Overdose secondary to Narcotics

Toxic and Metabolic encephalopathy secondary to Narcotics

Both(overdose and Toxic Encephalopathy due to Narcotics)

Other please specify___________________

___________________________________________________________________________

Toxic and Metabolic encephalopathy secondary to Narcotics

MTDD

## 2020-07-10 ENCOUNTER — HOSPITAL ENCOUNTER (OUTPATIENT)
Dept: HOSPITAL 47 - EC | Age: 80
Setting detail: OBSERVATION
LOS: 4 days | Discharge: NURSING, REHAB OR LONG-TERM CARE | End: 2020-07-14
Attending: INTERNAL MEDICINE | Admitting: INTERNAL MEDICINE
Payer: MEDICARE

## 2020-07-10 DIAGNOSIS — R41.82: ICD-10-CM

## 2020-07-10 DIAGNOSIS — Z79.02: ICD-10-CM

## 2020-07-10 DIAGNOSIS — R39.198: ICD-10-CM

## 2020-07-10 DIAGNOSIS — Z99.89: ICD-10-CM

## 2020-07-10 DIAGNOSIS — Z80.9: ICD-10-CM

## 2020-07-10 DIAGNOSIS — R01.1: ICD-10-CM

## 2020-07-10 DIAGNOSIS — N18.3: ICD-10-CM

## 2020-07-10 DIAGNOSIS — R29.6: ICD-10-CM

## 2020-07-10 DIAGNOSIS — Z79.82: ICD-10-CM

## 2020-07-10 DIAGNOSIS — K59.00: ICD-10-CM

## 2020-07-10 DIAGNOSIS — K21.9: ICD-10-CM
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
## 2020-07-10 DIAGNOSIS — Z95.5: ICD-10-CM

## 2020-07-10 DIAGNOSIS — I95.9: ICD-10-CM

## 2020-07-10 DIAGNOSIS — D64.9: ICD-10-CM

## 2020-07-10 DIAGNOSIS — E78.5: ICD-10-CM
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
## 2020-07-10 DIAGNOSIS — S09.90XA: ICD-10-CM

## 2020-07-10 DIAGNOSIS — I25.2: ICD-10-CM

## 2020-07-10 DIAGNOSIS — M54.5: ICD-10-CM

## 2020-07-10 DIAGNOSIS — M19.90: ICD-10-CM

## 2020-07-10 DIAGNOSIS — N17.9: ICD-10-CM

## 2020-07-10 DIAGNOSIS — Z82.3: ICD-10-CM

## 2020-07-10 DIAGNOSIS — R26.9: ICD-10-CM

## 2020-07-10 DIAGNOSIS — Z79.899: ICD-10-CM

## 2020-07-10 DIAGNOSIS — Z82.49: ICD-10-CM

## 2020-07-10 DIAGNOSIS — R07.9: ICD-10-CM

## 2020-07-10 DIAGNOSIS — E11.22: ICD-10-CM

## 2020-07-10 DIAGNOSIS — Z91.048: ICD-10-CM

## 2020-07-10 DIAGNOSIS — Z86.73: ICD-10-CM

## 2020-07-10 DIAGNOSIS — M25.551: ICD-10-CM

## 2020-07-10 DIAGNOSIS — I25.10: ICD-10-CM

## 2020-07-10 DIAGNOSIS — Z11.59: ICD-10-CM

## 2020-07-10 DIAGNOSIS — I12.9: ICD-10-CM

## 2020-07-10 DIAGNOSIS — Z87.891: ICD-10-CM

## 2020-07-10 DIAGNOSIS — R53.1: Primary | ICD-10-CM

## 2020-07-10 DIAGNOSIS — Z79.4: ICD-10-CM

## 2020-07-10 DIAGNOSIS — M54.2: ICD-10-CM

## 2020-07-10 DIAGNOSIS — E86.0: ICD-10-CM

## 2020-07-10 DIAGNOSIS — G47.30: ICD-10-CM

## 2020-07-10 LAB
ALBUMIN SERPL-MCNC: 3.8 G/DL (ref 3.5–5)
ALP SERPL-CCNC: 137 U/L (ref 38–126)
ALT SERPL-CCNC: 25 U/L (ref 4–49)
ANION GAP SERPL CALC-SCNC: 14 MMOL/L
APTT BLD: 22.7 SEC (ref 22–30)
AST SERPL-CCNC: 24 U/L (ref 17–59)
BASOPHILS # BLD AUTO: 0 K/UL (ref 0–0.2)
BASOPHILS NFR BLD AUTO: 0 %
BUN SERPL-SCNC: 27 MG/DL (ref 9–20)
CALCIUM SPEC-MCNC: 9.2 MG/DL (ref 8.4–10.2)
CHLORIDE SERPL-SCNC: 103 MMOL/L (ref 98–107)
CO2 SERPL-SCNC: 22 MMOL/L (ref 22–30)
EOSINOPHIL # BLD AUTO: 0.1 K/UL (ref 0–0.7)
EOSINOPHIL NFR BLD AUTO: 2 %
ERYTHROCYTE [DISTWIDTH] IN BLOOD BY AUTOMATED COUNT: 3.4 M/UL (ref 4.3–5.9)
ERYTHROCYTE [DISTWIDTH] IN BLOOD: 15.1 % (ref 11.5–15.5)
GLUCOSE BLD-MCNC: 145 MG/DL (ref 75–99)
GLUCOSE BLD-MCNC: 182 MG/DL (ref 75–99)
GLUCOSE SERPL-MCNC: 154 MG/DL (ref 74–99)
HCT VFR BLD AUTO: 29.8 % (ref 39–53)
HGB BLD-MCNC: 9.6 GM/DL (ref 13–17.5)
INR PPP: 1.1 (ref ?–1.2)
LYMPHOCYTES # SPEC AUTO: 0.9 K/UL (ref 1–4.8)
LYMPHOCYTES NFR SPEC AUTO: 14 %
MAGNESIUM SPEC-SCNC: 1.8 MG/DL (ref 1.6–2.3)
MCH RBC QN AUTO: 28.3 PG (ref 25–35)
MCHC RBC AUTO-ENTMCNC: 32.3 G/DL (ref 31–37)
MCV RBC AUTO: 87.6 FL (ref 80–100)
MONOCYTES # BLD AUTO: 0.4 K/UL (ref 0–1)
MONOCYTES NFR BLD AUTO: 7 %
NEUTROPHILS # BLD AUTO: 4.8 K/UL (ref 1.3–7.7)
NEUTROPHILS NFR BLD AUTO: 75 %
PH UR: 6.5 [PH] (ref 5–8)
PLATELET # BLD AUTO: 254 K/UL (ref 150–450)
POTASSIUM SERPL-SCNC: 4.4 MMOL/L (ref 3.5–5.1)
PROT SERPL-MCNC: 6.2 G/DL (ref 6.3–8.2)
PT BLD: 11.1 SEC (ref 9–12)
SODIUM SERPL-SCNC: 139 MMOL/L (ref 137–145)
SP GR UR: 1.01 (ref 1–1.03)
UROBILINOGEN UR QL STRIP: <2 MG/DL (ref ?–2)
WBC # BLD AUTO: 6.5 K/UL (ref 3.8–10.6)

## 2020-07-10 PROCEDURE — 80048 BASIC METABOLIC PNL TOTAL CA: CPT

## 2020-07-10 PROCEDURE — 99285 EMERGENCY DEPT VISIT HI MDM: CPT

## 2020-07-10 PROCEDURE — 71045 X-RAY EXAM CHEST 1 VIEW: CPT

## 2020-07-10 PROCEDURE — 83735 ASSAY OF MAGNESIUM: CPT

## 2020-07-10 PROCEDURE — 97162 PT EVAL MOD COMPLEX 30 MIN: CPT

## 2020-07-10 PROCEDURE — 96372 THER/PROPH/DIAG INJ SC/IM: CPT

## 2020-07-10 PROCEDURE — 87635 SARS-COV-2 COVID-19 AMP PRB: CPT

## 2020-07-10 PROCEDURE — 85610 PROTHROMBIN TIME: CPT

## 2020-07-10 PROCEDURE — 80053 COMPREHEN METABOLIC PANEL: CPT

## 2020-07-10 PROCEDURE — 85025 COMPLETE CBC W/AUTO DIFF WBC: CPT

## 2020-07-10 PROCEDURE — 96376 TX/PRO/DX INJ SAME DRUG ADON: CPT

## 2020-07-10 PROCEDURE — 96361 HYDRATE IV INFUSION ADD-ON: CPT

## 2020-07-10 PROCEDURE — 84484 ASSAY OF TROPONIN QUANT: CPT

## 2020-07-10 PROCEDURE — 51702 INSERT TEMP BLADDER CATH: CPT

## 2020-07-10 PROCEDURE — 83880 ASSAY OF NATRIURETIC PEPTIDE: CPT

## 2020-07-10 PROCEDURE — 96374 THER/PROPH/DIAG INJ IV PUSH: CPT

## 2020-07-10 PROCEDURE — 81003 URINALYSIS AUTO W/O SCOPE: CPT

## 2020-07-10 PROCEDURE — 70450 CT HEAD/BRAIN W/O DYE: CPT

## 2020-07-10 PROCEDURE — 97535 SELF CARE MNGMENT TRAINING: CPT

## 2020-07-10 PROCEDURE — 36415 COLL VENOUS BLD VENIPUNCTURE: CPT

## 2020-07-10 PROCEDURE — 85730 THROMBOPLASTIN TIME PARTIAL: CPT

## 2020-07-10 PROCEDURE — 72125 CT NECK SPINE W/O DYE: CPT

## 2020-07-10 PROCEDURE — 96375 TX/PRO/DX INJ NEW DRUG ADDON: CPT

## 2020-07-10 PROCEDURE — 93005 ELECTROCARDIOGRAM TRACING: CPT

## 2020-07-10 PROCEDURE — 97116 GAIT TRAINING THERAPY: CPT

## 2020-07-10 PROCEDURE — 73502 X-RAY EXAM HIP UNI 2-3 VIEWS: CPT

## 2020-07-10 PROCEDURE — 73551 X-RAY EXAM OF FEMUR 1: CPT

## 2020-07-10 PROCEDURE — 97165 OT EVAL LOW COMPLEX 30 MIN: CPT

## 2020-07-10 RX ADMIN — HEPARIN SODIUM SCH UNIT: 5000 INJECTION, SOLUTION INTRAVENOUS; SUBCUTANEOUS at 20:52

## 2020-07-10 RX ADMIN — INSULIN ASPART SCH UNIT: 100 INJECTION, SOLUTION INTRAVENOUS; SUBCUTANEOUS at 20:53

## 2020-07-10 RX ADMIN — ATORVASTATIN CALCIUM SCH MG: 80 TABLET, FILM COATED ORAL at 20:52

## 2020-07-10 RX ADMIN — CEFAZOLIN SCH: 330 INJECTION, POWDER, FOR SOLUTION INTRAMUSCULAR; INTRAVENOUS at 13:14

## 2020-07-10 RX ADMIN — METOPROLOL SUCCINATE SCH MG: 50 TABLET, EXTENDED RELEASE ORAL at 20:53

## 2020-07-10 RX ADMIN — Medication SCH MG: at 20:53

## 2020-07-10 RX ADMIN — ASPIRIN 81 MG CHEWABLE TABLET SCH MG: 81 TABLET CHEWABLE at 20:52

## 2020-07-10 RX ADMIN — INSULIN ASPART SCH UNIT: 100 INJECTION, SOLUTION INTRAVENOUS; SUBCUTANEOUS at 17:26

## 2020-07-10 NOTE — XR
EXAMINATION TYPE: XR chest 1V

 

DATE OF EXAM: 7/10/2020

 

HISTORY: Shortness of breath.

 

COMPARISON: 6/27/20

 

TECHNIQUE: Single view of the chest is submitted.

 

FINDINGS:

Demonstrated are scattered senescent parenchymal change.  

 

There is no evidence for focal infiltrate. Granuloma left chest.

 

The heart is stable.

 

Hilar and mediastinal structures are within normal limits.  

 

Degenerative changes are seen of the dorsal spine. 

 

 IMPRESSION: 

 

1.  Chronic changes without evidence for acute pulmonary disease.

## 2020-07-10 NOTE — P.HPIM
History of Present Illness


79-year-old pleasant male came to the hospital after a fall at home.  Patient 

was recently discharged from the subacute rehabilitation facility.  The patient 

did during his hospitalization was treated for narcotic-induced encephalopathy. 

Patient at this time denied any fever chills nausea vomiting abdominal pain.  

Patient although was started on multiple medications that can affect his kidney 

and opiates all of which were discontinued.  Patient was started on lorazepam 

that was this can urine as well.  Patient will just use Tylenol for pain.  

Patient is found to be in acute renal failure patient baseline creatinine around

1.1 1.2 now around 1.63.   blood pressure is low as well.  Patient was 

feeling quite weak.











Review of Systems








REVIEW OF SYSTEMS: 


CONSTITUTIONAL: No fever, no malaise, no fatigue. 


HEENT: No recent visual problems or hearing problems. Denied any sore throat. 


CARDIOVASCULAR: No chest pain, orthopnea, PND, no palpitations, no syncope. 


PULMONARY: No shortness of breath, no cough, no hemoptysis. 


GASTROINTESTINAL: No diarrhea, no nausea, no vomiting, no abdominal pain. 


NEUROLOGICAL: No headaches, no weakness, no numbness. 


HEMATOLOGICAL: Denies any bleeding or petechiae. 


GENITOURINARY: Denies any burning micturition, frequency, or urgency. 


MUSCULOSKELETAL/RHEUMATOLOGICAL: Denies any joint pain, swelling, or any muscle 

pain. 


ENDOCRINE: Denies any polyuria or polydipsia. 





The rest of the 14-point review of systems is negative.











Past Medical History


Past Medical History: Diabetes Mellitus, GERD/Reflux, Hyperlipidemia, Myocardial

Infarction (MI), Osteoarthritis (OA), Renal Disease, Sleep Apnea/CPAP/BIPAP


Additional Past Medical History / Comment(s): ANEMIA. MI x 2, heart murmer, no 

cpap used, constipation,


Last Myocardial Infarction Date:: 3/28/19


History of Any Multi-Drug Resistant Organisms: None Reported


Past Surgical History: Heart Catheterization With Stent, Joint Replacement, 

Orthopedic Surgery


Additional Past Surgical History / Comment(s): 4 cardiac stents, total right kne

e replacement, monse cataracts with lens implants, left shoulder surgery, left hip

replacement,


Past Anesthesia/Blood Transfusion Reactions: No Reported Reaction


Date of Last Stent Placement:: 6/2020


Past Psychological History: No Psychological Hx Reported


Smoking Status: Never smoker


Past Alcohol Use History: None Reported


Additional Past Alcohol Use History / Comment(s): quit smoking 1964, smoked for 

6-7 yrs


Past Drug Use History: None Reported





- Past Family History


  ** Father


Family Medical History: Myocardial Infarction (MI)





  ** Mother


Family Medical History: Coronary Artery Disease (CAD), CVA/TIA





  ** Sister(s)


Family Medical History: Cancer





Medications and Allergies


                                Home Medications











 Medication  Instructions  Recorded  Confirmed  Type


 


Aspirin EC [Ecotrin Low Dose] 81 mg PO HS 08/23/14 07/10/20 History


 


amLODIPine [Norvasc] 5 mg PO QAM 08/23/14 07/10/20 History


 


buPROPion XL [Wellbutrin XL] 300 mg PO QAM 08/23/14 07/10/20 History


 


Quinapril HCl [Accupril] 10 mg PO QAM 10/22/17 07/10/20 History


 


Docusate [Colace] 100 mg PO DAILY PRN 12/21/18 07/10/20 History


 


Metoprolol Succinate (ER) [Toprol 50 mg PO BID 12/21/18 07/10/20 History





XL]    


 


Atorvastatin [Lipitor] 80 mg PO HS 06/18/20 07/10/20 History


 


Cholecalciferol [Vitamin D3 (25 1,000 unit PO QAM 06/18/20 07/10/20 History





Mcg = 1000 Iu)]    


 


Clopidogrel [Plavix] 75 mg PO QAM 06/18/20 07/10/20 History


 


Fish Oil/Dha/Epa [Fish Oil 1,200 1 cap PO QAM 06/18/20 07/10/20 History





mg Fish Oil]    


 


Furosemide [Lasix] 20 mg PO DAILY 06/18/20 07/10/20 History


 


Glimepiride [Amaryl] 4 mg PO QAM 06/18/20 07/10/20 History


 


Magnesium Oxide [Mag-Ox] 200 mg PO BID 06/18/20 07/10/20 History


 


Potassium Chloride ER [K-Dur 10] 10 meq PO DAILY 06/18/20 07/10/20 History


 


Nitroglycerin Sl Tabs [Nitrostat] 0.4 mg SUBLINGUAL Q5M PRN #20 tab 06/21/20 

07/10/20 Rx


 


Acetaminophen Tab [Tylenol] 650 mg PO Q6HR PRN  tab 07/01/20 07/10/20 Rx


 


Folic Acid 1 mg PO DAILY@1200  tab 07/01/20 07/10/20 Rx


 


Pantoprazole [Protonix] 40 mg PO AC-[id number]  tablet. 07/01/20 07/10/20 Rx


 


Thiamine [Vitamin B-1] 100 mg PO DAILY@1200  tab 07/01/20 07/10/20 Rx


 


risperiDONE [RisperDAL] 0.125 mg PO HS #3 tab 07/01/20 07/10/20 Rx


 


INSULIN ASPART (NovoLOG) [NovoLOG See Protocol SQ ACHS 07/10/20 07/10/20 History





(formulary)]    


 


Multivitamins, Thera [Multivitamin 1 tab PO DAILY@1200 07/10/20 07/10/20 History





(formulary)]    








                                    Allergies











Allergy/AdvReac Type Severity Reaction Status Date / Time


 


iodine Allergy Unknown Unknown Verified 07/10/20 10:27














Physical Exam


Vitals: 


                                   Vital Signs











  Temp Pulse Pulse Resp BP BP Pulse Ox


 


 07/10/20 15:30    74  18   


 


 07/10/20 14:56  97.8 F   74  18   132/77  98


 


 07/10/20 13:11   72   16  103/60   98


 


 07/10/20 12:04   80   18  142/86   98


 


 07/10/20 11:00   78   18  139/88   94 L


 


 07/10/20 09:01  97.7 F  70   18  143/74   100








                                Intake and Output











 07/10/20 07/10/20 07/10/20





 06:59 14:59 22:59


 


Output Total  1655 


 


Balance  -1655 


 


Output:   


 


  Urine  600 


 


    Uretheral (Zendejas)  600 


 


  Post Void Residual  1055 


 


Other:   


 


  Voiding Method   Indwelling Catheter


 


  Weight  79.469 kg 

















PHYSICAL EXAMINATION: 





GENERAL: The patient is alert and oriented x3, not in any acute distress. Well 

developed, well nourished. 


HEENT: Pupils are round and equally reacting to light. EOMI. No scleral icterus.

No conjunctival pallor. Normocephalic, atraumatic. No pharyngeal erythema. No 

thyromegaly. 


CARDIOVASCULAR: S1 and S2 present. No murmurs, rubs, or gallops. 


PULMONARY: Chest is clear to auscultation, no wheezing or crackles. 


ABDOMEN: Soft, nontender, nondistended, normoactive bowel sounds. No palpable 

organomegaly. 


MUSCULOSKELETAL: No joint swelling or deformity.


EXTREMITIES: No cyanosis, clubbing, or pedal edema. 


NEUROLOGICAL: Gross neurological examination did not reveal any focal deficits. 

Significant generalized weakness.


SKIN: No rashes. 

















Results


CBC & Chem 7: 


                                 07/10/20 09:25





                                 07/10/20 09:25


Labs: 


                  Abnormal Lab Results - Last 24 Hours (Table)











  07/10/20 07/10/20 Range/Units





  09:25 09:25 


 


RBC  3.40 L   (4.30-5.90)  m/uL


 


Hgb  9.6 L   (13.0-17.5)  gm/dL


 


Hct  29.8 L   (39.0-53.0)  %


 


Lymphocytes #  0.9 L   (1.0-4.8)  k/uL


 


BUN   27 H  (9-20)  mg/dL


 


Creatinine   1.63 H  (0.66-1.25)  mg/dL


 


Glucose   154 H  (74-99)  mg/dL


 


Alkaline Phosphatase   137 H  ()  U/L


 


Total Protein   6.2 L  (6.3-8.2)  g/dL














Thrombosis Risk Factor Assmnt





- Choose All That Apply


Each Risk Factor Represents 3 Points: Age 75 years or older


Thrombosis Risk Factor Assessment Total Risk Factor Score: 3


Thrombosis Risk Factor Assessment Level: Moderate Risk





Assessment and Plan


Plan: 


-Fall: Secondary to generalized weakness.  Patient the will be evaluated with 

physical And occupational to begin.  The reasons why patient has generalized 

weakness #1 hypotension #2 acute renal failure.  If patient doesn't improve or 

if his generalized weakness doesn't improve after addressing these 2 things then

patient will need to be placed in subacute rehabilitation again for more 

physical therapy.


-Acute renal failure pedal azotemia nephrotoxic medications including 

nonsteroidal anti-inflammatory medications and diuretics will be held


-Hypertension patient is presently hypotensive hold up both losartan and 

amlodipine


-A recent cerebrovascular accident for which patient is on Plavix which will be 

continued


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


-Urinary artery disease


-Type 2 diabetes mellitus: Patient will be resumed on his home regimen will 

continue to check the blood sugars


-Severe degenerative joint disease


-Systolic murmur in the mitral area probably secondary to mitral regurgitation


-DVT prophylaxis with subcutaneous heparin

## 2020-07-10 NOTE — ED
Weakness HPI





- General


Chief complaint: Weakness


Stated complaint: fall/head injury


Time Seen by Provider: 07/10/20 09:02


Source: patient, EMS, RN notes reviewed, old records reviewed


Mode of arrival: EMS


Limitations: altered mental status





- History of Present Illness


Initial comments: 





Patient is a 79-year-old male recently discharged from rehab facility who 

returned home presents today with a fall.  Patient reports that he was recently 

admitted for heart attack and placed on Plavix.  Patient reports that today he 

is seeing a bathroom stepped backwards, lost his balance and fell backwards 

striking his head and complaining of head and neck pain.  He also complains of 

pain into the right hip and lower back.  Patient reportedly has been quite unste

monster on his feet since being discharged from rehab facility.  He's had multiple 

falls within the past 2 days.  Patient reports as if he feels as if the room is 

spinning since hitting his head.  He reports that he is scheduled to have right 

hip replacement surgery in August by Dr. Witt, this was to  be sooner but 

was being placed on a blood thinner this had to be delayed.  Patient's wife 

states that he has not been eating or drinking much for the past few days since 

being home.





- Related Data


                                Home Medications











 Medication  Instructions  Recorded  Confirmed


 


Aspirin EC [Ecotrin Low Dose] 81 mg PO  08/23/14 07/10/20


 


amLODIPine [Norvasc] 5 mg PO QAM 08/23/14 07/10/20


 


buPROPion XL [Wellbutrin XL] 300 mg PO QAM 08/23/14 07/10/20


 


Quinapril HCl [Accupril] 10 mg PO QAM 10/22/17 07/10/20


 


Docusate [Colace] 100 mg PO DAILY PRN 12/21/18 07/10/20


 


Metoprolol Succinate (ER) [Toprol 50 mg PO BID 12/21/18 07/10/20





XL]   


 


Atorvastatin [Lipitor] 80 mg PO HS 06/18/20 07/10/20


 


Cholecalciferol [Vitamin D3 (25 1,000 unit PO QAM 06/18/20 07/10/20





Mcg = 1000 Iu)]   


 


Clopidogrel [Plavix] 75 mg PO QAM 06/18/20 07/10/20


 


Fish Oil/Dha/Epa [Fish Oil 1,200 1 cap PO QAM 06/18/20 07/10/20





mg Fish Oil]   


 


Furosemide [Lasix] 20 mg PO DAILY 06/18/20 07/10/20


 


Glimepiride [Amaryl] 4 mg PO QAM 06/18/20 07/10/20


 


Magnesium Oxide [Mag-Ox] 200 mg PO BID 06/18/20 07/10/20


 


Potassium Chloride ER [K-Dur 10] 10 meq PO DAILY 06/18/20 07/10/20


 


INSULIN ASPART (NovoLOG) [NovoLOG See Protocol SQ ACHS 07/10/20 07/10/20





(formulary)]   


 


Multivitamins, Thera [Multivitamin 1 tab PO DAILY@1200 07/10/20 07/10/20





(formulary)]   








                                  Previous Rx's











 Medication  Instructions  Recorded


 


Nitroglycerin Sl Tabs [Nitrostat] 0.4 mg SUBLINGUAL Q5M PRN #20 tab 06/21/20


 


Acetaminophen Tab [Tylenol] 650 mg PO Q6HR PRN  tab 07/01/20


 


Folic Acid 1 mg PO DAILY@1200  tab 07/01/20


 


Pantoprazole [Protonix] 40 mg PO AC-[id number]  tablet. 07/01/20


 


Thiamine [Vitamin B-1] 100 mg PO DAILY@1200  tab 07/01/20


 


risperiDONE [RisperDAL] 0.125 mg PO HS #3 tab 07/01/20











                                    Allergies











Allergy/AdvReac Type Severity Reaction Status Date / Time


 


iodine Allergy Unknown Unknown Verified 07/10/20 10:27














Review of Systems


ROS Statement: 


Those systems with pertinent positive or pertinent negative responses have been 

documented in the HPI.





ROS Other: All systems not noted in ROS Statement are negative.





Past Medical History


Past Medical History: Diabetes Mellitus, GERD/Reflux, Hyperlipidemia, Myocardial

 Infarction (MI), Osteoarthritis (OA), Renal Disease, Sleep Apnea/CPAP/BIPAP


Additional Past Medical History / Comment(s): ANEMIA. MI x 2, heart murmer, no 

cpap used, constipation,


Last Myocardial Infarction Date:: 3/28/19


History of Any Multi-Drug Resistant Organisms: None Reported


Past Surgical History: Heart Catheterization With Stent, Joint Replacement, 

Orthopedic Surgery


Additional Past Surgical History / Comment(s): 4 cardiac stents, total right 

knee replacement, monse cataracts with lens implants, left shoulder surgery, left 

hip replacement,


Past Anesthesia/Blood Transfusion Reactions: No Reported Reaction


Date of Last Stent Placement:: 6/2020


Past Psychological History: No Psychological Hx Reported


Smoking Status: Never smoker


Past Alcohol Use History: None Reported


Past Drug Use History: None Reported





- Past Family History


  ** Father


Family Medical History: Myocardial Infarction (MI)





  ** Mother


Family Medical History: Coronary Artery Disease (CAD), CVA/TIA





  ** Sister(s)


Family Medical History: Cancer





General Exam





- General Exam Comments


Initial Comments: 





79-year-old male.  Moderate discomfort.


Limitations: altered mental status


General appearance: alert, in no apparent distress


Head exam: Present: atraumatic


Eye exam: Present: normal appearance, PERRL, EOMI.  Absent: scleral icterus, 

conjunctival injection, periorbital swelling


ENT exam: Present: normal exam, mucous membranes dry, mucous membranes moist


Neck exam: Present: normal inspection.  Absent: tenderness, meningismus, 

lymphadenopathy


Respiratory exam: Present: normal lung sounds bilaterally.  Absent: respiratory 

distress, wheezes, rales, rhonchi, stridor


Cardiovascular Exam: Present: regular rate, normal rhythm, normal heart sounds. 

 Absent: systolic murmur, diastolic murmur, rubs, gallop, clicks


Extremities exam: Present: normal inspection, full ROM, normal capillary refill,

 other (Multiple old bruises over upper and lower extremities.).  Absent: 

tenderness, pedal edema, joint swelling, calf tenderness


Back exam: Present: normal inspection


Neurological exam: Present: alert, oriented X3, CN II-XII intact


  ** Expanded


Patient oriented to: Present: person, place, time


Speech: Present: fluid speech


Cranial nerves: EOM's Intact: Normal


Upper motor neuron: Pronator Drift: Normal


Sensory exam: Upper Extremity Light Touch: Normal


Motor strength exam: RUE: 5, LUE: 5, RLE: 3 (pain with ROM of knee and hip), 

LLE: 5


Eye Response: (4) open spontaneously


Motor Response: (6) obeys commands


Verbal Response: (5) oriented


Saba Total: 15


Psychiatric exam: Present: normal affect


Skin exam: Present: warm, dry, intact, normal color.  Absent: rash





Course


                                   Vital Signs











  07/10/20 07/10/20 07/10/20





  09:01 11:00 12:04


 


Temperature 97.7 F  


 


Pulse Rate 70 78 80


 


Respiratory 18 18 18





Rate   


 


Blood Pressure 143/74 139/88 142/86


 


O2 Sat by Pulse 100 94 L 98





Oximetry   














EKG Findings





- EKG Comments:


EKG Findings:: EKG shows normal sinus rhythm right bundle-branch block.  

Possible inferior infarct age indeterminate.  Ventricular rate of 70 bpm.  

Normal is 158 ms.  She restoration is 124 ms.  QT QTc is 452/488 ms.





Medical Decision Making





- Medical Decision Making





79-year-old male presents emergency department today for weakness and falls.  

Patient fell back hitting his head and neck today when he lost his balance.  He 

was started on Plavix.  Patient CT of the brain was negative for any acute 

intracranial hemorrhage or fracture.  Patient is given IV fluids labwork 

obtained.  Appears dehydrated with dry oropharynx.  HEENT complains of 

significant right hip pain.  Patient was unable to move his hip without pain and

 he does report he is scheduled to have replacement.  Patient's wife states that

 he has been unsteady on his feet and unable to ambulate and when he fall she is

 unable to come up.  Patient's wife states that he will need to be placed in a 

rehab facility again denies just discharged home.  Wife was upset that there is 

not much contact to her from [facility] rehab facility and the doctor that admitted

 him to [facility], and was told to come pick him up onto his discharge.  Patient 

at this time is given 2 doses of Dilaudid reports his pain in his right hip 

improved.  X-ray of the hip was negative for fracture.  And Patient was and was 

able to move well to ambulate to the bedside commode and had a difficult time 

using a urinal and Zendejas catheter was placed.  Patient case was discussed with 

Dr. Anderson whom discussed the case with Dr. Rodrigez. 





- Lab Data


Result diagrams: 


                                 07/10/20 09:25





                                 07/10/20 09:25


                                   Lab Results











  07/10/20 07/10/20 07/10/20 Range/Units





  09:25 09:25 09:25 


 


WBC  6.5    (3.8-10.6)  k/uL


 


RBC  3.40 L    (4.30-5.90)  m/uL


 


Hgb  9.6 L    (13.0-17.5)  gm/dL


 


Hct  29.8 L    (39.0-53.0)  %


 


MCV  87.6    (80.0-100.0)  fL


 


MCH  28.3    (25.0-35.0)  pg


 


MCHC  32.3    (31.0-37.0)  g/dL


 


RDW  15.1    (11.5-15.5)  %


 


Plt Count  254    (150-450)  k/uL


 


Neutrophils %  75    %


 


Lymphocytes %  14    %


 


Monocytes %  7    %


 


Eosinophils %  2    %


 


Basophils %  0    %


 


Neutrophils #  4.8    (1.3-7.7)  k/uL


 


Lymphocytes #  0.9 L    (1.0-4.8)  k/uL


 


Monocytes #  0.4    (0-1.0)  k/uL


 


Eosinophils #  0.1    (0-0.7)  k/uL


 


Basophils #  0.0    (0-0.2)  k/uL


 


PT   11.1   (9.0-12.0)  sec


 


INR   1.1   (<1.2)  


 


APTT   22.7   (22.0-30.0)  sec


 


Sodium    139  (137-145)  mmol/L


 


Potassium    4.4  (3.5-5.1)  mmol/L


 


Chloride    103  ()  mmol/L


 


Carbon Dioxide    22  (22-30)  mmol/L


 


Anion Gap    14  mmol/L


 


BUN    27 H  (9-20)  mg/dL


 


Creatinine    1.63 H  (0.66-1.25)  mg/dL


 


Est GFR (CKD-EPI)AfAm    45  (>60 ml/min/1.73 sqM)  


 


Est GFR (CKD-EPI)NonAf    39  (>60 ml/min/1.73 sqM)  


 


Glucose    154 H  (74-99)  mg/dL


 


Calcium    9.2  (8.4-10.2)  mg/dL


 


Magnesium    1.8  (1.6-2.3)  mg/dL


 


Total Bilirubin    0.7  (0.2-1.3)  mg/dL


 


AST    24  (17-59)  U/L


 


ALT    25  (4-49)  U/L


 


Alkaline Phosphatase    137 H  ()  U/L


 


Troponin I     (0.000-0.034)  ng/mL


 


NT-Pro-B Natriuret Pep     pg/mL


 


Total Protein    6.2 L  (6.3-8.2)  g/dL


 


Albumin    3.8  (3.5-5.0)  g/dL


 


Urine Color     


 


Urine Appearance     (Clear)  


 


Urine pH     (5.0-8.0)  


 


Ur Specific Gravity     (1.001-1.035)  


 


Urine Protein     (Negative)  


 


Urine Glucose (UA)     (Negative)  


 


Urine Ketones     (Negative)  


 


Urine Blood     (Negative)  


 


Urine Nitrite     (Negative)  


 


Urine Bilirubin     (Negative)  


 


Urine Urobilinogen     (<2.0)  mg/dL


 


Ur Leukocyte Esterase     (Negative)  














  07/10/20 07/10/20 07/10/20 Range/Units





  09:25 09:25 11:08 


 


WBC     (3.8-10.6)  k/uL


 


RBC     (4.30-5.90)  m/uL


 


Hgb     (13.0-17.5)  gm/dL


 


Hct     (39.0-53.0)  %


 


MCV     (80.0-100.0)  fL


 


MCH     (25.0-35.0)  pg


 


MCHC     (31.0-37.0)  g/dL


 


RDW     (11.5-15.5)  %


 


Plt Count     (150-450)  k/uL


 


Neutrophils %     %


 


Lymphocytes %     %


 


Monocytes %     %


 


Eosinophils %     %


 


Basophils %     %


 


Neutrophils #     (1.3-7.7)  k/uL


 


Lymphocytes #     (1.0-4.8)  k/uL


 


Monocytes #     (0-1.0)  k/uL


 


Eosinophils #     (0-0.7)  k/uL


 


Basophils #     (0-0.2)  k/uL


 


PT     (9.0-12.0)  sec


 


INR     (<1.2)  


 


APTT     (22.0-30.0)  sec


 


Sodium     (137-145)  mmol/L


 


Potassium     (3.5-5.1)  mmol/L


 


Chloride     ()  mmol/L


 


Carbon Dioxide     (22-30)  mmol/L


 


Anion Gap     mmol/L


 


BUN     (9-20)  mg/dL


 


Creatinine     (0.66-1.25)  mg/dL


 


Est GFR (CKD-EPI)AfAm     (>60 ml/min/1.73 sqM)  


 


Est GFR (CKD-EPI)NonAf     (>60 ml/min/1.73 sqM)  


 


Glucose     (74-99)  mg/dL


 


Calcium     (8.4-10.2)  mg/dL


 


Magnesium     (1.6-2.3)  mg/dL


 


Total Bilirubin     (0.2-1.3)  mg/dL


 


AST     (17-59)  U/L


 


ALT     (4-49)  U/L


 


Alkaline Phosphatase     ()  U/L


 


Troponin I  0.013    (0.000-0.034)  ng/mL


 


NT-Pro-B Natriuret Pep   1470   pg/mL


 


Total Protein     (6.3-8.2)  g/dL


 


Albumin     (3.5-5.0)  g/dL


 


Urine Color    Yellow  


 


Urine Appearance    Clear  (Clear)  


 


Urine pH    6.5  (5.0-8.0)  


 


Ur Specific Gravity    1.011  (1.001-1.035)  


 


Urine Protein    Negative  (Negative)  


 


Urine Glucose (UA)    Negative  (Negative)  


 


Urine Ketones    Negative  (Negative)  


 


Urine Blood    Negative  (Negative)  


 


Urine Nitrite    Negative  (Negative)  


 


Urine Bilirubin    Negative  (Negative)  


 


Urine Urobilinogen    <2.0  (<2.0)  mg/dL


 


Ur Leukocyte Esterase    Negative  (Negative)  














- Radiology Data


Radiology results: report reviewed


There is no acute fracture dislocation of the cervical spine.  No acute 

intracranial hemorrhage mass effect or midline shift is seen.  Multilevel 

degenerative disc disease and facet arthropathy with multilevel stenosis and 

foraminal coachmen suspected recommended a follow-up MRI.  Dense carotid artery 

atherosclerotic changes.





Disposition


Clinical Impression: 


 Fall, Gait instability, Right hip pain, Dehydration





Disposition: ADMITTED AS IP TO THIS [facility]


Condition: Stable


Is patient prescribed a controlled substance at d/c from ED?: No


Referrals: 


Lemuel Ortiz MD [Primary Care Provider] - 1-2 days

## 2020-07-10 NOTE — XR
EXAMINATION TYPE: XR Femur RT 1 View

 

DATE OF EXAM: 7/10/2020

 

CLINICAL HISTORY: Pain

 

TECHNIQUE:  Two views of the right femur are obtained.

 

COMPARISON: None

 

FINDINGS:  There is no acute fracture or dislocation seen in the right femur.  The overlying soft tis
nadia appears unremarkable.

 

IMPRESSION:  There is no acute fracture or dislocation in the right femur.

## 2020-07-10 NOTE — CT
EXAMINATION TYPE: CT brain cspine wo con

 

DATE OF EXAM: 7/10/2020

 

COMPARISON: 6/27/2012

 

HISTORY: Fall on blood thinner

 

CT DLP: 1346 mGycm

Automated exposure control for dose reduction was used.

 

TECHNIQUE: CT scan of the head and cervical spine are performed without contrast.

 

FINDINGS:   

Brain: There is mild-to-moderate generalized degenerative change with diffuse low-attenuation the whi
te matter most typical remote microvascular ischemia.

No acute hemorrhage or mass effect.

Intracranial atherosclerotic changes noted. Calvarium intact. Craniocervical junction maintained. Cherie
la turcica is normal.

 

Cervical spine: Assessment spinal canal limited due to resolution and artifact.

There is multilevel severe degenerative disc disease and hypertrophic spurring. There is a 2 mm anter
olisthesis of C3 6 relative to C7. Multilevel facet arthropathy. Odontoid intact. Prevertebral soft t
issue structures are within normal limits. Suspect multilevel foraminal encroachment and canal stenos
is.

 

There is dense atherosclerotic changes of the carotid arteries correlate clinically. Exam nondiagnost
ic in assessment for disc herniation. Tiny 1 to 2 mm subpleural apical nodules likely benign.

 

IMPRESSION:

1. There is no acute fracture or dislocation evident in the cervical spine.

2. No acute intracranial hemorrhage, mass effect, or midline shift is seen.

3. Multilevel severe degenerative disc disease and facet arthropathy with multilevel canal stenosis a
nd foraminal encroachment suspected recommend follow-up MRI.

4. Dense carotid artery atherosclerotic changes.

## 2020-07-10 NOTE — XR
EXAMINATION TYPE: XR Hip RT and AP Pelvis

 

DATE OF EXAM: 7/10/2020

 

CLINICAL HISTORY: pain

 

TECHNIQUE: Single view the pelvis is submitted.

 

FINDINGS: No  evidence for fracture, dislocation or bony lesion.  Joint spaces are severely narrowed.
  SI joints appear symmetric. Left hip prosthesis.

 

IMPRESSION: 

 

1. No acute fracture or dislocation seen.

 

ICD 10 NO FRACTURE, INITIAL EVALUATION

## 2020-07-11 LAB
ANION GAP SERPL CALC-SCNC: 6 MMOL/L
BUN SERPL-SCNC: 16 MG/DL (ref 9–20)
CALCIUM SPEC-MCNC: 8.1 MG/DL (ref 8.4–10.2)
CHLORIDE SERPL-SCNC: 106 MMOL/L (ref 98–107)
CO2 SERPL-SCNC: 26 MMOL/L (ref 22–30)
GLUCOSE BLD-MCNC: 105 MG/DL (ref 75–99)
GLUCOSE BLD-MCNC: 173 MG/DL (ref 75–99)
GLUCOSE BLD-MCNC: 83 MG/DL (ref 75–99)
GLUCOSE BLD-MCNC: 89 MG/DL (ref 75–99)
GLUCOSE SERPL-MCNC: 81 MG/DL (ref 74–99)
POTASSIUM SERPL-SCNC: 3.8 MMOL/L (ref 3.5–5.1)
SODIUM SERPL-SCNC: 138 MMOL/L (ref 137–145)

## 2020-07-11 RX ADMIN — METOPROLOL SUCCINATE SCH MG: 50 TABLET, EXTENDED RELEASE ORAL at 20:06

## 2020-07-11 RX ADMIN — CLOPIDOGREL BISULFATE SCH MG: 75 TABLET ORAL at 07:37

## 2020-07-11 RX ADMIN — BUPROPION HYDROCHLORIDE SCH MG: 300 TABLET, FILM COATED, EXTENDED RELEASE ORAL at 07:37

## 2020-07-11 RX ADMIN — PANTOPRAZOLE SODIUM SCH MG: 40 TABLET, DELAYED RELEASE ORAL at 07:37

## 2020-07-11 RX ADMIN — HEPARIN SODIUM SCH UNIT: 5000 INJECTION, SOLUTION INTRAVENOUS; SUBCUTANEOUS at 20:06

## 2020-07-11 RX ADMIN — INSULIN ASPART SCH: 100 INJECTION, SOLUTION INTRAVENOUS; SUBCUTANEOUS at 11:29

## 2020-07-11 RX ADMIN — CEFAZOLIN SCH MLS/HR: 330 INJECTION, POWDER, FOR SOLUTION INTRAMUSCULAR; INTRAVENOUS at 07:38

## 2020-07-11 RX ADMIN — Medication SCH MG: at 20:06

## 2020-07-11 RX ADMIN — FOLIC ACID SCH MG: 1 TABLET ORAL at 11:04

## 2020-07-11 RX ADMIN — INSULIN ASPART SCH UNIT: 100 INJECTION, SOLUTION INTRAVENOUS; SUBCUTANEOUS at 17:19

## 2020-07-11 RX ADMIN — ACETAMINOPHEN PRN MG: 325 TABLET, FILM COATED ORAL at 11:01

## 2020-07-11 RX ADMIN — METOPROLOL SUCCINATE SCH MG: 50 TABLET, EXTENDED RELEASE ORAL at 07:37

## 2020-07-11 RX ADMIN — INSULIN ASPART SCH: 100 INJECTION, SOLUTION INTRAVENOUS; SUBCUTANEOUS at 07:29

## 2020-07-11 RX ADMIN — Medication SCH MG: at 11:04

## 2020-07-11 RX ADMIN — Medication SCH UNIT: at 07:38

## 2020-07-11 RX ADMIN — GLIMEPIRIDE SCH MG: 4 TABLET ORAL at 07:39

## 2020-07-11 RX ADMIN — CEFAZOLIN SCH: 330 INJECTION, POWDER, FOR SOLUTION INTRAMUSCULAR; INTRAVENOUS at 01:29

## 2020-07-11 RX ADMIN — ATORVASTATIN CALCIUM SCH MG: 80 TABLET, FILM COATED ORAL at 20:06

## 2020-07-11 RX ADMIN — Medication SCH MG: at 07:37

## 2020-07-11 RX ADMIN — INSULIN ASPART SCH: 100 INJECTION, SOLUTION INTRAVENOUS; SUBCUTANEOUS at 20:06

## 2020-07-11 RX ADMIN — HEPARIN SODIUM SCH UNIT: 5000 INJECTION, SOLUTION INTRAVENOUS; SUBCUTANEOUS at 07:39

## 2020-07-11 RX ADMIN — ASPIRIN 81 MG CHEWABLE TABLET SCH MG: 81 TABLET CHEWABLE at 20:06

## 2020-07-11 RX ADMIN — INSULIN ASPART SCH: 100 INJECTION, SOLUTION INTRAVENOUS; SUBCUTANEOUS at 11:58

## 2020-07-11 NOTE — P.PN
Subjective





79-year-old pleasant male came to the hospital after a fall at home.  Patient 

was recently discharged from the subacute rehabilitation facility.  The patient 

did during his hospitalization was treated for narcotic-induced encephalopathy. 

Patient at this time denied any fever chills nausea vomiting abdominal pain.  

Patient although was started on multiple medications that can affect his kidney 

and opiates all of which were discontinued.  Patient was started on lorazepam 

that was this can urine as well.  Patient will just use Tylenol for pain.  

Patient is found to be in acute renal failure patient baseline creatinine around

1.1 1.2 now around 1.63.   blood pressure is low as well.  Patient was 

feeling quite weak.








07/11/2020


Patient's serum creatinine improved significantly from 1.6-1.15 fluids were 

discontinued patient is feeling better but  still has generalized weakness.  

Patient denied any fever chills nausea vomiting abdominal pain.  Patient will be

discharged to subacute [facility] on Monday as he needs 3 days of hospital 

physician.





Constitutional: Denied any fatigue denied any fever.


Cardio vascular: denied any chest pain, palpitations


Gastrointestinal denied any nausea vomiting


Pulmonary: Denied any shortness of breath cough


Neurologic denied any new focal deficits





All inpatient medications were reviewed and appropriate changes in these 

medications as dictated in the interval history and assessment and plan.





Objective





- Vital Signs


Vital signs: 


                                   Vital Signs











Temp  98.5 F   07/11/20 07:00


 


Pulse  66   07/11/20 07:00


 


Resp  16   07/11/20 08:30


 


BP  152/73   07/11/20 07:00


 


Pulse Ox  100   07/11/20 07:00








                                 Intake & Output











 07/10/20 07/11/20 07/11/20





 18:59 06:59 18:59


 


Intake Total  200 850


 


Output Total 1655 1850 


 


Balance -1655 -1650 850


 


Weight 79.469 kg  


 


Intake:   


 


  Intake, IV Titration  200 600





  Amount   


 


    Sodium Chloride 0.9% 1,   600





    000 ml @ 100 mls/hr IV .   





    Q10H STA Rx#:[id number]   


 


    Sodium Chloride 0.9% 1,  200 





    000 ml @ 75 mls/hr IV .   





    [id number] OPAL Rx#:[id number]   


 


  Oral   250


 


Output:   


 


  Urine 600 1850 


 


    Uretheral (Zendejas) 600 1100 


 


  Post Void Residual 1055  


 


Other:   


 


  Voiding Method Indwelling Catheter Indwelling Catheter Indwelling Catheter














- Exam





PHYSICAL EXAMINATION: 





GENERAL: The patient is alert and oriented x3, not in any acute distress. Well 

developed, well nourished. 


HEENT: Pupils are round and equally reacting to light. EOMI. No scleral icterus.

No conjunctival pallor. Normocephalic, atraumatic. No pharyngeal erythema. No 

thyromegaly. 


CARDIOVASCULAR: S1 and S2 present. No murmurs, rubs, or gallops. 


PULMONARY: Chest is clear to auscultation, no wheezing or crackles. 


ABDOMEN: Soft, nontender, nondistended, normoactive bowel sounds. No palpable 

organomegaly. 


MUSCULOSKELETAL: No joint swelling or deformity.


EXTREMITIES: No cyanosis, clubbing, or pedal edema. 


NEUROLOGICAL: Gross neurological examination did not reveal any focal deficits. 

Significant generalized weakness.


SKIN: No rashes. 














- Labs


CBC & Chem 7: 


                                 07/10/20 09:25





                                 07/11/20 07:31


Labs: 


                  Abnormal Lab Results - Last 24 Hours (Table)











  07/10/20 07/10/20 07/11/20 Range/Units





  17:08 20:30 07:31 


 


POC Glucose (mg/dL)  145 H  182 H   (75-99)  mg/dL


 


Calcium    8.1 L  (8.4-10.2)  mg/dL














Assessment and Plan


Plan: 


-Fall: Secondary to generalized weakness.  Patient will need placement to 

subacute rehabilitation


-Acute renal failure pedal azotemia nephrotoxic medications including 

nonsteroidal anti-inflammatory medications and diuretics were held and patient 

received IV fluids with significant improvement in the serum creatinine IV 

fluids will be discontinued


-Hypertension blood pressure started going up patient will be started back on 

ACE inhibitor as his kidney function improved as well.


-A recent cerebrovascular accident for which patient is on Plavix which will be 

continued


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


-Urinary artery disease


-Type 2 diabetes mellitus: Patient will be resumed on his home regimen will 

continue to check the blood sugars


-Severe degenerative joint disease


-Systolic murmur in the mitral area probably secondary to mitral regurgitation


-DVT prophylaxis with subcutaneous heparin

## 2020-07-12 LAB
GLUCOSE BLD-MCNC: 127 MG/DL (ref 75–99)
GLUCOSE BLD-MCNC: 145 MG/DL (ref 75–99)
GLUCOSE BLD-MCNC: 75 MG/DL (ref 75–99)
GLUCOSE BLD-MCNC: 90 MG/DL (ref 75–99)

## 2020-07-12 RX ADMIN — HEPARIN SODIUM SCH UNIT: 5000 INJECTION, SOLUTION INTRAVENOUS; SUBCUTANEOUS at 20:24

## 2020-07-12 RX ADMIN — Medication SCH MG: at 08:40

## 2020-07-12 RX ADMIN — CLOPIDOGREL BISULFATE SCH MG: 75 TABLET ORAL at 08:40

## 2020-07-12 RX ADMIN — ATORVASTATIN CALCIUM SCH MG: 80 TABLET, FILM COATED ORAL at 20:24

## 2020-07-12 RX ADMIN — INSULIN ASPART SCH: 100 INJECTION, SOLUTION INTRAVENOUS; SUBCUTANEOUS at 20:41

## 2020-07-12 RX ADMIN — HEPARIN SODIUM SCH UNIT: 5000 INJECTION, SOLUTION INTRAVENOUS; SUBCUTANEOUS at 08:40

## 2020-07-12 RX ADMIN — INSULIN ASPART SCH: 100 INJECTION, SOLUTION INTRAVENOUS; SUBCUTANEOUS at 07:25

## 2020-07-12 RX ADMIN — GLIMEPIRIDE SCH MG: 4 TABLET ORAL at 08:40

## 2020-07-12 RX ADMIN — FOLIC ACID SCH MG: 1 TABLET ORAL at 08:40

## 2020-07-12 RX ADMIN — METOPROLOL SUCCINATE SCH MG: 50 TABLET, EXTENDED RELEASE ORAL at 08:40

## 2020-07-12 RX ADMIN — ASPIRIN 81 MG CHEWABLE TABLET SCH MG: 81 TABLET CHEWABLE at 20:24

## 2020-07-12 RX ADMIN — PANTOPRAZOLE SODIUM SCH MG: 40 TABLET, DELAYED RELEASE ORAL at 08:40

## 2020-07-12 RX ADMIN — Medication SCH UNIT: at 08:39

## 2020-07-12 RX ADMIN — BUPROPION HYDROCHLORIDE SCH MG: 300 TABLET, FILM COATED, EXTENDED RELEASE ORAL at 08:40

## 2020-07-12 RX ADMIN — METOPROLOL SUCCINATE SCH MG: 50 TABLET, EXTENDED RELEASE ORAL at 20:24

## 2020-07-12 RX ADMIN — Medication SCH MG: at 20:24

## 2020-07-12 RX ADMIN — ACETAMINOPHEN PRN MG: 325 TABLET, FILM COATED ORAL at 17:00

## 2020-07-12 RX ADMIN — INSULIN ASPART SCH: 100 INJECTION, SOLUTION INTRAVENOUS; SUBCUTANEOUS at 16:58

## 2020-07-12 RX ADMIN — Medication SCH MG: at 08:39

## 2020-07-12 RX ADMIN — INSULIN ASPART SCH: 100 INJECTION, SOLUTION INTRAVENOUS; SUBCUTANEOUS at 11:48

## 2020-07-12 NOTE — P.PN
Subjective





79-year-old pleasant male came to the hospital after a fall at home.  Patient 

was recently discharged from the subacute rehabilitation facility.  The patient 

did during his hospitalization was treated for narcotic-induced encephalopathy. 

Patient at this time denied any fever chills nausea vomiting abdominal pain.  

Patient although was started on multiple medications that can affect his kidney 

and opiates all of which were discontinued.  Patient was started on lorazepam 

that was this can urine as well.  Patient will just use Tylenol for pain.  

Patient is found to be in acute renal failure patient baseline creatinine around

1.1 1.2 now around 1.63.   blood pressure is low as well.  Patient was 

feeling quite weak.








07/11/2020


Patient's serum creatinine improved significantly from 1.6-1.15 fluids were 

discontinued patient is feeling better but  still has generalized weakness.  

Patient denied any fever chills nausea vomiting abdominal pain.  [facility] 

physician.





07/12/2020


Patient overall clinically doing well will not be discharged to subacute r

ehabitation





Constitutional: Denied any fatigue denied any fever.


Cardio vascular: denied any chest pain, palpitations


Gastrointestinal denied any nausea vomiting


Pulmonary: Denied any shortness of breath cough


Neurologic denied any new focal deficits





All inpatient medications were reviewed and appropriate changes in these 

medications as dictated in the interval history and assessment and plan.





Objective





- Vital Signs


Vital signs: 


                                   Vital Signs











Temp  97.9 F   07/12/20 07:00


 


Pulse  69   07/12/20 07:00


 


Resp  17   07/12/20 07:00


 


BP  167/81   07/12/20 07:00


 


Pulse Ox  99   07/12/20 07:00








                                 Intake & Output











 07/11/20 07/12/20 07/12/20





 18:59 06:59 18:59


 


Intake Total 1150 100 


 


Output Total 2000 1200 


 


Balance -850 -1100 


 


Intake:   


 


  Intake, IV Titration 600  





  Amount   


 


    Sodium Chloride 0.9% 1, 600  





    000 ml @ 100 mls/hr IV .   





    Q10H STA Rx#:[id number]   


 


  Oral 550 100 


 


Output:   


 


  Urine 2000 1200 


 


    Uretheral ([place]) 300  


 


Other:   


 


  Voiding Method Indwelling Catheter Indwelling Catheter Indwelling Catheter


 


  # Voids 1 2 














- Exam





PHYSICAL EXAMINATION: 





GENERAL: The patient is alert and oriented x3, not in any acute distress. Well 

developed, well nourished. 


HEENT: Pupils are round and equally reacting to light. EOMI. No scleral icterus.

No conjunctival pallor. Normocephalic, atraumatic. No pharyngeal erythema. No 

thyromegaly. 


CARDIOVASCULAR: S1 and S2 present. No murmurs, rubs, or gallops. 


PULMONARY: Chest is clear to auscultation, no wheezing or crackles. 


ABDOMEN: Soft, nontender, nondistended, normoactive bowel sounds. No palpable 

organomegaly. 


MUSCULOSKELETAL: No joint swelling or deformity.


EXTREMITIES: No cyanosis, clubbing, or pedal edema. 


NEUROLOGICAL: Gross neurological examination did not reveal any focal deficits. 

Significant generalized weakness.


SKIN: No rashes. 














- Labs


CBC & Chem 7: 


                                 07/10/20 09:25





                                 07/11/20 07:31


Labs: 


                  Abnormal Lab Results - Last 24 Hours (Table)











  07/11/20 07/11/20 07/12/20 Range/Units





  16:26 20:00 11:25 


 


POC Glucose (mg/dL)  173 H  105 H  127 H  (75-99)  mg/dL














Assessment and Plan


Plan: 


-Fall: Secondary to generalized weakness.  Patient will need placement to 

subacute rehabilitation


-Acute renal failure pedal azotemia nephrotoxic medications including 

nonsteroidal anti-inflammatory medications and diuretics were held and patient 

received IV fluids with significant improvement in the serum creatinine is 

presently off fluids


-Hypertension blood pressure started going up patient was started back on ACE 

inhibitor with better blood pressure control


-A recent cerebrovascular accident for which patient is on Plavix which will be 

continued


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


-Urinary artery disease


-Type 2 diabetes mellitus: His blood sugars are low today we'll discontinue


-Severe degenerative joint disease


-Systolic murmur in the mitral area probably secondary to mitral regurgitation


-DVT prophylaxis with subcutaneous heparin

## 2020-07-13 LAB
ANION GAP SERPL CALC-SCNC: 9 MMOL/L
BUN SERPL-SCNC: 20 MG/DL (ref 9–20)
CALCIUM SPEC-MCNC: 8.7 MG/DL (ref 8.4–10.2)
CHLORIDE SERPL-SCNC: 104 MMOL/L (ref 98–107)
CO2 SERPL-SCNC: 24 MMOL/L (ref 22–30)
GLUCOSE BLD-MCNC: 175 MG/DL (ref 75–99)
GLUCOSE BLD-MCNC: 235 MG/DL (ref 75–99)
GLUCOSE BLD-MCNC: 75 MG/DL (ref 75–99)
GLUCOSE SERPL-MCNC: 66 MG/DL (ref 74–99)
POTASSIUM SERPL-SCNC: 4 MMOL/L (ref 3.5–5.1)
SODIUM SERPL-SCNC: 137 MMOL/L (ref 137–145)

## 2020-07-13 RX ADMIN — Medication SCH UNIT: at 07:57

## 2020-07-13 RX ADMIN — Medication SCH MG: at 21:20

## 2020-07-13 RX ADMIN — INSULIN ASPART SCH UNIT: 100 INJECTION, SOLUTION INTRAVENOUS; SUBCUTANEOUS at 21:09

## 2020-07-13 RX ADMIN — Medication SCH MG: at 07:57

## 2020-07-13 RX ADMIN — PANTOPRAZOLE SODIUM SCH MG: 40 TABLET, DELAYED RELEASE ORAL at 07:56

## 2020-07-13 RX ADMIN — INSULIN ASPART SCH: 100 INJECTION, SOLUTION INTRAVENOUS; SUBCUTANEOUS at 12:37

## 2020-07-13 RX ADMIN — METOPROLOL SUCCINATE SCH MG: 50 TABLET, EXTENDED RELEASE ORAL at 07:57

## 2020-07-13 RX ADMIN — FOLIC ACID SCH MG: 1 TABLET ORAL at 07:57

## 2020-07-13 RX ADMIN — ASPIRIN 81 MG CHEWABLE TABLET SCH MG: 81 TABLET CHEWABLE at 21:08

## 2020-07-13 RX ADMIN — INSULIN ASPART SCH: 100 INJECTION, SOLUTION INTRAVENOUS; SUBCUTANEOUS at 06:52

## 2020-07-13 RX ADMIN — CLOPIDOGREL BISULFATE SCH MG: 75 TABLET ORAL at 07:57

## 2020-07-13 RX ADMIN — BUPROPION HYDROCHLORIDE SCH MG: 300 TABLET, FILM COATED, EXTENDED RELEASE ORAL at 07:57

## 2020-07-13 RX ADMIN — INSULIN ASPART SCH: 100 INJECTION, SOLUTION INTRAVENOUS; SUBCUTANEOUS at 18:04

## 2020-07-13 RX ADMIN — ACETAMINOPHEN PRN MG: 325 TABLET, FILM COATED ORAL at 03:32

## 2020-07-13 RX ADMIN — HEPARIN SODIUM SCH UNIT: 5000 INJECTION, SOLUTION INTRAVENOUS; SUBCUTANEOUS at 21:07

## 2020-07-13 RX ADMIN — HEPARIN SODIUM SCH UNIT: 5000 INJECTION, SOLUTION INTRAVENOUS; SUBCUTANEOUS at 07:57

## 2020-07-13 RX ADMIN — METOPROLOL SUCCINATE SCH MG: 50 TABLET, EXTENDED RELEASE ORAL at 21:09

## 2020-07-13 RX ADMIN — ACETAMINOPHEN PRN MG: 325 TABLET, FILM COATED ORAL at 21:07

## 2020-07-13 RX ADMIN — ATORVASTATIN CALCIUM SCH MG: 80 TABLET, FILM COATED ORAL at 21:08

## 2020-07-13 NOTE — P.PN
Subjective


Progress Note Date: 07/13/20


Principal diagnosis: 








79-year-old pleasant male came to the hospital after a fall at home.  Patient 

was recently discharged from the subacute rehabilitation facility.  The patient 

did during his hospitalization was treated for narcotic-induced encephalopathy. 

Patient at this time denied any fever chills nausea vomiting abdominal pain.  

Patient although was started on multiple medications that can affect his kidney 

and opiates all of which were discontinued.  Patient was started on lorazepam 

that was this can urine as well.  Patient will just use Tylenol for pain.  

Patient is found to be in acute renal failure patient baseline creatinine around

1.1 1.2 now around 1.63.   blood pressure is low as well.  Patient was 

feeling quite weak.








07/11/2020


Patient's serum creatinine improved significantly from 1.6-1.15 fluids were 

discontinued patient is feeling better but  still has generalized weakness.  

Patient denied any fever chills nausea vomiting abdominal pain.  Patient will be

[facility] as he needs 3 days of hospital 

physician.





07/12/2020


Patient overall clinically doing well will not be discharged to subacute 

rehabitation





Constitutional: Denied any fatigue denied any fever.


Cardio vascular: denied any chest pain, palpitations


Gastrointestinal denied any nausea vomiting


Pulmonary: Denied any shortness of breath cough


Neurologic denied any new focal deficits





07/13/2020


Patient is seen and evaluated and follow-up and continues to be weak requiring 

assistance with ambulation.  PT/OT following.  Patient will likely need rehab 

upon discharge.  Case management and social work following and working on 

discharged possible [place].  Authorization through insurance will be needed.  

Patient's creatinine improved at 1.16.  Will continue to hold diuretic and 

monitor closely.  Patient is continued on sliding scale and glyburide was 

discontinued.  Currently no reports of chest pain, shortness of breath, or 

palpitations.  Patient is afebrile.  No reports of nausea or vomiting and 

patient is tolerating diet. 





Objective





- Vital Signs


Vital signs: 


                                   Vital Signs











Temp  98.3 F   07/13/20 07:00


 


Pulse  62   07/13/20 07:00


 


Resp  18   07/13/20 07:00


 


BP  142/75   07/13/20 07:00


 


Pulse Ox  98   07/13/20 07:00








                                 Intake & Output











 07/12/20 07/13/20 07/13/20





 18:59 06:59 18:59


 


Intake Total 540 250 580


 


Output Total 1500 425 


 


Balance -960 -175 580


 


Intake:   


 


  Oral 540 250 580


 


Output:   


 


  Urine 1500 425 


 


Other:   


 


  Voiding Method Indwelling Catheter Indwelling Catheter Indwelling Catheter


 


  # Bowel Movements 1  














- Exam





GENERAL: The patient is alert and oriented x3, not in any acute distress. Well 

developed, well nourished. 


HEENT: Pupils are round and equally reacting to light. EOMI. No scleral icterus.

No conjunctival pallor. Normocephalic, atraumatic. No pharyngeal erythema. No 

thyromegaly. 


CARDIOVASCULAR: S1 and S2 present. No murmurs, rubs, or gallops. 


PULMONARY: Chest is clear to auscultation, no wheezing or crackles. 


ABDOMEN: Soft, nontender, nondistended, normoactive bowel sounds. No palpable 

organomegaly. 


MUSCULOSKELETAL: No joint swelling or deformity.


EXTREMITIES: No cyanosis, clubbing, or pedal edema. 


NEUROLOGICAL: Gross neurological examination did not reveal any focal deficits. 

Significant generalized weakness.


SKIN: No rashes. 











- Labs


CBC & Chem 7: 


                                 07/10/20 09:25





                                 07/13/20 07:16


Labs: 


                  Abnormal Lab Results - Last 24 Hours (Table)











  07/12/20 07/13/20 07/13/20 Range/Units





  20:21 07:16 11:59 


 


Glucose   66 L   (74-99)  mg/dL


 


POC Glucose (mg/dL)  145 H   175 H  (75-99)  mg/dL














Assessment and Plan


Assessment: 





-Fall: Secondary to generalized weakness.  Social work following and working on 

authorization from insurance for possible [facility] for continued PT/OT therapy.  

PT/OT following and patient requiring two-person assistance.


-Acute renal failure prerenal azotemia nephrotoxic medications including 

nonsteroidal anti-inflammatory medications and diuretics were held and patient 

received IV fluids with significant improvement, current creatinine is 1.16


-Hypertension, resumed lisinopril


-A recent cerebrovascular accident for which patient is on Plavix which will be 

continued


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


-Coronary artery disease


-Type 2 diabetes mellitus, continue with sliding scale and monitor closely


-Severe degenerative joint disease


-Systolic murmur in the mitral area probably secondary to mitral regurgitation


-DVT prophylaxis with subcutaneous heparin





Plan:


Continue current medications, management, and symptomatic treatment.  Will 

continue to monitor vital signs and labs closely.  Continue to monitor blood 

sugars closely and treat accordingly sliding scale.  Will repeat a.m. labs.  

Patient is currently off IV fluids and will continue to hold diuretics at this 

time.  Case management and social work following and awaiting authorization from

insurance for patient to go to [facility] for continued PT/OT therapy.  Further 

recommendations to follow.  Possible discharge in 24-48 hours.

## 2020-07-13 NOTE — XR
EXAMINATION TYPE: XR chest 1V

 

DATE OF EXAM: 7/13/2020

 

COMPARISON: Prior chest x-ray 7/10/2020

 

HISTORY: Congestive heart failure

 

TECHNIQUE: Single frontal view of the chest is obtained.

 

FINDINGS:  There is no focal air space opacity, pleural effusion, or pneumothorax seen.  The cardiac 
silhouette size is within normal limits. There is a calcified nodule in the left midlung.  The osseou
s structures are intact.

 

IMPRESSION:  No acute process.

## 2020-07-14 VITALS
HEART RATE: 67 BPM | SYSTOLIC BLOOD PRESSURE: 146 MMHG | DIASTOLIC BLOOD PRESSURE: 73 MMHG | TEMPERATURE: 98.4 F | RESPIRATION RATE: 17 BRPM

## 2020-07-14 LAB
GLUCOSE BLD-MCNC: 112 MG/DL (ref 75–99)
GLUCOSE BLD-MCNC: 159 MG/DL (ref 75–99)
GLUCOSE BLD-MCNC: 190 MG/DL (ref 75–99)

## 2020-07-14 RX ADMIN — FOLIC ACID SCH MG: 1 TABLET ORAL at 09:14

## 2020-07-14 RX ADMIN — INSULIN ASPART SCH: 100 INJECTION, SOLUTION INTRAVENOUS; SUBCUTANEOUS at 07:30

## 2020-07-14 RX ADMIN — Medication SCH MG: at 09:15

## 2020-07-14 RX ADMIN — HEPARIN SODIUM SCH UNIT: 5000 INJECTION, SOLUTION INTRAVENOUS; SUBCUTANEOUS at 09:15

## 2020-07-14 RX ADMIN — INSULIN ASPART SCH: 100 INJECTION, SOLUTION INTRAVENOUS; SUBCUTANEOUS at 12:20

## 2020-07-14 RX ADMIN — INSULIN ASPART SCH UNIT: 100 INJECTION, SOLUTION INTRAVENOUS; SUBCUTANEOUS at 17:30

## 2020-07-14 RX ADMIN — METOPROLOL SUCCINATE SCH MG: 50 TABLET, EXTENDED RELEASE ORAL at 09:15

## 2020-07-14 RX ADMIN — CLOPIDOGREL BISULFATE SCH MG: 75 TABLET ORAL at 09:15

## 2020-07-14 RX ADMIN — Medication SCH UNIT: at 09:14

## 2020-07-14 RX ADMIN — BUPROPION HYDROCHLORIDE SCH MG: 300 TABLET, FILM COATED, EXTENDED RELEASE ORAL at 09:14

## 2020-07-14 RX ADMIN — PANTOPRAZOLE SODIUM SCH MG: 40 TABLET, DELAYED RELEASE ORAL at 09:15

## 2020-07-14 NOTE — P.DS
Providers


Date of admission: 


07/10/20 12:31





Expected date of discharge: 07/14/20


Attending physician: 


Barb Lee





Primary care physician: 


Lemuel Ortiz





Hospital Course: 





Final diagnosis


-Fall: Secondary to generalized weakness


-Covid 19 ruled out, testing was negative 2


-Acute renal failure prerenal azotemia nephrotoxic medications including 

nonsteroidal anti-inflammatory medications and diuretics 


-Hypertension


-A recent cerebrovascular accident


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


-Coronary artery disease


-Type 2 diabetes mellitus


-Severe degenerative joint disease


-Systolic murmur in the mitral area probably secondary to mitral regurgitation


-DVT prophylaxis








Discharge disposition


Patient is being discharged in a stable condition with guarded prognosis to 

[facility] for continued PT/OT therapy.  Patient will follow-up with Dr. Ortiz in the outpatient setting upon discharge.  Total time taken is 35 minutes.





History of present illness 


This is a 79-year-old male who was recently admitted with recent fall at home 

and was being closely monitored.  Patient was recently discharged from a 

subacute rehab facility and continued to have weakness and gait dysfunction and 

subsequently fell after returning home.  Patient is scheduled to see his 

orthopedic surgeon in the outpatient setting in August for procedure.  Patient 

during hospitalization work with physical therapy requiring 2 person assist and 

severe gait dysfunction.  Patient will be going to [facility] today for continued 

PT/OT therapy.  Patient had Covid testing 2 and was negative during this 

hospitalization.  Patient also was found to have acute renal failure prerenal 

azotemia most likely due to medications and diuretics and will continue to hold 

until follow-up with primary care provider.  Blood pressure medications of lisin

opril have been resumed and will continue.  Currently no reports of chest pain, 

shortness of breath, or palpitations.  Patient is afebrile.  No reports of 

nausea or vomiting and patient is tolerating diet. Patient will continue 

monitoring blood sugars before meals at bedtime treat accordingly with sliding 

scale.  Patient will be going to [facility] today.





On exam vital signs are stable.  Temp is 98.3F, pulse is 65, respirations are 

18, blood pressure is 170/83, oxygen saturation is 97% on room air.  Cardio S1, 

S2 are muffled.  Respiratory system shows diminished breath sounds at the bases 

with no wheezing or rhonchi noted.  Abdomen is soft  and nontender.  Nervous 

system shows diffuse weakness.





Please refer to medication reconciliation sheet for a list of medications.


Patient Condition at Discharge: Stable





Plan - Discharge Summary


Discharge Rx Participant: Yes


New Discharge Prescriptions: 


Continue


   Aspirin EC [Ecotrin Low Dose] 81 mg PO HS


   buPROPion XL [Wellbutrin XL] 300 mg PO QAM


   Quinapril HCl [Accupril] 10 mg PO QAM


   Docusate [Colace] 100 mg PO DAILY PRN


     PRN Reason: Constipation


   Metoprolol Succinate (ER) [Toprol XL] 50 mg PO BID


   Atorvastatin [Lipitor] 80 mg PO HS


   Cholecalciferol [Vitamin D3 (25 Mcg = 1000 Iu)] 1,000 unit PO QAM


   Magnesium Oxide [Mag-Ox] 200 mg PO BID


   Fish Oil/Dha/Epa [Fish Oil 1,200 mg Fish Oil] 1 cap PO QAM


   Clopidogrel [Plavix] 75 mg PO QAM


   Nitroglycerin Sl Tabs [Nitrostat] 0.4 mg SUBLINGUAL Q5M PRN #20 tab


     PRN Reason: Chest Pain


   Folic Acid 1 mg PO DAILY@1200  tab


   Pantoprazole [Protonix] 40 mg PO AC-BRKFST  tablet.


   risperiDONE [RisperDAL] 0.125 mg PO HS #3 tab


   Acetaminophen Tab [Tylenol] 650 mg PO Q6HR PRN  tab


     PRN Reason: Mild Pain Or Fever > 100.5


   Thiamine [Vitamin B-1] 100 mg PO DAILY@1200  tab


   INSULIN ASPART (NovoLOG) [NovoLOG (formulary)] See Protocol SQ ACHS


   Multivitamins, Thera [Multivitamin (formulary)] 1 tab PO DAILY@1200





Discontinued


   amLODIPine [Norvasc] 5 mg PO QAM


   Potassium Chloride ER [K-Dur 10] 10 meq PO DAILY


   Glimepiride [Amaryl] 4 mg PO QAM


   Furosemide [Lasix] 20 mg PO DAILY


Discharge Medication List





Aspirin EC [Ecotrin Low Dose] 81 mg PO HS 08/23/14 [History]


buPROPion XL [Wellbutrin XL] 300 mg PO QAM 08/23/14 [History]


Quinapril HCl [Accupril] 10 mg PO QAM 10/22/17 [History]


Docusate [Colace] 100 mg PO DAILY PRN 12/21/18 [History]


Metoprolol Succinate (ER) [Toprol XL] 50 mg PO BID 12/21/18 [History]


Atorvastatin [Lipitor] 80 mg PO HS 06/18/20 [History]


Cholecalciferol [Vitamin D3 (25 Mcg = 1000 Iu)] 1,000 unit PO QAM 06/18/20 

[History]


Clopidogrel [Plavix] 75 mg PO QAM 06/18/20 [History]


Fish Oil/Dha/Epa [Fish Oil 1,200 mg Fish Oil] 1 cap PO QAM 06/18/20 [History]


Magnesium Oxide [Mag-Ox] 200 mg PO BID 06/18/20 [History]


Nitroglycerin Sl Tabs [Nitrostat] 0.4 mg SUBLINGUAL Q5M PRN #20 tab 06/21/20 

[Rx]


Acetaminophen Tab [Tylenol] 650 mg PO Q6HR PRN  tab 07/01/20 [Rx]


Folic Acid 1 mg PO DAILY@1200  tab 07/01/20 [Rx]


Pantoprazole [Protonix] 40 mg PO AC-BRKFST  tablet. 07/01/20 [Rx]


Thiamine [Vitamin B-1] 100 mg PO DAILY@1200  tab 07/01/20 [Rx]


risperiDONE [RisperDAL] 0.125 mg PO HS #3 tab 07/01/20 [Rx]


INSULIN ASPART (NovoLOG) [NovoLOG (formulary)] See Protocol SQ ACHS 07/10/20 

[History]


Multivitamins, Thera [Multivitamin (formulary)] 1 tab PO DAILY@1200 07/10/20 

[History]








Follow up Appointment(s)/Referral(s): 


Lemuel Ortiz MD [Primary Care Provider] - 1-2 days


[facility], [NON-STAFF] - As Needed


Activity/Diet/Wound Care/Special Instructions: 


Patient Is going to [facility]


Activity as tolerated


Continue monitoring blood sugars before meals at bedtime and treat accordingly 

with sliding scale


Continue with consistent carb diet








Discharge Disposition: TRANSFER TO SNF/ECF

## 2021-03-03 ENCOUNTER — HOSPITAL ENCOUNTER (EMERGENCY)
Dept: HOSPITAL 47 - EC | Age: 81
Discharge: HOME | End: 2021-03-03
Payer: MEDICARE

## 2021-03-03 VITALS — DIASTOLIC BLOOD PRESSURE: 87 MMHG | SYSTOLIC BLOOD PRESSURE: 169 MMHG

## 2021-03-03 VITALS — HEART RATE: 65 BPM | TEMPERATURE: 97.5 F

## 2021-03-03 VITALS — RESPIRATION RATE: 18 BRPM

## 2021-03-03 DIAGNOSIS — Z79.02: ICD-10-CM

## 2021-03-03 DIAGNOSIS — S09.90XA: Primary | ICD-10-CM
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
## 2021-03-03 DIAGNOSIS — M19.90: ICD-10-CM

## 2021-03-03 DIAGNOSIS — I25.2: ICD-10-CM

## 2021-03-03 DIAGNOSIS — Z87.891: ICD-10-CM

## 2021-03-03 DIAGNOSIS — Z79.82: ICD-10-CM

## 2021-03-03 DIAGNOSIS — E11.36: ICD-10-CM

## 2021-03-03 DIAGNOSIS — Z79.899: ICD-10-CM

## 2021-03-03 DIAGNOSIS — Z79.4: ICD-10-CM

## 2021-03-03 DIAGNOSIS — E78.5: ICD-10-CM

## 2021-03-03 DIAGNOSIS — K21.9: ICD-10-CM

## 2021-03-03 PROCEDURE — 99284 EMERGENCY DEPT VISIT MOD MDM: CPT

## 2021-03-03 PROCEDURE — 72125 CT NECK SPINE W/O DYE: CPT

## 2021-03-03 PROCEDURE — 70450 CT HEAD/BRAIN W/O DYE: CPT

## 2021-03-03 NOTE — ED
Fall HPI





- General


Chief Complaint: Fall


Stated Complaint: Fall


Time Seen by Provider: 21 05:22


Source: patient, EMS


Mode of arrival: EMS





- History of Present Illness


Initial Comments: 





This patient is an 80-year-old man who presents to be evaluated after he had 

lost his balance and fallen.  The patient states she had gotten up to use the 

bathroom, lost his balance and fell back onto linoleum floor.  He struck the 

back of his head.  He states he does have a little bit of occipital headache.  

Patient denied loss consciousness.  He denies any other injury.  Triage note is 

made of confusion, but the patient feels he is oriented here.


MD Complaint: fall


Onset/Timin


-: hour(s)


Fall From: standing


When Fall Occurred: just prior to arrival


Fall Witnessed: no


Place Fall Occurred: home


Loss of Consciousness: none


Prolonged Down Time?: no


Location: head


Severity: mild


Context: history of frequent falls


Associated Symptoms: headache





- Related Data


                                Home Medications











 Medication  Instructions  Recorded  Confirmed


 


Aspirin EC [Ecotrin Low Dose] 81 mg PO HS 08/23/14 07/10/20


 


buPROPion XL [Wellbutrin XL] 300 mg PO QAM 08/23/14 07/10/20


 


Quinapril HCl [Accupril] 10 mg PO QAM 10/22/17 07/10/20


 


Docusate [Colace] 100 mg PO DAILY PRN 12/21/18 07/10/20


 


Metoprolol Succinate (ER) [Toprol 50 mg PO BID 12/21/18 07/10/20





XL]   


 


Atorvastatin [Lipitor] 80 mg PO HS 06/18/20 07/10/20


 


Cholecalciferol [Vitamin D3 (25 1,000 unit PO QAM 06/18/20 07/10/20





Mcg = 1000 Iu)]   


 


Clopidogrel [Plavix] 75 mg PO QAM 06/18/20 07/10/20


 


Fish Oil/Dha/Epa [Fish Oil 1,200 1 cap PO QAM 06/18/20 07/10/20





mg Fish Oil]   


 


Magnesium Oxide [Mag-Ox] 200 mg PO BID 06/18/20 07/10/20


 


INSULIN ASPART (NovoLOG) [NovoLOG See Protocol SQ ACHS 07/10/20 07/10/20





(formulary)]   


 


Multivitamins, Thera [Multivitamin 1 tab PO DAILY@1200 07/10/20 07/10/20





(formulary)]   








                                  Previous Rx's











 Medication  Instructions  Recorded


 


Nitroglycerin Sl Tabs [Nitrostat] 0.4 mg SUBLINGUAL Q5M PRN #20 tab 20


 


Acetaminophen Tab [Tylenol] 650 mg PO Q6HR PRN  tab 20


 


Folic Acid 1 mg PO DAILY@1200  tab 20


 


Pantoprazole [Protonix] 40 mg PO AC-BRKFST  tablet. 20


 


Thiamine [Vitamin B-1] 100 mg PO DAILY@1200  tab 20


 


risperiDONE [RisperDAL] 0.125 mg PO HS #3 tab 20











                                    Allergies











Allergy/AdvReac Type Severity Reaction Status Date / Time


 


iodine Allergy Unknown Unknown Verified 21 05:29














Review of Systems


ROS Statement: 


Those systems with pertinent positive or pertinent negative responses have been 

documented in the HPI.





ROS Other: All systems not noted in ROS Statement are negative.


Constitutional: Denies: fever, chills, weakness


Eyes: Denies: vision change


Respiratory: Denies: cough, dyspnea


Cardiovascular: Denies: chest pain, palpitations, syncope


Gastrointestinal: Denies: abdominal pain, vomiting, diarrhea


Genitourinary: Denies: dysuria, hematuria


Musculoskeletal: Denies: back pain


Skin: Denies: rash


Neurological: Reports: headache.  Denies: weakness, numbness, paresthesias





Past Medical History


Past Medical History: Diabetes Mellitus, GERD/Reflux, Hyperlipidemia, Myocardial

 Infarction (MI), Osteoarthritis (OA), Renal Disease, Sleep Apnea/CPAP/BIPAP


Additional Past Medical History / Comment(s): ANEMIA. MI x 2, heart murmer, no 

cpap used, constipation,


Last Myocardial Infarction Date:: 3/28/19


History of Any Multi-Drug Resistant Organisms: None Reported


Past Surgical History: Heart Catheterization With Stent, Joint Replacement, 

Orthopedic Surgery


Additional Past Surgical History / Comment(s): 4 cardiac stents, total right 

knee replacement, monse cataracts with lens implants, left shoulder surgery, left 

hip replacement,


Past Anesthesia/Blood Transfusion Reactions: No Reported Reaction


Date of Last Stent Placement:: 2020


Past Psychological History: No Psychological Hx Reported


Smoking Status: Former smoker


Past Alcohol Use History: None Reported


Past Drug Use History: None Reported





- Past Family History


  ** Father


Family Medical History: Myocardial Infarction (MI)





  ** Mother


Family Medical History: Coronary Artery Disease (CAD), CVA/TIA





  ** Sister(s)


Family Medical History: Cancer





General Exam


Limitations: no limitations


General appearance: alert, in no apparent distress


Head exam: Present: normocephalic


Eye exam: Present: normal appearance, PERRL, EOMI.  Absent: scleral icterus, 

conjunctival injection


ENT exam: Present: normal oropharynx


Neck exam: Present: normal inspection.  Absent: tenderness


Respiratory exam: Present: normal lung sounds bilaterally.  Absent: respiratory 

distress, wheezes, rales, rhonchi, stridor, chest wall tenderness


Cardiovascular Exam: Present: regular rate, normal rhythm, normal heart sounds. 

 Absent: systolic murmur, diastolic murmur, rubs, gallop


GI/Abdominal exam: Present: soft.  Absent: distended, tenderness, guarding, 

rebound, rigid, mass


Extremities exam: Present: normal inspection, normal capillary refill.  Absent: 

pedal edema, calf tenderness


Back exam: Present: normal inspection.  Absent: vertebral tenderness


Neurological exam: Present: alert, oriented X3, CN II-XII intact.  Absent: motor

 sensory deficit


Skin exam: Present: warm, dry, intact, normal color.  Absent: rash





Course


                                   Vital Signs











  21





  05:23 06:42


 


Temperature 98.7 F 


 


Pulse Rate 67 70


 


Respiratory 18 18





Rate  


 


Blood Pressure 154/82 169/87


 


O2 Sat by Pulse 99 99





Oximetry  














Disposition


Clinical Impression: 


 Fall, Head injury





Disposition: HOME SELF-CARE


Condition: Good


Instructions (If sedation given, give patient instructions):  Fall Prevention 

for Older Adults (ED), Head Injury (ED)


Is patient prescribed a controlled substance at d/c from ED?: No


Referrals: 


Lemuel Ortiz MD [Primary Care Provider] - 1-2 days

## 2021-03-03 NOTE — CT
EXAM:

  CT Head Without Intravenous Contrast

 

CLINICAL HISTORY:

   fall

 

TECHNIQUE:

  Axial computed tomography images of the head/brain without intravenous 

contrast.  CTDI is 45.5 mGy and DLP is 1415 mGy-cm.  This CT exam was 

performed using one or more of the following dose reduction techniques: 

automated exposure control, adjustment of the mA and/or kV according to 

patient size, and/or use of iterative reconstruction technique.

 

COMPARISON:

  7/10/2020.

 

FINDINGS:

  Brain:  Age appropriate senescent changes.  No evidence of acute 

transcortical infarct or acute intracranial hemorrhage.  Patchy and 

confluent areas of decreased attenuation are seen involving the bilateral 

periventricular and subcortical white matter, likely representing 

moderate microangiopathy, as seen on prior study.

  Ventricles:  Unremarkable.  No ventriculomegaly.

  Bones/joints:  Unremarkable.  No acute fracture.

  Soft tissues:  Unremarkable.

  Sinuses:  Unremarkable as visualized.  No acute sinusitis.

  Mastoid air cells:  Unremarkable as visualized.  No mastoid effusion.

 

IMPRESSION:     

  No CT evidence of acute intracranial pathology without significant 

interval change.

 

_______________________________________________

 

EXAM:

  CT Cervical Spine Without Intravenous Contrast

 

CLINICAL HISTORY:

   fall

 

TECHNIQUE:

  Axial computed tomography images of the cervical spine without 

intravenous contrast.  CTDI is 9.7 mGy and DLP is 297.1 mGy-cm.  This CT 

exam was performed using one or more of the following dose reduction 

techniques: automated exposure control, adjustment of the mA and/or kV 

according to patient size, and/or use of iterative reconstruction 

technique.

 

COMPARISON:

  7/10/2020.

 

FINDINGS:

  Vertebrae:  No evidence of acute fracture or traumatic malalignment.  

Cervical spondylosis with varying degrees of central canal and foramina 

stenoses, as seen on the prior study.  Large bridging anterior 

osteophytes are again noted.

  Discs/spinal canal/neural foramina:  See above.

  Soft tissues:  Unremarkable.

  Vasculature:  Dense carotid artery atherosclerotic changes are again 

noted bilaterally.

 

IMPRESSION:     

1.  No evidence of acute fracture or traumatic malalignment.

2.  Cervical spondylosis with varying degrees of central canal and 

foramina stenoses, as seen on the prior study.

## 2021-04-30 ENCOUNTER — HOSPITAL ENCOUNTER (OUTPATIENT)
Dept: HOSPITAL 47 - LABPAT | Age: 81
Discharge: HOME | End: 2021-04-30
Attending: ORTHOPAEDIC SURGERY
Payer: MEDICARE

## 2021-04-30 DIAGNOSIS — Z01.812: Primary | ICD-10-CM

## 2021-04-30 LAB
ALBUMIN SERPL-MCNC: 3.6 G/DL (ref 3.5–5)
ALP SERPL-CCNC: 106 U/L (ref 38–126)
ALT SERPL-CCNC: 15 U/L (ref 4–49)
ANION GAP SERPL CALC-SCNC: 7 MMOL/L
APTT BLD: 23.7 SEC (ref 22–30)
AST SERPL-CCNC: 22 U/L (ref 17–59)
BUN SERPL-SCNC: 29 MG/DL (ref 9–20)
CALCIUM SPEC-MCNC: 9 MG/DL (ref 8.4–10.2)
CHLORIDE SERPL-SCNC: 104 MMOL/L (ref 98–107)
CO2 SERPL-SCNC: 26 MMOL/L (ref 22–30)
ERYTHROCYTE [DISTWIDTH] IN BLOOD BY AUTOMATED COUNT: 3.36 M/UL (ref 4.3–5.9)
ERYTHROCYTE [DISTWIDTH] IN BLOOD: 14.4 % (ref 11.5–15.5)
GLUCOSE SERPL-MCNC: 181 MG/DL (ref 74–99)
GLUCOSE UR QL: (no result)
HCT VFR BLD AUTO: 29.8 % (ref 39–53)
HGB BLD-MCNC: 10.2 GM/DL (ref 13–17.5)
INR PPP: 1 (ref ?–1.2)
MCH RBC QN AUTO: 30.4 PG (ref 25–35)
MCHC RBC AUTO-ENTMCNC: 34.2 G/DL (ref 31–37)
MCV RBC AUTO: 88.8 FL (ref 80–100)
PH UR: 5.5 [PH] (ref 5–8)
PLATELET # BLD AUTO: 203 K/UL (ref 150–450)
POTASSIUM SERPL-SCNC: 4.4 MMOL/L (ref 3.5–5.1)
PROT SERPL-MCNC: 6.4 G/DL (ref 6.3–8.2)
PROT UR QL: (no result)
PT BLD: 10.6 SEC (ref 9–12)
SODIUM SERPL-SCNC: 137 MMOL/L (ref 137–145)
SP GR UR: 1.01 (ref 1–1.03)
SQUAMOUS UR QL AUTO: <1 /HPF (ref 0–4)
UROBILINOGEN UR QL STRIP: <2 MG/DL (ref ?–2)
WBC # BLD AUTO: 5.5 K/UL (ref 3.8–10.6)
WBC #/AREA URNS HPF: <1 /HPF (ref 0–5)

## 2021-04-30 PROCEDURE — 80053 COMPREHEN METABOLIC PANEL: CPT

## 2021-04-30 PROCEDURE — 81001 URINALYSIS AUTO W/SCOPE: CPT

## 2021-04-30 PROCEDURE — 36415 COLL VENOUS BLD VENIPUNCTURE: CPT

## 2021-04-30 PROCEDURE — 85610 PROTHROMBIN TIME: CPT

## 2021-04-30 PROCEDURE — 85027 COMPLETE CBC AUTOMATED: CPT

## 2021-04-30 PROCEDURE — 87070 CULTURE OTHR SPECIMN AEROBIC: CPT

## 2021-04-30 PROCEDURE — 85730 THROMBOPLASTIN TIME PARTIAL: CPT

## 2021-05-08 ENCOUNTER — HOSPITAL ENCOUNTER (EMERGENCY)
Dept: HOSPITAL 47 - EC | Age: 81
Discharge: HOME | End: 2021-05-08
Payer: MEDICARE

## 2021-05-08 VITALS
HEART RATE: 82 BPM | TEMPERATURE: 98.9 F | SYSTOLIC BLOOD PRESSURE: 146 MMHG | RESPIRATION RATE: 18 BRPM | DIASTOLIC BLOOD PRESSURE: 78 MMHG

## 2021-05-08 DIAGNOSIS — Z99.81: ICD-10-CM

## 2021-05-08 DIAGNOSIS — Z95.5: ICD-10-CM

## 2021-05-08 DIAGNOSIS — R53.1: Primary | ICD-10-CM

## 2021-05-08 DIAGNOSIS — G47.33: ICD-10-CM

## 2021-05-08 DIAGNOSIS — Z79.4: ICD-10-CM

## 2021-05-08 DIAGNOSIS — M19.90: ICD-10-CM

## 2021-05-08 DIAGNOSIS — Z87.891: ICD-10-CM

## 2021-05-08 DIAGNOSIS — I25.2: ICD-10-CM

## 2021-05-08 DIAGNOSIS — E78.5: ICD-10-CM

## 2021-05-08 DIAGNOSIS — Z79.82: ICD-10-CM

## 2021-05-08 DIAGNOSIS — Z96.642: ICD-10-CM

## 2021-05-08 DIAGNOSIS — E11.9: ICD-10-CM

## 2021-05-08 LAB
ALBUMIN SERPL-MCNC: 3.9 G/DL (ref 3.5–5)
ALP SERPL-CCNC: 130 U/L (ref 38–126)
ALT SERPL-CCNC: 23 U/L (ref 4–49)
ANION GAP SERPL CALC-SCNC: 11 MMOL/L
APTT BLD: 22.8 SEC (ref 22–30)
AST SERPL-CCNC: 28 U/L (ref 17–59)
BASOPHILS # BLD AUTO: 0 K/UL (ref 0–0.2)
BASOPHILS NFR BLD AUTO: 1 %
BUN SERPL-SCNC: 27 MG/DL (ref 9–20)
CALCIUM SPEC-MCNC: 9.2 MG/DL (ref 8.4–10.2)
CHLORIDE SERPL-SCNC: 102 MMOL/L (ref 98–107)
CK SERPL-CCNC: 95 U/L (ref 55–170)
CO2 SERPL-SCNC: 24 MMOL/L (ref 22–30)
EOSINOPHIL # BLD AUTO: 0.1 K/UL (ref 0–0.7)
EOSINOPHIL NFR BLD AUTO: 1 %
ERYTHROCYTE [DISTWIDTH] IN BLOOD BY AUTOMATED COUNT: 3.44 M/UL (ref 4.3–5.9)
ERYTHROCYTE [DISTWIDTH] IN BLOOD: 14.5 % (ref 11.5–15.5)
GLUCOSE SERPL-MCNC: 201 MG/DL (ref 74–99)
GLUCOSE UR QL: (no result)
HCT VFR BLD AUTO: 30.2 % (ref 39–53)
HGB BLD-MCNC: 10.4 GM/DL (ref 13–17.5)
INR PPP: 1 (ref ?–1.2)
KETONES UR QL STRIP.AUTO: (no result)
LYMPHOCYTES # SPEC AUTO: 0.7 K/UL (ref 1–4.8)
LYMPHOCYTES NFR SPEC AUTO: 12 %
MAGNESIUM SPEC-SCNC: 1.9 MG/DL (ref 1.6–2.3)
MCH RBC QN AUTO: 30.3 PG (ref 25–35)
MCHC RBC AUTO-ENTMCNC: 34.4 G/DL (ref 31–37)
MCV RBC AUTO: 87.9 FL (ref 80–100)
MONOCYTES # BLD AUTO: 0.3 K/UL (ref 0–1)
MONOCYTES NFR BLD AUTO: 5 %
NEUTROPHILS # BLD AUTO: 5.1 K/UL (ref 1.3–7.7)
NEUTROPHILS NFR BLD AUTO: 81 %
PH UR: 6 [PH] (ref 5–8)
PLATELET # BLD AUTO: 220 K/UL (ref 150–450)
POTASSIUM SERPL-SCNC: 4.3 MMOL/L (ref 3.5–5.1)
PROT SERPL-MCNC: 6.6 G/DL (ref 6.3–8.2)
PROT UR QL: (no result)
PT BLD: 10.8 SEC (ref 9–12)
RBC UR QL: 3 /HPF (ref 0–5)
SODIUM SERPL-SCNC: 137 MMOL/L (ref 137–145)
SP GR UR: 1.02 (ref 1–1.03)
TROPONIN I SERPL-MCNC: <0.012 NG/ML (ref 0–0.03)
UROBILINOGEN UR QL STRIP: <2 MG/DL (ref ?–2)
WBC # BLD AUTO: 6.2 K/UL (ref 3.8–10.6)
WBC # UR AUTO: <1 /HPF (ref 0–5)

## 2021-05-08 PROCEDURE — 72170 X-RAY EXAM OF PELVIS: CPT

## 2021-05-08 PROCEDURE — 82550 ASSAY OF CK (CPK): CPT

## 2021-05-08 PROCEDURE — 83880 ASSAY OF NATRIURETIC PEPTIDE: CPT

## 2021-05-08 PROCEDURE — 82553 CREATINE MB FRACTION: CPT

## 2021-05-08 PROCEDURE — 85730 THROMBOPLASTIN TIME PARTIAL: CPT

## 2021-05-08 PROCEDURE — 83605 ASSAY OF LACTIC ACID: CPT

## 2021-05-08 PROCEDURE — 80053 COMPREHEN METABOLIC PANEL: CPT

## 2021-05-08 PROCEDURE — 36415 COLL VENOUS BLD VENIPUNCTURE: CPT

## 2021-05-08 PROCEDURE — 96374 THER/PROPH/DIAG INJ IV PUSH: CPT

## 2021-05-08 PROCEDURE — 70450 CT HEAD/BRAIN W/O DYE: CPT

## 2021-05-08 PROCEDURE — 85610 PROTHROMBIN TIME: CPT

## 2021-05-08 PROCEDURE — 83735 ASSAY OF MAGNESIUM: CPT

## 2021-05-08 PROCEDURE — 85025 COMPLETE CBC W/AUTO DIFF WBC: CPT

## 2021-05-08 PROCEDURE — 84484 ASSAY OF TROPONIN QUANT: CPT

## 2021-05-08 PROCEDURE — 99285 EMERGENCY DEPT VISIT HI MDM: CPT

## 2021-05-08 PROCEDURE — 84100 ASSAY OF PHOSPHORUS: CPT

## 2021-05-08 PROCEDURE — 72125 CT NECK SPINE W/O DYE: CPT

## 2021-05-08 PROCEDURE — 71045 X-RAY EXAM CHEST 1 VIEW: CPT

## 2021-05-08 PROCEDURE — 93005 ELECTROCARDIOGRAM TRACING: CPT

## 2021-05-08 PROCEDURE — 81001 URINALYSIS AUTO W/SCOPE: CPT

## 2021-05-08 NOTE — XR
EXAM:

  XR Pelvis, 1 or 2 Views

 

CLINICAL HISTORY:

  ITS.  REASON XR Reason: fall

 

TECHNIQUE:

  Frontal view of the pelvis.

 

COMPARISON:

  7/10/20

 

FINDINGS:

  Bones/joints: There is a redemonstrated bipolar left hip prosthesis, 

which is appropriately aligned.  Heterotopic ossification again noted 

superiorly along the greater trochanter extending towards the acetabular 

cup.  Marked degeneration of the right hip is again noted.  No acute 

fracture.  Degeneration of lower lumbar levels 

Soft tissues: As above.

 

IMPRESSION:     

  No fracture or dislocation.

 

Left hip arthroplasty.

 

Marked degeneration of the right hip.

## 2021-05-08 NOTE — CT
EXAM:

  CT Head Without Intravenous Contrast

 

CLINICAL HISTORY:

  ITS.  REASON CT Reason: fall

 

TECHNIQUE:

  Axial computed tomography images of the head/brain without intravenous 

contrast.  CTDI is 46.1 mGy and DLP is 1090 mGy-cm.  This CT exam was 

performed using one or more of the following dose reduction techniques: 

automated exposure control, adjustment of the mA and/or kV according to 

patient size, and/or use of iterative reconstruction technique.

 

COMPARISON:

3/5/21.

 

FINDINGS:

  There is no intracranial hemorrhage or mass-effect.  No clear acute 

large vessel territory infarct.  Involutional and chronic microvascular 

ischemic changes are again noted.  Ventricular size is felt to be 

commensurate with central volume loss.  Atherosclerosis of skull base 

arteries.  Bilateral ocular lens prostheses.  The calvarium is intact.  

Right maxillary sinus effusion was not seen previously.  Correlate for 

sinusitis.

 

IMPRESSION:     

No acute brain or skull injury.

 

Redemonstrated involutional and microvascular ischemic changes.

 

Right maxillary sinus effusion was not seen previously.  Correlate for 

sinusitis.

 

_______________________________________________

 

EXAM:

  CT Cervical Spine Without Intravenous Contrast

 

CLINICAL HISTORY:

  ITS.  REASON CT Reason: fall

 

TECHNIQUE:

  Axial computed tomography images of the cervical spine without 

intravenous contrast.  CTDI is 11.45 mGy and DLP is 337.7 mGy-cm.  This 

CT exam was performed using one or more of the following dose reduction 

techniques: automated exposure control, adjustment of the mA and/or kV 

according to patient size, and/or use of iterative reconstruction 

technique.

 

COMPARISON:

  No relevant prior studies available.

 

FINDINGS:

  Vertebrae:  No acute fracture.

  Discs/spinal canal/neural foramina: Multilevel spondylosis including 

disc degeneration and facet and uncovertebral arthropathy.  Large 

anterior osteophytes, C2-C7.  Grade 1 C2 and C6 anterolisthesis is felt 

to reflect facet arthropathy.

  Soft tissues: Carotid atherosclerosis.

 

IMPRESSION:     

No fracture.

## 2021-05-08 NOTE — XR
EXAM:

  XR Chest, 1 View

 

CLINICAL HISTORY:

  ITS.  REASON XR Reason: fall

 

TECHNIQUE:

  Frontal view of the chest.

 

COMPARISON:

3/5/21.

 

FINDINGS:

  Lungs:  Unremarkable.  No consolidation.

  Pleural space:  Unremarkable.  No pneumothorax.

  Heart:  Unremarkable.  No cardiomegaly.

  Mediastinum:  Unremarkable.

  Bones/joints: Osseous degenerative changes.  No fracture is identified.

 

IMPRESSION:     

No evidence of acute thoracic injury.

## 2021-05-08 NOTE — ED
Weakness HPI





- General


Chief complaint: Fall


Stated complaint: Fall


Time Seen by Provider: 05/08/21 00:57


Source: patient, EMS


Mode of arrival: EMS


Limitations: altered mental status





- Related Data


                                Home Medications











 Medication  Instructions  Recorded  Confirmed


 


Aspirin EC [Ecotrin Low Dose] 81 mg PO HS 08/23/14 05/06/21


 


buPROPion XL [Wellbutrin XL] 300 mg PO QAM 08/23/14 05/06/21


 


Quinapril HCl [Accupril] 10 mg PO QAM 10/22/17 05/06/21


 


Docusate [Colace] 100 mg PO DAILY PRN 12/21/18 05/06/21


 


Metoprolol Succinate (ER) [Toprol 50 mg PO BID 12/21/18 05/06/21





XL]   


 


Atorvastatin [Lipitor] 80 mg PO HS 06/18/20 05/06/21


 


Clopidogrel [Plavix] 75 mg PO HS 06/18/20 05/06/21


 


Fish Oil/Dha/Epa [Fish Oil 1,200 1 cap PO QAM 06/18/20 05/06/21





mg Fish Oil]   


 


Magnesium Oxide [Mag-Ox] 200 mg PO BID 06/18/20 05/06/21


 


INSULIN ASPART (NovoLOG) [NovoLOG See Protocol SQ ACHS 07/10/20 05/06/21





(formulary)]   


 


Multivitamins, Thera [Multivitamin 1 tab PO DAILY@1200 07/10/20 05/06/21





(formulary)]   


 


Folic Acid 1 mg PO DAILY 05/06/21 05/06/21


 


Potassium Chloride 10 meq PO HS 05/06/21 05/06/21








                                  Previous Rx's











 Medication  Instructions  Recorded


 


Nitroglycerin Sl Tabs [Nitrostat] 0.4 mg SUBLINGUAL Q5M PRN #20 tab 06/21/20


 


Acetaminophen Tab [Tylenol] 650 mg PO Q6HR PRN  tab 07/01/20











                                    Allergies











Allergy/AdvReac Type Severity Reaction Status Date / Time


 


iodine Allergy Unknown Unknown Verified 03/05/21 16:35














Review of Systems


ROS Statement: 


Those systems with pertinent positive or pertinent negative responses have been 

documented in the HPI.





ROS Other: All systems not noted in ROS Statement are negative.





Past Medical History


Past Medical History: Diabetes Mellitus, GERD/Reflux, Hyperlipidemia, Myocardial

 Infarction (MI), Osteoarthritis (OA), Renal Disease, Sleep Apnea/CPAP/BIPAP


Additional Past Medical History / Comment(s): ANEMIA. MI x 2, heart murmer, no 

cpap used, constipation,


Last Myocardial Infarction Date:: 3/28/19


History of Any Multi-Drug Resistant Organisms: None Reported


Past Surgical History: Heart Catheterization With Stent, Joint Replacement, 

Orthopedic Surgery


Additional Past Surgical History / Comment(s): 4 cardiac stents, total right 

knee replacement, monse cataracts with lens implants, left shoulder surgery, left 

hip replacement,


Past Anesthesia/Blood Transfusion Reactions: No Reported Reaction


Date of Last Stent Placement:: 6/2020


Past Psychological History: No Psychological Hx Reported


Smoking Status: Former smoker


Past Alcohol Use History: None Reported


Past Drug Use History: None Reported





- Past Family History


  ** Father


Family Medical History: Myocardial Infarction (MI)





  ** Mother


Family Medical History: No Reported History





  ** Sister(s)


Family Medical History: Cancer





General Exam


Limitations: altered mental status





Course


                                   Vital Signs











  05/08/21





  01:02


 


Temperature 99.2 F


 


Pulse Rate 76


 


Respiratory 16





Rate 


 


Blood Pressure 190/94


 


O2 Sat by Pulse 99





Oximetry 














EKG Findings





- EKG Comments:


EKG Findings:: EKG is sinus rhythm 75, MO 2:30  





Medical Decision Making





- Lab Data


Result diagrams: 


                                 05/08/21 01:26





                                 05/08/21 01:26


                                   Lab Results











  05/08/21 05/08/21 05/08/21 Range/Units





  01:26 01:26 01:26 


 


WBC  6.2    (3.8-10.6)  k/uL


 


RBC  3.44 L    (4.30-5.90)  m/uL


 


Hgb  10.4 L    (13.0-17.5)  gm/dL


 


Hct  30.2 L    (39.0-53.0)  %


 


MCV  87.9    (80.0-100.0)  fL


 


MCH  30.3    (25.0-35.0)  pg


 


MCHC  34.4    (31.0-37.0)  g/dL


 


RDW  14.5    (11.5-15.5)  %


 


Plt Count  220    (150-450)  k/uL


 


MPV  7.7    


 


Neutrophils %  81    %


 


Lymphocytes %  12    %


 


Monocytes %  5    %


 


Eosinophils %  1    %


 


Basophils %  1    %


 


Neutrophils #  5.1    (1.3-7.7)  k/uL


 


Lymphocytes #  0.7 L    (1.0-4.8)  k/uL


 


Monocytes #  0.3    (0-1.0)  k/uL


 


Eosinophils #  0.1    (0-0.7)  k/uL


 


Basophils #  0.0    (0-0.2)  k/uL


 


PT   10.8   (9.0-12.0)  sec


 


INR   1.0   (<1.2)  


 


APTT   22.8   (22.0-30.0)  sec


 


Sodium    137  (137-145)  mmol/L


 


Potassium    4.3  (3.5-5.1)  mmol/L


 


Chloride    102  ()  mmol/L


 


Carbon Dioxide    24  (22-30)  mmol/L


 


Anion Gap    11  mmol/L


 


BUN    27 H  (9-20)  mg/dL


 


Creatinine    1.36 H  (0.66-1.25)  mg/dL


 


Est GFR (CKD-EPI)AfAm    56  (>60 ml/min/1.73 sqM)  


 


Est GFR (CKD-EPI)NonAf    48  (>60 ml/min/1.73 sqM)  


 


Glucose    201 H  (74-99)  mg/dL


 


Plasma Lactic Acid Khurram     (0.7-2.0)  mmol/L


 


Calcium    9.2  (8.4-10.2)  mg/dL


 


Phosphorus    3.5  (2.5-4.5)  mg/dL


 


Magnesium    1.9  (1.6-2.3)  mg/dL


 


Total Bilirubin    0.4  (0.2-1.3)  mg/dL


 


AST    28  (17-59)  U/L


 


ALT    23  (4-49)  U/L


 


Alkaline Phosphatase    130 H  ()  U/L


 


Creatine Kinase    95  ()  U/L


 


CK-MB (CK-2)     (0.0-2.4)  ng/mL


 


Troponin I     (0.000-0.034)  ng/mL


 


NT-Pro-B Natriuret Pep     pg/mL


 


Total Protein    6.6  (6.3-8.2)  g/dL


 


Albumin    3.9  (3.5-5.0)  g/dL














  05/08/21 05/08/21 05/08/21 Range/Units





  01:26 01:26 01:26 


 


WBC     (3.8-10.6)  k/uL


 


RBC     (4.30-5.90)  m/uL


 


Hgb     (13.0-17.5)  gm/dL


 


Hct     (39.0-53.0)  %


 


MCV     (80.0-100.0)  fL


 


MCH     (25.0-35.0)  pg


 


MCHC     (31.0-37.0)  g/dL


 


RDW     (11.5-15.5)  %


 


Plt Count     (150-450)  k/uL


 


MPV     


 


Neutrophils %     %


 


Lymphocytes %     %


 


Monocytes %     %


 


Eosinophils %     %


 


Basophils %     %


 


Neutrophils #     (1.3-7.7)  k/uL


 


Lymphocytes #     (1.0-4.8)  k/uL


 


Monocytes #     (0-1.0)  k/uL


 


Eosinophils #     (0-0.7)  k/uL


 


Basophils #     (0-0.2)  k/uL


 


PT     (9.0-12.0)  sec


 


INR     (<1.2)  


 


APTT     (22.0-30.0)  sec


 


Sodium     (137-145)  mmol/L


 


Potassium     (3.5-5.1)  mmol/L


 


Chloride     ()  mmol/L


 


Carbon Dioxide     (22-30)  mmol/L


 


Anion Gap     mmol/L


 


BUN     (9-20)  mg/dL


 


Creatinine     (0.66-1.25)  mg/dL


 


Est GFR (CKD-EPI)AfAm     (>60 ml/min/1.73 sqM)  


 


Est GFR (CKD-EPI)NonAf     (>60 ml/min/1.73 sqM)  


 


Glucose     (74-99)  mg/dL


 


Plasma Lactic Acid Khurram  1.2    (0.7-2.0)  mmol/L


 


Calcium     (8.4-10.2)  mg/dL


 


Phosphorus     (2.5-4.5)  mg/dL


 


Magnesium     (1.6-2.3)  mg/dL


 


Total Bilirubin     (0.2-1.3)  mg/dL


 


AST     (17-59)  U/L


 


ALT     (4-49)  U/L


 


Alkaline Phosphatase     ()  U/L


 


Creatine Kinase     ()  U/L


 


CK-MB (CK-2)   2.6 H   (0.0-2.4)  ng/mL


 


Troponin I   <0.012   (0.000-0.034)  ng/mL


 


NT-Pro-B Natriuret Pep    2170  pg/mL


 


Total Protein     (6.3-8.2)  g/dL


 


Albumin     (3.5-5.0)  g/dL














Disposition


Clinical Impression: 


 Weakness





Disposition: HOME SELF-CARE


Condition: Good


Instructions (If sedation given, give patient instructions):  Fall Prevention 

for Older Adults (ED), Weakness (ED)


Is patient prescribed a controlled substance at d/c from ED?: No


Referrals: 


Lemuel Ortiz MD [Primary Care Provider] - 1-2 days

## 2021-05-09 ENCOUNTER — HOSPITAL ENCOUNTER (OUTPATIENT)
Dept: HOSPITAL 47 - EC | Age: 81
Setting detail: OBSERVATION
LOS: 3 days | Discharge: NURSING, REHAB OR LONG-TERM CARE | End: 2021-05-12
Attending: FAMILY MEDICINE | Admitting: FAMILY MEDICINE
Payer: MEDICARE

## 2021-05-09 DIAGNOSIS — Z96.651: ICD-10-CM

## 2021-05-09 DIAGNOSIS — K59.00: ICD-10-CM

## 2021-05-09 DIAGNOSIS — R53.1: ICD-10-CM

## 2021-05-09 DIAGNOSIS — Z91.048: ICD-10-CM

## 2021-05-09 DIAGNOSIS — Z79.02: ICD-10-CM

## 2021-05-09 DIAGNOSIS — G47.33: ICD-10-CM
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
## 2021-05-09 DIAGNOSIS — Z95.5: ICD-10-CM

## 2021-05-09 DIAGNOSIS — H91.90: ICD-10-CM

## 2021-05-09 DIAGNOSIS — Z79.4: ICD-10-CM

## 2021-05-09 DIAGNOSIS — K21.9: ICD-10-CM

## 2021-05-09 DIAGNOSIS — R07.89: Primary | ICD-10-CM

## 2021-05-09 DIAGNOSIS — Z79.899: ICD-10-CM

## 2021-05-09 DIAGNOSIS — E11.9: ICD-10-CM

## 2021-05-09 DIAGNOSIS — I45.10: ICD-10-CM

## 2021-05-09 DIAGNOSIS — I25.10: ICD-10-CM

## 2021-05-09 DIAGNOSIS — N39.0: ICD-10-CM

## 2021-05-09 DIAGNOSIS — I25.2: ICD-10-CM

## 2021-05-09 DIAGNOSIS — Z96.642: ICD-10-CM

## 2021-05-09 DIAGNOSIS — Z80.9: ICD-10-CM

## 2021-05-09 DIAGNOSIS — D64.9: ICD-10-CM

## 2021-05-09 DIAGNOSIS — I10: ICD-10-CM

## 2021-05-09 DIAGNOSIS — Z91.81: ICD-10-CM

## 2021-05-09 DIAGNOSIS — Z99.89: ICD-10-CM

## 2021-05-09 DIAGNOSIS — R62.7: ICD-10-CM

## 2021-05-09 DIAGNOSIS — M16.11: ICD-10-CM

## 2021-05-09 DIAGNOSIS — Z20.822: ICD-10-CM

## 2021-05-09 DIAGNOSIS — R79.89: ICD-10-CM

## 2021-05-09 DIAGNOSIS — I35.0: ICD-10-CM

## 2021-05-09 DIAGNOSIS — Z79.82: ICD-10-CM

## 2021-05-09 DIAGNOSIS — I27.20: ICD-10-CM

## 2021-05-09 DIAGNOSIS — Z82.49: ICD-10-CM

## 2021-05-09 DIAGNOSIS — Z87.891: ICD-10-CM

## 2021-05-09 DIAGNOSIS — Z96.1: ICD-10-CM

## 2021-05-09 DIAGNOSIS — R29.6: ICD-10-CM

## 2021-05-09 DIAGNOSIS — E78.5: ICD-10-CM

## 2021-05-09 LAB
ALBUMIN SERPL-MCNC: 3.8 G/DL (ref 3.5–5)
ALP SERPL-CCNC: 118 U/L (ref 38–126)
ALT SERPL-CCNC: 20 U/L (ref 4–49)
ANION GAP SERPL CALC-SCNC: 7 MMOL/L
AST SERPL-CCNC: 25 U/L (ref 17–59)
BASOPHILS # BLD AUTO: 0 K/UL (ref 0–0.2)
BASOPHILS NFR BLD AUTO: 1 %
BUN SERPL-SCNC: 24 MG/DL (ref 9–20)
CALCIUM SPEC-MCNC: 8.9 MG/DL (ref 8.4–10.2)
CHLORIDE SERPL-SCNC: 104 MMOL/L (ref 98–107)
CO2 SERPL-SCNC: 25 MMOL/L (ref 22–30)
EOSINOPHIL # BLD AUTO: 0.3 K/UL (ref 0–0.7)
EOSINOPHIL NFR BLD AUTO: 4 %
ERYTHROCYTE [DISTWIDTH] IN BLOOD BY AUTOMATED COUNT: 3.44 M/UL (ref 4.3–5.9)
ERYTHROCYTE [DISTWIDTH] IN BLOOD: 14.9 % (ref 11.5–15.5)
GLUCOSE BLD-MCNC: 114 MG/DL (ref 75–99)
GLUCOSE BLD-MCNC: 140 MG/DL (ref 75–99)
GLUCOSE BLD-MCNC: 166 MG/DL (ref 75–99)
GLUCOSE BLD-MCNC: 183 MG/DL (ref 75–99)
GLUCOSE SERPL-MCNC: 151 MG/DL (ref 74–99)
HCT VFR BLD AUTO: 30.3 % (ref 39–53)
HGB BLD-MCNC: 9.8 GM/DL (ref 13–17.5)
HYALINE CASTS UR QL AUTO: 1 /LPF (ref 0–2)
LYMPHOCYTES # SPEC AUTO: 1 K/UL (ref 1–4.8)
LYMPHOCYTES NFR SPEC AUTO: 14 %
MAGNESIUM SPEC-SCNC: 1.9 MG/DL (ref 1.6–2.3)
MCH RBC QN AUTO: 28.4 PG (ref 25–35)
MCHC RBC AUTO-ENTMCNC: 32.3 G/DL (ref 31–37)
MCV RBC AUTO: 87.9 FL (ref 80–100)
MONOCYTES # BLD AUTO: 0.4 K/UL (ref 0–1)
MONOCYTES NFR BLD AUTO: 6 %
NEUTROPHILS # BLD AUTO: 5.2 K/UL (ref 1.3–7.7)
NEUTROPHILS NFR BLD AUTO: 74 %
PH UR: 5 [PH] (ref 5–8)
PLATELET # BLD AUTO: 229 K/UL (ref 150–450)
POTASSIUM SERPL-SCNC: 4 MMOL/L (ref 3.5–5.1)
PROT SERPL-MCNC: 6.4 G/DL (ref 6.3–8.2)
PROT UR QL: (no result)
SODIUM SERPL-SCNC: 136 MMOL/L (ref 137–145)
SP GR UR: 1.01 (ref 1–1.03)
UROBILINOGEN UR QL STRIP: <2 MG/DL (ref ?–2)
WBC # BLD AUTO: 7 K/UL (ref 3.8–10.6)
WBC # UR AUTO: 1 /HPF (ref 0–5)

## 2021-05-09 PROCEDURE — 87186 SC STD MICRODIL/AGAR DIL: CPT

## 2021-05-09 PROCEDURE — 97166 OT EVAL MOD COMPLEX 45 MIN: CPT

## 2021-05-09 PROCEDURE — 84484 ASSAY OF TROPONIN QUANT: CPT

## 2021-05-09 PROCEDURE — 36415 COLL VENOUS BLD VENIPUNCTURE: CPT

## 2021-05-09 PROCEDURE — 97162 PT EVAL MOD COMPLEX 30 MIN: CPT

## 2021-05-09 PROCEDURE — 85730 THROMBOPLASTIN TIME PARTIAL: CPT

## 2021-05-09 PROCEDURE — 85027 COMPLETE CBC AUTOMATED: CPT

## 2021-05-09 PROCEDURE — 80048 BASIC METABOLIC PNL TOTAL CA: CPT

## 2021-05-09 PROCEDURE — 81001 URINALYSIS AUTO W/SCOPE: CPT

## 2021-05-09 PROCEDURE — 99285 EMERGENCY DEPT VISIT HI MDM: CPT

## 2021-05-09 PROCEDURE — 87086 URINE CULTURE/COLONY COUNT: CPT

## 2021-05-09 PROCEDURE — 97535 SELF CARE MNGMENT TRAINING: CPT

## 2021-05-09 PROCEDURE — 96374 THER/PROPH/DIAG INJ IV PUSH: CPT

## 2021-05-09 PROCEDURE — 97530 THERAPEUTIC ACTIVITIES: CPT

## 2021-05-09 PROCEDURE — 84100 ASSAY OF PHOSPHORUS: CPT

## 2021-05-09 PROCEDURE — 80053 COMPREHEN METABOLIC PANEL: CPT

## 2021-05-09 PROCEDURE — 87077 CULTURE AEROBIC IDENTIFY: CPT

## 2021-05-09 PROCEDURE — 71045 X-RAY EXAM CHEST 1 VIEW: CPT

## 2021-05-09 PROCEDURE — 87635 SARS-COV-2 COVID-19 AMP PRB: CPT

## 2021-05-09 PROCEDURE — 85025 COMPLETE CBC W/AUTO DIFF WBC: CPT

## 2021-05-09 PROCEDURE — 96376 TX/PRO/DX INJ SAME DRUG ADON: CPT

## 2021-05-09 PROCEDURE — 96361 HYDRATE IV INFUSION ADD-ON: CPT

## 2021-05-09 PROCEDURE — 83735 ASSAY OF MAGNESIUM: CPT

## 2021-05-09 PROCEDURE — 85610 PROTHROMBIN TIME: CPT

## 2021-05-09 PROCEDURE — 83605 ASSAY OF LACTIC ACID: CPT

## 2021-05-09 PROCEDURE — 93005 ELECTROCARDIOGRAM TRACING: CPT

## 2021-05-09 RX ADMIN — POTASSIUM CHLORIDE SCH MEQ: 750 TABLET, EXTENDED RELEASE ORAL at 22:28

## 2021-05-09 RX ADMIN — Medication SCH MG: at 22:28

## 2021-05-09 RX ADMIN — METOPROLOL SUCCINATE SCH MG: 50 TABLET, EXTENDED RELEASE ORAL at 22:28

## 2021-05-09 RX ADMIN — MORPHINE SULFATE PRN MG: 4 INJECTION, SOLUTION INTRAMUSCULAR; INTRAVENOUS at 20:00

## 2021-05-09 RX ADMIN — BUPROPION HYDROCHLORIDE SCH MG: 300 TABLET, FILM COATED, EXTENDED RELEASE ORAL at 12:09

## 2021-05-09 RX ADMIN — ATORVASTATIN CALCIUM SCH MG: 80 TABLET, FILM COATED ORAL at 22:28

## 2021-05-09 RX ADMIN — THERA TABS SCH EACH: TAB at 12:09

## 2021-05-09 RX ADMIN — CEFAZOLIN SCH MLS/HR: 330 INJECTION, POWDER, FOR SOLUTION INTRAMUSCULAR; INTRAVENOUS at 17:49

## 2021-05-09 RX ADMIN — Medication SCH MG: at 12:09

## 2021-05-09 RX ADMIN — CEFAZOLIN SCH MLS/HR: 330 INJECTION, POWDER, FOR SOLUTION INTRAMUSCULAR; INTRAVENOUS at 04:24

## 2021-05-09 RX ADMIN — FOLIC ACID SCH MG: 1 TABLET ORAL at 12:09

## 2021-05-09 NOTE — ED
Recheck HPI





- General


Stated Complaint: Weakness


Time Seen by Provider: 05/09/21 03:45


Source: RN notes reviewed, old records reviewed


Mode of arrival: EMS


Limitations: no limitations, altered mental status





- History of Present Illness


Initial Comments: 





This is an 80-year-old male DF for evaluation patient otherwise does not feel 

well here in the ER.  Patient is a poor historian history obtained from chart 

family family did call ahead for patient seen in the ER unable to get off the 

floor home is unsafe at home he was in the ER yesterday patient states his 

symptoms are worse than they were yesterday and he has multiple falls with 

increasing weakness


MD Complaint: other (Persistent weakness and falls)


-: week(s)


Returns Today for: persistent/worsening pain related to initial visit


Symptoms Since Prior Visit: no new symptoms (Worsening weakness), worsening pain


Associated Symptoms: none


Treatments Prior to Arrival: home treatments





- Related Data


                                Home Medications











 Medication  Instructions  Recorded  Confirmed


 


Aspirin EC [Ecotrin Low Dose] 81 mg PO HS 08/23/14 05/06/21


 


buPROPion XL [Wellbutrin XL] 300 mg PO QAM 08/23/14 05/06/21


 


Quinapril HCl [Accupril] 10 mg PO QAM 10/22/17 05/06/21


 


Docusate [Colace] 100 mg PO DAILY PRN 12/21/18 05/06/21


 


Metoprolol Succinate (ER) [Toprol 50 mg PO BID 12/21/18 05/06/21





XL]   


 


Atorvastatin [Lipitor] 80 mg PO HS 06/18/20 05/06/21


 


Clopidogrel [Plavix] 75 mg PO HS 06/18/20 05/06/21


 


Fish Oil/Dha/Epa [Fish Oil 1,200 1 cap PO QAM 06/18/20 05/06/21





mg Fish Oil]   


 


Magnesium Oxide [Mag-Ox] 200 mg PO BID 06/18/20 05/06/21


 


INSULIN ASPART (NovoLOG) [NovoLOG See Protocol SQ ACHS 07/10/20 05/06/21





(formulary)]   


 


Multivitamins, Thera [Multivitamin 1 tab PO DAILY@1200 07/10/20 05/06/21





(formulary)]   


 


Folic Acid 1 mg PO DAILY 05/06/21 05/06/21


 


Potassium Chloride 10 meq PO HS 05/06/21 05/06/21








                                  Previous Rx's











 Medication  Instructions  Recorded


 


Nitroglycerin Sl Tabs [Nitrostat] 0.4 mg SUBLINGUAL Q5M PRN #20 tab 06/21/20


 


Acetaminophen Tab [Tylenol] 650 mg PO Q6HR PRN  tab 07/01/20











                                    Allergies











Allergy/AdvReac Type Severity Reaction Status Date / Time


 


iodine Allergy Unknown Unknown Verified 03/05/21 16:35














Review of Systems


ROS Statement: 


Those systems with pertinent positive or pertinent negative responses have been 

documented in the HPI.





ROS Other: All systems not noted in ROS Statement are negative.





Past Medical History


Past Medical History: Diabetes Mellitus, GERD/Reflux, Hyperlipidemia, Myocardial

 Infarction (MI), Osteoarthritis (OA), Renal Disease, Sleep Apnea/CPAP/BIPAP


Additional Past Medical History / Comment(s): ANEMIA. MI x 2, heart murmer, no 

cpap used, constipation,


Last Myocardial Infarction Date:: 3/28/19


History of Any Multi-Drug Resistant Organisms: None Reported


Past Surgical History: Heart Catheterization With Stent, Joint Replacement, 

Orthopedic Surgery


Additional Past Surgical History / Comment(s): 4 cardiac stents, total right 

knee replacement, monse cataracts with lens implants, left shoulder surgery, left 

hip replacement,


Past Anesthesia/Blood Transfusion Reactions: No Reported Reaction


Date of Last Stent Placement:: 6/2020


Past Psychological History: No Psychological Hx Reported


Smoking Status: Former smoker


Past Alcohol Use History: None Reported


Past Drug Use History: None Reported





- Past Family History


  ** Father


Family Medical History: Myocardial Infarction (MI)





  ** Mother


Family Medical History: No Reported History





  ** Sister(s)


Family Medical History: Cancer





General Exam


General appearance: alert, in no apparent distress


Head exam: Present: atraumatic, normocephalic, normal inspection


Eye exam: Present: normal appearance, PERRL, EOMI.  Absent: scleral icterus, 

conjunctival injection, periorbital swelling


ENT exam: Present: normal exam, mucous membranes moist


Neck exam: Present: normal inspection.  Absent: tenderness, meningismus, 

lymphadenopathy


Respiratory exam: Present: normal lung sounds bilaterally.  Absent: respiratory 

distress, wheezes, rales, rhonchi, stridor


Cardiovascular Exam: Present: regular rate, normal rhythm, normal heart sounds. 

 Absent: systolic murmur, diastolic murmur, rubs, gallop, clicks


GI/Abdominal exam: Present: soft, normal bowel sounds.  Absent: distended, t

enderness, guarding, rebound, rigid


Extremities exam: Present: normal inspection, full ROM, normal capillary refill.

  Absent: tenderness, pedal edema, joint swelling, calf tenderness


Back exam: Present: normal inspection


Neurological exam: Present: alert, oriented X3, CN II-XII intact


Psychiatric exam: Present: normal affect, normal mood


Skin exam: Present: warm, dry, intact, normal color.  Absent: rash





Course


                                   Vital Signs











  05/09/21





  03:40


 


Temperature 97.9 F


 


Pulse Rate 67


 


Respiratory 18





Rate 


 


Blood Pressure 172/82


 


O2 Sat by Pulse 99





Oximetry 














- Reevaluation(s)


Reevaluation #1: 





Medical record is reviewed





Patient's ER visit from yesterday is also been reviewed





Did speak with patient regarding signs and symptoms, findings here in the ER





Patient has no improvement








Medical Decision Making





- Medical Decision Making





80 male to the ER with failure to thrive seen in ER yesterday continue falsely 

with inability get up the ground.  Patient will be admitted for likely need for 

placement





- Lab Data


Result diagrams: 


                                 05/09/21 04:08





                                 05/09/21 04:08





- EKG Data


-: EKG Interpreted by Me (EKG shows wide QRS 67 AR nonexistent  )





Disposition


Clinical Impression: 


 Weakness, Gait instability, Fall





Disposition: ADMITTED AS IP TO THIS HOSP


Condition: Fair


Is patient prescribed a controlled substance at d/c from ED?: No

## 2021-05-09 NOTE — P.HPIM
History of Present Illness


H&P Date: 05/09/21


Chief Complaint: Recurrent falls





Mr. Lau is a 80-year-old male, who follows with Dr. Lemuel Ortiz in 

outpatient setting, with a past medical history of diabetes mellitus, GERD, 

hypertension, hyperlipidemia, renal disease, sleep apnea, CAD status post 

stenting, and the hospital with a chief complaint of recurrent falls.  The 

patient has been increasingly feeling weak and having recurrent falls at home.  

Patient states that he has to get his right hip replacement done next week due 

to severe osteoarthritis of the right hip.  And for the past 2-3 weeks he 

mentions that he has been having issues with balance and having frequent falls. 

Patient fell off to the floor, and was not able to get off of the floor and 

thinks that home is unsafe for him.  He also mentions that he is becoming more 

forgetful for the past few months.  Patient was here in the ER yesterday, he had

a CAT scan of the head and cervical spine, that was negative for any fracture or

acute cranial process.  On reviewing his previous records, patient had a stress 

test done on 03/09/2021 that was negative for any inducible ischemia and an 

echocardiogram showing ejection fraction of 55-60%.





In the ER, patient's vitals at the time of admission temperature of 97.9, heart 

rate 60, respiratory 18, blood pressure 172/82, saturating at 99% on room air.  

He had a chest x-ray that was negative for any acute cardiopulmonary process.  

EKG showing right bundle branch block.  On reviewing the labs white count of 7, 

hemoglobin 9.8, platelets 229.  Sodium 136 operation 4, chloride 104, 

bicarbonate 25, BUN 24, creatinine 1.42.  Urine analysis was negative for 

leukocyte esterase and nitrites.  Corona virus  PCR negative. 





Review of Systems





REVIEW OF SYSTEMS: 


CONSTITUTIONAL: Generalized weakness and balance issues


HEENT: No recent visual problems or hearing problems. Denied any sore throat. 


CARDIOVASCULAR: No chest pain, orthopnea, PND, no palpitations, no syncope. 


PULMONARY:  no hemoptysis. 


GASTROINTESTINAL: No diarrhea, no nausea, no vomiting, no abdominal pain. 


NEUROLOGICAL: No focal weakness, headaches or syncopal episodes.


HEMATOLOGICAL: Denies any bleeding or petechiae. 


GENITOURINARY: Denies any burning micturition, frequency, or urgency. 


MUSCULOSKELETAL/RHEUMATOLOGICAL: Multiple joint osteoarthritis


ENDOCRINE: Denies any polyuria or polydipsia. 





The rest of the 14-point review of systems is negative.








Past Medical History


Past Medical History: Diabetes Mellitus, GERD/Reflux, Hyperlipidemia, Myocardial

Infarction (MI), Osteoarthritis (OA), Renal Disease, Sleep Apnea/CPAP/BIPAP


Additional Past Medical History / Comment(s): ANEMIA. MI x 2, heart murmer, no 

cpap used, constipation,


Last Myocardial Infarction Date:: 3/28/19


History of Any Multi-Drug Resistant Organisms: None Reported


Past Surgical History: Heart Catheterization With Stent, Joint Replacement, 

Orthopedic Surgery


Additional Past Surgical History / Comment(s): 4 cardiac stents, total right 

knee replacement, monse cataracts with lens implants, left shoulder surgery, left 

hip replacement,


Past Anesthesia/Blood Transfusion Reactions: No Reported Reaction


Date of Last Stent Placement:: 6/2020


Past Psychological History: No Psychological Hx Reported


Smoking Status: Former smoker


Past Alcohol Use History: None Reported


Past Drug Use History: None Reported





- Past Family History


  ** Father


Family Medical History: Myocardial Infarction (MI)





  ** Mother


Family Medical History: No Reported History





  ** Sister(s)


Family Medical History: Cancer





Medications and Allergies


                                Home Medications











 Medication  Instructions  Recorded  Confirmed  Type


 


Aspirin EC [Ecotrin Low Dose] 81 mg PO HS 08/23/14 05/09/21 History


 


buPROPion XL [Wellbutrin XL] 300 mg PO QAM 08/23/14 05/09/21 History


 


Quinapril HCl [Accupril] 10 mg PO QAM 10/22/17 05/09/21 History


 


Docusate [Colace] 100 mg PO DAILY PRN 12/21/18 05/09/21 History


 


Metoprolol Succinate (ER) [Toprol 50 mg PO BID 12/21/18 05/09/21 History





XL]    


 


Atorvastatin [Lipitor] 80 mg PO HS 06/18/20 05/09/21 History


 


Clopidogrel [Plavix] 75 mg PO HS 06/18/20 05/09/21 History


 


Fish Oil/Dha/Epa [Fish Oil 1,200 1 cap PO QAM 06/18/20 05/09/21 History





mg Fish Oil]    


 


Magnesium Oxide [Mag-Ox] 200 mg PO BID 06/18/20 05/09/21 History


 


Nitroglycerin Sl Tabs [Nitrostat] 0.4 mg SUBLINGUAL Q5M PRN #20 tab 06/21/20 05/09/21 Rx


 


Acetaminophen Tab [Tylenol] 650 mg PO Q6HR PRN  tab 07/01/20 05/09/21 Rx


 


INSULIN ASPART (NovoLOG) [NovoLOG See Protocol SQ ACHS 07/10/20 05/09/21 History





(formulary)]    


 


Multivitamins, Thera [Multivitamin 1 tab PO DAILY@1200 07/10/20 05/09/21 History





(formulary)]    


 


Folic Acid 1 mg PO DAILY 05/06/21 05/09/21 History


 


Potassium Chloride 10 meq PO HS 05/06/21 05/09/21 History








                                    Allergies











Allergy/AdvReac Type Severity Reaction Status Date / Time


 


iodine Allergy Unknown Unknown Verified 05/09/21 08:51














Physical Exam


Vitals: 


                                   Vital Signs











  Temp Pulse Pulse Resp BP BP Pulse Ox


 


 05/09/21 11:25  97.9 F   72  18   171/82  100


 


 05/09/21 06:09       174/82 


 


 05/09/21 05:54  98.1 F   84  16   192/84  95


 


 05/09/21 03:40  97.9 F  67   18  172/82   99








                                Intake and Output











 05/08/21 05/09/21 05/09/21





 22:59 06:59 14:59


 


Other:   


 


  Voiding Method   Urinal


 


  # Voids  1 


 


  Weight  77.111 kg 














PHYSICAL EXAMINATION: 





GENERAL: Elderly male lying in bed appears to be in no acute distress.


HEENT: Pupils are round and equally reacting to light. EOMI. No scleral icterus.

No conjunctival pallor. Normocephalic, atraumatic. No pharyngeal erythema. No 

thyromegaly. 


CARDIOVASCULAR: S1 and S2 present.  Systolic murmur positive


PULMONARY: Bilateral breath sounds are positive.  No wheeze or crackles.


ABDOMEN: Soft, nontender, nondistended, normoactive bowel sounds. No palpable 

organomegaly. 


MUSCULOSKELETAL: No joint swelling or redness.


EXTREMITIES: No cyanosis, clubbing, or pedal edema. 


NEUROLOGICAL:  patient is alert awake oriented 3 .Gross neurological 

examination did not reveal any focal deficits. 


Gait - patient could not get out of the bed by himself due to generalized 

weakness and multiple joint osteoarthritis


SKIN: No rashes.





Results


CBC & Chem 7: 


                                 05/09/21 04:08





                                 05/09/21 04:08


Labs: 


                  Abnormal Lab Results - Last 24 Hours (Table)











  05/09/21 05/09/21 05/09/21 Range/Units





  04:08 04:08 04:08 


 


RBC  3.44 L    (4.30-5.90)  m/uL


 


Hgb  9.8 L    (13.0-17.5)  gm/dL


 


Hct  30.3 L    (39.0-53.0)  %


 


Sodium    136 L  (137-145)  mmol/L


 


BUN    24 H  (9-20)  mg/dL


 


Creatinine    1.42 H  (0.66-1.25)  mg/dL


 


Glucose    151 H  (74-99)  mg/dL


 


POC Glucose (mg/dL)     (75-99)  mg/dL


 


Urine Protein   1+ H   (Negative)  


 


Urine Mucus   Rare H   (None)  /hpf














  05/09/21 05/09/21 Range/Units





  09:22 11:50 


 


RBC    (4.30-5.90)  m/uL


 


Hgb    (13.0-17.5)  gm/dL


 


Hct    (39.0-53.0)  %


 


Sodium    (137-145)  mmol/L


 


BUN    (9-20)  mg/dL


 


Creatinine    (0.66-1.25)  mg/dL


 


Glucose    (74-99)  mg/dL


 


POC Glucose (mg/dL)  140 H  114 H  (75-99)  mg/dL


 


Urine Protein    (Negative)  


 


Urine Mucus    (None)  /hpf














Thrombosis Risk Factor Assmnt





- Choose All That Apply


Each Factor Represents 1 point: Medical pt on bed rest


Each Risk Factor Represents 3 Points: Age 75 years or older


Thrombosis Risk Factor Assessment Total Risk Factor Score: 4


Thrombosis Risk Factor Assessment Level: Moderate Risk





Assessment and Plan


Assessment: 





ASSESSMENT





Recurrent falls


Gait instability


Severe degenerative joint disease


Type 2 diabetes mellitus


GERD


Hyperlipidemia


Coronary artery disease status post stenting


Obstructive sleep apnea


Remote history of nicotine dependence





PLAN: Patient had a CAT scan of the head and cervical spine that was negative 

for any acute fracture or cranial process.  Patient had an echocardiogram done 

and a stress test done couple of months back that are within normal limits.  

Patient has been seeing orthopedic surgery as outpatient basis for his right hip

replacement surgery.  Patient has been restarted on all his home medications.  

We'll consult PT OT evaluation.  He might be requiring subacute rehab placement 

because of significant history of falls and multiple chronic medical conditions.

 The treatment plan was discussed in detail with the patient at bedside.

## 2021-05-10 LAB
ANION GAP SERPL CALC-SCNC: 7.3 MMOL/L (ref 4–12)
BASOPHILS # BLD AUTO: 0 K/UL (ref 0–0.2)
BASOPHILS NFR BLD AUTO: 0 %
BUN SERPL-SCNC: 18 MG/DL (ref 9–27)
BUN/CREAT SERPL: 15 RATIO (ref 12–20)
CALCIUM SPEC-MCNC: 8 MG/DL (ref 8.7–10.3)
CHLORIDE SERPL-SCNC: 109 MMOL/L (ref 96–109)
CO2 SERPL-SCNC: 23.7 MMOL/L (ref 21.6–31.8)
EOSINOPHIL # BLD AUTO: 0.2 K/UL (ref 0–0.7)
EOSINOPHIL NFR BLD AUTO: 2 %
ERYTHROCYTE [DISTWIDTH] IN BLOOD BY AUTOMATED COUNT: 3.2 M/UL (ref 4.3–5.9)
ERYTHROCYTE [DISTWIDTH] IN BLOOD: 14.7 % (ref 11.5–15.5)
GLUCOSE BLD-MCNC: 167 MG/DL (ref 75–99)
GLUCOSE BLD-MCNC: 177 MG/DL (ref 75–99)
GLUCOSE BLD-MCNC: 211 MG/DL (ref 75–99)
GLUCOSE BLD-MCNC: 211 MG/DL (ref 75–99)
GLUCOSE SERPL-MCNC: 149 MG/DL (ref 70–110)
GLUCOSE UR QL: (no result)
HCT VFR BLD AUTO: 28.3 % (ref 39–53)
HGB BLD-MCNC: 9.3 GM/DL (ref 13–17.5)
LYMPHOCYTES # SPEC AUTO: 0.5 K/UL (ref 1–4.8)
LYMPHOCYTES NFR SPEC AUTO: 6 %
MCH RBC QN AUTO: 29.1 PG (ref 25–35)
MCHC RBC AUTO-ENTMCNC: 32.9 G/DL (ref 31–37)
MCV RBC AUTO: 88.4 FL (ref 80–100)
MONOCYTES # BLD AUTO: 0.5 K/UL (ref 0–1)
MONOCYTES NFR BLD AUTO: 5 %
NEUTROPHILS # BLD AUTO: 7.5 K/UL (ref 1.3–7.7)
NEUTROPHILS NFR BLD AUTO: 86 %
PH UR: 5.5 [PH] (ref 5–8)
PLATELET # BLD AUTO: 200 K/UL (ref 150–450)
POTASSIUM SERPL-SCNC: 4.3 MMOL/L (ref 3.5–5.5)
PROT UR QL: (no result)
RBC UR QL: 3 /HPF (ref 0–5)
SODIUM SERPL-SCNC: 140 MMOL/L (ref 135–145)
SP GR UR: 1.02 (ref 1–1.03)
UROBILINOGEN UR QL STRIP: <2 MG/DL (ref ?–2)
WBC # BLD AUTO: 8.7 K/UL (ref 3.8–10.6)
WBC # UR AUTO: 1 /HPF (ref 0–5)

## 2021-05-10 RX ADMIN — INSULIN ASPART SCH UNIT: 100 INJECTION, SOLUTION INTRAVENOUS; SUBCUTANEOUS at 18:03

## 2021-05-10 RX ADMIN — BUPROPION HYDROCHLORIDE SCH MG: 300 TABLET, FILM COATED, EXTENDED RELEASE ORAL at 08:40

## 2021-05-10 RX ADMIN — FOLIC ACID SCH MG: 1 TABLET ORAL at 08:40

## 2021-05-10 RX ADMIN — Medication SCH MG: at 08:41

## 2021-05-10 RX ADMIN — ATORVASTATIN CALCIUM SCH MG: 80 TABLET, FILM COATED ORAL at 21:19

## 2021-05-10 RX ADMIN — INSULIN ASPART SCH UNIT: 100 INJECTION, SOLUTION INTRAVENOUS; SUBCUTANEOUS at 21:19

## 2021-05-10 RX ADMIN — POTASSIUM CHLORIDE SCH MEQ: 750 TABLET, EXTENDED RELEASE ORAL at 21:19

## 2021-05-10 RX ADMIN — CEFAZOLIN SCH MLS/HR: 330 INJECTION, POWDER, FOR SOLUTION INTRAMUSCULAR; INTRAVENOUS at 21:20

## 2021-05-10 RX ADMIN — METOPROLOL SUCCINATE SCH MG: 50 TABLET, EXTENDED RELEASE ORAL at 21:19

## 2021-05-10 RX ADMIN — CEFAZOLIN SCH MLS/HR: 330 INJECTION, POWDER, FOR SOLUTION INTRAMUSCULAR; INTRAVENOUS at 11:40

## 2021-05-10 RX ADMIN — Medication SCH MG: at 21:19

## 2021-05-10 RX ADMIN — METOPROLOL SUCCINATE SCH MG: 50 TABLET, EXTENDED RELEASE ORAL at 08:40

## 2021-05-10 RX ADMIN — CEFAZOLIN SCH: 330 INJECTION, POWDER, FOR SOLUTION INTRAMUSCULAR; INTRAVENOUS at 02:55

## 2021-05-10 RX ADMIN — THERA TABS SCH EACH: TAB at 08:41

## 2021-05-10 NOTE — P.PN
Subjective


Progress Note Date: 05/10/21








Mr. Lau is a 80-year-old male, who follows with Dr. Lemuel Ortiz in 

outpatient setting, with a past medical history of diabetes mellitus, GERD, 

hypertension, hyperlipidemia, renal disease, sleep apnea, CAD status post 

stenting, and the hospital with a chief complaint of recurrent falls.  The patie

nt has been increasingly feeling weak and having recurrent falls at home.  

Patient states that he has to get his right hip replacement done next week due 

to severe osteoarthritis of the right hip.  And for the past 2-3 weeks he 

mentions that he has been having issues with balance and having frequent falls. 

Patient fell off to the floor, and was not able to get off of the floor and 

thinks that home is unsafe for him.  He also mentions that he is becoming more 

forgetful for the past few months.  Patient was here in the ER yesterday, he had

a CAT scan of the head and cervical spine, that was negative for any fracture or

acute cranial process.  On reviewing his previous records, patient had a stress 

test done on 03/09/2021 that was negative for any inducible ischemia and an 

echocardiogram showing ejection fraction of 55-60%.





In the ER, patient's vitals at the time of admission temperature of 97.9, heart 

rate 60, respiratory 18, blood pressure 172/82, saturating at 99% on room air.  

He had a chest x-ray that was negative for any acute cardiopulmonary process.  

EKG showing right bundle branch block.  On reviewing the labs white count of 7, 

hemoglobin 9.8, platelets 229.  Sodium 136 operation 4, chloride 104, 

bicarbonate 25, BUN 24, creatinine 1.42.  Urine analysis was negative for le

ukocyte esterase and nitrites.  Corona virus  PCR negative. 





05/10/2021





Patient is seen in follow-up this morning with no acute overnight issues.  

Patient continues to have right hip discomfort and is being seen by physical 

therapy recommending subacute rehab for some physical therapy and strengthen 

mobility.  Patient states he is seeing orthopedics outpatient and is scheduled 

to have a right hip surgery on 5/11.  Spoke to the daughter Vonda helps care for 

him in the outpatient setting stated that he had stopped his Plavix at home 

because of the scheduled right hip replacement with Dr. Witt.  Consult was 

placed for Dr. Witt as I don't believe he knows the patient is hospitalized 

for more frequent falls.  Will hold aspirin and Plavix as he is scheduled for 

surgery tomorrow.  Patient states he believes he is staying hospitalized for his

surgery and will likely go to rehab after surgery and clearance.  Patient does 

follow with Dr. Ortiz in the outpatient setting.





Review of systems:


Constitutional: No reports of fatigue, fever, or chills


Cardiovascular: No reports of chest pain or palpitations


Respiratory: No reports of shortness of breath or cough


GI: No reports of nausea, vomiting, or diarrhea


: No reports of dysuria or retention


Neurovascular: Reports weakness and right hip pain with more frequent falls 





All medications have been reviewed





Active Medications





Acetaminophen (Acetaminophen Tab 325 Mg Tab)  650 mg PO Q6HR PRN


   PRN Reason: Mild Pain or Fever > 100.5


   Last Admin: 05/09/21 15:18 Dose:  650 mg


   Documented by: 


Aspirin (Aspirin 81 Mg)  81 mg PO [facility]


   Last Admin: 05/09/21 22:27 Dose:  81 mg


   Documented by: 


Atorvastatin Calcium (Atorvastatin 80 Mg Tab)  80 mg PO [facility]


   Last Admin: 05/09/21 22:28 Dose:  80 mg


   Documented by: 


Bupropion HCl (Bupropion Xl 300 Mg Tab.Er.24h)  300 mg PO [facility]


   Last Admin: 05/10/21 08:40 Dose:  300 mg


   Documented by: 


Clopidogrel Bisulfate (Clopidogrel 75 Mg Tab)  75 mg PO [facility]


   Last Admin: 05/09/21 22:27 Dose:  75 mg


   Documented by: 


Docusate Sodium (Docusate 100 Mg Cap)  100 mg PO DAILY PRN


   PRN Reason: Constipation


Folic Acid (Folic Acid 1 Mg Tab)  1 mg PO DAILY [facility]


   Last Admin: 05/10/21 08:40 Dose:  1 mg


   Documented by: 


Sodium Chloride (Saline 0.9%)  1,000 mls @ 100 mls/hr IV .Q10H [facility]


   Last Admin: 05/10/21 11:40 Dose:  100 mls/hr


   Documented by: 


Lisinopril (Lisinopril 10 Mg Tab)  10 mg PO [facility]


   Last Admin: 05/10/21 08:41 Dose:  10 mg


   Documented by: 


Magnesium Oxide (Magnesium Oxide 400 Mg Tab)  200 mg PO BID [facility]


   Last Admin: 05/10/21 08:41 Dose:  200 mg


   Documented by: 


Metoprolol Succinate (Metoprolol Succinate (Er) 50 Mg Tab.Er.24h)  50 mg PO BID 

[facility]


   Last Admin: 05/10/21 08:40 Dose:  50 mg


   Documented by: 


Morphine Sulfate (Morphine Sulfate 4 Mg/Ml Syringe)  4 mg IV Q4HR PRN


   PRN Reason: Severe Pain


   Last Admin: 05/09/21 20:00 Dose:  4 mg


   Documented by: 


Multivitamins (Multivitamins, Thera 1 Each Tab)  1 each PO DAILY@1200 [facility]


   Last Admin: 05/10/21 08:41 Dose:  1 each


   Documented by: 


Naloxone HCl (Naloxone 0.4 Mg/Ml 1 Ml Vial)  0.2 mg IV Q2M PRN


   PRN Reason: Opioid Reversal


Potassium Chloride (Potassium Chloride Er 10 Meq Tab.Er.Prt)  10 meq PO HS [facility]


   Last Admin: 05/09/21 22:28 Dose:  10 meq


   Documented by: 











Objective





- Vital Signs


Vital signs: 


                                   Vital Signs











Temp  99.5 F   05/10/21 04:23


 


Pulse  78   05/10/21 04:23


 


Resp  16   05/10/21 04:23


 


BP  167/73   05/10/21 04:23


 


Pulse Ox  94 L  05/10/21 04:23








                                 Intake & Output











 05/09/21 05/10/21 05/10/21





 18:59 06:59 18:59


 


Intake Total 1200 1200 


 


Output Total 1000 2000 


 


Balance 200 -800 


 


Intake:   


 


  Intake, IV Titration 1200 1200 





  Amount   


 


    Sodium Chloride 0.9% 1, 1200 1200 





    000 ml @ 100 mls/hr IV .   





    Q10H [facility] Rx#:[id number]   


 


Output:   


 


  Urine 1000 2000 


 


Other:   


 


  Voiding Method Urinal Urinal 


 


  # Voids  1 2














- Exam








GENERAL: Elderly male lying in bed appears to be in no acute distress.


HEENT: Pupils are round and equally reacting to light. EOMI. No scleral icterus.

No conjunctival pallor. Normocephalic, atraumatic. No pharyngeal erythema. No 

thyromegaly. 


CARDIOVASCULAR: S1 and S2 present.  Systolic murmur positive


PULMONARY: Bilateral breath sounds are positive.  No wheeze or crackles.


ABDOMEN: Soft, nontender, nondistended, normoactive bowel sounds. No palpable 

organomegaly. 


MUSCULOSKELETAL: No joint swelling or redness.


EXTREMITIES: No cyanosis, clubbing, or pedal edema. 


NEUROLOGICAL:  patient is alert awake oriented 3 .Gross neurological 

examination did not reveal any focal deficits. 


Gait - patient could not get out of the bed by himself due to generalized 

weakness and multiple joint osteoarthritis


SKIN: No rashes.





- Labs


CBC & Chem 7: 


                                 05/10/21 06:03





                                 05/10/21 06:03


Labs: 


                  Abnormal Lab Results - Last 24 Hours (Table)











  05/09/21 05/09/21 05/10/21 Range/Units





  16:55 21:19 06:03 


 


RBC    3.20 L  (4.30-5.90)  m/uL


 


Hgb    9.3 L  (13.0-17.5)  gm/dL


 


Hct    28.3 L  (39.0-53.0)  %


 


Lymphocytes #    0.5 L  (1.0-4.8)  k/uL


 


Est GFR (CKD-EPI)NonAf     (60.0-200.0)   


 


Glucose     ()  mg/dL


 


POC Glucose (mg/dL)  183 H  166 H   (75-99)  mg/dL


 


Calcium     (8.7-10.3)  mg/dL














  05/10/21 05/10/21 Range/Units





  06:03 07:19 


 


RBC    (4.30-5.90)  m/uL


 


Hgb    (13.0-17.5)  gm/dL


 


Hct    (39.0-53.0)  %


 


Lymphocytes #    (1.0-4.8)  k/uL


 


Est GFR (CKD-EPI)NonAf  56.0 L   (60.0-200.0)   


 


Glucose  149 H   ()  mg/dL


 


POC Glucose (mg/dL)   167 H  (75-99)  mg/dL


 


Calcium  8.0 L   (8.7-10.3)  mg/dL














Assessment and Plan


Assessment: 





Recurrent falls


Gait instability


Severe degenerative joint disease


Type 2 diabetes mellitus


GERD


Hyperlipidemia


Coronary artery disease status post stenting


Obstructive sleep apnea


Remote history of nicotine dependence





PLAN: Patient had a CAT scan of the head and cervical spine that was negative 

for any acute fracture or cranial process.  Patient had an echocardiogram done 

and a stress test done couple of months back that are within normal limits.  

Patient has been seeing orthopedic surgery as outpatient basis for his right hip

replacement surgery.  Patient has been restarted on all his home medications.  

Holding aspirin and Plavix for now as he is scheduled for surgery with Dr. Witt for 05/11/2021 and this was confirmed with patient's daughter Vonda who 

helps care for him.  He will likely be requiring subacute rehab placement bec

ause of significant history of falls and multiple chronic medical conditions and

is requesting [place] stabilized and discharged.  Case management and social 

work are aware.  Will consult Dr. Witt.  Creatinine slightly improved today 

at 1.2 and will continue a gentle IV hydration and repeat a.m. labs.  Hemoglobin

is stable at 9.3 with no active bleeding noted.

## 2021-05-11 LAB
ALBUMIN SERPL-MCNC: 2.9 G/DL (ref 3.5–5)
ALBUMIN/GLOB SERPL: 1.1 {RATIO}
ALP SERPL-CCNC: 101 U/L (ref 38–126)
ALT SERPL-CCNC: 21 U/L (ref 4–49)
ANION GAP SERPL CALC-SCNC: 6 MMOL/L
APTT BLD: 28 SEC (ref 22–30)
AST SERPL-CCNC: 27 U/L (ref 17–59)
BASOPHILS # BLD AUTO: 0 K/UL (ref 0–0.2)
BASOPHILS NFR BLD AUTO: 0 %
BUN SERPL-SCNC: 19 MG/DL (ref 9–20)
CALCIUM SPEC-MCNC: 8.1 MG/DL (ref 8.4–10.2)
CHLORIDE SERPL-SCNC: 106 MMOL/L (ref 98–107)
CO2 SERPL-SCNC: 23 MMOL/L (ref 22–30)
EOSINOPHIL # BLD AUTO: 0.1 K/UL (ref 0–0.7)
EOSINOPHIL NFR BLD AUTO: 1 %
ERYTHROCYTE [DISTWIDTH] IN BLOOD BY AUTOMATED COUNT: 2.95 M/UL (ref 4.3–5.9)
ERYTHROCYTE [DISTWIDTH] IN BLOOD: 14.6 % (ref 11.5–15.5)
GLOBULIN SER CALC-MCNC: 2.6 G/DL
GLUCOSE BLD-MCNC: 174 MG/DL (ref 75–99)
GLUCOSE BLD-MCNC: 204 MG/DL (ref 75–99)
GLUCOSE BLD-MCNC: 229 MG/DL (ref 75–99)
GLUCOSE BLD-MCNC: 230 MG/DL (ref 75–99)
GLUCOSE SERPL-MCNC: 155 MG/DL (ref 74–99)
HCT VFR BLD AUTO: 26.1 % (ref 39–53)
HGB BLD-MCNC: 9.1 GM/DL (ref 13–17.5)
INR PPP: 1.1 (ref ?–1.2)
LYMPHOCYTES # SPEC AUTO: 0.5 K/UL (ref 1–4.8)
LYMPHOCYTES NFR SPEC AUTO: 6 %
MAGNESIUM SPEC-SCNC: 1.7 MG/DL (ref 1.6–2.3)
MCH RBC QN AUTO: 30.8 PG (ref 25–35)
MCHC RBC AUTO-ENTMCNC: 34.8 G/DL (ref 31–37)
MCV RBC AUTO: 88.4 FL (ref 80–100)
MONOCYTES # BLD AUTO: 0.5 K/UL (ref 0–1)
MONOCYTES NFR BLD AUTO: 6 %
NEUTROPHILS # BLD AUTO: 7 K/UL (ref 1.3–7.7)
NEUTROPHILS NFR BLD AUTO: 86 %
PLATELET # BLD AUTO: 183 K/UL (ref 150–450)
POTASSIUM SERPL-SCNC: 4 MMOL/L (ref 3.5–5.1)
PROT SERPL-MCNC: 5.5 G/DL (ref 6.3–8.2)
PT BLD: 11.3 SEC (ref 9–12)
SODIUM SERPL-SCNC: 135 MMOL/L (ref 137–145)
WBC # BLD AUTO: 8.1 K/UL (ref 3.8–10.6)

## 2021-05-11 RX ADMIN — THERA TABS SCH EACH: TAB at 09:54

## 2021-05-11 RX ADMIN — INSULIN ASPART SCH UNIT: 100 INJECTION, SOLUTION INTRAVENOUS; SUBCUTANEOUS at 21:42

## 2021-05-11 RX ADMIN — Medication SCH MG: at 21:43

## 2021-05-11 RX ADMIN — POTASSIUM CHLORIDE SCH MEQ: 750 TABLET, EXTENDED RELEASE ORAL at 21:43

## 2021-05-11 RX ADMIN — METOPROLOL SUCCINATE SCH MG: 50 TABLET, EXTENDED RELEASE ORAL at 21:43

## 2021-05-11 RX ADMIN — CEFAZOLIN SCH MLS/HR: 330 INJECTION, POWDER, FOR SOLUTION INTRAMUSCULAR; INTRAVENOUS at 17:25

## 2021-05-11 RX ADMIN — CEFAZOLIN SCH: 330 INJECTION, POWDER, FOR SOLUTION INTRAMUSCULAR; INTRAVENOUS at 08:56

## 2021-05-11 RX ADMIN — INSULIN ASPART SCH UNIT: 100 INJECTION, SOLUTION INTRAVENOUS; SUBCUTANEOUS at 09:53

## 2021-05-11 RX ADMIN — BUPROPION HYDROCHLORIDE SCH MG: 300 TABLET, FILM COATED, EXTENDED RELEASE ORAL at 09:54

## 2021-05-11 RX ADMIN — FOLIC ACID SCH MG: 1 TABLET ORAL at 09:54

## 2021-05-11 RX ADMIN — INSULIN ASPART SCH UNIT: 100 INJECTION, SOLUTION INTRAVENOUS; SUBCUTANEOUS at 17:26

## 2021-05-11 RX ADMIN — METOPROLOL SUCCINATE SCH MG: 50 TABLET, EXTENDED RELEASE ORAL at 09:54

## 2021-05-11 RX ADMIN — ATORVASTATIN CALCIUM SCH MG: 80 TABLET, FILM COATED ORAL at 21:43

## 2021-05-11 RX ADMIN — INSULIN ASPART SCH UNIT: 100 INJECTION, SOLUTION INTRAVENOUS; SUBCUTANEOUS at 12:52

## 2021-05-11 RX ADMIN — Medication SCH MG: at 09:53

## 2021-05-11 NOTE — P.PN
Subjective


Progress Note Date: 05/11/21


Principal diagnosis: 





Recurrent falls


UTI


Generalized weakness


80-year-old male with past medical history of diabetes mellitus, GERD, 

hypertension, hyperlipidemia, renal disease, sleep apnea, CAD status post 

stenting, and recurrent falls was admitted to the hospital for increasing 

generalized weakness and several falls.  Patient has been following up in 

outpatient setting with orthopedics for right hip surgery would be scheduled on 

05/11/2021.  Orthopedics has been consulted.  Plavix and aspirin has been held 

awaiting for recommendations and treatment plan from orthopedics for possible 

right hip surgery patient continues to be nothing by mouth.





05/11/2021


Evaluated patient this a.m., resting comfortably in bed.  Patient admits to 

recurrent falls with moderate to severe right hip pain.  He states he's been 

hospitalized due to having surgery on 05/11/2021.  Consult for orthopedics has 

been placed earlier by hospitalist services.  unaware if orthopedics has been 

contacted regarding right hip surgery.  Patient continues to endorse generalized

weakness and moderate to severe right hip pain.  Patient denies fever, chills, 

shortness of breath, chest pain, palpitations, abdominal pain, nausea, vomiting 

or diarrhea at this time.  Awaiting diagnostic labs this a.m.  Continue to hold 

Plavix and aspirin until recommendations and treatment plan from orthopedics.





Objective





- Vital Signs


Vital signs: 


                                   Vital Signs











Temp  99 F   05/11/21 04:44


 


Pulse  72   05/11/21 04:44


 


Resp  16   05/11/21 04:44


 


BP  160/75   05/11/21 04:44


 


Pulse Ox  98   05/11/21 04:44








                                 Intake & Output











 05/10/21 05/11/21 05/11/21





 18:59 06:59 18:59


 


Intake Total 1000 1700 


 


Output Total  675 


 


Balance 1000 1025 


 


Intake:   


 


  Intake, IV Titration 1000 1200 





  Amount   


 


    Sodium Chloride 0.9% 1, 1000 1200 





    000 ml @ 100 mls/hr IV .   





    Q10H [facility] Rx#:[id number]   


 


  Oral  500 


 


Output:   


 


  Urine  675 


 


Other:   


 


  Voiding Method  Urinal 


 


  # Voids 2  














- Constitutional


General appearance: Present: mild distress





- EENT


Eyes: Present: EOMI, PERRLA, normal appearance


ENT: Present: hard of hearing


Ears: bilateral: normal





- Neck


Neck: Present: normal ROM


Carotids: bilateral: upstroke normal


Thyroid: negative: normal size





- Respiratory


Respiratory: bilateral: CTA (Anterior and posterior lung fields)





- Cardiovascular


Heart rate: 74


Rhythm: regular


Heart sounds: normal: S1, S2





- Peripheral pulses


  ** radial pulse


Peripheral Pulses: bilateral: Normal





  ** dorsalis pedis


Peripheral Pulses: bilateral: Normal





- Gastrointestinal


General gastrointestinal: Present: normal bowel sounds





- Integumentary


Integumentary: Present: decreased turgor





- Neurologic


Neurologic: Present: CNII-XII intact





- Musculoskeletal


Musculoskeletal Comment(s): 


Right-sided hip pain tenderness to palpation





Musculoskeletal: Present: generalized weakness





- Psychiatric


Psychiatric: Present: A&O x's 3, appropriate affect, intact judgment & insight





- Allied health notes


Allied health notes reviewed: nursing





- Labs


CBC & Chem 7: 


                                 05/11/21 06:26





                                 05/11/21 06:26


Labs: 


                  Abnormal Lab Results - Last 24 Hours (Table)











  05/10/21 05/10/21 05/10/21 Range/Units





  06:03 11:53 17:25 


 


RBC     (4.30-5.90)  m/uL


 


Hgb     (13.0-17.5)  gm/dL


 


Hct     (39.0-53.0)  %


 


Lymphocytes #     (1.0-4.8)  k/uL


 


Sodium     (137-145)  mmol/L


 


Creatinine     (0.66-1.25)  mg/dL


 


Est GFR (CKD-EPI)NonAf  56.0 L    (60.0-200.0)   


 


Glucose  149 H    ()  mg/dL


 


POC Glucose (mg/dL)   211 H  177 H  (75-99)  mg/dL


 


Calcium  8.0 L    (8.7-10.3)  mg/dL


 


Total Protein     (6.3-8.2)  g/dL


 


Albumin     (3.5-5.0)  g/dL


 


Urine Protein     (Negative)  


 


Urine Glucose (UA)     (Negative)  


 


Urine Mucus     (None)  /hpf














  05/10/21 05/10/21 05/11/21 Range/Units





  18:53 20:21 06:26 


 


RBC    2.95 L  (4.30-5.90)  m/uL


 


Hgb    9.1 L  (13.0-17.5)  gm/dL


 


Hct    26.1 L  (39.0-53.0)  %


 


Lymphocytes #    0.5 L  (1.0-4.8)  k/uL


 


Sodium     (137-145)  mmol/L


 


Creatinine     (0.66-1.25)  mg/dL


 


Est GFR (CKD-EPI)NonAf     (60.0-200.0)   


 


Glucose     ()  mg/dL


 


POC Glucose (mg/dL)   211 H   (75-99)  mg/dL


 


Calcium     (8.7-10.3)  mg/dL


 


Total Protein     (6.3-8.2)  g/dL


 


Albumin     (3.5-5.0)  g/dL


 


Urine Protein  2+ H    (Negative)  


 


Urine Glucose (UA)  4+ H    (Negative)  


 


Urine Mucus  Rare H    (None)  /hpf














  05/11/21 05/11/21 Range/Units





  06:26 07:06 


 


RBC    (4.30-5.90)  m/uL


 


Hgb    (13.0-17.5)  gm/dL


 


Hct    (39.0-53.0)  %


 


Lymphocytes #    (1.0-4.8)  k/uL


 


Sodium  135 L   (137-145)  mmol/L


 


Creatinine  1.28 H   (0.66-1.25)  mg/dL


 


Est GFR (CKD-EPI)NonAf    (60.0-200.0)   


 


Glucose  155 H   ()  mg/dL


 


POC Glucose (mg/dL)   174 H  (75-99)  mg/dL


 


Calcium  8.1 L   (8.7-10.3)  mg/dL


 


Total Protein  5.5 L   (6.3-8.2)  g/dL


 


Albumin  2.9 L   (3.5-5.0)  g/dL


 


Urine Protein    (Negative)  


 


Urine Glucose (UA)    (Negative)  


 


Urine Mucus    (None)  /hpf














Assessment and Plan


Assessment: 


Recurrent falls


Right hip is comfort


Gait instability


Severe degenerative joint disease


Diabetes mellitus type 2


GERD/reflux


Hyperlipidemia


Coronary artery disease status post stenting


Obstructive sleep apnea


Remote history of nicotine dependence


Full code





Plan: 


Recurrent fallscontinue to monitor vital signs and gait dysfunction


Diabetes mellitus type 2sliding scale continue to monitor blood sugars


Right hip discomfortawaiting recommendations and treatment plan from 

orthopedics for possible right hip replacement


Continue home medications


Continue medical management


Further recommendations to come based on patient's clinical condition





Time with Patient: Greater than 30

## 2021-05-11 NOTE — P.CNOR
History of Present Illness





- HPI


Consult date: 05/11/21


History of present illness: 


This is an 80-year-old male who is admitted for weakness, frequent falls and 

UTI.  Patient was scheduled for total hip replacement to be done on 05/11/2021, 

but this is to be rescheduled.  Patient is seen and evaluated at bedside today 

with Dr. Byron Witt.  No family is present in the room today.  Patient 

denies any pain in the right hip today.  Patient's past medical history 

significant for diabetes mellitus, GERD, hypertension, hyperlipidemia, renal 

disease, sleep apnea, CAD status post stent placement and recurrent falls. 

Patient denies any fever/chills, abdominal pain, shortness of breath or chest 

pain.








Review of Systems


See HPI.








Past Medical History


Past Medical History: Diabetes Mellitus, GERD/Reflux, Hyperlipidemia, Myocardial

Infarction (MI), Osteoarthritis (OA), Renal Disease, Sleep Apnea/CPAP/BIPAP


Additional Past Medical History / Comment(s): ANEMIA. MI x 2, heart murmer, no 

cpap used, constipation,


Last Myocardial Infarction Date:: 3/28/19


History of Any Multi-Drug Resistant Organisms: None Reported


Past Surgical History: Heart Catheterization With Stent, Joint Replacement, 

Orthopedic Surgery


Additional Past Surgical History / Comment(s): 4 cardiac stents, total right 

knee replacement, monse cataracts with lens implants, left shoulder surgery, left 

hip replacement,


Past Anesthesia/Blood Transfusion Reactions: No Reported Reaction


Date of Last Stent Placement:: 6/2020


Past Psychological History: No Psychological Hx Reported


Smoking Status: Former smoker


Past Alcohol Use History: None Reported


Past Drug Use History: None Reported





- Past Family History


  ** Father


Family Medical History: Myocardial Infarction (MI)





  ** Mother


Family Medical History: No Reported History





  ** Sister(s)


Family Medical History: Cancer





Medications and Allergies


                                Home Medications











 Medication  Instructions  Recorded  Confirmed  Type


 


Aspirin EC [Ecotrin Low Dose] 81 mg PO HS 08/23/14 05/09/21 History


 


buPROPion XL [Wellbutrin XL] 300 mg PO QAM 08/23/14 05/09/21 History


 


Quinapril HCl [Accupril] 10 mg PO QAM 10/22/17 05/09/21 History


 


Docusate [Colace] 100 mg PO DAILY PRN 12/21/18 05/09/21 History


 


Metoprolol Succinate (ER) [Toprol 50 mg PO BID 12/21/18 05/09/21 History





XL]    


 


Atorvastatin [Lipitor] 80 mg PO HS 06/18/20 05/09/21 History


 


Clopidogrel [Plavix] 75 mg PO HS 06/18/20 05/09/21 History


 


Fish Oil/Dha/Epa [Fish Oil 1,200 1 cap PO QAM 06/18/20 05/09/21 History





mg Fish Oil]    


 


Magnesium Oxide [Mag-Ox] 200 mg PO BID 06/18/20 05/09/21 History


 


Nitroglycerin Sl Tabs [Nitrostat] 0.4 mg SUBLINGUAL Q5M PRN #20 tab 06/21/20 05/09/21 Rx


 


Acetaminophen Tab [Tylenol] 650 mg PO Q6HR PRN  tab 07/01/20 05/09/21 Rx


 


INSULIN ASPART (NovoLOG) [NovoLOG See Protocol SQ ACHS 07/10/20 05/09/21 History





(formulary)]    


 


Multivitamins, Thera [Multivitamin 1 tab PO DAILY@1200 07/10/20 05/09/21 History





(formulary)]    


 


Folic Acid 1 mg PO DAILY 05/06/21 05/09/21 History


 


Potassium Chloride 10 meq PO HS 05/06/21 05/09/21 History








                                    Allergies











Allergy/AdvReac Type Severity Reaction Status Date / Time


 


iodine Allergy Unknown Unknown Verified 05/09/21 08:51














Physical Examination


On exam patient is lying comfortably in bed in no acute distress.  Patient is 

alert and oriented.  Sensation intact to bilateral lower extremities.  

Neurovascular status and circulatory status are intact.








Results





- Labs


Labs: 


                  Abnormal Lab Results - Last 24 Hours (Table)











  05/10/21 05/10/21 05/10/21 Range/Units





  11:53 17:25 18:53 


 


RBC     (4.30-5.90)  m/uL


 


Hgb     (13.0-17.5)  gm/dL


 


Hct     (39.0-53.0)  %


 


Lymphocytes #     (1.0-4.8)  k/uL


 


Sodium     (137-145)  mmol/L


 


Creatinine     (0.66-1.25)  mg/dL


 


Glucose     (74-99)  mg/dL


 


POC Glucose (mg/dL)  211 H  177 H   (75-99)  mg/dL


 


Calcium     (8.4-10.2)  mg/dL


 


Total Protein     (6.3-8.2)  g/dL


 


Albumin     (3.5-5.0)  g/dL


 


Urine Protein    2+ H  (Negative)  


 


Urine Glucose (UA)    4+ H  (Negative)  


 


Urine Mucus    Rare H  (None)  /hpf














  05/10/21 05/11/21 05/11/21 Range/Units





  20:21 06:26 06:26 


 


RBC   2.95 L   (4.30-5.90)  m/uL


 


Hgb   9.1 L   (13.0-17.5)  gm/dL


 


Hct   26.1 L   (39.0-53.0)  %


 


Lymphocytes #   0.5 L   (1.0-4.8)  k/uL


 


Sodium    135 L  (137-145)  mmol/L


 


Creatinine    1.28 H  (0.66-1.25)  mg/dL


 


Glucose    155 H  (74-99)  mg/dL


 


POC Glucose (mg/dL)  211 H    (75-99)  mg/dL


 


Calcium    8.1 L  (8.4-10.2)  mg/dL


 


Total Protein    5.5 L  (6.3-8.2)  g/dL


 


Albumin    2.9 L  (3.5-5.0)  g/dL


 


Urine Protein     (Negative)  


 


Urine Glucose (UA)     (Negative)  


 


Urine Mucus     (None)  /hpf














  05/11/21 Range/Units





  07:06 


 


RBC   (4.30-5.90)  m/uL


 


Hgb   (13.0-17.5)  gm/dL


 


Hct   (39.0-53.0)  %


 


Lymphocytes #   (1.0-4.8)  k/uL


 


Sodium   (137-145)  mmol/L


 


Creatinine   (0.66-1.25)  mg/dL


 


Glucose   (74-99)  mg/dL


 


POC Glucose (mg/dL)  174 H  (75-99)  mg/dL


 


Calcium   (8.4-10.2)  mg/dL


 


Total Protein   (6.3-8.2)  g/dL


 


Albumin   (3.5-5.0)  g/dL


 


Urine Protein   (Negative)  


 


Urine Glucose (UA)   (Negative)  


 


Urine Mucus   (None)  /[facility]








                                      H & H











  05/09/21 05/10/21 05/11/21 Range/Units





  04:08 06:03 06:26 


 


Hgb  9.8 L  9.3 L  9.1 L  (13.0-17.5)  gm/dL


 


Hct  30.3 L  28.3 L  26.1 L  (39.0-53.0)  %








                                   Coagulation











  05/11/21 Range/Units





  06:26 


 


INR  1.1  (<1.2)  











Result Diagrams: 


                                 05/11/21 06:26





                                 05/11/21 06:26





Assessment and Plan


(1) Fall


Current Visit: Yes   Status: Acute   Code(s): W19.XXXA - UNSPECIFIED FALL, 

INITIAL ENCOUNTER   SNOMED Code(s): 7540296


   





(2) Gait instability


Current Visit: Yes   Status: Acute   Code(s): R26.81 - UNSTEADINESS ON FEET   

SNOMED Code(s): 44946345


   





(3) Weakness


Current Visit: Yes   Status: Acute   Code(s): R53.1 - WEAKNESS   SNOMED Code(s):

37903283


   


Plan: 


1.  It is discussed with the patient at bedside that his surgery will have to be

canceled for now due to multiple risk factors including weakness and frequent 

falls.  It is explained that having a hip replacement when experiencing weakness

and frequent falls will only increase his risk for falling and/or fractures. In 

addition, the patient's hemoglobin is 9.1 today and trending down. Typically his

hemoglobin would need to be over 10 to undergo total hip replacement.  It is 

also explained that because he has been admitted to the hospital for the last 

couple of days he is at an increased risk for postoperative infection.


2.  It is recommended that the patient follow up with [facility] as 

an outpatient once he is discharged from the hospital and recovered.  At this 

point we may discuss rescheduling his hip replacement.

## 2021-05-12 VITALS
DIASTOLIC BLOOD PRESSURE: 76 MMHG | TEMPERATURE: 98 F | RESPIRATION RATE: 18 BRPM | SYSTOLIC BLOOD PRESSURE: 152 MMHG | HEART RATE: 67 BPM

## 2021-05-12 LAB
ANION GAP SERPL CALC-SCNC: 7 MMOL/L
BASOPHILS # BLD AUTO: 0 K/UL (ref 0–0.2)
BASOPHILS NFR BLD AUTO: 0 %
BUN SERPL-SCNC: 22 MG/DL (ref 9–20)
CALCIUM SPEC-MCNC: 7.7 MG/DL (ref 8.4–10.2)
CHLORIDE SERPL-SCNC: 107 MMOL/L (ref 98–107)
CO2 SERPL-SCNC: 21 MMOL/L (ref 22–30)
EOSINOPHIL # BLD AUTO: 0.1 K/UL (ref 0–0.7)
EOSINOPHIL NFR BLD AUTO: 2 %
ERYTHROCYTE [DISTWIDTH] IN BLOOD BY AUTOMATED COUNT: 2.77 M/UL (ref 4.3–5.9)
ERYTHROCYTE [DISTWIDTH] IN BLOOD BY AUTOMATED COUNT: 2.8 M/UL (ref 4.3–5.9)
ERYTHROCYTE [DISTWIDTH] IN BLOOD: 14.5 % (ref 11.5–15.5)
ERYTHROCYTE [DISTWIDTH] IN BLOOD: 14.5 % (ref 11.5–15.5)
GLUCOSE BLD-MCNC: 123 MG/DL (ref 75–99)
GLUCOSE BLD-MCNC: 188 MG/DL (ref 75–99)
GLUCOSE SERPL-MCNC: 176 MG/DL (ref 74–99)
HCT VFR BLD AUTO: 24.7 % (ref 39–53)
HCT VFR BLD AUTO: 25.3 % (ref 39–53)
HGB BLD-MCNC: 8.2 GM/DL (ref 13–17.5)
HGB BLD-MCNC: 8.6 GM/DL (ref 13–17.5)
LYMPHOCYTES # SPEC AUTO: 0.5 K/UL (ref 1–4.8)
LYMPHOCYTES NFR SPEC AUTO: 7 %
MCH RBC QN AUTO: 29.6 PG (ref 25–35)
MCH RBC QN AUTO: 30.5 PG (ref 25–35)
MCHC RBC AUTO-ENTMCNC: 33.3 G/DL (ref 31–37)
MCHC RBC AUTO-ENTMCNC: 33.9 G/DL (ref 31–37)
MCV RBC AUTO: 88.9 FL (ref 80–100)
MCV RBC AUTO: 90 FL (ref 80–100)
MONOCYTES # BLD AUTO: 0.5 K/UL (ref 0–1)
MONOCYTES NFR BLD AUTO: 6 %
NEUTROPHILS # BLD AUTO: 6.1 K/UL (ref 1.3–7.7)
NEUTROPHILS NFR BLD AUTO: 84 %
PLATELET # BLD AUTO: 188 K/UL (ref 150–450)
PLATELET # BLD AUTO: 200 K/UL (ref 150–450)
POTASSIUM SERPL-SCNC: 3.9 MMOL/L (ref 3.5–5.1)
SODIUM SERPL-SCNC: 135 MMOL/L (ref 137–145)
WBC # BLD AUTO: 6 K/UL (ref 3.8–10.6)
WBC # BLD AUTO: 7.3 K/UL (ref 3.8–10.6)

## 2021-05-12 RX ADMIN — BUPROPION HYDROCHLORIDE SCH MG: 300 TABLET, FILM COATED, EXTENDED RELEASE ORAL at 08:05

## 2021-05-12 RX ADMIN — THERA TABS SCH EACH: TAB at 08:05

## 2021-05-12 RX ADMIN — MORPHINE SULFATE PRN MG: 4 INJECTION, SOLUTION INTRAMUSCULAR; INTRAVENOUS at 01:57

## 2021-05-12 RX ADMIN — CEFAZOLIN SCH MLS/HR: 330 INJECTION, POWDER, FOR SOLUTION INTRAMUSCULAR; INTRAVENOUS at 08:05

## 2021-05-12 RX ADMIN — METOPROLOL SUCCINATE SCH MG: 50 TABLET, EXTENDED RELEASE ORAL at 08:05

## 2021-05-12 RX ADMIN — CEFAZOLIN SCH: 330 INJECTION, POWDER, FOR SOLUTION INTRAMUSCULAR; INTRAVENOUS at 06:57

## 2021-05-12 RX ADMIN — FOLIC ACID SCH MG: 1 TABLET ORAL at 08:05

## 2021-05-12 RX ADMIN — Medication SCH MG: at 08:05

## 2021-05-12 RX ADMIN — INSULIN ASPART SCH UNIT: 100 INJECTION, SOLUTION INTRAVENOUS; SUBCUTANEOUS at 08:06

## 2021-05-12 RX ADMIN — INSULIN ASPART SCH: 100 INJECTION, SOLUTION INTRAVENOUS; SUBCUTANEOUS at 12:41

## 2021-05-12 NOTE — P.CRDCN
History of Present Illness


Consult date: 05/12/21


History of present illness: 





HISTORY OF PRESENT ILLNESS:  





This is a 80-year-old male with a past medical history significant for 

hypertension, hyperlipidemia, diabetes mellitus, and coronary artery disease 

with previous PCI. Patient follows in the office with Dr. Mast. We have been 

asked to see the patient in consultation for chest pain. Patient examined at the

bedside.  Patient states he was supposed to undergo a right total hip 

replacement but states it was rescheduled secondary to increasing weakness and 

falls.  Patient states he had one episode of chest pain during his 

hospitalization.  He states this was very mild and only lasted for a few 

minutes.  He denies any further episodes of chest pain.  He denies shortness of 

breath, cough, or congestion.





EKG reveals sinus mechanism with right bundle branch block, unchanged from 

previous EKG


Chest xray correlate for CHF, otherwise consider interstitial pneumonia


Laboratory data: WBC 7.3.  Hemoglobin 8.2.  Lamictal 188.  Sodium 135.  

Potassium 3.9.  BUN 22.  Creatinine 1.22.  Troponin 0.015.  0.067.  0.080.  

0.063.


Current home cardiac medications include quinapril 10 mg daily, metoprolol 

succinate 50 mg twice a day, Plavix 75 mg daily, Lipitor 80 mg daily, and 

aspirin 81 mg daily.


Echocardiogram reveals ejection fraction 55-60%, moderate aortic stenosis, mild 

mitral regurgitation, and mild tricuspid regurgitation.


Cardiac catheterization history: June 2020 with Dr. oJ revealing heavily 

calcified coronary arteries.  Critical stenosis involving the right coronary 

artery proximally.  Moderate in-stent restenosis of the proximal LAD with 

moderate to significant disease in the proximal left circumflex.  

Mild-to-moderate aortic stenosis.  Patient underwent stenting of the RCA.


Patient underwent dobutamine stress test in March 2021 which was negative for 

ischemia





REVIEW OF SYSTEMS: 


At the time of my exam:


CONSTITUTIONAL: Denies fever or chills.


HEENT: Denies blurred vision, vision changes, or eye pain. Denies hemoptysis 


CARDIOVASCULAR: Denies chest pain. Denies orthopnea. Denies PND. Denies 

palpitations


RESPIRATORY: Denies shortness of breath. 


GASTROINTESTINAL: Denies abdominal pain. Denies nausea or vomiting. 


HEMATOLOGIC: Denies bleeding disorders.


GENITOURINARY:  Denies any blood in urine.


SKIN: Denies pruitis. Denies rash.





PHYSICAL EXAM: 


VITAL SIGNS: Reviewed.


GENERAL: Well-developed in no acute distress. 


HEENT: Head is normocephalic. Pupils are equal, round. Sclerae anicteric. Mucous

membranes of the mouth are moist. Neck supple. No JVD or thyromegaly


LUNGS: Respirations even and unlabored. Lungs essentially clear to auscultation 

bilaterally.


HEART: Regular rate and rhythm.  S1 and S2 heard.  Systolic murmur noted


ABDOMEN: Soft. Nondistended. Nontender.


EXTREMITIES: Normal range of motion.  No clubbing or cyanosis.  Peripheral 

pulses intact.  No lower extremity edema


NEUROLOGIC: Awake and alert. Oriented x 3. 





ASSESSMENT: 


Chest pain


Frequent falls and increased weakness


Abnormal troponins of unclear significance


Osteoarthritis


Coronary artery disease with previous PCI to LAD and RCA


Moderate aortic stenosis


Mild to moderate pulmonary hypertension


Hypertension


Hyperlipidemia


Diabetes mellitus  





PLAN: 


Dr. De La Fuente discussed options with patient. Patient did have a negative 

stress test in March 2021. Patient does not want to have any further invasive 

procedures performed including cardiac catheterization.


We will continue with medical management at this time


Continue current cardiac medications including Lipitor, lisinopril, and 

metoprolol


Resume aspirin and plavix 


Begin Imdur 30mg daily


We will sign off. Please reconsult if needed











Nurse practitioner note has been reviewed by physician. Signing provider agrees 

with the documented findings, assessment, and plan of care. 








Past Medical History


Past Medical History: Diabetes Mellitus, GERD/Reflux, Hyperlipidemia, Myocardial

Infarction (MI), Osteoarthritis (OA), Renal Disease, Sleep Apnea/CPAP/BIPAP


Additional Past Medical History / Comment(s): ANEMIA. MI x 2, heart murmer, no c

pap used, constipation,


Last Myocardial Infarction Date:: 3/28/19


History of Any Multi-Drug Resistant Organisms: None Reported


Past Surgical History: Heart Catheterization With Stent, Joint Replacement, 

Orthopedic Surgery


Additional Past Surgical History / Comment(s): 4 cardiac stents, total right k

nee replacement, monse cataracts with lens implants, left shoulder surgery, left 

hip replacement,


Past Anesthesia/Blood Transfusion Reactions: No Reported Reaction


Date of Last Stent Placement:: 6/2020


Past Psychological History: No Psychological Hx Reported


Smoking Status: Former smoker


Past Alcohol Use History: None Reported


Past Drug Use History: None Reported





- Past Family History


  ** Father


Family Medical History: Myocardial Infarction (MI)





  ** Mother


Family Medical History: No Reported History





  ** Sister(s)


Family Medical History: Cancer





Medications and Allergies


                                Home Medications











 Medication  Instructions  Recorded  Confirmed  Type


 


Aspirin EC [Ecotrin Low Dose] 81 mg PO HS 08/23/14 05/09/21 History


 


buPROPion XL [Wellbutrin XL] 300 mg PO QAM 08/23/14 05/09/21 History


 


Quinapril HCl [Accupril] 10 mg PO QAM 10/22/17 05/09/21 History


 


Docusate [Colace] 100 mg PO DAILY PRN 12/21/18 05/09/21 History


 


Metoprolol Succinate (ER) [Toprol 50 mg PO BID 12/21/18 05/09/21 History





XL]    


 


Atorvastatin [Lipitor] 80 mg PO HS 06/18/20 05/09/21 History


 


Clopidogrel [Plavix] 75 mg PO HS 06/18/20 05/09/21 History


 


Fish Oil/Dha/Epa [Fish Oil 1,200 1 cap PO QAM 06/18/20 05/09/21 History





mg Fish Oil]    


 


Magnesium Oxide [Mag-Ox] 200 mg PO BID 06/18/20 05/09/21 History


 


Nitroglycerin Sl Tabs [Nitrostat] 0.4 mg SUBLINGUAL Q5M PRN #20 tab 06/21/20 05/09/21 Rx


 


Acetaminophen Tab [Tylenol] 650 mg PO Q6HR PRN  tab 07/01/20 05/09/21 Rx


 


INSULIN ASPART (NovoLOG) [NovoLOG See Protocol SQ ACHS 07/10/20 05/09/21 History





(formulary)]    


 


Multivitamins, Thera [Multivitamin 1 tab PO DAILY@1200 07/10/20 05/09/21 History





(formulary)]    


 


Folic Acid 1 mg PO DAILY 05/06/21 05/09/21 History


 


Potassium Chloride 10 meq PO HS 05/06/21 05/09/21 History


 


Isosorbide Mononitrate ER [Imdur] 30 mg PO DAILY 30 Days #30 05/12/21  Rx





 tab.er.24h   








                                    Allergies











Allergy/AdvReac Type Severity Reaction Status Date / Time


 


iodine Allergy Unknown Unknown Verified 05/09/21 08:51














Physical Exam


Vitals: 


                                   Vital Signs











  Temp Pulse Resp BP BP Pulse Ox


 


 05/12/21 05:00  98.1 F  72  16  154/68   98


 


 05/12/21 02:18  99.0 F  86  18  201/97  209/95  89 L


 


 05/11/21 20:00   82  16   


 


 05/11/21 19:53  98.9 F  82  16   174/86  95


 


 05/11/21 11:37  98.0 F  73  16   159/71  98








                                Intake and Output











 05/11/21 05/12/21 05/12/21





 22:59 06:59 14:59


 


Output Total 300  


 


Balance -300  


 


Output:   


 


  Urine 300  


 


Other:   


 


  Voiding Method Urinal  














Results





                                 05/12/21 05:50





                                 05/12/21 05:50


                                 Cardiac Enzymes











  05/12/21 05/12/21 05/12/21 Range/Units





  02:49 05:50 08:43 


 


Troponin I  0.067 H*  0.080 H*  0.063 H*  (0.000-0.034)  ng/mL








                                       CBC











  05/12/21 Range/Units





  05:50 


 


WBC  7.3  (3.8-10.6)  k/uL


 


RBC  2.77 L  (4.30-5.90)  m/uL


 


Hgb  8.2 L  (13.0-17.5)  gm/dL


 


Hct  24.7 L  (39.0-53.0)  %


 


Plt Count  188  (150-450)  k/uL








                          Comprehensive Metabolic Panel











  05/12/21 Range/Units





  05:50 


 


Sodium  135 L  (137-145)  mmol/L


 


Potassium  3.9  (3.5-5.1)  mmol/L


 


Chloride  107  ()  mmol/L


 


Carbon Dioxide  21 L  (22-30)  mmol/L


 


BUN  22 H  (9-20)  mg/dL


 


Creatinine  1.22  (0.66-1.25)  mg/dL


 


Glucose  176 H  (74-99)  mg/dL


 


Calcium  7.7 L  (8.4-10.2)  mg/dL








                               Current Medications











Generic Name Dose Route Start Last Admin





  Trade Name Freq  PRN Reason Stop Dose Admin


 


Acetaminophen  650 mg  05/09/21 11:02  05/09/21 15:18





  Acetaminophen Tab 325 Mg Tab  PO   650 mg





  Q6HR PRN   Administration





  Mild Pain or Fever > 100.5  


 


Atorvastatin Calcium  80 mg  05/09/21 21:00  05/11/21 21:43





  Atorvastatin 80 Mg Tab  PO   80 mg





  HS OPAL   Administration


 


Bupropion HCl  300 mg  05/09/21 11:15  05/12/21 08:05





  Bupropion Xl 300 Mg Tab.Er.24h  PO   300 mg





  QAM OPAL   Administration


 


Docusate Sodium  100 mg  05/09/21 11:02 





  Docusate 100 Mg Cap  PO  





  DAILY PRN  





  Constipation  


 


Folic Acid  1 mg  05/09/21 11:15  05/12/21 08:05





  Folic Acid 1 Mg Tab  PO   1 mg





  DAILY OPAL   Administration


 


Hydralazine HCl  10 mg  05/12/21 06:58 





  Hydralazine Hcl 20 Mg/Ml 1 Ml Vial  IVP  





  Q6HR PRN  





  Blood Pressure - High  


 


Sodium Chloride  1,000 mls @ 100 mls/hr  05/09/21 04:00  05/12/21 08:05





  Saline 0.9%  IV   100 mls/hr





  .Q10H OPAL   Administration


 


Insulin Aspart  0 unit  05/10/21 17:30  05/12/21 08:06





  Insulin Aspart (Novolog) 100 Unit/Ml Vial  SQ   2 unit





  ACHS OPAL   Administration





  Protocol  


 


Isosorbide Mononitrate  30 mg  05/12/21 09:45  05/12/21 10:16





  Isosorbide Mononitrate Er 30 Mg Tab.Er.24h  PO   30 mg





  DAILY OPAL   Administration


 


Lisinopril  10 mg  05/09/21 11:15  05/12/21 08:06





  Lisinopril 10 Mg Tab  PO   10 mg





  QAM OPAL   Administration


 


Magnesium Oxide  200 mg  05/09/21 11:15  05/12/21 08:05





  Magnesium Oxide 400 Mg Tab  PO   200 mg





  BID OPAL   Administration


 


Metoprolol Succinate  50 mg  05/09/21 21:00  05/12/21 08:05





  Metoprolol Succinate (Er) 50 Mg Tab.Er.24h  PO   50 mg





  BID OPAL   Administration


 


Morphine Sulfate  4 mg  05/09/21 03:50  05/12/21 01:57





  Morphine Sulfate 4 Mg/Ml Syringe  IV   4 mg





  Q4HR PRN   Administration





  Severe Pain  


 


Multivitamins  1 each  05/09/21 12:00  05/12/21 08:05





  Multivitamins, Thera 1 Each Tab  PO   1 each





  DAILY@1200 OPAL   Administration


 


Naloxone HCl  0.2 mg  05/09/21 03:50 





  Naloxone 0.4 Mg/Ml 1 Ml Vial  IV  





  Q2M PRN  





  Opioid Reversal  


 


Potassium Chloride  10 meq  05/09/21 21:00  05/11/21 21:43





  Potassium Chloride Er 10 Meq Tab.Er.Prt  PO   10 meq





  HS OPAL   Administration








                                Intake and Output











 05/11/21 05/12/21 05/12/21





 22:59 06:59 14:59


 


Output Total 300  


 


Balance -300  


 


Output:   


 


  Urine 300  


 


Other:   


 


  Voiding Method Urinal  








                                        





                                 05/12/21 05:50 





                                 05/12/21 05:50

## 2021-05-12 NOTE — P.PN
Subjective


Progress Note Date: 05/12/21


Principal diagnosis: 





Recurrent falls


Chest discomfort


Elevated blood pressure


Mildly elevated troponins


UTI


Generalized weakness


80-year-old male with past medical history of diabetes mellitus, GERD, 

hypertension, hyperlipidemia, renal disease, sleep apnea, CAD status post stenti

ng, and recurrent falls was admitted to the hospital for increasing generalized 

weakness and several falls.  Patient has been following up in outpatient setting

with orthopedics for right hip surgery would be scheduled on 05/11/2021.  

Orthopedics has been consulted.  Plavix and aspirin has been held awaiting for 

recommendations and treatment plan from orthopedics for possible right hip 

surgery patient continues to be nothing by mouth.





05/11/2021


Evaluated patient this a.m., resting comfortably in bed.  Patient admits to 

recurrent falls with moderate to severe right hip pain.  He states he's been 

hospitalized due to having surgery on 05/11/2021.  Consult for orthopedics has 

been placed earlier by hospitalist services.  unaware if orthopedics has been 

contacted regarding right hip surgery.  Patient continues to endorse generalized

weakness and moderate to severe right hip pain.  Patient denies fever, chills, 

shortness of breath, chest pain, palpitations, abdominal pain, nausea, vomiting 

or diarrhea at this time.  Awaiting diagnostic labs this a.m.  Continue to hold 

Plavix and aspirin until recommendations and treatment plan from orthopedics.





05/12/2021


Evaluated patient this a.m.; overnight patient had episode of chest pressure 

discomfort with elevated blood pressureEKG was obtained no acute changes noted,

troponin was obtained mildly elevated troponin.  Upon evaluation patient this 

a.m. patient resting comfortably in no acute signs of distress.  Patient denies 

fever, shortness of breath, chest pain, palpitations, abdominal pain, nausea, 

vomiting or diarrhea.  [facility] was consulted awaiting 

recommendations and treatment plans from cardiology for mildly elevated troponin

and transient chest pain.  Patient had recent echo 2-3 months prior to this 

admission





Objective





- Vital Signs


Vital signs: 


                                   Vital Signs











Temp  98.1 F   05/12/21 05:00


 


Pulse  72   05/12/21 05:00


 


Resp  16   05/12/21 05:00


 


BP  154/68   05/12/21 05:00


 


Pulse Ox  98   05/12/21 05:00








                                 Intake & Output











 05/11/21 05/12/21 05/12/21





 18:59 06:59 18:59


 


Output Total 300  


 


Balance -300  


 


Output:   


 


  Urine 300  


 


Other:   


 


  Voiding Method Urinal Urinal 














- Constitutional


General appearance: Present: cooperative





- EENT


Eyes: Present: EOMI, PERRLA


ENT: Present: normal oropharynx


Ears: bilateral: normal





- Neck


Neck: Present: normal ROM


Carotids: bilateral: upstroke normal


Thyroid: bilateral: normal size





- Respiratory


Respiratory: bilateral: diminished (Anterior and posterior fields)





- Cardiovascular


Details: 


Sinus rhythm





Heart rate: 74


Rhythm: regular


Heart sounds: normal: S1, S2





- Peripheral pulses


  ** radial pulse


Peripheral Pulses: bilateral: Normal





  ** dorsalis pedis


Peripheral Pulses: bilateral: Normal





- Gastrointestinal


General gastrointestinal: Present: normal bowel sounds





- Integumentary


Integumentary: Present: pale





- Neurologic


Neurologic: Present: CNII-XII intact





- Musculoskeletal


Musculoskeletal Comment(s): 





Right hip discomfort upon palpation


Musculoskeletal: Present: generalized weakness





- Psychiatric


Psychiatric: Present: A&O x's 3, appropriate affect, intact judgment & insight





- Allied health notes


Allied health notes reviewed: nursing





- Labs


CBC & Chem 7: 


                                 05/12/21 05:50





                                 05/12/21 05:50


Labs: 


                  Abnormal Lab Results - Last 24 Hours (Table)











  05/11/21 05/11/21 05/11/21 Range/Units





  06:26 06:26 07:06 


 


RBC  2.95 L    (4.30-5.90)  m/uL


 


Hgb  9.1 L    (13.0-17.5)  gm/dL


 


Hct  26.1 L    (39.0-53.0)  %


 


Lymphocytes #  0.5 L    (1.0-4.8)  k/uL


 


Sodium   135 L   (137-145)  mmol/L


 


Carbon Dioxide     (22-30)  mmol/L


 


BUN     (9-20)  mg/dL


 


Creatinine   1.28 H   (0.66-1.25)  mg/dL


 


Glucose   155 H   (74-99)  mg/dL


 


POC Glucose (mg/dL)    174 H  (75-99)  mg/dL


 


Calcium   8.1 L   (8.4-10.2)  mg/dL


 


Troponin I     (0.000-0.034)  ng/mL


 


Total Protein   5.5 L   (6.3-8.2)  g/dL


 


Albumin   2.9 L   (3.5-5.0)  g/dL














  05/11/21 05/11/21 05/11/21 Range/Units





  11:17 16:58 21:13 


 


RBC     (4.30-5.90)  m/uL


 


Hgb     (13.0-17.5)  gm/dL


 


Hct     (39.0-53.0)  %


 


Lymphocytes #     (1.0-4.8)  k/uL


 


Sodium     (137-145)  mmol/L


 


Carbon Dioxide     (22-30)  mmol/L


 


BUN     (9-20)  mg/dL


 


Creatinine     (0.66-1.25)  mg/dL


 


Glucose     (74-99)  mg/dL


 


POC Glucose (mg/dL)  204 H  229 H  230 H  (75-99)  mg/dL


 


Calcium     (8.4-10.2)  mg/dL


 


Troponin I     (0.000-0.034)  ng/mL


 


Total Protein     (6.3-8.2)  g/dL


 


Albumin     (3.5-5.0)  g/dL














  05/12/21 05/12/21 05/12/21 Range/Units





  02:49 05:50 05:50 


 


RBC   2.77 L   (4.30-5.90)  m/uL


 


Hgb   8.2 L   (13.0-17.5)  gm/dL


 


Hct   24.7 L   (39.0-53.0)  %


 


Lymphocytes #   0.5 L   (1.0-4.8)  k/uL


 


Sodium    135 L  (137-145)  mmol/L


 


Carbon Dioxide    21 L  (22-30)  mmol/L


 


BUN    22 H  (9-20)  mg/dL


 


Creatinine     (0.66-1.25)  mg/dL


 


Glucose    176 H  (74-99)  mg/dL


 


POC Glucose (mg/dL)     (75-99)  mg/dL


 


Calcium    7.7 L  (8.4-10.2)  mg/dL


 


Troponin I  0.067 H*    (0.000-0.034)  ng/mL


 


Total Protein     (6.3-8.2)  g/dL


 


Albumin     (3.5-5.0)  g/dL








                      Microbiology - Last 24 Hours (Table)











 05/11/21 17:50 Urine Culture - Preliminary





 Urine,Clean Catch 














- Imaging and Cardiology


Chest x-ray: pending





Assessment and Plan


Assessment: 


Recurrent falls


Chest discomfort and mild elevation in troponinsconsultation with cardiology 

for recommendations and treatment plan


Right hip  discomfort


Gait instability


Severe degenerative joint disease


Diabetes mellitus type 2


GERD/reflux


Hyperlipidemia


Coronary artery disease status post stenting


Obstructive sleep apnea


Remote history of nicotine dependence


Full code





Plan: 


Recurrent fallscontinue to monitor vital signs and gait dysfunction


Chest discomfort episode with hypertension and mild elevation in 

troponinconsultation with cardiology for recommendations and treatment plan


Diabetes mellitus type 2sliding scale continue to monitor blood sugars


Right hip discomfort-awaiting clearance from cardiology to be transferred to 

skilled nursing facility for rehabilitation for right hip replacement


Continue home medications


Continue medical management


Further recommendations to come based on patient's clinical condition





Time with Patient: Greater than 30

## 2021-05-12 NOTE — XR
EXAMINATION TYPE: XR chest 1V

 

DATE OF EXAM: 5/12/2021

 

COMPARISON: 5/8/2021

 

HISTORY: Shortness of breath

 

FINDINGS:

There are bilateral pleural effusions with cardiomegaly and bibasilar infiltrate.  There is a diffuse
 interstitial pattern. No pneumothorax. Arthropathy shoulders.

 

IMPRESSION:

1. Correlate for CHF otherwise consider interstitial pneumonia.

## 2021-05-12 NOTE — P.DS
Providers


Date of admission: 


05/09/21 03:50





Expected date of discharge: 05/12/21


Attending physician: 


Lemuel Ortiz





Consults: 





                                        





05/10/21 15:01


Consult Physician Stat 


   Consulting Provider: Byron Witt


   Consult Reason/Comments: scheduled for  5/11 right hip surgery/ was on Plavix


   Do you want consulting provider notified?: Yes





05/12/21 02:36


Consult Physician Routine 


   Consulting Provider: Shannon Herrera


   Consult Reason/Comments: Chest pain, tightness, high blood pressure


   Do you want consulting provider notified?: Yes, Notify in am











Primary care physician: 


Lemuel Ortiz





[facility] Course: 


80-year-old male with past medical history of diabetes mellitus, GERD, 

hypertension, hyperlipidemia, renal disease, sleep apnea, CAD status post 

stenting, and recurrent falls was admitted to the hospital for increasing 

generalized weakness and several falls.  patient was elevated by orthopedics for

possible right hip placement- orthopedics recommendations for subacute rehab for

strength and condition- before performing total right hip replacement. patient 

had one episode of chest discomfort with associated hypertension- cardiology 

consulted for chest discomfort and mild elevation of troponin- cleared by 

cardiology- starting imdur 30 mg po daily. patient would benefit form subacute 

rehab for strength and condition.  


Assessment: 


recurrent falls


one episode of chest discomfort: with mild elevation of troponins- cleared by 

cardiology


Right hip discomfort- awaiting right hip replacement


Gait instability


Severe degenerative joint disease


diabetes mellitus type two


Gerd/reflux


hyperlipidemia


CAD- post-stenting 


obstructive sleep apnea


Remote history of nicotine dependence'


full code 


Health Concerns: 


multiple medical conditions


frequent falls 


weakness


Pertinent Studies: 


serial chest-x-ray





Procedures: 


none performed 





Patient Condition at Discharge: Fair





Plan - Discharge Summary


Discharge Rx Participant: No


New Discharge Prescriptions: 


New


   Isosorbide Mononitrate ER [Imdur] 30 mg PO DAILY 30 Days #30 tab.er.24h





Continue


   Aspirin EC [Ecotrin Low Dose] 81 mg PO HS


   buPROPion XL [Wellbutrin XL] 300 mg PO QAM


   Quinapril HCl [Accupril] 10 mg PO QAM


   Docusate [Colace] 100 mg PO DAILY PRN


     PRN Reason: Constipation


   Metoprolol Succinate (ER) [Toprol XL] 50 mg PO BID


   Atorvastatin [Lipitor] 80 mg PO HS


   Magnesium Oxide [Mag-Ox] 200 mg PO BID


   Fish Oil/Dha/Epa [Fish Oil 1,200 mg Fish Oil] 1 cap PO QAM


   Clopidogrel [Plavix] 75 mg PO HS


   Nitroglycerin Sl Tabs [Nitrostat] 0.4 mg SUBLINGUAL Q5M PRN #20 tab


     PRN Reason: Chest Pain


   Acetaminophen Tab [Tylenol] 650 mg PO Q6HR PRN  tab


     PRN Reason: Mild Pain Or Fever > 100.5


   INSULIN ASPART (NovoLOG) [NovoLOG (formulary)] See Protocol SQ ACHS


   Multivitamins, Thera [Multivitamin (formulary)] 1 tab PO DAILY@1200


   Folic Acid 1 mg PO DAILY


   Potassium Chloride 10 meq PO HS


Discharge Medication List





Aspirin EC [Ecotrin Low Dose] 81 mg PO HS 08/23/14 [History]


buPROPion XL [Wellbutrin XL] 300 mg PO QAM 08/23/14 [History]


Quinapril HCl [Accupril] 10 mg PO QAM 10/22/17 [History]


Docusate [Colace] 100 mg PO DAILY PRN 12/21/18 [History]


Metoprolol Succinate (ER) [Toprol XL] 50 mg PO BID 12/21/18 [History]


Atorvastatin [Lipitor] 80 mg PO HS 06/18/20 [History]


Clopidogrel [Plavix] 75 mg PO HS 06/18/20 [History]


Fish Oil/Dha/Epa [Fish Oil 1,200 mg Fish Oil] 1 cap PO QAM 06/18/20 [History]


Magnesium Oxide [Mag-Ox] 200 mg PO BID 06/18/20 [History]


Nitroglycerin Sl Tabs [Nitrostat] 0.4 mg SUBLINGUAL Q5M PRN #20 tab 06/21/20 

[Rx]


Acetaminophen Tab [Tylenol] 650 mg PO Q6HR PRN  tab 07/01/20 [Rx]


INSULIN ASPART (NovoLOG) [NovoLOG (formulary)] See Protocol SQ ACHS 07/10/20 

[History]


Multivitamins, Thera [Multivitamin (formulary)] 1 tab PO DAILY@1200 07/10/20 

[History]


Folic Acid 1 mg PO DAILY 05/06/21 [History]


Potassium Chloride 10 meq PO HS 05/06/21 [History]


Isosorbide Mononitrate ER [Imdur] 30 mg PO DAILY 30 Days #30 tab.er.24h 05/12/21

 [Rx]








Follow up Appointment(s)/Referral(s): 


Lemuel Ortiz MD [Primary Care Provider] - 1-2 days


Discharge Disposition: TRANSFER TO SNF/ECF

## 2021-06-09 ENCOUNTER — HOSPITAL ENCOUNTER (EMERGENCY)
Dept: HOSPITAL 47 - EC | Age: 81
Discharge: HOME | End: 2021-06-09
Payer: MEDICARE

## 2021-06-09 VITALS — TEMPERATURE: 97.8 F | HEART RATE: 60 BPM | RESPIRATION RATE: 18 BRPM

## 2021-06-09 VITALS — DIASTOLIC BLOOD PRESSURE: 94 MMHG | SYSTOLIC BLOOD PRESSURE: 170 MMHG

## 2021-06-09 DIAGNOSIS — E11.36: ICD-10-CM

## 2021-06-09 DIAGNOSIS — I25.2: ICD-10-CM

## 2021-06-09 DIAGNOSIS — Z79.4: ICD-10-CM

## 2021-06-09 DIAGNOSIS — Z79.02: ICD-10-CM

## 2021-06-09 DIAGNOSIS — Z79.82: ICD-10-CM

## 2021-06-09 DIAGNOSIS — Z20.822: ICD-10-CM

## 2021-06-09 DIAGNOSIS — Z87.891: ICD-10-CM

## 2021-06-09 DIAGNOSIS — K21.9: ICD-10-CM

## 2021-06-09 DIAGNOSIS — G47.30: ICD-10-CM

## 2021-06-09 DIAGNOSIS — M19.90: ICD-10-CM

## 2021-06-09 DIAGNOSIS — E86.0: ICD-10-CM

## 2021-06-09 DIAGNOSIS — E78.5: ICD-10-CM

## 2021-06-09 DIAGNOSIS — Z95.5: ICD-10-CM

## 2021-06-09 DIAGNOSIS — D64.9: ICD-10-CM

## 2021-06-09 DIAGNOSIS — Z79.899: ICD-10-CM

## 2021-06-09 DIAGNOSIS — N17.9: Primary | ICD-10-CM

## 2021-06-09 LAB
ALBUMIN SERPL-MCNC: 3.1 G/DL (ref 3.5–5)
ALP SERPL-CCNC: 116 U/L (ref 38–126)
ALT SERPL-CCNC: 17 U/L (ref 4–49)
ANION GAP SERPL CALC-SCNC: 8 MMOL/L
APTT BLD: 23.9 SEC (ref 22–30)
AST SERPL-CCNC: 23 U/L (ref 17–59)
BASOPHILS # BLD AUTO: 0.1 K/UL (ref 0–0.2)
BASOPHILS NFR BLD AUTO: 1 %
BUN SERPL-SCNC: 28 MG/DL (ref 9–20)
CALCIUM SPEC-MCNC: 8.8 MG/DL (ref 8.4–10.2)
CHLORIDE SERPL-SCNC: 103 MMOL/L (ref 98–107)
CO2 SERPL-SCNC: 26 MMOL/L (ref 22–30)
EOSINOPHIL # BLD AUTO: 0.3 K/UL (ref 0–0.7)
EOSINOPHIL NFR BLD AUTO: 6 %
ERYTHROCYTE [DISTWIDTH] IN BLOOD BY AUTOMATED COUNT: 3.18 M/UL (ref 4.3–5.9)
ERYTHROCYTE [DISTWIDTH] IN BLOOD: 15.2 % (ref 11.5–15.5)
GLUCOSE BLD-MCNC: 139 MG/DL (ref 75–99)
GLUCOSE SERPL-MCNC: 133 MG/DL (ref 74–99)
HCT VFR BLD AUTO: 27.6 % (ref 39–53)
HGB BLD-MCNC: 9.3 GM/DL (ref 13–17.5)
INR PPP: 1 (ref ?–1.2)
LYMPHOCYTES # SPEC AUTO: 1.3 K/UL (ref 1–4.8)
LYMPHOCYTES NFR SPEC AUTO: 22 %
MAGNESIUM SPEC-SCNC: 1.8 MG/DL (ref 1.6–2.3)
MCH RBC QN AUTO: 29.2 PG (ref 25–35)
MCHC RBC AUTO-ENTMCNC: 33.7 G/DL (ref 31–37)
MCV RBC AUTO: 86.8 FL (ref 80–100)
MONOCYTES # BLD AUTO: 0.5 K/UL (ref 0–1)
MONOCYTES NFR BLD AUTO: 9 %
NEUTROPHILS # BLD AUTO: 3.6 K/UL (ref 1.3–7.7)
NEUTROPHILS NFR BLD AUTO: 61 %
PH UR: 5.5 [PH] (ref 5–8)
PLATELET # BLD AUTO: 207 K/UL (ref 150–450)
POTASSIUM SERPL-SCNC: 4.3 MMOL/L (ref 3.5–5.1)
PROT SERPL-MCNC: 5.7 G/DL (ref 6.3–8.2)
PT BLD: 11 SEC (ref 9–12)
SODIUM SERPL-SCNC: 137 MMOL/L (ref 137–145)
SP GR UR: 1.01 (ref 1–1.03)
UROBILINOGEN UR QL STRIP: <2 MG/DL (ref ?–2)
WBC # BLD AUTO: 5.9 K/UL (ref 3.8–10.6)

## 2021-06-09 PROCEDURE — 36415 COLL VENOUS BLD VENIPUNCTURE: CPT

## 2021-06-09 PROCEDURE — 85730 THROMBOPLASTIN TIME PARTIAL: CPT

## 2021-06-09 PROCEDURE — 85025 COMPLETE CBC W/AUTO DIFF WBC: CPT

## 2021-06-09 PROCEDURE — 83605 ASSAY OF LACTIC ACID: CPT

## 2021-06-09 PROCEDURE — 96361 HYDRATE IV INFUSION ADD-ON: CPT

## 2021-06-09 PROCEDURE — 93005 ELECTROCARDIOGRAM TRACING: CPT

## 2021-06-09 PROCEDURE — 83735 ASSAY OF MAGNESIUM: CPT

## 2021-06-09 PROCEDURE — 99285 EMERGENCY DEPT VISIT HI MDM: CPT

## 2021-06-09 PROCEDURE — 86900 BLOOD TYPING SEROLOGIC ABO: CPT

## 2021-06-09 PROCEDURE — 81003 URINALYSIS AUTO W/O SCOPE: CPT

## 2021-06-09 PROCEDURE — 86901 BLOOD TYPING SEROLOGIC RH(D): CPT

## 2021-06-09 PROCEDURE — 84484 ASSAY OF TROPONIN QUANT: CPT

## 2021-06-09 PROCEDURE — 87635 SARS-COV-2 COVID-19 AMP PRB: CPT

## 2021-06-09 PROCEDURE — 80053 COMPREHEN METABOLIC PANEL: CPT

## 2021-06-09 PROCEDURE — 96360 HYDRATION IV INFUSION INIT: CPT

## 2021-06-09 PROCEDURE — 86850 RBC ANTIBODY SCREEN: CPT

## 2021-06-09 PROCEDURE — 85610 PROTHROMBIN TIME: CPT

## 2021-06-09 PROCEDURE — 71046 X-RAY EXAM CHEST 2 VIEWS: CPT

## 2021-06-09 NOTE — ED
General Adult HPI





- General


Stated complaint: neuro deficit


Time Seen by Provider: 06/09/21 15:08





- History of Present Illness


Initial comments: 


Martin is an 81yo M with extensive PMH who presents to the ER today via ambulance

from nursing home for assessment of general weakness and malaise.  The patient 

has a history of recurrent anemia requiring blood transfusions.  Per the nursing

home patient has been very weak and tired today.  They were concerned that his 

hemoglobin has again gotten low and sent him to the hospital for reevaluation.  

Patient reports he's not feeling well but denies any specific complaints.  

Patient denies any chest pain palpitations shortness of breath, abdominal pain 

or recent fevers.








- Related Data


                                Home Medications











 Medication  Instructions  Recorded  Confirmed


 


Aspirin EC [Ecotrin Low Dose] 81 mg PO HS@2100 08/23/14 06/09/21


 


buPROPion XL [Wellbutrin XL] 300 mg PO DAILY@0800 08/23/14 06/09/21


 


Quinapril HCl [Accupril] 10 mg PO DAILY@0800 10/22/17 06/09/21


 


Docusate [Colace] 100 mg PO DAILY PRN 12/21/18 06/09/21


 


Metoprolol Succinate (ER) [Toprol 50 mg PO BID@0800,1700 12/21/18 06/09/21





XL]   


 


Atorvastatin [Lipitor] 80 mg PO HS@2100 06/18/20 06/09/21


 


Clopidogrel [Plavix] 75 mg PO HS@2100 06/18/20 06/09/21


 


Fish Oil/Dha/Epa [Fish Oil 1,200 1 cap PO DAILY@0800 06/18/20 06/09/21





mg Fish Oil]   


 


Magnesium Oxide [Mag-Ox] 200 mg PO BID@0800,1700 06/18/20 06/09/21


 


INSULIN ASPART (NovoLOG) [NovoLOG See Protocol SQ ACHS 07/10/20 06/09/21





(formulary)]   


 


Multivitamins, Thera [Multivitamin 1 tab PO DAILY@1200 07/10/20 06/09/21





(formulary)]   


 


Folic Acid 1 mg PO DAILY@1700 05/06/21 06/09/21


 


Potassium Chloride 10 meq PO HS@2100 05/06/21 06/09/21


 


Ferrous Sulfate [Iron (65  mg PO DAILY@1700 06/09/21 06/09/21





Elemental)]   


 


Glucerna Shake 237 ml PO DAILY@0800 06/09/21 06/09/21


 


Isosorbide Mononitrate ER [Imdur] 30 mg PO DAILY@0800 06/09/21 06/09/21


 


Magnesium Hydroxide [Milk of 7,200 mg PO DAILY PRN 06/09/21 06/09/21





Magnesia Concentrate]   


 


Na Phos,M-B/Na Phos,Di-Ba [Fleet 133 ml RECTAL DAILY PRN 06/09/21 06/09/21





Adult]   


 


Nystatin 1 applic TOPICAL BID@0800,2100 06/09/21 06/09/21


 


bisacodyL [Dulcolax] 10 mg RECTAL DAILY PRN 06/09/21 06/09/21








                                  Previous Rx's











 Medication  Instructions  Recorded


 


Nitroglycerin Sl Tabs [Nitrostat] 0.4 mg SUBLINGUAL Q5M PRN #20 tab 06/21/20


 


Acetaminophen Tab [Tylenol] 650 mg PO Q6HR PRN  tab 07/01/20











                                    Allergies











Allergy/AdvReac Type Severity Reaction Status Date / Time


 


iodine Allergy Unknown Unknown Verified 06/09/21 16:00














Review of Systems


ROS Statement: 


Those systems with pertinent positive or pertinent negative responses have been 

documented in the HPI.





ROS Other: All systems not noted in ROS Statement are negative.





Past Medical History


Past Medical History: Diabetes Mellitus, GERD/Reflux, Hyperlipidemia, Myocardial

 Infarction (MI), Osteoarthritis (OA), Renal Disease, Sleep Apnea/CPAP/BIPAP


Additional Past Medical History / Comment(s): ANEMIA. MI x 2, heart murmer, no 

cpap used, constipation,


Last Myocardial Infarction Date:: 3/28/19


History of Any Multi-Drug Resistant Organisms: None Reported


Past Surgical History: Heart Catheterization With Stent, Joint Replacement, 

Orthopedic Surgery


Additional Past Surgical History / Comment(s): 4 cardiac stents, total right 

knee replacement, monse cataracts with lens implants, left shoulder surgery, left 

hip replacement,


Past Anesthesia/Blood Transfusion Reactions: No Reported Reaction


Date of Last Stent Placement:: 6/2020


Past Psychological History: No Psychological Hx Reported


Smoking Status: Former smoker


Past Alcohol Use History: None Reported


Past Drug Use History: None Reported





- Past Family History


  ** Father


Family Medical History: Myocardial Infarction (MI)





  ** Mother


Family Medical History: No Reported History





  ** Sister(s)


Family Medical History: Cancer





General Exam





- General Exam Comments


Initial Comments: 


Physical Exam


GENERAL:


Patient is well-developed and well-nourished.  


Patient is nontoxic and well-hydrated and is in no distress.





HENT:


Normocephalic, Atraumatic. 


Gums are pale





EYES:


PERRL, EOMI


Conjunctival pallor





PULMONARY:


Unlabored respirations.  


No audible rales rhonchi or wheezing was noted.





CARDIOVASCULAR:


There is a regular rate and rhythm without any murmurs gallops or rubs.  





ABDOMEN:


Soft and nontender with normal bowel sounds. 





SKIN:


Pale





: 


Deferred





NEUROLOGIC:


Patient is alert and oriented x3


Moving all extremities spontaneously





MUSCULOSKELETAL:


Generalized weakness


Decreased ROM of right hip secondary to chronic pain





PSYCHIATRIC:


Normal psychiatric evaluation.








Course


                                   Vital Signs











  06/09/21





  15:15


 


Temperature 97.8 F


 


Pulse Rate 60


 


Respiratory 18





Rate 


 


Blood Pressure 165/77


 


O2 Sat by Pulse 100





Oximetry 














EKG Findings





- EKG Comments:


EKG Findings:: EKG was obtained due to complaint of generalized weakness and 

altered mental status, EKG was obtained at 1515, rate is 59 rhythm is sinus 

bradycardia, normal axis, GA prolonged at 206, , QTc 471 there are no 

acute ST elevations or depressions there is no evidence of ischemia or 

infarction





Medical Decision Making





- Medical Decision Making


Patient was seen and evaluated history is obtained from EMS and family who ar

rived at bedside


Elderly male with recurrent anemia was sent in today for generalized malaise and

 weakness


EMS it expressed concern he may be having a stroke due to what they perceived to

 be facial asymmetry and weakness in the right leg however I suspect the 

patient's facial symmetry was due to having a very dry mouth, he has weakness in

 the right leg secondary to a hip replacement which is chronic


Do not feel the patient is having a stroke


Labs obtained, patient received IV fluids





For IV fluids when family arrived the patient was more awake alert oriented and 

her family.  Lab findings including chronic anemia which is no worse than 

previous was discussed with the patient and family at bedside.  At this time 

they feel comfortable with the patient being discharged [facility]








- Lab Data


Result diagrams: 


                                 06/09/21 15:10





                                 06/09/21 15:10


                                   Lab Results











  06/09/21 06/09/21 06/09/21 Range/Units





  15:07 15:10 15:10 


 


WBC   5.9   (3.8-10.6)  k/uL


 


RBC   3.18 L   (4.30-5.90)  m/uL


 


Hgb   9.3 L   (13.0-17.5)  gm/dL


 


Hct   27.6 L   (39.0-53.0)  %


 


MCV   86.8   (80.0-100.0)  fL


 


MCH   29.2   (25.0-35.0)  pg


 


MCHC   33.7   (31.0-37.0)  g/dL


 


RDW   15.2   (11.5-15.5)  %


 


Plt Count   207   (150-450)  k/uL


 


MPV   7.6   


 


Neutrophils %   61   %


 


Lymphocytes %   22   %


 


Monocytes %   9   %


 


Eosinophils %   6   %


 


Basophils %   1   %


 


Neutrophils #   3.6   (1.3-7.7)  k/uL


 


Lymphocytes #   1.3   (1.0-4.8)  k/uL


 


Monocytes #   0.5   (0-1.0)  k/uL


 


Eosinophils #   0.3   (0-0.7)  k/uL


 


Basophils #   0.1   (0-0.2)  k/uL


 


PT    11.0  (9.0-12.0)  sec


 


INR    1.0  (<1.2)  


 


APTT    23.9  (22.0-30.0)  sec


 


Sodium     (137-145)  mmol/L


 


Potassium     (3.5-5.1)  mmol/L


 


Chloride     ()  mmol/L


 


Carbon Dioxide     (22-30)  mmol/L


 


Anion Gap     mmol/L


 


BUN     (9-20)  mg/dL


 


Creatinine     (0.66-1.25)  mg/dL


 


Est GFR (CKD-EPI)AfAm     (>60 ml/min/1.73 sqM)  


 


Est GFR (CKD-EPI)NonAf     (>60 ml/min/1.73 sqM)  


 


Glucose     (74-99)  mg/dL


 


POC Glucose (mg/dL)  139 H    (75-99)  mg/dL


 


POC Glu Operater ID  Marnei Ramsey    


 


Plasma Lactic Acid Khurram     (0.7-2.0)  mmol/L


 


Calcium     (8.4-10.2)  mg/dL


 


Magnesium     (1.6-2.3)  mg/dL


 


Total Bilirubin     (0.2-1.3)  mg/dL


 


AST     (17-59)  U/L


 


ALT     (4-49)  U/L


 


Alkaline Phosphatase     ()  U/L


 


Troponin I     (0.000-0.034)  ng/mL


 


Total Protein     (6.3-8.2)  g/dL


 


Albumin     (3.5-5.0)  g/dL


 


Urine Color     


 


Urine Appearance     (Clear)  


 


Urine pH     (5.0-8.0)  


 


Ur Specific Gravity     (1.001-1.035)  


 


Urine Protein     (Negative)  


 


Urine Glucose (UA)     (Negative)  


 


Urine Ketones     (Negative)  


 


Urine Blood     (Negative)  


 


Urine Nitrite     (Negative)  


 


Urine Bilirubin     (Negative)  


 


Urine Urobilinogen     (<2.0)  mg/dL


 


Ur Leukocyte Esterase     (Negative)  


 


Coronavirus (PCR)     (Not Detectd)  


 


Blood Type     


 


Blood Type Recheck     


 


Bld Type Recheck Status     


 


Antibody Screen     


 


Spec Expiration Date     














  06/09/21 06/09/21 06/09/21 Range/Units





  15:10 15:10 15:10 


 


WBC     (3.8-10.6)  k/uL


 


RBC     (4.30-5.90)  m/uL


 


Hgb     (13.0-17.5)  gm/dL


 


Hct     (39.0-53.0)  %


 


MCV     (80.0-100.0)  fL


 


MCH     (25.0-35.0)  pg


 


MCHC     (31.0-37.0)  g/dL


 


RDW     (11.5-15.5)  %


 


Plt Count     (150-450)  k/uL


 


MPV     


 


Neutrophils %     %


 


Lymphocytes %     %


 


Monocytes %     %


 


Eosinophils %     %


 


Basophils %     %


 


Neutrophils #     (1.3-7.7)  k/uL


 


Lymphocytes #     (1.0-4.8)  k/uL


 


Monocytes #     (0-1.0)  k/uL


 


Eosinophils #     (0-0.7)  k/uL


 


Basophils #     (0-0.2)  k/uL


 


PT     (9.0-12.0)  sec


 


INR     (<1.2)  


 


APTT     (22.0-30.0)  sec


 


Sodium  137    (137-145)  mmol/L


 


Potassium  4.3    (3.5-5.1)  mmol/L


 


Chloride  103    ()  mmol/L


 


Carbon Dioxide  26    (22-30)  mmol/L


 


Anion Gap  8    mmol/L


 


BUN  28 H    (9-20)  mg/dL


 


Creatinine  1.31 H    (0.66-1.25)  mg/dL


 


Est GFR (CKD-EPI)AfAm  58    (>60 ml/min/1.73 sqM)  


 


Est GFR (CKD-EPI)NonAf  51    (>60 ml/min/1.73 sqM)  


 


Glucose  133 H    (74-99)  mg/dL


 


POC Glucose (mg/dL)     (75-99)  mg/dL


 


POC Glu Operater ID     


 


Plasma Lactic Acid Khurram   1.5   (0.7-2.0)  mmol/L


 


Calcium  8.8    (8.4-10.2)  mg/dL


 


Magnesium  1.8    (1.6-2.3)  mg/dL


 


Total Bilirubin  0.1 L    (0.2-1.3)  mg/dL


 


AST  23    (17-59)  U/L


 


ALT  17    (4-49)  U/L


 


Alkaline Phosphatase  116    ()  U/L


 


Troponin I    <0.012  (0.000-0.034)  ng/mL


 


Total Protein  5.7 L    (6.3-8.2)  g/dL


 


Albumin  3.1 L    (3.5-5.0)  g/dL


 


Urine Color     


 


Urine Appearance     (Clear)  


 


Urine pH     (5.0-8.0)  


 


Ur Specific Gravity     (1.001-1.035)  


 


Urine Protein     (Negative)  


 


Urine Glucose (UA)     (Negative)  


 


Urine Ketones     (Negative)  


 


Urine Blood     (Negative)  


 


Urine Nitrite     (Negative)  


 


Urine Bilirubin     (Negative)  


 


Urine Urobilinogen     (<2.0)  mg/dL


 


Ur Leukocyte Esterase     (Negative)  


 


Coronavirus (PCR)     (Not Detectd)  


 


Blood Type     


 


Blood Type Recheck     


 


Bld Type Recheck Status     


 


Antibody Screen     


 


Spec Expiration Date     














  06/09/21 06/09/21 06/09/21 Range/Units





  15:10 15:32 16:11 


 


WBC     (3.8-10.6)  k/uL


 


RBC     (4.30-5.90)  m/uL


 


Hgb     (13.0-17.5)  gm/dL


 


Hct     (39.0-53.0)  %


 


MCV     (80.0-100.0)  fL


 


MCH     (25.0-35.0)  pg


 


MCHC     (31.0-37.0)  g/dL


 


RDW     (11.5-15.5)  %


 


Plt Count     (150-450)  k/uL


 


MPV     


 


Neutrophils %     %


 


Lymphocytes %     %


 


Monocytes %     %


 


Eosinophils %     %


 


Basophils %     %


 


Neutrophils #     (1.3-7.7)  k/uL


 


Lymphocytes #     (1.0-4.8)  k/uL


 


Monocytes #     (0-1.0)  k/uL


 


Eosinophils #     (0-0.7)  k/uL


 


Basophils #     (0-0.2)  k/uL


 


PT     (9.0-12.0)  sec


 


INR     (<1.2)  


 


APTT     (22.0-30.0)  sec


 


Sodium     (137-145)  mmol/L


 


Potassium     (3.5-5.1)  mmol/L


 


Chloride     ()  mmol/L


 


Carbon Dioxide     (22-30)  mmol/L


 


Anion Gap     mmol/L


 


BUN     (9-20)  mg/dL


 


Creatinine     (0.66-1.25)  mg/dL


 


Est GFR (CKD-EPI)AfAm     (>60 ml/min/1.73 sqM)  


 


Est GFR (CKD-EPI)NonAf     (>60 ml/min/1.73 sqM)  


 


Glucose     (74-99)  mg/dL


 


POC Glucose (mg/dL)     (75-99)  mg/dL


 


POC Glu Operater ID     


 


Plasma Lactic Acid Khurram     (0.7-2.0)  mmol/L


 


Calcium     (8.4-10.2)  mg/dL


 


Magnesium     (1.6-2.3)  mg/dL


 


Total Bilirubin     (0.2-1.3)  mg/dL


 


AST     (17-59)  U/L


 


ALT     (4-49)  U/L


 


Alkaline Phosphatase     ()  U/L


 


Troponin I     (0.000-0.034)  ng/mL


 


Total Protein     (6.3-8.2)  g/dL


 


Albumin     (3.5-5.0)  g/dL


 


Urine Color    Yellow  


 


Urine Appearance    Clear  (Clear)  


 


Urine pH    5.5  (5.0-8.0)  


 


Ur Specific Gravity    1.014  (1.001-1.035)  


 


Urine Protein    Trace H  (Negative)  


 


Urine Glucose (UA)    Negative  (Negative)  


 


Urine Ketones    Negative  (Negative)  


 


Urine Blood    Negative  (Negative)  


 


Urine Nitrite    Negative  (Negative)  


 


Urine Bilirubin    Negative  (Negative)  


 


Urine Urobilinogen    <2.0  (<2.0)  mg/dL


 


Ur Leukocyte Esterase    Negative  (Negative)  


 


Coronavirus (PCR)   Not Detected   (Not Detectd)  


 


Blood Type  O Negative    


 


Blood Type Recheck  O Neg    


 


Bld Type Recheck Status  No    


 


Antibody Screen  NEGATIVE    


 


Spec Expiration Date  06/12/2021 - 2310    














Disposition


Clinical Impression: 


 Chronic anemia, DAPHNEY (acute kidney injury), Dehydration





Disposition: HOME SELF-CARE


Condition: Stable


Instructions (If sedation given, give patient instructions):  Dehydration (ED)


Is patient prescribed a controlled substance at d/c from ED?: No


Referrals: 


Mook Abdalla MD [Primary Care Provider] - 1-2 days

## 2021-06-09 NOTE — XR
EXAMINATION TYPE: XR chest 2V

 

DATE OF EXAM: 6/9/2021

 

COMPARISON: Chest x-ray May 12, 2021

 

HISTORY: Altered mental status and weakness.

 

TECHNIQUE:  Frontal and lateral views of the chest are obtained.

 

FINDINGS: Low lung volumes redemonstrated. There is no new suspicious focal air space opacity, pleura
l effusion, or pneumothorax seen.  The cardiac silhouette size is stable and upper limits of normal. 
Degenerative change left glenohumeral joint. Multilevel spurring in the spine.

 

IMPRESSION:  No acute cardiopulmonary process currently.

## 2022-07-07 ENCOUNTER — HOSPITAL ENCOUNTER (EMERGENCY)
Dept: HOSPITAL 47 - EC | Age: 82
LOS: 1 days | Discharge: HOME | End: 2022-07-08
Payer: MEDICARE

## 2022-07-07 DIAGNOSIS — Z79.899: ICD-10-CM

## 2022-07-07 DIAGNOSIS — E78.5: ICD-10-CM

## 2022-07-07 DIAGNOSIS — E11.9: ICD-10-CM

## 2022-07-07 DIAGNOSIS — S01.01XA: Primary | ICD-10-CM

## 2022-07-07 DIAGNOSIS — Z79.84: ICD-10-CM

## 2022-07-07 DIAGNOSIS — Z79.02: ICD-10-CM
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
## 2022-07-07 DIAGNOSIS — Z88.8: ICD-10-CM

## 2022-07-07 DIAGNOSIS — I25.2: ICD-10-CM

## 2022-07-07 DIAGNOSIS — M19.90: ICD-10-CM
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
## 2022-07-07 DIAGNOSIS — K21.9: ICD-10-CM

## 2022-07-07 PROCEDURE — 72125 CT NECK SPINE W/O DYE: CPT

## 2022-07-07 PROCEDURE — 12002 RPR S/N/AX/GEN/TRNK2.6-7.5CM: CPT

## 2022-07-07 PROCEDURE — 70450 CT HEAD/BRAIN W/O DYE: CPT

## 2022-07-07 PROCEDURE — 99284 EMERGENCY DEPT VISIT MOD MDM: CPT

## 2022-07-07 NOTE — CT
EXAMINATION TYPE: CT brain cspine wo con

 

DATE OF EXAM: 7/7/2022

 

COMPARISON: 5/8/2021

 

HISTORY: fall

Headache. Neck pain

CT DLP: 1374.9 mGycm

Automated exposure control for dose reduction was used.

 

There is diffuse cerebral atrophy. There is no mass effect or midline shift. No sign of intracranial 
hemorrhage. There is hypodensity in the periventricular white matter. The calvarium is intact.

 

The cervical vertebra show a degenerative C6-7 mild spondylolisthesis. There is large hypertrophic an
terior osteophyte formation throughout the cervical spine. Posterior elements are intact. No compress
ion fracture. No focal bone destruction.

 

IMPRESSION:

Cerebral atrophy and chronic small vessel ischemia. There is progression of the white matter disease 
compared to old exam.

 

Multilevel moderate cervical spondylotic changes. No significant change compared to old exam. No frac
ture.

## 2022-07-07 NOTE — ED
Fall HPI





- General


Stated Complaint: Fall, Head Injury


Time Seen by Provider: 07/07/22 21:41


Source: RN notes reviewed, old records reviewed


Mode of arrival: EMS


Limitations: no limitations





- History of Present Illness


Initial Comments: 





0 This is an 81-year-old male to the ER status post fall.  Patient has 

significant fall here in the emergency department prior this is a fall from 

standing he did fall backwards his abdomen has no other injury or complaint.  

Patient is on blood thinners.  Patient does have had laceration


MD Complaint: fall, other


-: hour(s)


Fall From: standing


When Fall Occurred: 1 hour PTA


Fall Witnessed: yes, by family


Place Fall Occurred: home


Loss of Consciousness: none


Prolonged Down Time?: no


Symptoms Prior to Fall: none


Severity: moderate


Severity scale (1-10): 5


Quality: burning


Context: tripped/slipped


Associated Symptoms: denies





- Related Data


                                Home Medications











 Medication  Instructions  Recorded  Confirmed


 


Aspirin EC [Ecotrin Low Dose] 81 mg PO HS@2100 08/23/14 07/07/22


 


buPROPion XL [Wellbutrin XL] 300 mg PO DAILY@0800 08/23/14 07/07/22


 


Quinapril HCl [Accupril] 10 mg PO DAILY@0800 10/22/17 07/07/22


 


Docusate [Colace] 100 mg PO DAILY PRN 12/21/18 07/07/22


 


Metoprolol Succinate (ER) [Toprol 50 mg PO BID@0800,1700 12/21/18 07/07/22





XL]   


 


Atorvastatin [Lipitor] 80 mg PO HS@2100 06/18/20 07/07/22


 


Clopidogrel [Plavix] 75 mg PO HS@2100 06/18/20 07/07/22


 


Fish Oil/Dha/Epa [Fish Oil 1,200 1 cap PO DAILY@0800 06/18/20 07/07/22





mg Fish Oil]   


 


Magnesium Oxide [Mag-Ox] 200 mg PO BID@0800,1700 06/18/20 07/07/22


 


Multivitamins, Thera [Multivitamin 1 tab PO DAILY@1700 07/10/20 07/07/22





(formulary)]   


 


Folic Acid 1 mg PO DAILY@1700 05/06/21 07/07/22


 


Potassium Chloride [Potassium 10 meq PO HS@2100 05/06/21 07/07/22





Chloride ER]   


 


Ferrous Sulfate [Iron (65  mg PO DAILY@1700 06/09/21 07/07/22





Elemental)]   


 


Glucerna Shake 237 ml PO DAILY@0800 06/09/21 07/07/22


 


Isosorbide Mononitrate ER [Imdur] 30 mg PO DAILY@0800 06/09/21 07/07/22


 


Magnesium Hydroxide [Milk of 7,200 mg PO DAILY PRN 06/09/21 07/07/22





Magnesia Concentrate]   


 


bisacodyL [Dulcolax] 10 mg RECTAL DAILY PRN 06/09/21 07/07/22


 


Donepezil [Aricept] 5 mg PO HS@2100 07/07/22 07/07/22


 


Epoetin Henri [Procrit] 20,000 unit SQ FR@0800 07/07/22 07/07/22


 


Famotidine [Pepcid] 20 mg PO HS@2100 07/07/22 07/07/22


 


Nicole-Tussin Syrup 30 ml PO Q6H PRN 07/07/22 07/07/22


 


Loperamide [Imodium] 2 mg PO Q6H PRN 07/07/22 07/07/22


 


Mag Hydrox/Al Hydrox/Simeth 15 ml PO QID PRN 07/07/22 07/07/22





[Maalox]   


 


Mirabegron [Myrbetriq] 25 mg PO DAILY@0800 07/07/22 07/07/22


 


amLODIPine [Norvasc] 2.5 mg PO HS@2130 07/07/22 07/07/22


 


metFORMIN HCL [Glucophage] 500 mg PO BID@0800,1700 07/07/22 07/07/22








                                  Previous Rx's











 Medication  Instructions  Recorded


 


Nitroglycerin Sl Tabs [Nitrostat] 0.4 mg SUBLINGUAL Q5M PRN #20 tab 06/21/20


 


Acetaminophen Tab [Tylenol] 650 mg PO Q6HR PRN  tab 07/01/20











                                    Allergies











Allergy/AdvReac Type Severity Reaction Status Date / Time


 


iodine Allergy Unknown Unknown Verified 07/07/22 22:00














Review of Systems


ROS Statement: 


Those systems with pertinent positive or pertinent negative responses have been 

documented in the HPI.





ROS Other: All systems not noted in ROS Statement are negative.





Past Medical History


Past Medical History: Diabetes Mellitus, GERD/Reflux, Hyperlipidemia, Myocardial

 Infarction (MI), Osteoarthritis (OA), Renal Disease, Sleep Apnea/CPAP/BIPAP


Additional Past Medical History / Comment(s): ANEMIA. MI x 2, heart murmer, no 

cpap used, constipation,


Last Myocardial Infarction Date:: 3/28/19


History of Any Multi-Drug Resistant Organisms: None Reported


Past Surgical History: Heart Catheterization With Stent, Joint Replacement, 

Orthopedic Surgery


Additional Past Surgical History / Comment(s): 4 cardiac stents, total right 

knee replacement, monse cataracts with lens implants, left shoulder surgery, left 

hip replacement,


Past Anesthesia/Blood Transfusion Reactions: No Reported Reaction


Date of Last Stent Placement:: 6/2020


Past Psychological History: No Psychological Hx Reported


Smoking Status: Former smoker


Past Alcohol Use History: None Reported


Past Drug Use History: None Reported





- Past Family History


  ** Father


Family Medical History: Myocardial Infarction (MI)





  ** Mother


Family Medical History: No Reported History





  ** Sister(s)


Family Medical History: Cancer





Course


                                   Vital Signs











  07/07/22 07/08/22





  21:57 00:00


 


Pulse Rate 64 70


 


Respiratory 20 16





Rate  


 


Blood Pressure 163/86 165/89


 


O2 Sat by Pulse 98 





Oximetry  














Procedures





- Laceration


  ** Laceration #1


Consent Obtained: verbal consent


Indication: laceration


Site: scalp


Size (cm): 5


Description: linear


Size of Sutures: other (staples)


Technique: simple, interrupted


Patient Tolerated Procedure: well





Medical Decision Making





- Medical Decision Making





78 female DF status post fall fall from standing fall on blood thinners with 

head injury.  Patient is laceration laceration is repaired.  Patient can be 

discharged home CT is negative





- Radiology Data


Radiology results: report reviewed (CT brain C-spine negative for acute 

disease), image reviewed





Disposition


Clinical Impression: 


 Fall





Disposition: HOME SELF-CARE


Condition: Good


Instructions (If sedation given, give patient instructions):  Fall Prevention 

for Older Adults (ED)


Is patient prescribed a controlled substance at d/c from ED?: No


Referrals: 


Mook Abdalla MD [Primary Care Provider] - 1-2 days


Time of Disposition: 01:00

## 2022-07-08 VITALS — DIASTOLIC BLOOD PRESSURE: 89 MMHG | RESPIRATION RATE: 16 BRPM | HEART RATE: 70 BPM | SYSTOLIC BLOOD PRESSURE: 165 MMHG

## 2023-08-08 ENCOUNTER — HOSPITAL ENCOUNTER (OUTPATIENT)
Dept: HOSPITAL 47 - EC | Age: 83
Setting detail: OBSERVATION
LOS: 2 days | Discharge: NURSING, REHAB OR LONG-TERM CARE | End: 2023-08-10
Attending: HOSPITALIST | Admitting: HOSPITALIST
Payer: MEDICARE

## 2023-08-08 VITALS — RESPIRATION RATE: 16 BRPM

## 2023-08-08 DIAGNOSIS — Z86.73: ICD-10-CM

## 2023-08-08 DIAGNOSIS — I48.91: ICD-10-CM

## 2023-08-08 DIAGNOSIS — K21.9: ICD-10-CM

## 2023-08-08 DIAGNOSIS — N28.9: ICD-10-CM

## 2023-08-08 DIAGNOSIS — Z82.49: ICD-10-CM

## 2023-08-08 DIAGNOSIS — I25.2: ICD-10-CM

## 2023-08-08 DIAGNOSIS — Z79.899: ICD-10-CM

## 2023-08-08 DIAGNOSIS — G47.30: ICD-10-CM

## 2023-08-08 DIAGNOSIS — M19.90: ICD-10-CM

## 2023-08-08 DIAGNOSIS — H26.9: ICD-10-CM

## 2023-08-08 DIAGNOSIS — Z87.891: ICD-10-CM

## 2023-08-08 DIAGNOSIS — I25.10: ICD-10-CM

## 2023-08-08 DIAGNOSIS — Z96.1: ICD-10-CM

## 2023-08-08 DIAGNOSIS — I10: ICD-10-CM

## 2023-08-08 DIAGNOSIS — F03.A0: ICD-10-CM

## 2023-08-08 DIAGNOSIS — Z79.82: ICD-10-CM

## 2023-08-08 DIAGNOSIS — I35.0: ICD-10-CM

## 2023-08-08 DIAGNOSIS — D64.9: ICD-10-CM

## 2023-08-08 DIAGNOSIS — E78.5: ICD-10-CM

## 2023-08-08 DIAGNOSIS — Z88.8: ICD-10-CM

## 2023-08-08 DIAGNOSIS — Z79.84: ICD-10-CM

## 2023-08-08 DIAGNOSIS — E66.9: ICD-10-CM

## 2023-08-08 DIAGNOSIS — Z96.642: ICD-10-CM

## 2023-08-08 DIAGNOSIS — Z95.5: ICD-10-CM

## 2023-08-08 DIAGNOSIS — Z99.3: ICD-10-CM

## 2023-08-08 DIAGNOSIS — E11.9: ICD-10-CM

## 2023-08-08 DIAGNOSIS — Z79.4: ICD-10-CM

## 2023-08-08 DIAGNOSIS — Z96.651: ICD-10-CM

## 2023-08-08 DIAGNOSIS — G45.9: Primary | ICD-10-CM

## 2023-08-08 LAB
ALBUMIN SERPL-MCNC: 3.5 G/DL (ref 3.5–5)
ALP SERPL-CCNC: 128 U/L (ref 38–126)
ALT SERPL-CCNC: 20 U/L (ref 4–49)
ANION GAP SERPL CALC-SCNC: 10 MMOL/L
APTT BLD: 23.5 SEC (ref 22–30)
AST SERPL-CCNC: 25 U/L (ref 17–59)
BASOPHILS # BLD AUTO: 0 K/UL (ref 0–0.2)
BASOPHILS NFR BLD AUTO: 1 %
BUN SERPL-SCNC: 41 MG/DL (ref 9–20)
CALCIUM SPEC-MCNC: 8.7 MG/DL (ref 8.4–10.2)
CHLORIDE SERPL-SCNC: 103 MMOL/L (ref 98–107)
CK SERPL-CCNC: 51 U/L (ref 55–170)
CO2 SERPL-SCNC: 27 MMOL/L (ref 22–30)
EOSINOPHIL # BLD AUTO: 0.3 K/UL (ref 0–0.7)
EOSINOPHIL NFR BLD AUTO: 4 %
ERYTHROCYTE [DISTWIDTH] IN BLOOD BY AUTOMATED COUNT: 3.81 M/UL (ref 4.3–5.9)
ERYTHROCYTE [DISTWIDTH] IN BLOOD: 15.9 % (ref 11.5–15.5)
GLUCOSE BLD-MCNC: 154 MG/DL (ref 70–110)
GLUCOSE SERPL-MCNC: 133 MG/DL (ref 74–99)
HCT VFR BLD AUTO: 34.6 % (ref 39–53)
HGB BLD-MCNC: 11.7 GM/DL (ref 13–17.5)
INR PPP: 1 (ref ?–1.2)
LYMPHOCYTES # SPEC AUTO: 0.9 K/UL (ref 1–4.8)
LYMPHOCYTES NFR SPEC AUTO: 13 %
MCH RBC QN AUTO: 30.6 PG (ref 25–35)
MCHC RBC AUTO-ENTMCNC: 33.7 G/DL (ref 31–37)
MCV RBC AUTO: 90.8 FL (ref 80–100)
MONOCYTES # BLD AUTO: 0.5 K/UL (ref 0–1)
MONOCYTES NFR BLD AUTO: 7 %
NEUTROPHILS # BLD AUTO: 4.9 K/UL (ref 1.3–7.7)
NEUTROPHILS NFR BLD AUTO: 73 %
PLATELET # BLD AUTO: 155 K/UL (ref 150–450)
POTASSIUM SERPL-SCNC: 4.5 MMOL/L (ref 3.5–5.1)
PROT SERPL-MCNC: 6.7 G/DL (ref 6.3–8.2)
PT BLD: 10.5 SEC (ref 9–12)
SODIUM SERPL-SCNC: 140 MMOL/L (ref 137–145)
WBC # BLD AUTO: 6.7 K/UL (ref 3.8–10.6)

## 2023-08-08 PROCEDURE — 85730 THROMBOPLASTIN TIME PARTIAL: CPT

## 2023-08-08 PROCEDURE — 82607 VITAMIN B-12: CPT

## 2023-08-08 PROCEDURE — 93880 EXTRACRANIAL BILAT STUDY: CPT

## 2023-08-08 PROCEDURE — 71046 X-RAY EXAM CHEST 2 VIEWS: CPT

## 2023-08-08 PROCEDURE — 93306 TTE W/DOPPLER COMPLETE: CPT

## 2023-08-08 PROCEDURE — 99291 CRITICAL CARE FIRST HOUR: CPT

## 2023-08-08 PROCEDURE — 94760 N-INVAS EAR/PLS OXIMETRY 1: CPT

## 2023-08-08 PROCEDURE — 82550 ASSAY OF CK (CPK): CPT

## 2023-08-08 PROCEDURE — 93005 ELECTROCARDIOGRAM TRACING: CPT

## 2023-08-08 PROCEDURE — 93270 REMOTE 30 DAY ECG REV/REPORT: CPT

## 2023-08-08 PROCEDURE — 85610 PROTHROMBIN TIME: CPT

## 2023-08-08 PROCEDURE — 83036 HEMOGLOBIN GLYCOSYLATED A1C: CPT

## 2023-08-08 PROCEDURE — 97166 OT EVAL MOD COMPLEX 45 MIN: CPT

## 2023-08-08 PROCEDURE — 85025 COMPLETE CBC W/AUTO DIFF WBC: CPT

## 2023-08-08 PROCEDURE — 36415 COLL VENOUS BLD VENIPUNCTURE: CPT

## 2023-08-08 PROCEDURE — 84484 ASSAY OF TROPONIN QUANT: CPT

## 2023-08-08 PROCEDURE — 80053 COMPREHEN METABOLIC PANEL: CPT

## 2023-08-08 PROCEDURE — 70450 CT HEAD/BRAIN W/O DYE: CPT

## 2023-08-08 PROCEDURE — 80061 LIPID PANEL: CPT

## 2023-08-08 NOTE — CT
EXAMINATION TYPE: CT brain wo con

CT DLP: 1162.4 mGycm, Automated exposure control for dose reduction was used.

 

DATE OF EXAM: 8/8/2023 6:42 PM

 

COMPARISON: 7/7/2022..

 

CLINICAL INDICATION:Male, 83 years old with history of Neuro deficit, acute, stroke suspected, Neuro 
deficit, acute, stroke suspected

 

TECHNIQUE: 

Brain: Axial CT images of the brain were obtained with coronal and sagittal reformats created and rev
iewed.

Contrast used: None.

Oral contrast used: None.

 

FINDINGS:

 

Brain:

Extra-axial spaces: No abnormal extra-axial fluid collections.

Ventricular system: Dilatation in proportion to cerebral atrophy.

Cerebral parenchyma: Cerebral atrophy. No acute intraparenchymal hemorrhage or mass effect.  The gray
-white junction is well differentiated. Scattered hypoattenuating areas are seen within the white mat
ter.  

Cerebellum: Unremarkable.

Mass effect: No evidence of midline shift.

Intracranial vasculature: unremarkable

Soft tissues: Normal.

Calvarium/osseous structures: No depressed skull fracture.

Paranasal sinuses and mastoid air cells: Mild scattered paranasal sinus disease.

Visualized orbits: Bilateral aphakia

 

IMPRESSION:

1. No acute intracranial process.

 

2. Nonspecific white matter changes, likely secondary to chronic small vessel ischemic disease.

## 2023-08-08 NOTE — XR
EXAMINATION TYPE: XR chest 2V

 

DATE OF EXAM: 8/8/2023 6:46 PM

 

COMPARISON: 6/9/2021

 

TECHNIQUE: XR chest 2V Frontal and lateral views of the chest.

 

CLINICAL INDICATION:Male, 83 years old with history of altered mental status; 

 

FINDINGS: 

Lungs/Pleura: There is no evidence of pleural effusion, focal consolidation, or pneumothorax.  

Pulmonary vascularity: Unremarkable.

Heart/mediastinum: Cardiomediastinal silhouette is unremarkable.

Musculoskeletal: No acute osseous pathology.

 

IMPRESSION: 

No acute cardiopulmonary disease/process.

## 2023-08-09 VITALS — TEMPERATURE: 97.6 F

## 2023-08-09 LAB
GLUCOSE BLD-MCNC: 115 MG/DL (ref 70–110)
GLUCOSE BLD-MCNC: 167 MG/DL (ref 70–110)
GLUCOSE BLD-MCNC: 223 MG/DL (ref 70–110)
GLUCOSE BLD-MCNC: 94 MG/DL (ref 70–110)

## 2023-08-09 RX ADMIN — INSULIN ASPART SCH UNIT: 100 INJECTION, SOLUTION INTRAVENOUS; SUBCUTANEOUS at 08:46

## 2023-08-09 RX ADMIN — ISOSORBIDE MONONITRATE SCH MG: 30 TABLET, EXTENDED RELEASE ORAL at 08:46

## 2023-08-09 RX ADMIN — ASPIRIN SCH: 325 TABLET ORAL at 08:50

## 2023-08-09 RX ADMIN — Medication SCH MG: at 08:46

## 2023-08-09 RX ADMIN — INSULIN ASPART SCH: 100 INJECTION, SOLUTION INTRAVENOUS; SUBCUTANEOUS at 17:18

## 2023-08-09 RX ADMIN — INSULIN ASPART SCH: 100 INJECTION, SOLUTION INTRAVENOUS; SUBCUTANEOUS at 17:15

## 2023-08-09 RX ADMIN — DEXTRAN 70 AND HYPROMELLOSE 2910 SCH DROPS: 1; 3 SOLUTION/ DROPS OPHTHALMIC at 20:12

## 2023-08-09 RX ADMIN — DAPAGLIFLOZIN SCH MG: 10 TABLET, FILM COATED ORAL at 08:49

## 2023-08-09 RX ADMIN — MEMANTINE HYDROCHLORIDE SCH MG: 10 TABLET ORAL at 08:46

## 2023-08-09 RX ADMIN — INSULIN ASPART SCH: 100 INJECTION, SOLUTION INTRAVENOUS; SUBCUTANEOUS at 06:50

## 2023-08-09 RX ADMIN — INSULIN ASPART SCH UNIT: 100 INJECTION, SOLUTION INTRAVENOUS; SUBCUTANEOUS at 12:44

## 2023-08-09 RX ADMIN — DEXTRAN 70 AND HYPROMELLOSE 2910 SCH DROPS: 1; 3 SOLUTION/ DROPS OPHTHALMIC at 17:21

## 2023-08-09 RX ADMIN — METOPROLOL SUCCINATE SCH MG: 50 TABLET, EXTENDED RELEASE ORAL at 20:11

## 2023-08-09 RX ADMIN — DEXTRAN 70 AND HYPROMELLOSE 2910 SCH DROPS: 1; 3 SOLUTION/ DROPS OPHTHALMIC at 08:47

## 2023-08-09 RX ADMIN — BUPROPION HYDROCHLORIDE SCH MG: 300 TABLET, FILM COATED, EXTENDED RELEASE ORAL at 08:49

## 2023-08-09 RX ADMIN — Medication SCH MG: at 20:11

## 2023-08-09 RX ADMIN — INSULIN ASPART SCH UNIT: 100 INJECTION, SOLUTION INTRAVENOUS; SUBCUTANEOUS at 21:05

## 2023-08-09 RX ADMIN — METOPROLOL SUCCINATE SCH MG: 50 TABLET, EXTENDED RELEASE ORAL at 08:46

## 2023-08-09 NOTE — US
EXAMINATION TYPE: US carotid duplex BILAT

 

DATE OF EXAM: 8/9/2023

 

COMPARISON: US 6/27/2020

 

CLINICAL INDICATION: Male, 83 years old with history of Slurred speech; Slurred speech. Prior smoker,
 limited history from patient.

 

TECHNIQUE: Carotid duplex ultrasound examination. Indirect Doppler criteria was utilized.

 

FINDINGS:

 

EXAM MEASUREMENTS: 

 

RIGHT:  Peak Systolic Velocity (PSV) cm/sec

----- Right CCA:  64.5  

----- Right ICA:  93.0     

----- Right ECA:  367.4   

ICA/CCA ratio:  1.4    

 

RIGHT:  End Diastole cm/sec

----- Right CCA:  14.0   

----- Right ICA:  19.3      

----- Right ECA:  33.4     

 

LEFT:  Peak Systolic Velocity (PSV) cm/sec

----- Left CCA:  38.1  

----- Left ICA:  78.2   

----- Left ECA:  222.7  

ICA/CCA ratio:  2.0  

 

LEFT:  End Diastole cm/sec

----- Left CCA:  10.7  

----- Left ICA:  16.2   

----- Left ECA:  9.4 

 

VERTEBRALS (direction of flow):

Right Vertebral: Antegrade

Left Vertebral: Antegrade

 

Rhythm:  Normal

 

SONOGRAPHER NOTES: Elevated velocities within right ECA and left ECA. *Bidirectional flow seen within
 left ECA. **Narrowing seen left proximal ECA. 

 

 

IMPRESSION:  

Moderate atherosclerotic plaque in the bilateral carotid bulbs. No elevated velocities to suggest hem
odynamically significant stenosis of the bilateral internal carotid arteries. There is elevated veloc
ities involving both external carotid arteries suggesting stenosis again.

 

 

Criteria for Assigning % of Stenosis / Diameter reduction

(Estimation based on the indirect measurements of the internal carotid artery velocities (ICA PSV).

1.  Normal (no stenosis)=ICA PSV < 125 cm/s: ratio < 2.0: ICA EDV<40 cm/s.

2. Less than 50% stenosis=ICA PSV < 125 cm/s: ratio < 2.0: ICA EDV<40 cm/s.

3.  50 to 69% stenosis=ICA PSV of 125 to 230 cm/s: ration 2.0 ? 4.0: ICA EDV  cm/s.

4.  Greater than 70% stenosis to near occlusion= ICA PSV > 230 cm/s: ratio > 4.0: ICA EDV > 100 cm/s.
 

5.  Near occlusion= ICA PSV velocities may be low or undetectable: variable ratio and ICA EDV.

6.  Total occlusion=unable to detect flow.

## 2023-08-09 NOTE — P.HPIM
History of Present Illness


H&P Date: 08/09/23





History of present illness; patient is 83-year-old gentleman with past medical h

istory significant for hypertension, hyperlipidemia, coronary artery disease, 

diabetes mellitus who presented to the ER from his nursing facility for onset of

slurred speech.  Patient was all right yesterday afternoon when after waking up 

from his nap in the afternoon around 4 PM nursing staff noted the patient had 

slurred speech.  There was no complain of any facial droop, weakness of any 

extremity.  patient was not confused.  there was no complain of any headache.  

no complaint of any seizure-like activity.  Because of this acute on several 

slurred speech, patient was brought to the ER


Initial lab work done in the ER showed WBC 6.7, hemoglobin 11.7, platelet count 

155, sodium 140, potassium 4.5, BUN 41, creatinine 1.77, glucose 133


CT brain negative for any acute intracranial process


EKG done in the ER ventricular rate of 65 bpm, , no ST segment elevation 

or T-wave inversion, A. fib


Chest x-ray done in the ER showed no acute cardiac process


Patient symptoms resolved in the ER.  Case was discussed with neuro 

interventionist, keeping in mind patient still low NIH and improving symptoms, 

did not recommend any further intervention


Patient admitted to medicine service





REVIEW OF SYSTEMS: 


CONSTITUTIONAL: No fever, no malaise, no fatigue. 


HEENT: No recent visual problems or hearing problems. Denied any sore throat. 


CARDIOVASCULAR: No chest pain, orthopnea, PND, no palpitations, no syncope. 


PULMONARY: No shortness of breath, no cough, no hemoptysis. 


GASTROINTESTINAL: No diarrhea, no nausea, no vomiting, no abdominal pain. 


NEUROLOGICAL: No headaches, no weakness, no numbness. 


HEMATOLOGICAL: Denies any bleeding or petechiae. 


GENITOURINARY: Denies any burning micturition, frequency, or urgency. 


MUSCULOSKELETAL/RHEUMATOLOGICAL: Denies any joint pain, swelling, or any muscle 

pain. 


ENDOCRINE: Denies any polyuria or polydipsia. 





The rest of the 14-point review of systems is negative.











PHYSICAL EXAMINATION: 





GENERAL: The patient is alert and oriented x2-3, not in any acute distress. Well

developed, well nourished. 


HEENT: Pupils are round and equally reacting to light. EOMI. No scleral icterus.

No conjunctival pallor. Normocephalic, atraumatic. No pharyngeal erythema. No 

thyromegaly. 


CARDIOVASCULAR: S1 and S2 present. No murmurs, rubs, or gallops. 


PULMONARY: Chest is clear to auscultation, no wheezing or crackles. 


ABDOMEN: Soft, nontender, nondistended, normoactive bowel sounds. No palpable 

organomegaly. 


MUSCULOSKELETAL: No joint swelling or deformity.


EXTREMITIES: No cyanosis, clubbing, or pedal edema. 


NEUROLOGICAL: Alert awake oriented 2-3, muscle strength 5/5 in all-EXTREMITIES,

cranial nerves II-12 intact, no sensory deficit


SKIN: No rashes. 





Assessment and plan


TIA


Hypertension


Hyperlipidemia


Diabetes mellitus


History of coronary artery disease


History of prior stroke


History of dementia





Monitor vital signs


Monitor CBC


Monitor CMP


Continue telemetry monitoring


Continue neuro checks


Fall precautions


2-D echo ordered


Ultrasound of carotids ordered


Continue aspirin, Plavix, Lipitor


Neurology consulted


Resume home meds


Labs and medication were reviewed..  Continue same treatment.  Continue with 

symptomatic treatment.  Resume home medication.  Monitor labs and vitals.  DVT 

and GI prophylaxis.  Further recommendations as per clinical course of the 

patient





Dictation was produced using dragon dictation software. please excuse any 

grammatical, word or spelling errors. 








Past Medical History


Past Medical History: Diabetes Mellitus, GERD/Reflux, Hyperlipidemia, Myocardial

Infarction (MI), Osteoarthritis (OA), Renal Disease, Sleep Apnea/CPAP/BIPAP


Additional Past Medical History / Comment(s): ANEMIA. MI x 2, heart murmer, no 

cpap used, constipation,


Last Myocardial Infarction Date:: 3/28/19


History of Any Multi-Drug Resistant Organisms: None Reported


Past Surgical History: Heart Catheterization With Stent, Joint Replacement, 

Orthopedic Surgery


Additional Past Surgical History / Comment(s): 4 cardiac stents, total right 

knee replacement, monse cataracts with lens implants, left shoulder surgery, left 

hip replacement,


Past Anesthesia/Blood Transfusion Reactions: No Reported Reaction


Date of Last Stent Placement:: 6/2020


Past Psychological History: No Psychological Hx Reported


Smoking Status: Former smoker


Past Alcohol Use History: None Reported


Additional Past Alcohol Use History / Comment(s): quit smoking 1964, smoked for 

6-7 yrs


Past Drug Use History: None Reported





- Past Family History


  ** Father


Family Medical History: Myocardial Infarction (MI)





  ** Mother


Family Medical History: No Reported History





  ** Sister(s)


Family Medical History: Cancer





Medications and Allergies


                                Home Medications











 Medication  Instructions  Recorded  Confirmed  Type


 


Aspirin EC [Ecotrin Low Dose] 81 mg PO DAILY@1700 08/23/14 08/08/23 History


 


buPROPion XL [Wellbutrin XL] 300 mg PO DAILY@0800 08/23/14 08/08/23 History


 


Docusate [Colace] 100 mg PO DAILY PRN 12/21/18 08/08/23 History


 


Metoprolol Succinate (ER) [Toprol 50 mg PO BID@0800,1700 12/21/18 08/08/23 

History





XL]    


 


Atorvastatin [Lipitor] 80 mg PO HS@2100 06/18/20 08/08/23 History


 


Clopidogrel [Plavix] 75 mg PO HS@2100 06/18/20 08/08/23 History


 


Fish Oil/Dha/Epa [Fish Oil 1,200 1 cap PO DAILY@0800 06/18/20 08/08/23 History





mg Fish Oil]    


 


Nitroglycerin Sl Tabs [Nitrostat] 0.4 mg SUBLINGUAL Q5M PRN #20 tab 06/21/20 08/08/23 Rx


 


Acetaminophen Tab [Tylenol] 650 mg PO Q6HR PRN  tab 07/01/20 08/08/23 Rx


 


Multivitamins, Thera [Multivitamin 1 tab PO DAILY@1700 07/10/20 08/08/23 History





(formulary)]    


 


Folic Acid 1 mg PO DAILY@1700 05/06/21 08/08/23 History


 


Potassium Chloride [Klor-Con M10] 10 meq PO DAILY@1700 05/06/21 08/08/23 History


 


Ferrous Sulfate [Iron (65  mg PO DAILY@0800 06/09/21 08/08/23 History





Elemental)]    


 


Isosorbide Mononitrate ER [Imdur] 30 mg PO DAILY@0800 06/09/21 08/08/23 History


 


Magnesium Hydroxide [Milk of 7,200 mg PO DAILY PRN 06/09/21 08/08/23 History





Magnesia Concentrate]    


 


bisacodyL [Dulcolax] 10 mg RECTAL DAILY PRN 06/09/21 08/08/23 History


 


Epoetin Henri [Procrit] 20,000 unit SQ Q14D 07/07/22 08/08/23 History


 


Famotidine [Pepcid] 20 mg PO Q48H 07/07/22 08/08/23 History


 


Nicole-Tussin Syrup 30 ml PO Q6H PRN 07/07/22 08/08/23 History


 


Loperamide [Imodium] 2 mg PO Q6H PRN 07/07/22 08/08/23 History


 


Mag Hydrox/Al Hydrox/Simeth 15 ml PO QID PRN 07/07/22 08/08/23 History





[Maalox]    


 


Mirabegron [Myrbetriq] 25 mg PO DAILY@0800 07/07/22 08/08/23 History


 


amLODIPine [Norvasc] 2.5 mg PO DAILY@1700 07/07/22 08/08/23 History


 


Carboxymethylcellulose Sodium 1 drop BOTH EYES TID@0800,1200,1700 08/08/23 08/08/23 History





[Refresh Tears]    


 


Dapagliflozin Propanediol [Farxiga] 10 mg PO DAILY@0800 08/08/23 08/08/23 

History


 


Ensure Enlive 120 ml PO TID@0800,1200,1700 08/08/23 08/08/23 History


 


INSULIN ASPART (NovoLOG) [NovoLOG 3 unit SQ TID@0700,1100,1630 08/08/23 08/08/23

 History





(formulary)]    


 


INSULIN ASPART (NovoLOG) [NovoLOG See Protocol SQ ACHS 08/08/23 08/08/23 History





(formulary)]    


 


Insulin Glargine [Lantus Vial] 10 unit SQ HS@2130 08/08/23 08/08/23 History


 


Magnesium Oxide [Mag-Oxide] 200 mg PO BID@0800,1700 08/08/23 08/08/23 History


 


Melatonin 1 mg PO HS@2100 08/08/23 08/08/23 History


 


Memantine HCl [Namenda] 5 mg PO HS@2100 08/08/23 08/08/23 History


 


Memantine [Namenda] 10 mg PO DAILY@0800 08/08/23 08/08/23 History








                                    Allergies











Allergy/AdvReac Type Severity Reaction Status Date / Time


 


iodine Allergy Unknown Unknown Verified 08/08/23 18:42














Physical Exam


Vitals: 


                                   Vital Signs











  Temp Pulse Pulse Resp BP BP Pulse Ox


 


 08/09/23 08:00    67  16   171/73  99


 


 08/09/23 07:45        100


 


 08/09/23 04:00    66  16   150/69  100


 


 08/09/23 02:00     16   


 


 08/09/23 00:00    74  16   149/78  97


 


 08/08/23 21:45  97.7 F   70  16   155/71  98


 


 08/08/23 20:00   64   16  166/77  


 


 08/08/23 18:49   64   18    97


 


 08/08/23 18:45   64   18  146/82   98


 


 08/08/23 18:24  98.1 F  66   18  171/84   99








                                Intake and Output











 08/08/23 08/09/23 08/09/23





 22:59 06:59 14:59


 


Intake Total   180


 


Output Total  350 


 


Balance  -350 180


 


Intake:   


 


  Oral   180


 


Output:   


 


  Urine  350 


 


Other:   


 


  Voiding Method Urinal Urinal 


 


  # Voids  2 


 


  Weight 81.873 kg  














Results


CBC & Chem 7: 


                                 08/08/23 19:05





                                 08/08/23 19:05


Labs: 


                  Abnormal Lab Results - Last 24 Hours (Table)











  08/08/23 08/08/23 08/08/23 Range/Units





  18:31 19:05 19:05 


 


RBC   3.81 L   (4.30-5.90)  m/uL


 


Hgb   11.7 L   (13.0-17.5)  gm/dL


 


Hct   34.6 L   (39.0-53.0)  %


 


RDW   15.9 H   (11.5-15.5)  %


 


Lymphocytes #   0.9 L   (1.0-4.8)  k/uL


 


BUN    41 H  (9-20)  mg/dL


 


Creatinine    1.77 H  (0.66-1.25)  mg/dL


 


Glucose    133 H  (74-99)  mg/dL


 


POC Glucose (mg/dL)  154 H    ()  mg/dL


 


Alkaline Phosphatase    128 H  ()  U/L


 


Creatine Kinase    51 L  ()  U/L














  08/09/23 Range/Units





  06:46 


 


RBC   (4.30-5.90)  m/uL


 


Hgb   (13.0-17.5)  gm/dL


 


Hct   (39.0-53.0)  %


 


RDW   (11.5-15.5)  %


 


Lymphocytes #   (1.0-4.8)  k/uL


 


BUN   (9-20)  mg/dL


 


Creatinine   (0.66-1.25)  mg/dL


 


Glucose   (74-99)  mg/dL


 


POC Glucose (mg/dL)  115 H  ()  mg/dL


 


Alkaline Phosphatase   ()  U/L


 


Creatine Kinase   ()  U/L














Thrombosis Risk Factor Assmnt





- Choose All That Apply


Any of the Below Risk Factors Present?: Yes


Each Factor Represents 1 point: Obesity (BMI >25)


Each Risk Factor Represents 3 Points: Age 75 years or older


Thrombosis Risk Factor Assessment Total Risk Factor Score: 4


Thrombosis Risk Factor Assessment Level: Moderate Risk

## 2023-08-09 NOTE — P.CNNES
History of Present Illness


Consult date: 08/09/23


Requesting physician: Viktor Michael


Reason for Consult: TIA


History of Present Illness: 





Patient is a 83-year-old right-handed male with history of diabetes, mild to 

moderate dementia, hypertension came to the hospital by ambulance yesterday at 

6:24 PM for possible TIA.  Patient's son-in-law was present, who provided with a

history.  Patient is wheelchair bound for last 2 years.  He has been living in 

[facility] with his wife for last 2 years.  Patient's son-in-law mentions that last

night patient had dinner, and he ate well.  He then developed slurred speech, 

could not remember his wife's name.  There was no facial droop, any focal 

weakness, numbness or tingling or visual disturbance reported.  Therefore 

patient was brought to the hospital.





Vital signs on arrival blood pressure 171/84, pulse rate 66, temperature 98.1.  

Blood test shows normal WBC, hemoglobin 11.7, normal platelets, PT/PTT.  

Electrolytes are normal, BUN 41, creatinine 1.77, hepatic panel is normal, CK 

normal, troponin negative.  CT head revealed no acute intracranial process.  

Nonspecific white matter changes, likely secondary to chronic small vessel 

ischemic disease.  I personally reviewed CT head, agree with the findings, with 

evidence of significant small vessel disease.  Generalized atrophy.  Evidence of

old lacune involving bilateral anterior limb of internal capsule.  Chest x-ray 

showed no acute cardiac or process.  EKG shows atrial fibrillation.





Patient has been seen by myself previously on 07/01/2020 for acute delirium wh

ich was felt to be related to pain medication, Norco, tramadol..  Patient had 

right foot drop that was felt to be related to peroneal nerve compression at the

fibular head.  Diabetic asymmetric neuropathy versus lumbar radiculopathy was 

also in the differential.  Patient had a normal carotid Doppler performed at 

that time.  B12 was 357, folate 11.3, TSH was mildly abnormal 5.11.





Patient currently on aspirin 81 mg, Plavix 75 mg, Lipitor 80 mg, folic acid, 

metoprolol, Wellbutrin 300 mg and multiple other medications.





Patient's son-in-law mentions that patient has history of dementia for around 5-

6 years, which has been more noticeable in the last couple years.  It is 

particularly worse in the last couple months.  Patient is more lucid about 4 

days a week, but other days he is more confused, and talks about long past 

events.  Patient even at this time started talking about his job with [employer], that 

he was changing location and did not know where he was.  He was talking somewhat

tangential at times.  Patient has history of MI 2 years ago and had undergone 

couple stenting.  Patient has history of diabetes for "decades".  Patient's 

son-in-law mentions that patient use to walk with a walker for couple years, but

for last 2 years he has been wheelchair bound.  He started falling, therefore he

was transferred [facility], where he lives with his wife.





Patient has history of smoking 1-1-1/2 pack per day for about 20-30 years, quit 

40 years ago.  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





Review of Systems


Constitutional: Denies chills, Denies fever


Eyes: denies blurred vision, denies diplopia, denies pain


Ears: deny: decreased hearing, ear discharge


Ears, nose, mouth and throat: Denies headache, Denies sore throat


Cardiovascular: Denies chest pain, Denies irregular heart beat, Denies 

palpitations, Denies shortness of breath


Respiratory: Reports cough, Denies excessive sputum


Gastrointestinal: Denies abdominal pain, Denies diarrhea, Denies nausea, Denies 

vomiting


Genitourinary: Denies incontinence, Denies urinary frequency


Musculoskeletal: Denies low back pain, Denies myalgias, Denies neck pain


Integumentary: Denies pruritus, Denies rash


Neurological: Reports as per HPI


Psychiatric: Denies anxiety, Denies depression


Endocrine: Denies fatigue, Denies weight change


Hematologic/Lymphatic: Reports easy bruising, Denies easy bleeding





Past Medical History


Past Medical History: Diabetes Mellitus, GERD/Reflux, Hyperlipidemia, Myocardial

Infarction (MI), Osteoarthritis (OA), Renal Disease, Sleep Apnea/CPAP/BIPAP


Additional Past Medical History / Comment(s): ANEMIA. MI x 2, heart murmer, no 

cpap used, constipation,


Last Myocardial Infarction Date:: 3/28/19


History of Any Multi-Drug Resistant Organisms: None Reported


Past Surgical History: Heart Catheterization With Stent, Joint Replacement, 

Orthopedic Surgery


Additional Past Surgical History / Comment(s): 4 cardiac stents, total right 

knee replacement, monse cataracts with lens implants, left shoulder surgery, left 

hip replacement,


Past Anesthesia/Blood Transfusion Reactions: No Reported Reaction


Date of Last Stent Placement:: 6/2020


Past Psychological History: No Psychological Hx Reported


Smoking Status: Former smoker


Past Alcohol Use History: None Reported


Additional Past Alcohol Use History / Comment(s): quit smoking 1964, smoked for 

6-7 yrs


Past Drug Use History: None Reported





- Past Family History


  ** Father


Family Medical History: Myocardial Infarction (MI)





  ** Mother


Family Medical History: No Reported History





  ** Sister(s)


Family Medical History: Cancer





Medications and Allergies


                                Home Medications











 Medication  Instructions  Recorded  Confirmed  Type


 


Aspirin EC [Ecotrin Low Dose] 81 mg PO DAILY@1700 08/23/14 08/08/23 History


 


buPROPion XL [Wellbutrin XL] 300 mg PO DAILY@0800 08/23/14 08/08/23 History


 


Docusate [Colace] 100 mg PO DAILY PRN 12/21/18 08/08/23 History


 


Metoprolol Succinate (ER) [Toprol 50 mg PO BID@0800,1700 12/21/18 08/08/23 

History





XL]    


 


Atorvastatin [Lipitor] 80 mg PO HS@2100 06/18/20 08/08/23 History


 


Clopidogrel [Plavix] 75 mg PO HS@2100 06/18/20 08/08/23 History


 


Fish Oil/Dha/Epa [Fish Oil 1,200 1 cap PO DAILY@0800 06/18/20 08/08/23 History





mg Fish Oil]    


 


Nitroglycerin Sl Tabs [Nitrostat] 0.4 mg SUBLINGUAL Q5M PRN #20 tab 06/21/20 08/08/23 Rx


 


Acetaminophen Tab [Tylenol] 650 mg PO Q6HR PRN  tab 07/01/20 08/08/23 Rx


 


Multivitamins, Thera [Multivitamin 1 tab PO DAILY@1700 07/10/20 08/08/23 History





(formulary)]    


 


Folic Acid 1 mg PO DAILY@1700 05/06/21 08/08/23 History


 


Potassium Chloride [Klor-Con M10] 10 meq PO DAILY@1700 05/06/21 08/08/23 History


 


Ferrous Sulfate [Iron (65  mg PO DAILY@0800 06/09/21 08/08/23 History





Elemental)]    


 


Isosorbide Mononitrate ER [Imdur] 30 mg PO DAILY@0800 06/09/21 08/08/23 History


 


Magnesium Hydroxide [Milk of 7,200 mg PO DAILY PRN 06/09/21 08/08/23 History





Magnesia Concentrate]    


 


bisacodyL [Dulcolax] 10 mg RECTAL DAILY PRN 06/09/21 08/08/23 History


 


Epoetin Henri [Procrit] 20,000 unit SQ Q14D 07/07/22 08/08/23 History


 


Famotidine [Pepcid] 20 mg PO Q48H 07/07/22 08/08/23 History


 


Nicole-Tussin Syrup 30 ml PO Q6H PRN 07/07/22 08/08/23 History


 


Loperamide [Imodium] 2 mg PO Q6H PRN 07/07/22 08/08/23 History


 


Mag Hydrox/Al Hydrox/Simeth 15 ml PO QID PRN 07/07/22 08/08/23 History





[Maalox]    


 


Mirabegron [Myrbetriq] 25 mg PO DAILY@0800 07/07/22 08/08/23 History


 


amLODIPine [Norvasc] 2.5 mg PO DAILY@1700 07/07/22 08/08/23 History


 


Carboxymethylcellulose Sodium 1 drop BOTH EYES TID@0800,1200,1700 08/08/23 08/08/23 History





[Refresh Tears]    


 


Dapagliflozin Propanediol [Farxiga] 10 mg PO DAILY@0800 08/08/23 08/08/23 

History


 


Ensure Enlive 120 ml PO TID@0800,1200,1700 08/08/23 08/08/23 History


 


INSULIN ASPART (NovoLOG) [NovoLOG 3 unit SQ TID@0700,1100,1630 08/08/23 08/08/23

 History





(formulary)]    


 


INSULIN ASPART (NovoLOG) [NovoLOG See Protocol SQ ACHS 08/08/23 08/08/23 History





(formulary)]    


 


Insulin Glargine [Lantus Vial] 10 unit SQ HS@2130 08/08/23 08/08/23 History


 


Magnesium Oxide [Mag-Oxide] 200 mg PO BID@0800,1700 08/08/23 08/08/23 History


 


Melatonin 1 mg PO HS@2100 08/08/23 08/08/23 History


 


Memantine HCl [Namenda] 5 mg PO HS@2100 08/08/23 08/08/23 History


 


Memantine [Namenda] 10 mg PO DAILY@0800 08/08/23 08/08/23 History








                                    Allergies











Allergy/AdvReac Type Severity Reaction Status Date / Time


 


iodine Allergy Unknown Unknown Verified 08/08/23 18:42














Physical Examination





- Vital Signs


Vital Signs: 


                                   Vital Signs











  Temp Pulse Pulse Resp BP BP Pulse Ox


 


 08/09/23 08:00    67  16   171/73  99


 


 08/09/23 07:45        100


 


 08/09/23 04:00    66  16   150/69  100


 


 08/09/23 02:00     16   


 


 08/09/23 00:00    74  16   149/78  97


 


 08/08/23 21:45  97.7 F   70  16   155/71  98


 


 08/08/23 20:00   64   16  166/77  


 


 08/08/23 18:49   64   18    97


 


 08/08/23 18:45   64   18  146/82   98


 


 08/08/23 18:24  98.1 F  66   18  171/84   99








                                Intake and Output











 08/08/23 08/09/23 08/09/23





 22:59 06:59 14:59


 


Intake Total   180


 


Output Total  350 


 


Balance  -350 180


 


Intake:   


 


  Oral   180


 


Output:   


 


  Urine  350 


 


Other:   


 


  Voiding Method Urinal Urinal Urinal


 


  # Voids  2 


 


  Weight 81.873 kg  














Patient is an elderly  male, very pleasant, in no acute distress. 


Patient is alert awake.  Patient states it is August and the year is 2023 but he

 believes that he is in the nursing home right now.  He thinks that he is 

currently in [facility], where he lived 40 years ago.  He could not tell

 name of the current president but after giving some choices, he was able to 

state was bubble Biden. Speech and language functions are normal.  Patient can 

name and repeat very well.  No aphasia or dysarthria.  Attention, concentration 

is intact and fund of knowledge is limited.  Patient has negative visuospatial 

apraxia, positive palmomental reflex bilaterally.





On cranial nerve examination, pupils are equal, round and reacting to light, 

visual fields are full on confrontation, with no neglect on double simultaneous 

stimulation.  Extraocular muscles are intact with no nystagmus.  Face is 

symmetric, tongue protrudes to the midline.  Palatal elevation and sensation 

normal, hearing and shoulder shrug normal, facial sensation normal.  





On muscle strength testing, there is no pronator drift and the strength is 

normal in arms distally and proximally.  In the lower limbs, the strength is 

normal in the left lower limb.  On the right side, he has a right foot drop, 

with 3+ at the right ankle, and hip flexion is 4 with pain.





Deep tendon reflexes are symmetric, very hypoactive and plantars are downgoing 

bilaterally.





Sensory to touch is equal with no neglect on double simultaneous stimulation.





Cerebellar function showed no ataxia for finger-to-nose testing.  No dysdiadocho

kinesia.  No ataxia for heel-to-shin testing on either side.  Tone and bulk of 

muscles normal.





Gait deferred..





On general examination, there is no carotid bruit or murmur, S1-S2 audible.  

Chest is clear on consultation.  Abdomen is soft nontender.  No organomegaly, 

bowel sounds present.   Peripheral pulses are present.  No edema.





Results





- Laboratory Findings


CBC and BMP: 


                                 08/08/23 19:05





                                 08/08/23 19:05


Abnormal Lab Findings: 


                                  Abnormal Labs











  08/08/23 08/08/23 08/08/23





  18:31 19:05 19:05


 


RBC   3.81 L 


 


Hgb   11.7 L 


 


Hct   34.6 L 


 


RDW   15.9 H 


 


Lymphocytes #   0.9 L 


 


BUN    41 H


 


Creatinine    1.77 H


 


Glucose    133 H


 


POC Glucose (mg/dL)  154 H  


 


Alkaline Phosphatase    128 H


 


Creatine Kinase    51 L














  08/09/23





  06:46


 


RBC 


 


Hgb 


 


Hct 


 


RDW 


 


Lymphocytes # 


 


BUN 


 


Creatinine 


 


Glucose 


 


POC Glucose (mg/dL)  115 H


 


Alkaline Phosphatase 


 


Creatine Kinase 














Assessment and Plan


Assessment: 





* Probable TIA manifesting with slurred speech, that resolved in about 3-4 

  hours.


* New onset Atrial fibrillation


* Diabetes


* Hypertension


* Coronary artery disease


* Hyperlipidemia


* Dementia











Plan: 





* Patient had TIA, likely related to new onset atrial fibrillation.


* Await cardiology consultation.  Patient probably will be a candidate for long-

  term anticoagulation.  Patient is wheelchair bound, therefore not a fall risk.


* 2-D echo pending


* No indication for MRI, as the symptoms very brief, yield is very low, and will

   not change the management.





* Carotid Doppler revealed moderate atherosclerotic plaque in the bilateral 

  carotid bulbs.  No elevated velocities to suggest hemodynamically significant 

  stenosis of the bilateral internal carotid arteries.  Antegrade flow in both 

  vertebral arteries.


* Patient currently on aspirin 81 mg, Plavix 75 mg and Lipitor 80 mg.


* Check hemoglobin A1c, fasting lipid panel.


* Telemetry monitoring.


* DVT prophylaxis: Start heparin 5000 units subcu every 8 hours.


* Neurology will follow clinically.  Discussed with primary physician in detail.

    Thank you for the consult.

## 2023-08-10 VITALS — DIASTOLIC BLOOD PRESSURE: 80 MMHG | SYSTOLIC BLOOD PRESSURE: 153 MMHG | HEART RATE: 66 BPM

## 2023-08-10 LAB
CHOLEST SERPL-MCNC: 95 MG/DL (ref 0–200)
GLUCOSE BLD-MCNC: 101 MG/DL (ref 70–110)
GLUCOSE BLD-MCNC: 72 MG/DL (ref 70–110)
HDLC SERPL-MCNC: 44.7 MG/DL (ref 40–60)
LDLC SERPL CALC-MCNC: 33.6 MG/DL (ref 0–131)
TRIGL SERPL-MCNC: 83.6 MG/DL (ref 0–149)
VLDLC SERPL CALC-MCNC: 16.72 MG/DL (ref 5–40)

## 2023-08-10 RX ADMIN — INSULIN ASPART SCH: 100 INJECTION, SOLUTION INTRAVENOUS; SUBCUTANEOUS at 12:30

## 2023-08-10 RX ADMIN — Medication SCH MG: at 08:49

## 2023-08-10 RX ADMIN — ASPIRIN SCH: 325 TABLET ORAL at 08:51

## 2023-08-10 RX ADMIN — ISOSORBIDE MONONITRATE SCH MG: 30 TABLET, EXTENDED RELEASE ORAL at 08:49

## 2023-08-10 RX ADMIN — METOPROLOL SUCCINATE SCH MG: 50 TABLET, EXTENDED RELEASE ORAL at 08:50

## 2023-08-10 RX ADMIN — Medication SCH MG: at 08:50

## 2023-08-10 RX ADMIN — INSULIN ASPART SCH UNIT: 100 INJECTION, SOLUTION INTRAVENOUS; SUBCUTANEOUS at 08:49

## 2023-08-10 RX ADMIN — DAPAGLIFLOZIN SCH MG: 10 TABLET, FILM COATED ORAL at 08:50

## 2023-08-10 RX ADMIN — BUPROPION HYDROCHLORIDE SCH MG: 300 TABLET, FILM COATED, EXTENDED RELEASE ORAL at 08:50

## 2023-08-10 RX ADMIN — MEMANTINE HYDROCHLORIDE SCH MG: 10 TABLET ORAL at 08:50

## 2023-08-10 RX ADMIN — DEXTRAN 70 AND HYPROMELLOSE 2910 SCH DROPS: 1; 3 SOLUTION/ DROPS OPHTHALMIC at 08:50

## 2023-08-10 RX ADMIN — INSULIN ASPART SCH: 100 INJECTION, SOLUTION INTRAVENOUS; SUBCUTANEOUS at 06:09

## 2023-08-10 NOTE — P.DS
Providers


Date of admission: 


08/08/23 20:25





Expected date of discharge: 08/10/23


Attending physician: 


Dalila Gil





Consults: 





                                        





08/08/23 20:25


Consult Physician Routine 


   Consulting Provider: Rupa Bennett


   Consult Reason/Comments: TIA


   Do you want consulting provider notified?: Yes, Notify in am





08/09/23 09:23


Consult Physician Routine 


   Consulting Provider: Deni Jimenes


   Consult Reason/Comments: Palpitations, TIA


   Do you want consulting provider notified?: Yes











Primary care physician: 


Mook Abdalla





Hospital Course: 





Discharge diagnoses;


TIA


Hypertension


Hyperlipidemia


Diabetes mellitus


History of coronary artery disease


History of prior stroke


History of dementia








Hospital course;


patient is 83-year-old gentleman with past medical history significant for 

hypertension, hyperlipidemia, coronary artery disease, diabetes mellitus who 

presented to the ER from his nursing facility for onset of slurred speech.  

Patient was all right yesterday afternoon when after waking up from his nap in 

the afternoon around 4 PM nursing staff noted the patient had slurred speech.  

There was no complain of any facial droop, weakness of any extremity.  patient 

was not confused.  there was no complain of any headache.  no complaint of any 

seizure-like activity.  Because of this acute on several slurred speech, patient

was brought to the ER


Initial lab work done in the ER showed WBC 6.7, hemoglobin 11.7, platelet count 

155, sodium 140, potassium 4.5, BUN 41, creatinine 1.77, glucose 133


CT brain negative for any acute intracranial process


EKG done in the ER ventricular rate of 65 bpm, , no ST segment elevation 

or T-wave inversion, A. fib


Chest x-ray done in the ER showed no acute cardiac process


Patient symptoms resolved in the ER.  Case was discussed with neuro 

interventionist, keeping in mind patient still low NIH and improving symptoms, 

did not recommend any further intervention


Patient admitted to medicine service





8/10.  Patient seen and examined.  No acute issues overnight.  Cardiology 

elevated the patient, reviewed all telemetry tracings, no evidence of any atrial

fibrillation ablation.  Cardiology recommended discharging patient with event 

monitor.  Neurology recommended patient on current medications of aspirin Plavix

and Lipitor








PHYSICAL EXAMINATION: 





GENERAL: The patient is alert and oriented x 2-3, not in any acute distress. 

Well developed, well nourished. 


HEENT: Pupils are round and equally reacting to light. EOMI. No scleral icterus.

No conjunctival pallor. Normocephalic, atraumatic. No pharyngeal erythema. No 

thyromegaly. 


CARDIOVASCULAR: S1 and S2 present. No murmurs, rubs, or gallops. 


PULMONARY: Chest is clear to auscultation, no wheezing or crackles. 


ABDOMEN: Soft, nontender, nondistended, normoactive bowel sounds. No palpable 

organomegaly. 


MUSCULOSKELETAL: No joint swelling or deformity.


EXTREMITIES: No cyanosis, clubbing, or pedal edema. 


NEUROLOGICAL: Gross neurological examination did not reveal any focal deficits. 


SKIN: No rashes. 











Dictation was produced using dragon dictation software. please excuse any 

grammatical, word or spelling errors. 





Patient Condition at Discharge: Stable





Plan - Discharge Summary


Discharge Rx Participant: No


New Discharge Prescriptions: 


Continue


   Aspirin EC [Ecotrin Low Dose] 81 mg PO DAILY@1700


   buPROPion XL [Wellbutrin XL] 300 mg PO DAILY@0800


   Docusate [Colace] 100 mg PO DAILY PRN


     PRN Reason: Constipation


   Metoprolol Succinate (ER) [Toprol XL] 50 mg PO BID@0800,1700


   Atorvastatin [Lipitor] 80 mg PO HS@2100


   Fish Oil/Dha/Epa [Fish Oil 1,200 mg Fish Oil] 1 cap PO DAILY@0800


   Clopidogrel [Plavix] 75 mg PO HS@2100


   Nitroglycerin Sl Tabs [Nitrostat] 0.4 mg SUBLINGUAL Q5M PRN #20 tab


     PRN Reason: Chest Pain


   Acetaminophen Tab [Tylenol] 650 mg PO Q6HR PRN  tab


     PRN Reason: Mild Pain Or Fever > 100.5


   Multivitamins, Thera [Multivitamin (formulary)] 1 tab PO DAILY@1700


   bisacodyL [Dulcolax] 10 mg RECTAL DAILY PRN


     PRN Reason: Constipation


   Magnesium Hydroxide [Milk of Magnesia Concentrate] 7,200 mg PO DAILY PRN


     PRN Reason: Constipation


   Epoetin Henri [Procrit] 20,000 unit SQ Q14D


   Loperamide [Imodium] 2 mg PO Q6H PRN


     PRN Reason: Loose Stool


   Mag Hydrox/Al Hydrox/Simeth [Maalox] 15 ml PO QID PRN


     PRN Reason: Heartburn


   Mirabegron [Myrbetriq] 25 mg PO DAILY@0800


   INSULIN ASPART (NovoLOG) [NovoLOG (formulary)] See Protocol SQ ACHS


   Memantine [Namenda] 10 mg PO DAILY@0800


   Memantine HCl [Namenda] 5 mg PO HS@2100


   Melatonin 1 mg PO HS@2100


   Folic Acid 1 mg PO DAILY@1700


   Potassium Chloride [Klor-Con M10] 10 meq PO DAILY@1700


   Isosorbide Mononitrate ER [Imdur] 30 mg PO DAILY@0800


   Ferrous Sulfate [Iron (65 MG Elemental)] 325 mg PO DAILY@0800


   Nicole-Tussin Syrup 30 ml PO Q6H PRN


     PRN Reason: Cough


   amLODIPine [Norvasc] 2.5 mg PO DAILY@1700


   Famotidine [Pepcid] 20 mg PO Q48H


   Carboxymethylcellulose Sodium [Refresh Tears] 1 drop BOTH EYES 

TID@0800,1200,1700


   INSULIN ASPART (NovoLOG) [NovoLOG (formulary)] 3 unit SQ TID@0700,1100,1630


   Ensure Enlive 120 ml PO TID@0800,1200,1700


   Magnesium Oxide [Mag-Oxide] 200 mg PO BID@0800,1700


   Insulin Glargine [Lantus Vial] 10 unit SQ HS@2130


   Dapagliflozin Propanediol [Farxiga] 10 mg PO DAILY@0800


Discharge Medication List





Aspirin EC [Ecotrin Low Dose] 81 mg PO DAILY@1700 08/23/14 [History]


buPROPion XL [Wellbutrin XL] 300 mg PO DAILY@0800 08/23/14 [History]


Docusate [Colace] 100 mg PO DAILY PRN 12/21/18 [History]


Metoprolol Succinate (ER) [Toprol XL] 50 mg PO BID@0800,1700 12/21/18 [History]


Atorvastatin [Lipitor] 80 mg PO HS@2100 06/18/20 [History]


Clopidogrel [Plavix] 75 mg PO HS@2100 06/18/20 [History]


Fish Oil/Dha/Epa [Fish Oil 1,200 mg Fish Oil] 1 cap PO DAILY@0800 06/18/20 

[History]


Nitroglycerin Sl Tabs [Nitrostat] 0.4 mg SUBLINGUAL Q5M PRN #20 tab 06/21/20 

[Rx]


Acetaminophen Tab [Tylenol] 650 mg PO Q6HR PRN  tab 07/01/20 [Rx]


Multivitamins, Thera [Multivitamin (formulary)] 1 tab PO DAILY@1700 07/10/20 

[History]


Folic Acid 1 mg PO DAILY@1700 05/06/21 [History]


Potassium Chloride [Klor-Con M10] 10 meq PO DAILY@1700 05/06/21 [History]


Ferrous Sulfate [Iron (65 MG Elemental)] 325 mg PO DAILY@0800 06/09/21 [History]


Isosorbide Mononitrate ER [Imdur] 30 mg PO DAILY@0800 06/09/21 [History]


Magnesium Hydroxide [Milk of Magnesia Concentrate] 7,200 mg PO DAILY PRN 

06/09/21 [History]


bisacodyL [Dulcolax] 10 mg RECTAL DAILY PRN 06/09/21 [History]


Epoetin Henri [Procrit] 20,000 unit SQ Q14D 07/07/22 [History]


Famotidine [Pepcid] 20 mg PO Q48H 07/07/22 [History]


Nicole-Tussin Syrup 30 ml PO Q6H PRN 07/07/22 [History]


Loperamide [Imodium] 2 mg PO Q6H PRN 07/07/22 [History]


Mag Hydrox/Al Hydrox/Simeth [Maalox] 15 ml PO QID PRN 07/07/22 [History]


Mirabegron [Myrbetriq] 25 mg PO DAILY@0800 07/07/22 [History]


amLODIPine [Norvasc] 2.5 mg PO DAILY@1700 07/07/22 [History]


Carboxymethylcellulose Sodium [Refresh Tears] 1 drop BOTH EYES 

TID@0800,1200,1700 08/08/23 [History]


Dapagliflozin Propanediol [Farxiga] 10 mg PO DAILY@0800 08/08/23 [History]


Ensure Enlive 120 ml PO TID@0800,1200,1700 08/08/23 [History]


INSULIN ASPART (NovoLOG) [NovoLOG (formulary)] 3 unit SQ TID@0700,1100,1630 08/0 8/23 [History]


INSULIN ASPART (NovoLOG) [NovoLOG (formulary)] See Protocol SQ ACHS 08/08/23 

[History]


Insulin Glargine [Lantus Vial] 10 unit SQ HS@2130 08/08/23 [History]


Magnesium Oxide [Mag-Oxide] 200 mg PO BID@0800,1700 08/08/23 [History]


Melatonin 1 mg PO HS@2100 08/08/23 [History]


Memantine HCl [Namenda] 5 mg PO HS@2100 08/08/23 [History]


Memantine [Namenda] 10 mg PO DAILY@0800 08/08/23 [History]








Follow up Appointment(s)/Referral(s): 


Mook Abdalla MD [Primary Care Provider] - 1-2 days


Beth Galloway [NON-STAFF] - 


Discharge Disposition: TRANSFER TO SNF/ECF

## 2023-08-10 NOTE — CA
Transthoracic Echo Report 

 Name: Martin Lau 

 MRN:    [id number] 

 Age:    83     Gender:     M 

 

 :    1940 

 Exam Date:     2023 10:25 

 Exam Location: [facility] 

 Ht (in):     71     Wt (lb):     180 

 Ordering Physician:        Mckinley Amador MD 

 Attending/Referring Phys: 

 Technician         Nghia Hernandez 

 Procedure CPT: 

 Indications:       Slurred speech, CVA 

 

 Cardiac Hx: 

 Technical Quality:      Fair 

 Contrast 1:                                Total Dose (mL): 

 Contrast 2:                                Total Dose (mL): 

 

 MEASUREMENTS  (Male / Female) Normal Values 

 2D ECHO 

 LV Systolic Diameter PLAX         4.0 cm                 

 RV Internal Dim ED PLAX           2.8 cm                 

 LVOT Diameter                     2.2 cm                 

 Aortic Root Diameter              2.9 cm                 

 LA Systolic Diameter LX           3.7 cm                3.0 - 4.0 / 2.7 - 3.8 cm 

 LV Diastolic Volume MOD BP        65.0 cm???              67 - 155 / 56 - 104 cm??? 

 LV Systolic Volume MOD BP         33.2 cm???              22 - 58 / 19 - 49 cm??? 

 LV Ejection Fraction MOD BP       48.8 %                >= 55  % 

 LV Diastolic Volume MOD 4C        62.2 cm???               

 LV Systolic Volume MOD 4C         25.0 cm???               

 LV Ejection Fraction MOD 4C       59.8 %                 

 LV Diastolic Length 4C            7.2 cm                 

 LV Systolic Length 4C             6.5 cm                 

 LV Diastolic Volume MOD 2C        65.9 cm???               

 LV Systolic Volume MOD 2C         44.2 cm???               

 LV Ejection Fraction MOD 2C       33.0 %                 

 LV Diastolic Length 2C            7.4 cm                 

 LV Systolic Length 2C             6.6 cm                 

 LA Volume                         76.8 cm???              18 - 58 / 22 - 52 cm??? 

 Ascending Aorta Diameter          2.8 cm                 

 

 DOPPLER 

 AV Peak Velocity                  415.2 cm/s             

 AV Peak Gradient                  68.9 mmHg              

 AV Mean Velocity                  315.9 cm/s             

 AV Mean Gradient                  44.1 mmHg              

 AV Velocity Time Integral         110.4 cm               

 AI Peak Velocity                  258.5 cm/s             

 AI Peak Gradient                  26.7 mmHg              

 AI Pressure Half Time             519.4 ms               

 LVOT Peak Velocity                60.6 cm/s              

 LVOT Peak Gradient                1.5 mmHg               

 LVOT Velocity Time Integral       15.2 cm                

 LVOT Stroke Volume                57.1 cm???               

 LVOT Stroke Volume Index          28.3 ml/m???             

 AV Area Cont Eq vti               0.5 cm???                

 AV Area Cont Eq pk                0.5 cm???                

 MV Peak Velocity                  142.1 cm/s             

 MV Peak Gradient                  8.1 mmHg               

 MV Mean Velocity                  60.5 cm/s              

 MV Mean Gradient                  2.0 mmHg               

 MV Velocity Time Integral         45.7 cm                

 MR Peak Velocity                  306.9 cm/s             

 MR Peak Gradient                  37.7 mmHg              

 Mitral E Point Velocity           115.1 cm/s             

 Mitral A Point Velocity           78.7 cm/s              

 Mitral E to A Ratio               1.5                    

 MV Deceleration Time              272.7 ms               

 MV E' Velocity                    4.5 cm/s               

 Mitral E to MV E' Ratio           25.6                   

 TR Peak Velocity                  195.2 cm/s             

 TR Peak Gradient                  15.2 mmHg              

 Right Ventricular Systolic Press  21.9 mmHg              

 PV Peak Velocity                  92.2 cm/s              

 PV Peak Gradient                  3.4 mmHg               

 

 

 FINDINGS 

 Left Ventricle 

 Normal LV size.  Mild concentric LVH.  Hypokinesis of mid to basal  

 inferolateral wall.  Normal global LV systolic function with LVEF estimated at  

 50-55%.  Grade II diastolic dysfunction 

 

 Right Ventricle 

 Normal right ventricular size. RVSP= 32mmhg. 

 

 Right Atrium 

 Normal right atrial size. 

 

 Left Atrium 

 Mild left atrial dilatation 

 

 Mitral Valve 

 Moderate mitral annular calcification. Mild MR. 

 

 Aortic Valve 

 Calcific aortic valve with severe aortic stenosis.  Maximum velocity 4.26 m/s,  

 mean pressure gradient 44 mmHg. 

 

 Tricuspid Valve 

 Tricuspid valve not well visualized. Mild TR. 

 

 Pulmonic Valve 

 Pulmonic valve not well visualized. No pulmonic regurgitation. 

 

 Pericardium 

 No pericardial effusion 

 

 Aorta 

 Normal size aortic root and proximal ascending aorta. 

 

 CONCLUSIONS 

 Normal global LV systolic function.  Estimated EF 50% 

 Mild concentric LVH 

 Grade 2 diastolic dysfunction 

 Hypokinetic mid to basal inferolateral wall 

 Severe aortic stenosis with mean gradient 44 mmHg, Vmax 4.26 m/s 

 

 No prior echo to compare with 

 Previewed by:  

 Dr Saul Arias 

 (Electronically Signed) 

 Final Date:      10 August 2023 17:48

## 2023-08-10 NOTE — P.CRDCN
History of Present Illness


History of present illness: 





HISTORY OF PRESENT ILLNESS:  





This is a 83-year-old male with a past medical history significant for coronary 

artery disease with previous stenting, hypertension, hyperlipidemia, and severe 

aortic stenosis. Patient follows in the office with Dr. Mast. We have been as

ked to see the patient in consultation for palpitations and TIA. Patient 

examined at the bedside.  Patient was brought to the emergency room secondary to

slurred speech and concerns that the patient did not recommend is his family.  

Patient has been evaluated by neurology and thought to have had a TIA.  The 

patient currently denies any chest pain or pressure.  He denies shortness of 

breath.  He currently denies any palpitations.  EKG performed on it her arrival 

is interpreted as atrial fibrillation per computer.  However upon review of EKG 

patient is in sinus mechanism.  Telemetry reviewed also revealing sinus 

mechanism with no signs of atrial fibrillation.





* Chest xray negative for acute process


* Laboratory data: The WBC 6.7.  Hemoglobin 11.7.  Platelet count 155.  Sodium 

  140.  Potassium 4.5.  BUN 41.  Creatinine 1.77.


* Current home cardiac medications include aspirin 81 mg daily, Lipitor 80 mg at

  night, Imdur 30 mg daily, Plavix 75 mg at night, amlodipine 2.5 mg daily, 

  metoprolol succinate 50 mg twice a day


* Most recent echocardiogram obtained in June 2022 revealed ejection fraction 

  50%, mild aortic regurgitation, severe aortic stenosis, mild mitral 

  regurgitation, mild tricuspid regurgitation


* Cardiac catheterization history: August 2010 with stenting of the mid PLB and 

  May 2010 with stenting to the LAD





REVIEW OF SYSTEMS: 


At the time of my exam:


CONSTITUTIONAL: Denies fever or chills.


HEENT: Denies blurred vision, vision changes, or eye pain. Denies hemoptysis 


CARDIOVASCULAR: Denies chest pain. Denies orthopnea. Denies PND. Denies palpit

ations


RESPIRATORY: Denies shortness of breath. 


GASTROINTESTINAL: Denies abdominal pain. Denies nausea or vomiting. 


HEMATOLOGIC: Denies bleeding disorders.


GENITOURINARY:  Denies any blood in urine.


SKIN: Denies pruitis. Denies rash.





PHYSICAL EXAM: 


VITAL SIGNS: Reviewed.


GENERAL: Well-developed in no acute distress. 


HEENT: Head is normocephalic. Pupils are equal, round. Sclerae anicteric. Mucous

membranes of the mouth are moist. Neck supple. No JVD or thyromegaly


LUNGS: Respirations even and unlabored. Lungs essentially clear to auscultation 

bilaterally.


HEART: Regular rate and rhythm.  S1 and S2 heard.  Systolic murmur noted


ABDOMEN: Soft. Nondistended. Nontender.


EXTREMITIES: Normal range of motion.  No clubbing or cyanosis.  Peripheral 

pulses intact.  No lower extremity edema


NEUROLOGIC: Awake and alert. Oriented x 3. 





ASSESSMENT: 


Suspected TIA


Atrial fibrillation, ruled out, EKG and telemetry reveal sinus mechanism


Coronary artery disease with previous stenting


Hypertension


Hyperlipidemia


Severe aortic stenosis





PLAN: 


2-D echo has been ordered.  Await results.


At this time there is no evidence of atrial fibrillation noted on EKG or 

telemetry tracings


Patient will be discharged home with a 30 day event monitor to assess for 

arrhythmias


Further recommendations pending patient's course








Nurse practitioner note has been reviewed by physician. Signing provider agrees 

with the documented findings, assessment, and plan of care. 








Past Medical History


Past Medical History: Diabetes Mellitus, GERD/Reflux, Hyperlipidemia, Myocardial

Infarction (MI), Osteoarthritis (OA), Renal Disease, Sleep Apnea/CPAP/BIPAP


Additional Past Medical History / Comment(s): ANEMIA. MI x 2, heart murmer, no 

cpap used, constipation,


Last Myocardial Infarction Date:: 3/28/19


History of Any Multi-Drug Resistant Organisms: None Reported


Past Surgical History: Heart Catheterization With Stent, Joint Replacement, 

Orthopedic Surgery


Additional Past Surgical History / Comment(s): 4 cardiac stents, total right 

knee replacement, monse cataracts with lens implants, left shoulder surgery, left 

hip replacement,


Past Anesthesia/Blood Transfusion Reactions: No Reported Reaction


Date of Last Stent Placement:: 6/2020


Past Psychological History: No Psychological Hx Reported


Smoking Status: Former smoker


Past Alcohol Use History: None Reported


Additional Past Alcohol Use History / Comment(s): quit smoking 1964, smoked for 

6-7 yrs


Past Drug Use History: None Reported





- Past Family History


  ** Father


Family Medical History: Myocardial Infarction (MI)





  ** Mother


Family Medical History: No Reported History





  ** Sister(s)


Family Medical History: Cancer





Medications and Allergies


                                Home Medications











 Medication  Instructions  Recorded  Confirmed  Type


 


Aspirin EC [Ecotrin Low Dose] 81 mg PO DAILY@1700 08/23/14 08/08/23 History


 


buPROPion XL [Wellbutrin XL] 300 mg PO DAILY@0800 08/23/14 08/08/23 History


 


Docusate [Colace] 100 mg PO DAILY PRN 12/21/18 08/08/23 History


 


Metoprolol Succinate (ER) [Toprol 50 mg PO BID@0800,1700 12/21/18 08/08/23 

History





XL]    


 


Atorvastatin [Lipitor] 80 mg PO HS@2100 06/18/20 08/08/23 History


 


Clopidogrel [Plavix] 75 mg PO HS@2100 06/18/20 08/08/23 History


 


Fish Oil/Dha/Epa [Fish Oil 1,200 1 cap PO DAILY@0800 06/18/20 08/08/23 History





mg Fish Oil]    


 


Nitroglycerin Sl Tabs [Nitrostat] 0.4 mg SUBLINGUAL Q5M PRN #20 tab 06/21/20 08/08/23 Rx


 


Acetaminophen Tab [Tylenol] 650 mg PO Q6HR PRN  tab 07/01/20 08/08/23 Rx


 


Multivitamins, Thera [Multivitamin 1 tab PO DAILY@1700 07/10/20 08/08/23 History





(formulary)]    


 


Folic Acid 1 mg PO DAILY@1700 05/06/21 08/08/23 History


 


Potassium Chloride [Klor-Con M10] 10 meq PO DAILY@1700 05/06/21 08/08/23 History


 


Ferrous Sulfate [Iron (65  mg PO DAILY@0800 06/09/21 08/08/23 History





Elemental)]    


 


Isosorbide Mononitrate ER [Imdur] 30 mg PO DAILY@0800 06/09/21 08/08/23 History


 


Magnesium Hydroxide [Milk of 7,200 mg PO DAILY PRN 06/09/21 08/08/23 History





Magnesia Concentrate]    


 


bisacodyL [Dulcolax] 10 mg RECTAL DAILY PRN 06/09/21 08/08/23 History


 


Epoetin Henri [Procrit] 20,000 unit SQ Q14D 07/07/22 08/08/23 History


 


Famotidine [Pepcid] 20 mg PO Q48H 07/07/22 08/08/23 History


 


Nicole-Tussin Syrup 30 ml PO Q6H PRN 07/07/22 08/08/23 History


 


Loperamide [Imodium] 2 mg PO Q6H PRN 07/07/22 08/08/23 History


 


Mag Hydrox/Al Hydrox/Simeth 15 ml PO QID PRN 07/07/22 08/08/23 History





[Maalox]    


 


Mirabegron [Myrbetriq] 25 mg PO DAILY@0800 07/07/22 08/08/23 History


 


amLODIPine [Norvasc] 2.5 mg PO DAILY@1700 07/07/22 08/08/23 History


 


Carboxymethylcellulose Sodium 1 drop BOTH EYES TID@0800,1200,1700 08/08/23 08/08/23 History





[Refresh Tears]    


 


Dapagliflozin Propanediol [Farxiga] 10 mg PO DAILY@0800 08/08/23 08/08/23 

History


 


Ensure Enlive 120 ml PO TID@0800,1200,1700 08/08/23 08/08/23 History


 


INSULIN ASPART (NovoLOG) [NovoLOG 3 unit SQ TID@0700,1100,1630 08/08/23 08/08/23

 History





(formulary)]    


 


INSULIN ASPART (NovoLOG) [NovoLOG See Protocol SQ ACHS 08/08/23 08/08/23 History





(formulary)]    


 


Insulin Glargine [Lantus Vial] 10 unit SQ HS@2130 08/08/23 08/08/23 History


 


Magnesium Oxide [Mag-Oxide] 200 mg PO BID@0800,1700 08/08/23 08/08/23 History


 


Melatonin 1 mg PO HS@2100 08/08/23 08/08/23 History


 


Memantine HCl [Namenda] 5 mg PO HS@2100 08/08/23 08/08/23 History


 


Memantine [Namenda] 10 mg PO DAILY@0800 08/08/23 08/08/23 History








                                    Allergies











Allergy/AdvReac Type Severity Reaction Status Date / Time


 


iodine Allergy Unknown Unknown Verified 08/08/23 18:42














Physical Exam


Vitals: 


                                   Vital Signs











  Temp Pulse Resp BP Pulse Ox


 


 08/10/23 08:00   68  16  137/70  97


 


 08/10/23 04:00   61  16  165/81  99


 


 08/10/23 02:00    16  


 


 08/10/23 00:00   64  16  154/83  98


 


 08/09/23 20:00  97.6 F  62  16  163/79  98


 


 08/09/23 16:00   63  16  165/80  97








                                Intake and Output











 08/09/23 08/10/23 08/10/23





 22:59 06:59 14:59


 


Intake Total 180  360


 


Output Total 450 350 350


 


Balance -270 -350 10


 


Intake:   


 


  Oral 180  360


 


Output:   


 


  Urine 450 350 350


 


Other:   


 


  Voiding Method Urinal Urinal Urinal


 


  # Voids 1  














Results





                                 08/08/23 19:05





                                 08/08/23 19:05


                                     Lipids











  08/10/23 Range/Units





  07:37 


 


Triglycerides  83.60  (0..00)  mg/dL


 


Cholesterol  95.00  (0..00)  mg/dL


 


HDL Cholesterol  44.70  (40.00-60.00)  mg/dL


 


Cholesterol/HDL Ratio  2.13  Ratio








                               Current Medications











Generic Name Dose Route Start Last Admin





  Trade Name Freq  PRN Reason Stop Dose Admin


 


Acetaminophen  650 mg  08/09/23 05:40 





  Acetaminophen Tab 325 Mg Tab  PO  





  Q6HR PRN  





  Mild Pain or Fever > 100.5  


 


Al Hydroxide/Mg Hydroxide  15 ml  08/09/23 05:40 





  Mag Hydrox/Al Hydrox/Simeth 30 Ml Cup  PO  





  QID PRN  





  Heartburn  


 


Amlodipine Besylate  2.5 mg  08/09/23 17:00  08/09/23 17:20





  Amlodipine 2.5 Mg Tab  PO   2.5 mg





  DAILY@1700 OPAL   Administration


 


Artificial Tears  1 drops  08/09/23 09:00  08/10/23 08:50





  Artificial Tears-Hypromellose Drops 15 Ml Btl  BOTH EYES   1 drops





  TID OPAL   Administration


 


Aspirin  81 mg  08/09/23 17:00  08/09/23 17:21





  Aspirin 81 Mg  PO   81 mg





  DAILY@1700 OPAL   Administration


 


Atorvastatin Calcium  80 mg  08/09/23 21:00  08/09/23 20:11





  Atorvastatin 80 Mg Tab  PO   80 mg





  HS@2100 OPAL   Administration


 


Bisacodyl  10 mg  08/09/23 05:40 





  Bisacodyl 10 Mg Supp  RECTAL  





  DAILY PRN  





  Constipation  


 


Bupropion HCl  300 mg  08/09/23 09:00  08/10/23 08:50





  Bupropion Xl 300 Mg Tab.Er.24h  PO   300 mg





  DAILY OPAL   Administration


 


Clopidogrel Bisulfate  75 mg  08/09/23 21:00  08/09/23 20:11





  Clopidogrel 75 Mg Tab  PO   75 mg





  HS@2100 OPAL   Administration


 


Dapagliflozin  10 mg  08/09/23 09:00  08/10/23 08:50





  Dapagliflozin Propanediol 10 Mg Tablet  PO   10 mg





  DAILY OPAL   Administration


 


Darbepoetin Henri  25 mcg  08/16/23 09:00 





  Darbepoetin Henri 25 Mcg/Ml 1 Ml Vial  SQ  





  Q7D [facility]  


 


Docusate Sodium  100 mg  08/09/23 05:40 





  Docusate 100 Mg Cap  PO  





  DAILY PRN  





  Constipation  


 


Famotidine  20 mg  08/10/23 09:00  08/10/23 08:49





  Famotidine 20 Mg Tab  PO   20 mg





  Q48H OPAL   Administration


 


Ferrous Sulfate  325 mg  08/09/23 09:00  08/10/23 08:50





  Ferrous Sulfate 325 Mg Tab  PO   325 mg





  DAILY OPAL   Administration


 


Folic Acid  1 mg  08/09/23 17:00  08/09/23 17:20





  Folic Acid 1 Mg Tab  PO   1 mg





  DAILY@1700 [facility]   Administration


 


Guaifenesin  30 mg  08/09/23 05:40 





  Guaifenesin Syrup 100mg/5ml 200 Mg/10 Ml Cup  PO  





  Q6H PRN  





  Cough  


 


Insulin Aspart  0 unit  08/09/23 07:30  08/10/23 06:09





  Insulin Aspart (Novolog) 100 Unit/Ml Vial  SQ   Not Given





  ACHS [facility]  





  Protocol  


 


Insulin Aspart  3 unit  08/09/23 07:30  08/10/23 08:49





  Insulin Aspart (Novolog) 100 Unit/Ml Vial  SQ   3 unit





  AC-TID [facility]   Administration


 


Insulin Detemir  10 unit  08/09/23 21:00  08/09/23 21:05





  Insulin Detemir (Levemir) 100 Unit/Ml Syr  SQ   10 unit





  HS [facility]   Administration


 


Isosorbide Mononitrate  30 mg  08/09/23 09:00  08/10/23 08:49





  Isosorbide Mononitrate Er 30 Mg Tab.Er.24h  PO   30 mg





  DAILY [facility]   Administration


 


Loperamide HCl  2 mg  08/09/23 05:40 





  Loperamide 2 Mg Cap  PO  





  Q6H PRN  





  Loose Stool  


 


Magnesium Hydroxide  7,200 mg  08/09/23 05:40 





  Magnesium Hydroxide 2,400 Mg/30 Ml Cup  PO  





  DAILY PRN  





  Constipation  


 


Magnesium Oxide  200 mg  08/09/23 09:00  08/10/23 08:49





  Magnesium Oxide 400 Mg Tab  PO   200 mg





  BID [facility]   Administration


 


Melatonin  1 mg  08/09/23 21:00  08/09/23 20:11





  Melatonin 1 Mg Tab  PO   1 mg





  HS@2100 [facility]   Administration


 


Memantine  10 mg  08/09/23 09:00  08/10/23 08:50





  Memantine 10 Mg Tab  PO   10 mg





  DAILY OPAL   Administration


 


Memantine  5 mg  08/09/23 21:00  08/09/23 20:12





  Memantine 5 Mg Tab  PO   5 mg





  HS@2100 OPAL   Administration


 


Metoprolol Succinate  50 mg  08/09/23 09:00  08/10/23 08:50





  Metoprolol Succinate (Er) 50 Mg Tab.Er.24h  PO   50 mg





  BID OPAL   Administration


 


Multivitamins  1 each  08/09/23 17:00  08/09/23 17:20





  Multivitamins, Thera 1 Each Tab  PO   1 each





  DAILY@1700 OPAL   Administration


 


Naloxone HCl  0.2 mg  08/08/23 20:25 





  Naloxone 0.4 Mg/Ml 1 Ml Vial  IV  





  Q2M PRN  





  Opioid Reversal  


 


Nitroglycerin  0.4 mg  08/09/23 05:40 





  Nitroglycerin Sl Tabs 0.4 Mg Tab  SUBLINGUAL  





  Q5M PRN  





  Chest Pain  


 


Non-Formulary Medication  25 mg  08/09/23 09:00  08/10/23 08:51





  Mirabegron [Myrbetriq]  PO   Not Given





  DAILY [facility]  


 


Potassium Chloride  10 meq  08/09/23 17:00  08/09/23 17:21





  Potassium Chloride Er 10 Meq Tab.Er.Prt  PO   10 meq





  DAILY@1700 OPAL   Administration








                                Intake and Output











 08/09/23 08/10/23 08/10/23





 22:59 06:59 14:59


 


Intake Total 180  360


 


Output Total 450 350 350


 


Balance -270 -350 10


 


Intake:   


 


  Oral 180  360


 


Output:   


 


  Urine 450 350 350


 


Other:   


 


  Voiding Method Urinal Urinal Urinal


 


  # Voids 1  








                                        





                                 08/08/23 19:05 





                                 08/08/23 19:05

## 2023-08-12 ENCOUNTER — HOSPITAL ENCOUNTER (OUTPATIENT)
Dept: HOSPITAL 47 - EC | Age: 83
Setting detail: OBSERVATION
LOS: 3 days | Discharge: NURSING, REHAB OR LONG-TERM CARE | End: 2023-08-15
Attending: HOSPITALIST | Admitting: HOSPITALIST
Payer: MEDICARE

## 2023-08-12 DIAGNOSIS — I25.2: ICD-10-CM

## 2023-08-12 DIAGNOSIS — F03.911: ICD-10-CM

## 2023-08-12 DIAGNOSIS — F32.A: ICD-10-CM

## 2023-08-12 DIAGNOSIS — F03.94: ICD-10-CM

## 2023-08-12 DIAGNOSIS — L97.319: ICD-10-CM

## 2023-08-12 DIAGNOSIS — Z95.5: ICD-10-CM

## 2023-08-12 DIAGNOSIS — Z79.82: ICD-10-CM

## 2023-08-12 DIAGNOSIS — E11.622: ICD-10-CM

## 2023-08-12 DIAGNOSIS — Z79.899: ICD-10-CM

## 2023-08-12 DIAGNOSIS — Z79.02: ICD-10-CM

## 2023-08-12 DIAGNOSIS — Z87.891: ICD-10-CM

## 2023-08-12 DIAGNOSIS — E78.5: ICD-10-CM

## 2023-08-12 DIAGNOSIS — R41.82: ICD-10-CM

## 2023-08-12 DIAGNOSIS — I48.91: ICD-10-CM

## 2023-08-12 DIAGNOSIS — D63.1: ICD-10-CM

## 2023-08-12 DIAGNOSIS — Z79.84: ICD-10-CM

## 2023-08-12 DIAGNOSIS — K21.9: ICD-10-CM

## 2023-08-12 DIAGNOSIS — N18.9: ICD-10-CM

## 2023-08-12 DIAGNOSIS — Z79.4: ICD-10-CM

## 2023-08-12 DIAGNOSIS — R47.01: Primary | ICD-10-CM

## 2023-08-12 DIAGNOSIS — G47.30: ICD-10-CM

## 2023-08-12 DIAGNOSIS — I12.9: ICD-10-CM

## 2023-08-12 DIAGNOSIS — I25.10: ICD-10-CM

## 2023-08-12 DIAGNOSIS — Z86.73: ICD-10-CM

## 2023-08-12 LAB
ALBUMIN SERPL-MCNC: 3.3 G/DL (ref 3.5–5)
ALP SERPL-CCNC: 127 U/L (ref 38–126)
ALT SERPL-CCNC: 21 U/L (ref 4–49)
ANION GAP SERPL CALC-SCNC: 9 MMOL/L
APTT BLD: 23.9 SEC (ref 22–30)
AST SERPL-CCNC: 26 U/L (ref 17–59)
BASOPHILS # BLD AUTO: 0 K/UL (ref 0–0.2)
BASOPHILS NFR BLD AUTO: 1 %
BUN SERPL-SCNC: 39 MG/DL (ref 9–20)
CALCIUM SPEC-MCNC: 8.7 MG/DL (ref 8.4–10.2)
CHLORIDE SERPL-SCNC: 103 MMOL/L (ref 98–107)
CK SERPL-CCNC: 57 U/L (ref 55–170)
CO2 SERPL-SCNC: 27 MMOL/L (ref 22–30)
EOSINOPHIL # BLD AUTO: 0.2 K/UL (ref 0–0.7)
EOSINOPHIL NFR BLD AUTO: 4 %
ERYTHROCYTE [DISTWIDTH] IN BLOOD BY AUTOMATED COUNT: 3.97 M/UL (ref 4.3–5.9)
ERYTHROCYTE [DISTWIDTH] IN BLOOD: 16 % (ref 11.5–15.5)
GLUCOSE BLD-MCNC: 153 MG/DL (ref 70–110)
GLUCOSE SERPL-MCNC: 160 MG/DL (ref 74–99)
HCT VFR BLD AUTO: 36.2 % (ref 39–53)
HGB BLD-MCNC: 11.7 GM/DL (ref 13–17.5)
INR PPP: 1 (ref ?–1.2)
LYMPHOCYTES # SPEC AUTO: 0.9 K/UL (ref 1–4.8)
LYMPHOCYTES NFR SPEC AUTO: 17 %
MCH RBC QN AUTO: 29.5 PG (ref 25–35)
MCHC RBC AUTO-ENTMCNC: 32.3 G/DL (ref 31–37)
MCV RBC AUTO: 91.2 FL (ref 80–100)
MONOCYTES # BLD AUTO: 0.4 K/UL (ref 0–1)
MONOCYTES NFR BLD AUTO: 8 %
NEUTROPHILS # BLD AUTO: 3.7 K/UL (ref 1.3–7.7)
NEUTROPHILS NFR BLD AUTO: 68 %
PLATELET # BLD AUTO: 168 K/UL (ref 150–450)
POTASSIUM SERPL-SCNC: 4.4 MMOL/L (ref 3.5–5.1)
PROT SERPL-MCNC: 6.5 G/DL (ref 6.3–8.2)
PT BLD: 10.5 SEC (ref 9–12)
SODIUM SERPL-SCNC: 139 MMOL/L (ref 137–145)
WBC # BLD AUTO: 5.4 K/UL (ref 3.8–10.6)

## 2023-08-12 PROCEDURE — 95816 EEG AWAKE AND DROWSY: CPT

## 2023-08-12 PROCEDURE — 80053 COMPREHEN METABOLIC PANEL: CPT

## 2023-08-12 PROCEDURE — 82550 ASSAY OF CK (CPK): CPT

## 2023-08-12 PROCEDURE — 93005 ELECTROCARDIOGRAM TRACING: CPT

## 2023-08-12 PROCEDURE — 82140 ASSAY OF AMMONIA: CPT

## 2023-08-12 PROCEDURE — 71046 X-RAY EXAM CHEST 2 VIEWS: CPT

## 2023-08-12 PROCEDURE — 85730 THROMBOPLASTIN TIME PARTIAL: CPT

## 2023-08-12 PROCEDURE — 85610 PROTHROMBIN TIME: CPT

## 2023-08-12 PROCEDURE — 36415 COLL VENOUS BLD VENIPUNCTURE: CPT

## 2023-08-12 PROCEDURE — 80061 LIPID PANEL: CPT

## 2023-08-12 PROCEDURE — 84443 ASSAY THYROID STIM HORMONE: CPT

## 2023-08-12 PROCEDURE — 94760 N-INVAS EAR/PLS OXIMETRY 1: CPT

## 2023-08-12 PROCEDURE — 70450 CT HEAD/BRAIN W/O DYE: CPT

## 2023-08-12 PROCEDURE — 85025 COMPLETE CBC W/AUTO DIFF WBC: CPT

## 2023-08-12 PROCEDURE — 96361 HYDRATE IV INFUSION ADD-ON: CPT

## 2023-08-12 PROCEDURE — 99291 CRITICAL CARE FIRST HOUR: CPT

## 2023-08-12 PROCEDURE — 84484 ASSAY OF TROPONIN QUANT: CPT

## 2023-08-12 PROCEDURE — 82607 VITAMIN B-12: CPT

## 2023-08-12 PROCEDURE — 82746 ASSAY OF FOLIC ACID SERUM: CPT

## 2023-08-12 PROCEDURE — 70551 MRI BRAIN STEM W/O DYE: CPT

## 2023-08-12 PROCEDURE — 96360 HYDRATION IV INFUSION INIT: CPT

## 2023-08-12 RX ADMIN — CEFAZOLIN SCH MLS/HR: 330 INJECTION, POWDER, FOR SOLUTION INTRAMUSCULAR; INTRAVENOUS at 20:46

## 2023-08-12 RX ADMIN — ATORVASTATIN CALCIUM SCH MG: 80 TABLET, FILM COATED ORAL at 20:42

## 2023-08-12 RX ADMIN — MEMANTINE HYDROCHLORIDE SCH MG: 5 TABLET ORAL at 20:42

## 2023-08-12 RX ADMIN — INSULIN DETEMIR SCH UNIT: 100 INJECTION, SOLUTION SUBCUTANEOUS at 20:46

## 2023-08-12 RX ADMIN — Medication SCH MG: at 20:42

## 2023-08-12 RX ADMIN — CLOPIDOGREL BISULFATE SCH MG: 75 TABLET ORAL at 20:42

## 2023-08-12 NOTE — ED
Neuro HPI





- General


Chief Complaint: Neuro Symptoms/Deficit


Stated Complaint: TIA


Time Seen by Provider: 08/12/23 14:18


Source: patient, family, EMS, RN notes reviewed, old records reviewed


Mode of arrival: EMS


Limitations: no limitations





- History of Present Illness


Is the patient presenting with stroke symptoms?: No


Initial Comments: 





83-year-old male with a history of TIA heart disease hypertension who was just 

discharged 2 days ago from this facility after being evaluated for TIA who is 

back today with complaints of a aphasia.  He apparently was last known well at 

8:30 this morning after breakfast he took a nap when he woke up he was having 

difficulty with speech and could only speak one or 2 words.  Per his wife was 

present he is improving.  No complaints of loss of function to his upper or 

lower extremities.  No fevers chills sweats no other symptoms no trauma 

reported.  Patient does apparently have an ulcer to the right ankle.





- Related Data


Home Medications: 


                                Home Medications











 Medication  Instructions  Recorded  Confirmed


 


Aspirin EC [Ecotrin Low Dose] 81 mg PO DAILY@1700 08/23/14 08/12/23


 


Docusate [Colace] 100 mg PO DAILY PRN 12/21/18 08/12/23


 


Metoprolol Succinate (ER) [Toprol 50 mg PO BID@0800,1700 12/21/18 08/12/23





XL]   


 


Atorvastatin [Lipitor] 80 mg PO HS@2100 06/18/20 08/12/23


 


Clopidogrel [Plavix] 75 mg PO HS@2100 06/18/20 08/12/23


 


Fish Oil/Dha/Epa [Fish Oil 1,200 1 cap PO DAILY@0800 06/18/20 08/12/23





mg Fish Oil]   


 


Multivitamins, Thera [Multivitamin 1 tab PO DAILY@1700 07/10/20 08/12/23





(formulary)]   


 


Folic Acid 1 mg PO DAILY@1700 05/06/21 08/12/23


 


Potassium Chloride [Klor-Con M10] 10 meq PO DAILY@1700 05/06/21 08/12/23


 


Ferrous Sulfate [Iron (65  mg PO DAILY@0800 06/09/21 08/12/23





Elemental)]   


 


Isosorbide Mononitrate ER [Imdur] 30 mg PO DAILY@0800 06/09/21 08/12/23


 


Magnesium Hydroxide [Milk of 7,200 mg PO DAILY PRN 06/09/21 08/12/23





Magnesia Concentrate]   


 


bisacodyL [Dulcolax] 10 mg RECTAL DAILY PRN 06/09/21 08/12/23


 


Epoetin Henri [Procrit] 20,000 unit SQ Q14D 07/07/22 08/12/23


 


Famotidine [Pepcid] 20 mg PO Q2D@1700 07/07/22 08/12/23


 


Nicole-Tussin Syrup 30 ml PO Q6H PRN 07/07/22 08/12/23


 


Loperamide [Imodium] 2 mg PO Q6H PRN 07/07/22 08/12/23


 


Mag Hydrox/Al Hydrox/Simeth 15 ml PO QID PRN 07/07/22 08/12/23





[Maalox]   


 


Mirabegron [Myrbetriq] 25 mg PO DAILY@0800 07/07/22 08/12/23


 


amLODIPine [Norvasc] 2.5 mg PO DAILY@1700 07/07/22 08/12/23


 


Carboxymethylcellulose Sodium 1 drop BOTH EYES TID@0800,1200,1700 08/08/23 08/12/23





[Refresh Tears]   


 


Dapagliflozin Propanediol [Farxiga] 10 mg PO DAILY@0800 08/08/23 08/12/23


 


Ensure Enlive 120 ml PO TID@0800,1200,1700 08/08/23 08/12/23


 


INSULIN ASPART (NovoLOG) [NovoLOG 3 unit SQ TID@0700,1100,1630 08/08/23 08/12/23





(formulary)]   


 


INSULIN ASPART (NovoLOG) [NovoLOG See Protocol SQ ACHS 08/08/23 08/12/23





(formulary)]   


 


Insulin Glargine [Lantus Vial] 10 unit SQ HS@2130 08/08/23 08/12/23


 


Magnesium Oxide [Mag-Oxide] 200 mg PO BID@0800,1700 08/08/23 08/12/23


 


Melatonin 1 mg PO HS@2100 08/08/23 08/12/23


 


Memantine HCl [Namenda] 5 mg PO HS@2100 08/08/23 08/12/23


 


Memantine [Namenda] 10 mg PO DAILY@0800 08/08/23 08/12/23


 


buPROPion HCL [Wellbutrin XL] 300 mg PO DAILY@0800 08/12/23 08/12/23








                                  Previous Rx's











 Medication  Instructions  Recorded


 


Nitroglycerin Sl Tabs [Nitrostat] 0.4 mg SUBLINGUAL Q5M PRN #20 tab 06/21/20


 


Acetaminophen Tab [Tylenol] 650 mg PO Q6HR PRN  tab 07/01/20











Allergies/Adverse Reactions: 


                                    Allergies











Allergy/AdvReac Type Severity Reaction Status Date / Time


 


iodine Allergy Unknown Unknown Verified 08/12/23 16:41














Review of Systems


ROS Statement: 


Those systems with pertinent positive or pertinent negative responses have been 

documented in the HPI.





ROS Other: All systems not noted in ROS Statement are negative.





General Exam





- General Exam Comments


Initial Comments: 





This is a well-developed well-nourished elderly male who is awake alert in no 

acute distress.  The patient is able to converse though he is somewhat slow to 

answer questions he does seem a demonstrate good cognition


Limitations: no limitations


General appearance: alert, in no apparent distress


Head exam: Present: atraumatic, normocephalic, normal inspection


Eye exam: Present: normal appearance, PERRL, EOMI.  Absent: scleral icterus, 

conjunctival injection, periorbital swelling


ENT exam: Present: mucous membranes dry


Neck exam: Present: normal inspection, full ROM, other (No stridor.  Bruits).  

Absent: tenderness, meningismus, lymphadenopathy


Respiratory exam: Present: normal lung sounds bilaterally.  Absent: respiratory 

distress, wheezes, rales, rhonchi, stridor


Cardiovascular Exam: Present: regular rate, normal rhythm, normal heart sounds. 

 Absent: systolic murmur, diastolic murmur, rubs, gallop, clicks


GI/Abdominal exam: Present: soft, normal bowel sounds.  Absent: distended, 

tenderness, guarding, rebound, rigid


Rectal exam: Present: deferred


Extremities exam: Present: full ROM, normal capillary refill.  Absent: 

tenderness, pedal edema, joint swelling, calf tenderness


Back exam: Present: normal inspection


Neurological exam: Present: alert, oriented X3, CN II-XII intact.  Absent: motor

 sensory deficit


Psychiatric exam: Present: normal affect, normal mood


Skin exam: Present: warm, dry, normal color.  Absent: rash





Stroke MDM





- Lab Data


Result diagrams: 


                                 08/12/23 14:36





                                 08/12/23 14:36


                                   Lab Results











  08/12/23 08/12/23 08/12/23 Range/Units





  14:36 14:36 14:36 


 


WBC  5.4    (3.8-10.6)  k/uL


 


RBC  3.97 L    (4.30-5.90)  m/uL


 


Hgb  11.7 L    (13.0-17.5)  gm/dL


 


Hct  36.2 L    (39.0-53.0)  %


 


MCV  91.2    (80.0-100.0)  fL


 


MCH  29.5    (25.0-35.0)  pg


 


MCHC  32.3    (31.0-37.0)  g/dL


 


RDW  16.0 H    (11.5-15.5)  %


 


Plt Count  168    (150-450)  k/uL


 


MPV  8.8    


 


Neutrophils %  68    %


 


Lymphocytes %  17    %


 


Monocytes %  8    %


 


Eosinophils %  4    %


 


Basophils %  1    %


 


Neutrophils #  3.7    (1.3-7.7)  k/uL


 


Lymphocytes #  0.9 L    (1.0-4.8)  k/uL


 


Monocytes #  0.4    (0-1.0)  k/uL


 


Eosinophils #  0.2    (0-0.7)  k/uL


 


Basophils #  0.0    (0-0.2)  k/uL


 


PT   10.5   (9.0-12.0)  sec


 


INR   1.0   (<1.2)  


 


APTT   23.9   (22.0-30.0)  sec


 


Sodium    139  (137-145)  mmol/L


 


Potassium    4.4  (3.5-5.1)  mmol/L


 


Chloride    103  ()  mmol/L


 


Carbon Dioxide    27  (22-30)  mmol/L


 


Anion Gap    9  mmol/L


 


BUN    39 H  (9-20)  mg/dL


 


Creatinine    1.78 H  (0.66-1.25)  mg/dL


 


Est GFR (CKD-EPI)AfAm    40  (>60 ml/min/1.73 sqM)  


 


Est GFR (CKD-EPI)NonAf    34  (>60 ml/min/1.73 sqM)  


 


Glucose    160 H  (74-99)  mg/dL


 


Calcium    8.7  (8.4-10.2)  mg/dL


 


Total Bilirubin    0.4  (0.2-1.3)  mg/dL


 


AST    26  (17-59)  U/L


 


ALT    21  (4-49)  U/L


 


Alkaline Phosphatase    127 H  ()  U/L


 


Creatine Kinase    57  ()  U/L


 


Troponin I     (0.000-0.034)  ng/mL


 


Total Protein    6.5  (6.3-8.2)  g/dL


 


Albumin    3.3 L  (3.5-5.0)  g/dL














  08/12/23 Range/Units





  14:36 


 


WBC   (3.8-10.6)  k/uL


 


RBC   (4.30-5.90)  m/uL


 


Hgb   (13.0-17.5)  gm/dL


 


Hct   (39.0-53.0)  %


 


MCV   (80.0-100.0)  fL


 


MCH   (25.0-35.0)  pg


 


MCHC   (31.0-37.0)  g/dL


 


RDW   (11.5-15.5)  %


 


Plt Count   (150-450)  k/uL


 


MPV   


 


Neutrophils %   %


 


Lymphocytes %   %


 


Monocytes %   %


 


Eosinophils %   %


 


Basophils %   %


 


Neutrophils #   (1.3-7.7)  k/uL


 


Lymphocytes #   (1.0-4.8)  k/uL


 


Monocytes #   (0-1.0)  k/uL


 


Eosinophils #   (0-0.7)  k/uL


 


Basophils #   (0-0.2)  k/uL


 


PT   (9.0-12.0)  sec


 


INR   (<1.2)  


 


APTT   (22.0-30.0)  sec


 


Sodium   (137-145)  mmol/L


 


Potassium   (3.5-5.1)  mmol/L


 


Chloride   ()  mmol/L


 


Carbon Dioxide   (22-30)  mmol/L


 


Anion Gap   mmol/L


 


BUN   (9-20)  mg/dL


 


Creatinine   (0.66-1.25)  mg/dL


 


Est GFR (CKD-EPI)AfAm   (>60 ml/min/1.73 sqM)  


 


Est GFR (CKD-EPI)NonAf   (>60 ml/min/1.73 sqM)  


 


Glucose   (74-99)  mg/dL


 


Calcium   (8.4-10.2)  mg/dL


 


Total Bilirubin   (0.2-1.3)  mg/dL


 


AST   (17-59)  U/L


 


ALT   (4-49)  U/L


 


Alkaline Phosphatase   ()  U/L


 


Creatine Kinase   ()  U/L


 


Troponin I  0.014  (0.000-0.034)  ng/mL


 


Total Protein   (6.3-8.2)  g/dL


 


Albumin   (3.5-5.0)  g/dL














- NIH Stroke Scale


1a. Level of Consciousness: (0) alert


1b. LOC Questions: (0) answers correctly


1c. LOC Commands: (0) performs tasks correctly


2. Best Gaze: (0) normal


3. Visual: (0) no visual loss


4. Facial Palsy: (0) normal symmetrical movement


5a. Motor Arm Left: (0) no drift


5b. Motor Arm Right: (0) no drift


6a. Motor Leg Left: (0) no drift


6b. Motor Leg Right: (0) no drift


7. Limb Ataxia: (0) absent


8. Sensory: (0) normal


9. Best Language: (0) no aphasia


10. Dysarthria: (0) normal


11. Extinction/Inattention: (0) no abnormality





- Medical Decision Making





I did discuss the findings with the patient and with the patient's wife as well 

as with Dr. Gil who was in the emergency department.  Patient be admitted for 

reevaluation and neurological consultation.Was pt. sent in by a medical 

professional or institution (, PA, NP, urgent care, hospital, or nursing 

home...) When possible be specific


@  -No


Did you speak to anyone other than the patient for history (EMS, parent, family,

 police, friend...)? What history was obtained from this source 


@  -EMS personnel


Did you review nursing and triage notes (agree or disagree)?  Why? 


@  -I reviewed and agree with nursing and triage notes


Were old charts reviewed (outside hosp., previous admission, EMS record, old 

EKG, old radiological studies, urgent care reports/EKG's, nursing home records)?

 Report findings 


@  -Previous admission old charts were reviewed


Differential Diagnosis (chest pain, altered mental status, abdominal pain women,

 abdominal pain men, vaginal bleeding, weakness, fever, dyspnea, syncope, 

headache, dizziness, GI bleed, back pain, seizure, CVA, palpatations, mental 

health, musculoskeletal)? 


@  -CVA, TIA


EKG interpreted by me (3pts min.).


@  -As above EKG interpreted by me sinus rhythm with first-degree AV block rate 

was 60.  Interval 231 QRS duration 125 QT since /461 nonspecific septal 

configuration evidence of RV conduction delay


X-rays interpreted by me (1pt min.).


@  -X-ray interpreted by me no acute process


CT interpreted by me (1pt min.).


@  -CT interpreted by me no acute process


U/S interpreted by me (1pt. min.).


@  -None done


What testing was considered but not performed or refused? (CT, X-rays, U/S, 

labs)? Why?


@  -None


What meds were considered but not given or refused? Why?


@  -None


Did you discuss the management of the patient with other professionals (jenna casillas i.eNena Willard, PA, NP, lab, RT, psych nurse, , , teacher, 

, )? Give summary


@  -Dr. Gil


Was smoking cessation discussed for >3mins.?


@  -No


Was critical care preformed (if so, how long)?


@  -31 minutes


Were there social determinants of health that impacted care today? How? 

(Homelessness, low income, unemployed, alcoholism, drug addiction, 

transportation, low edu. Level, literacy, decrease access to med. care, long-term, 

rehab)?


@  -No


Was there de-escalation of care discussed even if they declined (Discuss DNR or 

withdrawal of care, Hospice)? DNR status


@  -No


What co-morbidities impacted this encounter? (DM, HTN, Smoking, COPD, CAD, 

Cancer, CVA, ARF, Chemo, Hep., AIDS, mental health diagnosis, sleep apnea, 

morbid obesity)?


@  -TIA, coronary artery disease hypertension


Was patient admitted / discharged? Hospital course, mention meds given and 

route, prescriptions, significant lab abnormalities, going to OR and other per

tinent info.


@  -hospital course the patient was admitted to the hospital with neuro 

evaluation 


Undiagnosed new problem with uncertain prognosis?


@  -No


Drug Therapy requiring intensive monitoring for toxicity (Heparin, Nitro, 

Insulin, Cardizem)?


@  -No


Were any procedures done?


@  -No


Diagnosis/symptom?


@  -TIA


Acute, or Chronic, or Acute on Chronic?


@  -Acute


Uncomplicated (without systemic symptoms) or Complicated (systemic symptoms)?


@  -Obligated


Side effects of treatment?


@  -No


Exacerbation, Progression, or Severe Exacerbation?


@  -No


Poses a threat to life or bodily function? How? (Chest pain, USA, MI, pneumonia,

 PE, COPD, DKA, ARF, appy, cholecystitis, CVA, Diverticulitis, Homicidal, 

Suicidal, threat to staff... and all critical care pts)


@  -Yes TIA





- Radiology Data


CT her by me no evidence of acute processes white matter changes are seen.  No 

masses no evidence of any bleeding at this time.





- EKG Data


-: EKG Interpreted by Me (EKG interpreted by me sinus rhythm with first-degree 

AV block rate 60.  Int)





Past Medical History


Past Medical History: CVA/TIA, Dementia, Diabetes Mellitus, GERD/Reflux, Hyperli

pidemia, Myocardial Infarction (MI), Osteoarthritis (OA), Renal Disease, Sleep 

Apnea/CPAP/BIPAP


Additional Past Medical History / Comment(s): ANEMIA. MI x 2, heart murmer, no 

cpap used, constipation,j aortic valve stenosis, chronic kidney disease,


Last Myocardial Infarction Date:: 3/28/19


History of Any Multi-Drug Resistant Organisms: None Reported


Past Surgical History: Heart Catheterization With Stent, Joint Replacement, 

Orthopedic Surgery


Additional Past Surgical History / Comment(s): 4 cardiac stents, total right 

knee replacement, monse cataracts with lens implants, left shoulder surgery, left 

hip replacement,


Past Anesthesia/Blood Transfusion Reactions: No Reported Reaction


Date of Last Stent Placement:: 6/2020


Past Psychological History: Depression


Smoking Status: Former smoker


Past Alcohol Use History: None Reported


Past Drug Use History: None Reported





- Past Family History


  ** Father


Family Medical History: Myocardial Infarction (MI)





  ** Mother


Family Medical History: No Reported History





  ** Sister(s)


Family Medical History: Cancer





Course


                                   Vital Signs











  08/12/23 08/12/23





  14:19 15:42


 


Temperature 98.5 F 


 


Pulse Rate 60 60


 


Respiratory 18 16





Rate  


 


Blood Pressure 160/80 168/73


 


O2 Sat by Pulse 100 98





Oximetry  














- Reevaluation(s)


Reevaluation #1: 





08/12/23 16:54


Reevaluation patient finds that he has totally recovered his speech appears wife

 he is back to normal.  The patient is in agreement.





Critical Care Time


Critical Care Time: Yes


Total Critical Care Time: 31





Disposition


Clinical Impression: 


 TIA (transient ischemic attack)





Disposition: ADMITTED AS IP TO THIS [facility]


Condition: Stable


Referrals: 


Mook Abdalla MD [Primary Care Provider] - 1-2 days


Decision Date: 08/12/23


Decision Time: 17:03

## 2023-08-12 NOTE — CT
EXAMINATION TYPE: CT brain wo con

CT DLP: 2192.4 mGycm, Automated exposure control for dose reduction was used.

 

DATE OF EXAM: 8/12/2023 3:00 PM

 

COMPARISON: CT brain 8/8/2023

 

CLINICAL INDICATION:Male, 83 years old with history of Neuro deficit, acute, stroke suspected, Neuro 
deficit, acute, stroke suspected

 

TECHNIQUE: 

Brain: Axial CT images of the brain were obtained with coronal and sagittal reformats created and rev
iewed.

Contrast used: None.

Oral contrast used: None.

 

FINDINGS:

 

Brain:

Extra-axial spaces: No abnormal extra-axial fluid collections.

Ventricular system: Dilatation in proportion to cerebral atrophy.

Cerebral parenchyma: Cerebral atrophy. No acute intraparenchymal hemorrhage or mass effect.  The gray
-white junction is well differentiated. Scattered hypoattenuating areas are seen within the white mat
ter.  

Cerebellum: Unremarkable.

Mass effect: No evidence of midline shift.

Intracranial vasculature: Atherosclerotic calcifications of the intracranial vessels.

Soft tissues: Normal.

Calvarium/osseous structures: No depressed skull fracture.

Paranasal sinuses and mastoid air cells: Clear. Mastoid air cells are Clear

Visualized orbits: Bilateral aphakia

 

 

IMPRESSION:

1. No acute intracranial process. No significant interval change from prior

2. Nonspecific white matter changes, likely secondary to chronic small vessel ischemic disease.

## 2023-08-12 NOTE — XR
EXAMINATION TYPE: XR chest 2V

 

DATE OF EXAM: 8/12/2023 3:03 PM

 

COMPARISON: Chest x-ray 8/8/2023

 

TECHNIQUE: XR chest 2V .

 

CLINICAL INDICATION:Male, 83 years old with history of altered mental status; 

 

FINDINGS: 

Lungs/Pleura: There is no evidence of pleural effusion, focal consolidation, or pneumothorax.  

Pulmonary vascularity: Unremarkable.

Heart/mediastinum: Cardiomediastinal silhouette is unremarkable.

Musculoskeletal: Multiple level degenerative disc disease changes seen throughout the spine. Battery 
pack overlies left chest wall.

 

IMPRESSION: 

No acute cardiopulmonary disease/process.

## 2023-08-13 LAB
GLUCOSE BLD-MCNC: 117 MG/DL (ref 70–110)
GLUCOSE BLD-MCNC: 158 MG/DL (ref 70–110)
GLUCOSE BLD-MCNC: 75 MG/DL (ref 70–110)

## 2023-08-13 RX ADMIN — MEMANTINE HYDROCHLORIDE SCH MG: 10 TABLET ORAL at 08:31

## 2023-08-13 RX ADMIN — DEXTRAN 70 AND HYPROMELLOSE 2910 SCH: 1; 3 SOLUTION/ DROPS OPHTHALMIC at 08:38

## 2023-08-13 RX ADMIN — DEXTRAN 70 AND HYPROMELLOSE 2910 SCH: 1; 3 SOLUTION/ DROPS OPHTHALMIC at 11:50

## 2023-08-13 RX ADMIN — FOLIC ACID SCH MG: 1 TABLET ORAL at 17:08

## 2023-08-13 RX ADMIN — INSULIN ASPART SCH: 100 INJECTION, SOLUTION INTRAVENOUS; SUBCUTANEOUS at 11:52

## 2023-08-13 RX ADMIN — METOPROLOL SUCCINATE SCH MG: 50 TABLET, EXTENDED RELEASE ORAL at 17:08

## 2023-08-13 RX ADMIN — INSULIN DETEMIR SCH UNIT: 100 INJECTION, SOLUTION SUBCUTANEOUS at 22:31

## 2023-08-13 RX ADMIN — DEXTRAN 70 AND HYPROMELLOSE 2910 SCH: 1; 3 SOLUTION/ DROPS OPHTHALMIC at 17:09

## 2023-08-13 RX ADMIN — ASPIRIN SCH: 325 TABLET ORAL at 08:22

## 2023-08-13 RX ADMIN — INSULIN ASPART SCH UNIT: 100 INJECTION, SOLUTION INTRAVENOUS; SUBCUTANEOUS at 17:09

## 2023-08-13 RX ADMIN — Medication SCH MG: at 08:31

## 2023-08-13 RX ADMIN — ATORVASTATIN CALCIUM SCH MG: 80 TABLET, FILM COATED ORAL at 22:31

## 2023-08-13 RX ADMIN — BUPROPION HYDROCHLORIDE SCH MG: 300 TABLET, FILM COATED, EXTENDED RELEASE ORAL at 09:26

## 2023-08-13 RX ADMIN — MEMANTINE HYDROCHLORIDE SCH MG: 5 TABLET ORAL at 22:31

## 2023-08-13 RX ADMIN — DAPAGLIFLOZIN SCH MG: 10 TABLET, FILM COATED ORAL at 08:31

## 2023-08-13 RX ADMIN — ASPIRIN 81 MG CHEWABLE TABLET SCH MG: 81 TABLET CHEWABLE at 17:08

## 2023-08-13 RX ADMIN — THERA TABS SCH EACH: TAB at 17:08

## 2023-08-13 RX ADMIN — Medication SCH MG: at 22:32

## 2023-08-13 RX ADMIN — CLOPIDOGREL BISULFATE SCH MG: 75 TABLET ORAL at 22:31

## 2023-08-13 RX ADMIN — ISOSORBIDE MONONITRATE SCH MG: 30 TABLET, EXTENDED RELEASE ORAL at 08:31

## 2023-08-13 RX ADMIN — POTASSIUM CHLORIDE SCH MEQ: 750 TABLET, EXTENDED RELEASE ORAL at 17:08

## 2023-08-13 RX ADMIN — METOPROLOL SUCCINATE SCH MG: 50 TABLET, EXTENDED RELEASE ORAL at 08:31

## 2023-08-13 RX ADMIN — CEFAZOLIN SCH: 330 INJECTION, POWDER, FOR SOLUTION INTRAMUSCULAR; INTRAVENOUS at 11:50

## 2023-08-13 RX ADMIN — INSULIN ASPART SCH: 100 INJECTION, SOLUTION INTRAVENOUS; SUBCUTANEOUS at 08:12

## 2023-08-13 NOTE — P.CNNES
History of Present Illness


Consult date: 08/13/23


Reason for Consult: TIA


History of Present Illness: 





The patient is an 83-year-old male who is seen in neurologic consultation on 

August 13, 2023, in collaboration was Pat Barnett, via teleneurology.


History is obtained from the chart, the patient and his daughter, Amarilis.  The 

patient himself states that this time "I wasn't functioning rightly".  He says 

he is unable to recall the events.  He says this morning, he feels fine.  He 

does not recall the events leading to his admission to the hospital.  According 

to the patient's daughter, she spoke with her father at the time of the episode 

leading to admission.  She says that her father was having difficulty getting 

out complete sentences.  In addition, he was very agitated and anxious.  The 

agitation and anxiety are very unusual for him.





Review of Systems





Unable to accurately obtain secondary to mental status of patient





Past Medical History


Past Medical History: CVA/TIA, Dementia, Diabetes Mellitus, GERD/Reflux, 

Hyperlipidemia, Myocardial Infarction (MI), Osteoarthritis (OA), Renal Disease, 

Sleep Apnea/CPAP/BIPAP


Additional Past Medical History / Comment(s): ANEMIA. MI x 2, heart murmer, no 

cpap used, constipation,j aortic valve stenosis, chronic kidney disease,


Last Myocardial Infarction Date:: 3/28/19


History of Any Multi-Drug Resistant Organisms: None Reported


Past Surgical History: Heart Catheterization With Stent, Joint Replacement, 

Orthopedic Surgery


Additional Past Surgical History / Comment(s): 4 cardiac stents, total right 

knee replacement, monse cataracts with lens implants, left shoulder surgery, left 

hip replacement,


Past Anesthesia/Blood Transfusion Reactions: No Reported Reaction


Date of Last Stent Placement:: 6/2020


Past Psychological History: Depression


Smoking Status: Former smoker


Past Alcohol Use History: None Reported


Additional Past Alcohol Use History / Comment(s): quit smoking 1964, smoked for 

6-7 yrs


Past Drug Use History: None Reported





- Past Family History


  ** Father


Family Medical History: Myocardial Infarction (MI)





  ** Mother


Family Medical History: No Reported History





  ** Sister(s)


Family Medical History: Cancer





Medications and Allergies


                                Home Medications











 Medication  Instructions  Recorded  Confirmed  Type


 


Aspirin EC [Ecotrin Low Dose] 81 mg PO DAILY@1700 08/23/14 08/12/23 History


 


Docusate [Colace] 100 mg PO DAILY PRN 12/21/18 08/12/23 History


 


Metoprolol Succinate (ER) [Toprol 50 mg PO BID@0800,1700 12/21/18 08/12/23 

History





XL]    


 


Atorvastatin [Lipitor] 80 mg PO HS@2100 06/18/20 08/12/23 History


 


Clopidogrel [Plavix] 75 mg PO HS@2100 06/18/20 08/12/23 History


 


Fish Oil/Dha/Epa [Fish Oil 1,200 1 cap PO DAILY@0800 06/18/20 08/12/23 History





mg Fish Oil]    


 


Nitroglycerin Sl Tabs [Nitrostat] 0.4 mg SUBLINGUAL Q5M PRN #20 tab 06/21/20 08/12/23 Rx


 


Acetaminophen Tab [Tylenol] 650 mg PO Q6HR PRN  tab 07/01/20 08/12/23 Rx


 


Multivitamins, Thera [Multivitamin 1 tab PO DAILY@1700 07/10/20 08/12/23 History





(formulary)]    


 


Folic Acid 1 mg PO DAILY@1700 05/06/21 08/12/23 History


 


Potassium Chloride [Klor-Con M10] 10 meq PO DAILY@1700 05/06/21 08/12/23 History


 


Ferrous Sulfate [Iron (65  mg PO DAILY@0800 06/09/21 08/12/23 History





Elemental)]    


 


Isosorbide Mononitrate ER [Imdur] 30 mg PO DAILY@0800 06/09/21 08/12/23 History


 


Magnesium Hydroxide [Milk of 7,200 mg PO DAILY PRN 06/09/21 08/12/23 History





Magnesia Concentrate]    


 


bisacodyL [Dulcolax] 10 mg RECTAL DAILY PRN 06/09/21 08/12/23 History


 


Epoetin Henri [Procrit] 20,000 unit SQ Q14D 07/07/22 08/12/23 History


 


Famotidine [Pepcid] 20 mg PO Q2D@1700 07/07/22 08/12/23 History


 


Nicole-Tussin Syrup 30 ml PO Q6H PRN 07/07/22 08/12/23 History


 


Loperamide [Imodium] 2 mg PO Q6H PRN 07/07/22 08/12/23 History


 


Mag Hydrox/Al Hydrox/Simeth 15 ml PO QID PRN 07/07/22 08/12/23 History





[Maalox]    


 


Mirabegron [Myrbetriq] 25 mg PO DAILY@0800 07/07/22 08/12/23 History


 


amLODIPine [Norvasc] 2.5 mg PO DAILY@1700 07/07/22 08/12/23 History


 


Carboxymethylcellulose Sodium 1 drop BOTH EYES TID@0800,1200,1700 08/08/23 08/12/23 History





[Refresh Tears]    


 


Dapagliflozin Propanediol [Farxiga] 10 mg PO DAILY@0800 08/08/23 08/12/23 

History


 


Ensure Enlive 120 ml PO TID@0800,1200,1700 08/08/23 08/12/23 History


 


INSULIN ASPART (NovoLOG) [NovoLOG 3 unit SQ TID@0700,1100,1630 08/08/23 08/12/23

 History





(formulary)]    


 


INSULIN ASPART (NovoLOG) [NovoLOG See Protocol SQ ACHS 08/08/23 08/12/23 History





(formulary)]    


 


Insulin Glargine [Lantus Vial] 10 unit SQ HS@2130 08/08/23 08/12/23 History


 


Magnesium Oxide [Mag-Oxide] 200 mg PO BID@0800,1700 08/08/23 08/12/23 History


 


Melatonin 1 mg PO HS@2100 08/08/23 08/12/23 History


 


Memantine HCl [Namenda] 5 mg PO HS@2100 08/08/23 08/12/23 History


 


Memantine [Namenda] 10 mg PO DAILY@0800 08/08/23 08/12/23 History


 


buPROPion HCL [Wellbutrin XL] 300 mg PO DAILY@0800 08/12/23 08/12/23 History








                                    Allergies











Allergy/AdvReac Type Severity Reaction Status Date / Time


 


iodine Allergy Unknown Unknown Verified 08/12/23 16:41














Physical Examination





- Vital Signs


Vital Signs: 


                                   Vital Signs











  Temp Pulse Pulse Resp BP BP Pulse Ox


 


 08/13/23 08:25  98 F   70  17   156/74  99


 


 08/13/23 03:44  97.8 F   62  18   156/72  100


 


 08/13/23 00:00  98.1 F   68  18   147/71  99


 


 08/12/23 20:17  98.2 F   68  18   161/76  99


 


 08/12/23 19:47  98.2 F  68   18  161/76   99


 


 08/12/23 18:00   60   18  157/76   100


 


 08/12/23 17:49  98.2 F   59 L  19   158/71  100


 


 08/12/23 15:42   60   16  168/73   98


 


 08/12/23 14:19  98.5 F  60   18  160/80   100








                                Intake and Output











 08/12/23 08/13/23 08/13/23





 22:59 06:59 14:59


 


Output Total  600 


 


Balance  -600 


 


Output:   


 


  Urine  600 


 


Other:   


 


  Voiding Method Diaper Diaper Diaper





 External Catheter External Catheter External Catheter


 


  Weight 81.647 kg  














Gen.: The patient is reclining in the bed.  He is well-nourished, well-developed

and in no acute distress.  


HEENT: Head is atraumatic, Normocephalic.  Fundus not visualized.  There is no 

scleral icterus.  Mucous membranes are moist.


Neck: Supple, without carotid bruits


Heart: Regular rate and rhythm


Extremities: Without edema


Neurological examination


Mental status: The patient is able to accurately state his name, date of birth, 

age, year and month.  He is not oriented to the upcoming holiday.  There is no a

anomia.  There is no right/left confusion.  The patient is able to follow two-

step commands.  The patient's speech is clear.  The patient is unable to recall 

the name of the facility in which he resides.  He initially states "I used to 

live in [place]".  When asked what state he is living in now, the patient is 

unable to report this.


Cranial nerves: Pupils are equal at 3 mm and reactive.  Visual fields are full 

to confrontation.  Extraocular movements are intact.  There is no nystagmus.  

Facial sensation is intact.  There is no facial asymmetry.  Hearing is grossly 

intact.  Uvula and palate are midline.  Shoulder shrug is symmetric.  Tongue 

protrudes midline.


Motor: Strength is 5/5 in the bilateral upper extremities.  Right hip flexor 

strength 4/5.  Left 5/5.


Coordination: Finger to nose, rapid alternating movements and heel to shin 

testing are intact.


Deep tendon reflexes: 2+/4+ in the upper extremities.  1+/4+ in the lower 

extremities.  Plantar responses are flexor bilaterally.


Gait: Not assessed





Results





- Laboratory Findings


CBC and BMP: 


                                 08/12/23 14:36





                                 08/12/23 14:36


Abnormal Lab Findings: 


                                  Abnormal Labs











  08/12/23 08/12/23 08/12/23





  14:36 14:36 19:49


 


RBC  3.97 L  


 


Hgb  11.7 L  


 


Hct  36.2 L  


 


RDW  16.0 H  


 


Lymphocytes #  0.9 L  


 


BUN   39 H 


 


Creatinine   1.78 H 


 


Glucose   160 H 


 


POC Glucose (mg/dL)    153 H


 


Alkaline Phosphatase   127 H 


 


Albumin   3.3 L 














Assessment and Plan


Assessment: 





1.  Altered mental status with reported expressive aphasia and, per daughter 

agitation and anxiety: TIA versus seizure


2.  History of dementia


3.  History of TIA


4.  History of atrial fibrillation


5.  History of hypertension


6.  Polypharmacy


Plan: 





1.  EEG has been ordered


2.  TIA workup was just recently completed including echocardiogram, carotid 

Doppler, 2-D echocardiogram, hemoglobin A1c and lipid panel


3.  Attempt to wean/discontinue all unnecessary medications








Thank you for allowing us to participate in the care of this patient








Dr. Ganesh Lau will assume neurologic coverage of this patient as of August 14, 2023


Time with Patient: Greater than 30 (55 minutes were spent in caring for this 

patient today including, obtaining history, examining the patient, discussing 

signs and symptoms with the patient's nurse and daughter, reviewing imaging, 

chart documentation, labs and creating this note)

## 2023-08-13 NOTE — P.HPIM
History of Present Illness


H&P Date: 08/13/23





History of present illness; ; patient is 83-year-old gentleman with past medical

history significant for hypertension, hyperlipidemia, coronary artery disease, 

diabetes mellitus who presented to the ER from his nursing facility for aphasia 

year patient was only recently discharged from our facility after being 

evaluated for TIA, was discharged on aspirin Plavix.  Patient last well-known 

was this morning when he woke up after taking a nap after eating his breakfast, 

it was found having difficulty in speaking with only able to speak 1 or 2 words.

 There was no complain of any facial droop.  No weakness of any extremity.  

Patient was brought to the ER, the time patient came to the ER, symptoms are 

improving .





Initial lab work done in the ER showed WBC 5.4, hemoglobin 11.7, platelet count 

168, sodium 139, potassium 4.4, BUN 39, creatinine 1.78


Chest x-ray done in the ER showed no acute cardiopulmonary process


CT brain done showed no acute intracranial process


Patient admitted to medicine service





REVIEW OF SYSTEMS: 


CONSTITUTIONAL: No fever, no malaise, no fatigue. 


HEENT: No recent visual problems or hearing problems. Denied any sore throat. 


CARDIOVASCULAR: No chest pain, orthopnea, PND, no palpitations, no syncope. 


PULMONARY: No shortness of breath, no cough, no hemoptysis. 


GASTROINTESTINAL: No diarrhea, no nausea, no vomiting, no abdominal pain. 


NEUROLOGICAL: As mentioned in HPI


HEMATOLOGICAL: Denies any bleeding or petechiae. 


GENITOURINARY: Denies any burning micturition, frequency, or urgency. 


MUSCULOSKELETAL/RHEUMATOLOGICAL: Denies any joint pain, swelling, or any muscle 

pain. 


ENDOCRINE: Denies any polyuria or polydipsia. 





The rest of the 14-point review of systems is negative.











PHYSICAL EXAMINATION: 





GENERAL: The patient is alert and oriented x2-3, not in any acute distress. Well

developed, well nourished. 


HEENT: Pupils are round and equally reacting to light. EOMI. No scleral icterus.

No conjunctival pallor. Normocephalic, atraumatic. No pharyngeal erythema. No 

thyromegaly. 


CARDIOVASCULAR: S1 and S2 present. No murmurs, rubs, or gallops. 


PULMONARY: Chest is clear to auscultation, no wheezing or crackles. 


ABDOMEN: Soft, nontender, nondistended, normoactive bowel sounds. No palpable 

organomegaly. 


MUSCULOSKELETAL: No joint swelling or deformity.


EXTREMITIES: No cyanosis, clubbing, or pedal edema. 


NEUROLOGICAL: Muscle strength is 5/5 in all extremities, no sensory deficits.  

Cranial 2 to 12 intact


SKIN: No rashes. 





Assessment and plan


TIA


Hypertension


Hyperlipidemia


Diabetes mellitus


History of coronary artery disease


History of prior stroke


History of dementia








Monitor vital signs


Monitor CBC


Monitor CMP


Continue telemetry monitoring


Continue neuro checks


Fall precautions


EEG ordered


Continue aspirin, Plavix, Lipitor


Neurology consulted





Labs and medication were reviewed..  Continue same treatment.  Continue with 

symptomatic treatment.  Resume home medication.  Monitor labs and vitals.  DVT 

and GI prophylaxis.  Further recommendations as per clinical course of the 

patient





Dictation was produced using dragon dictation software. please excuse any 

grammatical, word or spelling errors. 








Past Medical History


Past Medical History: CVA/TIA, Dementia, Diabetes Mellitus, GERD/Reflux, 

Hyperlipidemia, Myocardial Infarction (MI), Osteoarthritis (OA), Renal Disease, 

Sleep Apnea/CPAP/BIPAP


Additional Past Medical History / Comment(s): ANEMIA. MI x 2, heart murmer, no 

cpap used, constipation,j aortic valve stenosis, chronic kidney disease,


Last Myocardial Infarction Date:: 3/28/19


History of Any Multi-Drug Resistant Organisms: None Reported


Past Surgical History: Heart Catheterization With Stent, Joint Replacement, 

Orthopedic Surgery


Additional Past Surgical History / Comment(s): 4 cardiac stents, total right 

knee replacement, monse cataracts with lens implants, left shoulder surgery, left 

hip replacement,


Past Anesthesia/Blood Transfusion Reactions: No Reported Reaction


Date of Last Stent Placement:: 6/2020


Past Psychological History: Depression


Smoking Status: Former smoker


Past Alcohol Use History: None Reported


Additional Past Alcohol Use History / Comment(s): quit smoking 1964, smoked for 

6-7 yrs


Past Drug Use History: None Reported





- Past Family History


  ** Father


Family Medical History: Myocardial Infarction (MI)





  ** Mother


Family Medical History: No Reported History





  ** Sister(s)


Family Medical History: Cancer





Medications and Allergies


                                Home Medications











 Medication  Instructions  Recorded  Confirmed  Type


 


Aspirin EC [Ecotrin Low Dose] 81 mg PO DAILY@1700 08/23/14 08/12/23 History


 


Docusate [Colace] 100 mg PO DAILY PRN 12/21/18 08/12/23 History


 


Metoprolol Succinate (ER) [Toprol 50 mg PO BID@0800,1700 12/21/18 08/12/23 

History





XL]    


 


Atorvastatin [Lipitor] 80 mg PO HS@2100 06/18/20 08/12/23 History


 


Clopidogrel [Plavix] 75 mg PO HS@2100 06/18/20 08/12/23 History


 


Fish Oil/Dha/Epa [Fish Oil 1,200 1 cap PO DAILY@0800 06/18/20 08/12/23 History





mg Fish Oil]    


 


Nitroglycerin Sl Tabs [Nitrostat] 0.4 mg SUBLINGUAL Q5M PRN #20 tab 06/21/20 08/12/23 Rx


 


Acetaminophen Tab [Tylenol] 650 mg PO Q6HR PRN  tab 07/01/20 08/12/23 Rx


 


Multivitamins, Thera [Multivitamin 1 tab PO DAILY@1700 07/10/20 08/12/23 History





(formulary)]    


 


Folic Acid 1 mg PO DAILY@1700 05/06/21 08/12/23 History


 


Potassium Chloride [Klor-Con M10] 10 meq PO DAILY@1700 05/06/21 08/12/23 History


 


Ferrous Sulfate [Iron (65  mg PO DAILY@0800 06/09/21 08/12/23 History





Elemental)]    


 


Isosorbide Mononitrate ER [Imdur] 30 mg PO DAILY@0800 06/09/21 08/12/23 History


 


Magnesium Hydroxide [Milk of 7,200 mg PO DAILY PRN 06/09/21 08/12/23 History





Magnesia Concentrate]    


 


bisacodyL [Dulcolax] 10 mg RECTAL DAILY PRN 06/09/21 08/12/23 History


 


Epoetin Henri [Procrit] 20,000 unit SQ Q14D 07/07/22 08/12/23 History


 


Famotidine [Pepcid] 20 mg PO Q2D@1700 07/07/22 08/12/23 History


 


Nicole-Tussin Syrup 30 ml PO Q6H PRN 07/07/22 08/12/23 History


 


Loperamide [Imodium] 2 mg PO Q6H PRN 07/07/22 08/12/23 History


 


Mag Hydrox/Al Hydrox/Simeth 15 ml PO QID PRN 07/07/22 08/12/23 History





[Maalox]    


 


Mirabegron [Myrbetriq] 25 mg PO DAILY@0800 07/07/22 08/12/23 History


 


amLODIPine [Norvasc] 2.5 mg PO DAILY@1700 07/07/22 08/12/23 History


 


Carboxymethylcellulose Sodium 1 drop BOTH EYES TID@0800,1200,1700 08/08/23 08/12/23 History





[Refresh Tears]    


 


Dapagliflozin Propanediol [Farxiga] 10 mg PO DAILY@0800 08/08/23 08/12/23 

History


 


Ensure Enlive 120 ml PO TID@0800,1200,1700 08/08/23 08/12/23 History


 


INSULIN ASPART (NovoLOG) [NovoLOG 3 unit SQ TID@0700,1100,1630 08/08/23 08/12/23

 History





(formulary)]    


 


INSULIN ASPART (NovoLOG) [NovoLOG See Protocol SQ ACHS 08/08/23 08/12/23 History





(formulary)]    


 


Insulin Glargine [Lantus Vial] 10 unit SQ HS@2130 08/08/23 08/12/23 History


 


Magnesium Oxide [Mag-Oxide] 200 mg PO BID@0800,1700 08/08/23 08/12/23 History


 


Melatonin 1 mg PO HS@2100 08/08/23 08/12/23 History


 


Memantine HCl [Namenda] 5 mg PO HS@2100 08/08/23 08/12/23 History


 


Memantine [Namenda] 10 mg PO DAILY@0800 08/08/23 08/12/23 History


 


buPROPion HCL [Wellbutrin XL] 300 mg PO DAILY@0800 08/12/23 08/12/23 History








                                    Allergies











Allergy/AdvReac Type Severity Reaction Status Date / Time


 


iodine Allergy Unknown Unknown Verified 08/12/23 16:41














Physical Exam


Vitals: 


                                   Vital Signs











  Temp Pulse Pulse Resp BP BP Pulse Ox


 


 08/13/23 03:44  97.8 F   62  18   156/72  100


 


 08/13/23 00:00  98.1 F   68  18   147/71  99


 


 08/12/23 20:17  98.2 F   68  18   161/76  99


 


 08/12/23 19:47  98.2 F  68   18  161/76   99


 


 08/12/23 18:00   60   18  157/76   100


 


 08/12/23 17:49  98.2 F   59 L  19   158/71  100


 


 08/12/23 15:42   60   16  168/73   98


 


 08/12/23 14:19  98.5 F  60   18  160/80   100








                                Intake and Output











 08/12/23 08/13/23 08/13/23





 22:59 06:59 14:59


 


Output Total  600 


 


Balance  -600 


 


Output:   


 


  Urine  600 


 


Other:   


 


  Voiding Method Diaper Diaper 





 External Catheter External Catheter 


 


  Weight 81.647 kg  














Results


CBC & Chem 7: 


                                 08/12/23 14:36





                                 08/12/23 14:36


Labs: 


                  Abnormal Lab Results - Last 24 Hours (Table)











  08/12/23 08/12/23 08/12/23 Range/Units





  14:36 14:36 19:49 


 


RBC  3.97 L    (4.30-5.90)  m/uL


 


Hgb  11.7 L    (13.0-17.5)  gm/dL


 


Hct  36.2 L    (39.0-53.0)  %


 


RDW  16.0 H    (11.5-15.5)  %


 


Lymphocytes #  0.9 L    (1.0-4.8)  k/uL


 


BUN   39 H   (9-20)  mg/dL


 


Creatinine   1.78 H   (0.66-1.25)  mg/dL


 


Glucose   160 H   (74-99)  mg/dL


 


POC Glucose (mg/dL)    153 H  ()  mg/dL


 


Alkaline Phosphatase   127 H   ()  U/L


 


Albumin   3.3 L   (3.5-5.0)  g/dL














Thrombosis Risk Factor Assmnt





- Choose All That Apply


Any of the Below Risk Factors Present?: Yes


Each Factor Represents 1 point: Obesity (BMI >25)


Other Risk Factors: Yes


Each Risk Factor Represents 3 Points: Age 75 years or older


Other congenital or acquired thrombophilia - If yes, enter type in comment: No


Thrombosis Risk Factor Assessment Total Risk Factor Score: 4


Thrombosis Risk Factor Assessment Level: Moderate Risk

## 2023-08-14 LAB
CHOLEST SERPL-MCNC: 82 MG/DL (ref 0–200)
GLUCOSE BLD-MCNC: 124 MG/DL (ref 70–110)
GLUCOSE BLD-MCNC: 152 MG/DL (ref 70–110)
GLUCOSE BLD-MCNC: 162 MG/DL (ref 70–110)
GLUCOSE BLD-MCNC: 191 MG/DL (ref 70–110)
GLUCOSE BLD-MCNC: 93 MG/DL (ref 70–110)
HDLC SERPL-MCNC: 41.9 MG/DL (ref 40–60)
LDLC SERPL CALC-MCNC: 20.3 MG/DL (ref 0–131)
TRIGL SERPL-MCNC: 98.8 MG/DL (ref 0–149)
VLDLC SERPL CALC-MCNC: 19.76 MG/DL (ref 5–40)

## 2023-08-14 RX ADMIN — Medication SCH MG: at 08:24

## 2023-08-14 RX ADMIN — THERA TABS SCH EACH: TAB at 16:39

## 2023-08-14 RX ADMIN — CLOPIDOGREL BISULFATE SCH MG: 75 TABLET ORAL at 20:32

## 2023-08-14 RX ADMIN — DEXTRAN 70 AND HYPROMELLOSE 2910 SCH DROPS: 1; 3 SOLUTION/ DROPS OPHTHALMIC at 08:24

## 2023-08-14 RX ADMIN — INSULIN DETEMIR SCH UNIT: 100 INJECTION, SOLUTION SUBCUTANEOUS at 20:32

## 2023-08-14 RX ADMIN — CEFAZOLIN SCH: 330 INJECTION, POWDER, FOR SOLUTION INTRAMUSCULAR; INTRAVENOUS at 08:12

## 2023-08-14 RX ADMIN — ASPIRIN 81 MG CHEWABLE TABLET SCH MG: 81 TABLET CHEWABLE at 16:39

## 2023-08-14 RX ADMIN — INSULIN ASPART SCH UNIT: 100 INJECTION, SOLUTION INTRAVENOUS; SUBCUTANEOUS at 11:45

## 2023-08-14 RX ADMIN — BUPROPION HYDROCHLORIDE SCH MG: 300 TABLET, FILM COATED, EXTENDED RELEASE ORAL at 08:24

## 2023-08-14 RX ADMIN — METOPROLOL SUCCINATE SCH MG: 50 TABLET, EXTENDED RELEASE ORAL at 16:39

## 2023-08-14 RX ADMIN — DEXTRAN 70 AND HYPROMELLOSE 2910 SCH: 1; 3 SOLUTION/ DROPS OPHTHALMIC at 16:40

## 2023-08-14 RX ADMIN — CEFAZOLIN SCH MLS/HR: 330 INJECTION, POWDER, FOR SOLUTION INTRAMUSCULAR; INTRAVENOUS at 20:32

## 2023-08-14 RX ADMIN — POTASSIUM CHLORIDE SCH MEQ: 750 TABLET, EXTENDED RELEASE ORAL at 16:39

## 2023-08-14 RX ADMIN — INSULIN ASPART SCH UNIT: 100 INJECTION, SOLUTION INTRAVENOUS; SUBCUTANEOUS at 08:24

## 2023-08-14 RX ADMIN — MEMANTINE HYDROCHLORIDE SCH MG: 5 TABLET ORAL at 20:32

## 2023-08-14 RX ADMIN — DEXTRAN 70 AND HYPROMELLOSE 2910 SCH: 1; 3 SOLUTION/ DROPS OPHTHALMIC at 11:12

## 2023-08-14 RX ADMIN — FOLIC ACID SCH MG: 1 TABLET ORAL at 16:39

## 2023-08-14 RX ADMIN — DAPAGLIFLOZIN SCH MG: 10 TABLET, FILM COATED ORAL at 08:24

## 2023-08-14 RX ADMIN — CEFAZOLIN SCH MLS/HR: 330 INJECTION, POWDER, FOR SOLUTION INTRAMUSCULAR; INTRAVENOUS at 08:24

## 2023-08-14 RX ADMIN — ATORVASTATIN CALCIUM SCH MG: 80 TABLET, FILM COATED ORAL at 20:32

## 2023-08-14 RX ADMIN — ISOSORBIDE MONONITRATE SCH MG: 30 TABLET, EXTENDED RELEASE ORAL at 08:23

## 2023-08-14 RX ADMIN — MEMANTINE HYDROCHLORIDE SCH MG: 10 TABLET ORAL at 08:24

## 2023-08-14 RX ADMIN — Medication SCH MG: at 20:32

## 2023-08-14 RX ADMIN — INSULIN ASPART SCH UNIT: 100 INJECTION, SOLUTION INTRAVENOUS; SUBCUTANEOUS at 16:39

## 2023-08-14 RX ADMIN — ASPIRIN SCH: 325 TABLET ORAL at 08:17

## 2023-08-14 RX ADMIN — METOPROLOL SUCCINATE SCH MG: 50 TABLET, EXTENDED RELEASE ORAL at 08:24

## 2023-08-14 NOTE — P.PN
Subjective


Progress Note Date: 08/14/23


patient is 83-year-old gentleman with past medical history significant for 

hypertension, hyperlipidemia, coronary artery disease, diabetes mellitus who 

presented to the ER from his nursing facility for aphasia year patient was only 

recently discharged from our facility after being evaluated for TIA, was 

discharged on aspirin Plavix.  Patient last well-known was this morning when he 

woke up after taking a nap after eating his breakfast, it was found having 

difficulty in speaking with only able to speak 1 or 2 words.  There was no 

complain of any facial droop.  No weakness of any extremity.  Patient was 

brought to the ER, the time patient came to the ER, symptoms are improving .





Initial lab work done in the ER showed WBC 5.4, hemoglobin 11.7, platelet count 

168, sodium 139, potassium 4.4, BUN 39, creatinine 1.78


Chest x-ray done in the ER showed no acute cardiopulmonary process


CT brain done showed no acute intracranial process


Patient admitted to medicine service





8/14.  Patient seen and examined.  No acute issues overnight.  Vital signs 

stable .currently undergoing EEG





REVIEW OF SYSTEMS: 


CONSTITUTIONAL: No fever, no malaise,. 


CARDIOVASCULAR: No chest pain, no palpitations, no syncope. 


PULMONARY: No shortness of breath, no cough, 


GASTROINTESTINAL: No diarrhea, no nausea, no vomiting, no abdominal pain. 


NEUROLOGICAL: No headaches, no weakness, 





PHYSICAL EXAMINATION: 





GENERAL: The patient is alert and oriented x2, not in any acute distress. Well 

developed, well nourished. 


HEENT: Pupils are round and equally reacting to light. EOMI. No scleral icterus.

No conjunctival pallor. Normocephalic, atraumatic. No pharyngeal erythema. No 

thyromegaly. 


CARDIOVASCULAR: S1 and S2 present. No murmurs, rubs, or gallops. 


PULMONARY: Chest is clear to auscultation, no wheezing or crackles. 


ABDOMEN: Soft, nontender, nondistended, normoactive bowel sounds. No palpable 

organomegaly. 


MUSCULOSKELETAL: No joint swelling or deformity.


EXTREMITIES: No cyanosis, clubbing, or pedal edema. 


NEUROLOGICAL: Gross neurological examination did not reveal any focal deficits. 


SKIN: No rashes. 





Assessment and plan


TIA


Hypertension


Hyperlipidemia


Diabetes mellitus


History of coronary artery disease


History of prior stroke


History of dementia





Monitor vital signs


Monitor CBC


Monitor CMP


Continue neuro checks


Follow-up on EEG


Continue aspirin, Plavix, Lipitor


I'll up in neurology recommendations


Labs and medication were reviewed..  Continue same treatment.  Continue with 

symptomatic treatment.  Resume home medication.  Monitor labs and vitals.  DVT 

and GI prophylaxis.  Further recommendations as per clinical course of the 

patient








Dictation was produced using dragon dictation software. please excuse any 

grammatical, word or spelling errors. 








Objective





- Vital Signs


Vital signs: 


                                   Vital Signs











Temp  97.8 F   08/14/23 11:30


 


Pulse  60   08/14/23 11:30


 


Resp  16   08/14/23 11:30


 


BP  157/77   08/14/23 11:30


 


Pulse Ox  98   08/14/23 11:30


 


FiO2      








                                 Intake & Output











 08/13/23 08/14/23 08/14/23





 18:59 06:59 18:59


 


Intake Total 358  480


 


Output Total 950 1500 550


 


Balance -592 -1500 -70


 


Intake:   


 


  Oral 358  480


 


Output:   


 


  Urine 950 1500 550


 


Other:   


 


  Voiding Method Diaper Diaper Diaper





 External Catheter External Catheter External Catheter














- Labs


CBC & Chem 7: 


                                 08/12/23 14:36





                                 08/12/23 14:36


Labs: 


                  Abnormal Lab Results - Last 24 Hours (Table)











  08/13/23 08/14/23 Range/Units





  16:31 11:21 


 


POC Glucose (mg/dL)  158 H  124 H  ()  mg/dL

## 2023-08-14 NOTE — P.PN
Subjective


Progress Note Date: 08/14/23


I am seeing the patient for the first time during this hospital visit.  Please 

refer to Dr. Jordan's note for further details.


It seems patient has altered mental status and it is reported he had reported ex

pressive aphasia, per daughter with agitation and anxiety and to rule out TIA vs

seizure.  Patient had recent TIA workup.


Patient is not aware what transpired but currently he feels he is doing well.  

Denies of any new neurological issues.


He states he lives with his wife.








Objective





- Vital Signs


Vital signs: 


                                   Vital Signs











Temp  98.3 F   08/14/23 15:40


 


Pulse  61   08/14/23 15:40


 


Resp  16   08/14/23 15:40


 


BP  162/78   08/14/23 15:40


 


Pulse Ox  98   08/14/23 15:40


 


FiO2      








                                 Intake & Output











 08/13/23 08/14/23 08/14/23





 18:59 06:59 18:59


 


Intake Total 358  720


 


Output Total 950 1500 1050


 


Balance -592 -1500 -330


 


Intake:   


 


  Oral 358  720


 


Output:   


 


  Urine 950 1500 1050


 


Other:   


 


  Voiding Method Diaper Diaper Diaper





 External Catheter External Catheter External Catheter














- Exam


General: Is lying in bed and is not in acute distress.  Watching Movie.


Neuro:


Patient is awake, alert, oriented to self and time.  He states she is in the 

hospital but does not know name.  He is able to name objects (pen and watch).  

Able to follow simple commands.  No aphasia or neglect.


Pupils are round, equal and reactive to light.  VFF to confrontation.  EOM 

intact throughout and no nystagmus.  No facial weakness.  No dysarthria.


Motor: Strength is limited in right lower (old per patient).  Otherwise lifting 

all extremities above gravity without issues.


Sensation: Normal to touch.








- Labs


CBC & Chem 7: 


                                 08/12/23 14:36





                                 08/12/23 14:36


Labs: 


                  Abnormal Lab Results - Last 24 Hours (Table)











  08/12/23 08/13/23 08/14/23 Range/Units





  14:30 20:07 11:21 


 


POC Glucose (mg/dL)  159 H  152 H  124 H  ()  mg/dL














  08/14/23 Range/Units





  16:33 


 


POC Glucose (mg/dL)  162 H  ()  mg/dL














Assessment and Plan


Assessment: 


1.  Altered mental status with reported expressive aphasia and, per daughter 

agitation and anxiety: TIA versus seizure--currently better.


2.  History of dementia


3.  History of TIA


4.  History of atrial fibrillation and is not on anticoagulant but is on 

antiplatelets.


5. DM and recent HbA1 is 6.7 on 08/09/23


6.  History of hypertension


7.  Polypharmacy





Plan: 





* EEG: Preliminary report is normal.  No seizure or discharges. 


* I ordered MRI Brain to access if any central causes such as stroke as result 

  of symptoms and not seen on CT head.


* I ordered TSH, vitamin B12, folate level, ammonia level to access any other 

  cause of confusion.


* Patient is on ASA 81mg and Plavix 75mg as well as lipitor 80mg qhs.


* TIA workup was just recently completed including echocardiogram, carotid 

  Doppler, 2-D echocardiogram, hemoglobin A1c and lipid panel


* Per Dr. Jordan, attempt to wean/discontinue all unnecessary medications


* Will defer the rest of management to primary team and other specialist.











Time with Patient: Less than 30

## 2023-08-14 NOTE — EEG
ELECTROENCEPHALOGRAM REPORT



CLINICAL HISTORY:

This is an 83-year-old gentleman with history of dementia, who has altered 
mental

status.  The video EEG is obtained to evaluate for seizure and epileptiform 
activity.



RELEVANT MEDICATIONS:

The patient is not on any antiepileptic drug.



EEG TYPE:

A routine 21-channel EEG with video using the 10/20 electrode placement system 
was

used.



DESCRIPTION:

Wakefulness is only obtained.  During awake state, the posterior-dominant rhythm

consists of low-to-moderate voltage of 8 to 9 Hz activity, that is well 
modulated and

well sustained.  There is no physiological stage II sleep architecture.



There is no focal slowing.



There is moderate amount of left central temporal myogenic artifact.



INTERICTAL AND ICTAL:

None.



ACTIVATION PROCEDURE:

Photic stimulation did not evoke a posterior driving response.  There is no 
abnormality

during the photic stimulation.



Hyperventilation is not performed.



CLINICAL INTERPRETATION:

This is a normal routine EEG.  There is no focal slowing, epileptiform 
discharge, or

seizure on the EEG.  A normal routine EEG does not rule out underlying epilepsy.

Clinical correlation is recommended.





MMLARA / IJN: [id number] / Job#: [id number]

MAREN

## 2023-08-15 VITALS
TEMPERATURE: 97.9 F | DIASTOLIC BLOOD PRESSURE: 86 MMHG | SYSTOLIC BLOOD PRESSURE: 173 MMHG | HEART RATE: 63 BPM | RESPIRATION RATE: 16 BRPM

## 2023-08-15 LAB
GLUCOSE BLD-MCNC: 66 MG/DL (ref 70–110)
GLUCOSE BLD-MCNC: 86 MG/DL (ref 70–110)
VIT B12 SERPL-MCNC: 549 PG/ML (ref 200–944)

## 2023-08-15 RX ADMIN — ASPIRIN SCH: 325 TABLET ORAL at 08:32

## 2023-08-15 RX ADMIN — Medication SCH MG: at 08:42

## 2023-08-15 RX ADMIN — CEFAZOLIN SCH MLS/HR: 330 INJECTION, POWDER, FOR SOLUTION INTRAMUSCULAR; INTRAVENOUS at 08:43

## 2023-08-15 RX ADMIN — DEXTRAN 70 AND HYPROMELLOSE 2910 SCH: 1; 3 SOLUTION/ DROPS OPHTHALMIC at 11:33

## 2023-08-15 RX ADMIN — DEXTRAN 70 AND HYPROMELLOSE 2910 SCH DROPS: 1; 3 SOLUTION/ DROPS OPHTHALMIC at 08:43

## 2023-08-15 RX ADMIN — METOPROLOL SUCCINATE SCH MG: 50 TABLET, EXTENDED RELEASE ORAL at 08:43

## 2023-08-15 RX ADMIN — ISOSORBIDE MONONITRATE SCH MG: 30 TABLET, EXTENDED RELEASE ORAL at 08:42

## 2023-08-15 RX ADMIN — INSULIN ASPART SCH: 100 INJECTION, SOLUTION INTRAVENOUS; SUBCUTANEOUS at 11:33

## 2023-08-15 RX ADMIN — MEMANTINE HYDROCHLORIDE SCH MG: 10 TABLET ORAL at 08:43

## 2023-08-15 RX ADMIN — INSULIN ASPART SCH: 100 INJECTION, SOLUTION INTRAVENOUS; SUBCUTANEOUS at 08:28

## 2023-08-15 RX ADMIN — DAPAGLIFLOZIN SCH MG: 10 TABLET, FILM COATED ORAL at 08:43

## 2023-08-15 RX ADMIN — BUPROPION HYDROCHLORIDE SCH MG: 300 TABLET, FILM COATED, EXTENDED RELEASE ORAL at 08:43

## 2023-08-15 NOTE — MR
EXAMINATION TYPE: MR brain wo con

 

DATE OF EXAM: 8/15/2023 8:12 AM

 

COMPARISON: 10/23/2017.  

 

CLINICAL INDICATION:Male, 83 years old with history of expressive aphasia.  cva; PHH, Expressive Apha
cleopatra. CVA.

 

TECHNIQUE: Multi planar, multi sequence imaging was performed through the brain including: T1, T2, In
version recovery, Diffusion weighted imaging, and gradient echo imaging. No gadolinium was given. 

 

FINDINGS: Ventricular dilation and in proportion to cerebral atrophy. Symmetric mesial temporal lobe 
atrophy no significantly changed given increase dilation of the ventricular system compared to prior.
 The gray-white junctions, ventricular system, and cisterns appear unremarkable.  Scattered foci and 
confluent areas of high T2 signal intensity are seen within the periventricular white matter. Midline
 structures show no abnormality. Diffusion-weighted imaging shows no evidence of restricted diffusion
. The susceptibility weighted images do not reveal any evidence for micro-hemorrhage.

 

The bone marrow signal is within normal limits. 

Paranasal sinuses and mastoid air cells: No significant paranasal sinus disease. Trace left mastoid a
ir cell effusion.

Visualized orbits: Bilaterally aphakia. 

 

IMPRESSION:

1. No evidence of intracranial mass or acute/subacute infarct.

2. Extensive white matter changes which are nonspecific , likely secondary to small vessel ischemic d
isease.

3. Mesial temporal lobe atrophy which is symmetric correlate for Alzheimer's.

4. Ventricular dilation proportion to cerebral atrophy which may be mildly increased from 2017.

5. Trace left mastoid air cell effusion.

## 2024-06-07 NOTE — ED
General Adult HPI





- General


Chief complaint: Neuro Symptoms/Deficit


Stated complaint: slurred speech


Time Seen by Provider: 08/08/23 18:29


Source: patient


Mode of arrival: EMS


Limitations: no limitations





- History of Present Illness


Initial comments: 


This is an 83-year-old male who presents emergency department via EMS for acute 

slurring of speech.  Is reported the patient was at his nursing facility when he

was noted to take a nap at noon and was normal but then woke up at 4 PM with 

mild slurring of speech.  It was reported that the patient's family did note 

this and was concerned state called EMS to have the patient evaluated in the 

emergency department.  The patient is of a history of previous CVA and stated on

arrival that he was feeling that his speech was slurred.  The patient denied any

acute pain or complaints at this time including any headache, lightheadedness, 

blurry vision or any numbness or tingling.  The patient was otherwise resting in

bed comfortably with minimal swelling of speech noted.  The patient was ANO x1-

2. 








- Related Data


                                Home Medications











 Medication  Instructions  Recorded  Confirmed


 


Aspirin EC [Ecotrin Low Dose] 81 mg PO DAILY@1700 08/23/14 08/08/23


 


buPROPion XL [Wellbutrin XL] 300 mg PO DAILY@0800 08/23/14 08/08/23


 


Docusate [Colace] 100 mg PO DAILY PRN 12/21/18 08/08/23


 


Metoprolol Succinate (ER) [Toprol 50 mg PO BID@0800,1700 12/21/18 08/08/23





XL]   


 


Atorvastatin [Lipitor] 80 mg PO HS@2100 06/18/20 08/08/23


 


Clopidogrel [Plavix] 75 mg PO HS@2100 06/18/20 08/08/23


 


Fish Oil/Dha/Epa [Fish Oil 1,200 1 cap PO DAILY@0800 06/18/20 08/08/23





mg Fish Oil]   


 


Multivitamins, Thera [Multivitamin 1 tab PO DAILY@1700 07/10/20 08/08/23





(formulary)]   


 


Folic Acid 1 mg PO DAILY@1700 05/06/21 08/08/23


 


Potassium Chloride [Klor-Con M10] 10 meq PO DAILY@1700 05/06/21 08/08/23


 


Ferrous Sulfate [Iron (65  mg PO DAILY@0800 06/09/21 08/08/23





Elemental)]   


 


Isosorbide Mononitrate ER [Imdur] 30 mg PO DAILY@0800 06/09/21 08/08/23


 


Magnesium Hydroxide [Milk of 7,200 mg PO DAILY PRN 06/09/21 08/08/23





Magnesia Concentrate]   


 


bisacodyL [Dulcolax] 10 mg RECTAL DAILY PRN 06/09/21 08/08/23


 


Epoetin Henri [Procrit] 20,000 unit SQ Q14D 07/07/22 08/08/23


 


Famotidine [Pepcid] 20 mg PO Q48H 07/07/22 08/08/23


 


Nicole-Tussin Syrup 30 ml PO Q6H PRN 07/07/22 08/08/23


 


Loperamide [Imodium] 2 mg PO Q6H PRN 07/07/22 08/08/23


 


Mag Hydrox/Al Hydrox/Simeth 15 ml PO QID PRN 07/07/22 08/08/23





[Maalox]   


 


Mirabegron [Myrbetriq] 25 mg PO DAILY@0800 07/07/22 08/08/23


 


amLODIPine [Norvasc] 2.5 mg PO DAILY@1700 07/07/22 08/08/23


 


Carboxymethylcellulose Sodium 1 drop BOTH EYES TID@0800,1200,1700 08/08/23 08/08/23





[Refresh Tears]   


 


Dapagliflozin Propanediol [Farxiga] 10 mg PO DAILY@0800 08/08/23 08/08/23


 


Ensure Enlive 120 ml PO TID@0800,1200,1700 08/08/23 08/08/23


 


INSULIN ASPART (NovoLOG) [NovoLOG 3 unit SQ TID@0700,1100,1630 08/08/23 08/08/23





(formulary)]   


 


INSULIN ASPART (NovoLOG) [NovoLOG See Protocol SQ ACHS 08/08/23 08/08/23





(formulary)]   


 


Insulin Glargine [Lantus Vial] 10 unit SQ HS@2130 08/08/23 08/08/23


 


Magnesium Oxide [Mag-Oxide] 200 mg PO BID@0800,1700 08/08/23 08/08/23


 


Melatonin 1 mg PO HS@2100 08/08/23 08/08/23


 


Memantine HCl [Namenda] 5 mg PO HS@2100 08/08/23 08/08/23


 


Memantine [Namenda] 10 mg PO DAILY@0800 08/08/23 08/08/23








                                  Previous Rx's











 Medication  Instructions  Recorded


 


Nitroglycerin Sl Tabs [Nitrostat] 0.4 mg SUBLINGUAL Q5M PRN #20 tab 06/21/20


 


Acetaminophen Tab [Tylenol] 650 mg PO Q6HR PRN  tab 07/01/20











                                    Allergies











Allergy/AdvReac Type Severity Reaction Status Date / Time


 


iodine Allergy Unknown Unknown Verified 08/08/23 18:42














Review of Systems


ROS Statement: 


Those systems with pertinent positive or pertinent negative responses have been 

documented in the HPI.





ROS Other: All systems not noted in ROS Statement are negative.





Past Medical History


Past Medical History: Diabetes Mellitus, GERD/Reflux, Hyperlipidemia, Myocardial

 Infarction (MI), Osteoarthritis (OA), Renal Disease, Sleep Apnea/CPAP/BIPAP


Additional Past Medical History / Comment(s): ANEMIA. MI x 2, heart murmer, no 

cpap used, constipation,


Last Myocardial Infarction Date:: 3/28/19


History of Any Multi-Drug Resistant Organisms: None Reported


Past Surgical History: Heart Catheterization With Stent, Joint Replacement, 

Orthopedic Surgery


Additional Past Surgical History / Comment(s): 4 cardiac stents, total right kn

ee replacement, monse cataracts with lens implants, left shoulder surgery, left 

hip replacement,


Past Anesthesia/Blood Transfusion Reactions: No Reported Reaction


Date of Last Stent Placement:: 6/2020


Past Psychological History: No Psychological Hx Reported


Smoking Status: Former smoker


Past Alcohol Use History: None Reported


Past Drug Use History: None Reported





- Past Family History


  ** Father


Family Medical History: Myocardial Infarction (MI)





  ** Mother


Family Medical History: No Reported History





  ** Sister(s)


Family Medical History: Cancer





General Exam


Limitations: altered mental status (ANOx1-2, at baseline)


General appearance: alert, in no apparent distress


Head exam: Present: atraumatic, normocephalic, normal inspection


Eye exam: Present: normal appearance, PERRL


Pupils: Present: normal accommodation


ENT exam: Present: normal exam, normal oropharynx, mucous membranes moist


Neck exam: Present: normal inspection, full ROM


Respiratory exam: Present: normal lung sounds bilaterally


Cardiovascular Exam: Present: regular rate, normal rhythm, normal heart sounds


GI/Abdominal exam: Present: soft, normal bowel sounds


Extremities exam: Present: normal inspection, full ROM


Back exam: Present: normal inspection, full ROM


Neurological exam: Present: alert, altered (ANOx1-2, at baseline), CN II-XII 

intact, other (Minor slurring of speech).  Absent: motor sensory deficit, 

reflexes normal


Psychiatric exam: Present: normal affect, normal mood


Skin exam: Present: warm, dry





Course


                                   Vital Signs











  08/08/23 08/08/23 08/08/23





  18:24 18:45 18:49


 


Temperature 98.1 F  


 


Pulse Rate 66 64 64


 


Respiratory 18 18 18





Rate   


 


Blood Pressure 171/84 146/82 


 


O2 Sat by Pulse 99 98 97





Oximetry   














  08/08/23





  20:00


 


Temperature 


 


Pulse Rate 64


 


Respiratory 16





Rate 


 


Blood Pressure 166/77


 


O2 Sat by Pulse 





Oximetry 














EKG Findings





- EKG Comments:


EKG Findings:: An EKG was obtained and was interpreted by myself showing a rate 

of 65, QRS duration of 1:30 QTC of 469.  This EKG showed an atrial fibrillation 

with a right bundle branch block which was similar to his previous EKGs and 

similar to his previous history of A. fib.





Medical Decision Making





- Medical Decision Making


Was pt. sent in by a medical professional or institution (, PA, NP, urgent 

care, hospital, or nursing home...) When possible be specific


@  -Yes, sent in from his nursing facility.


Did you speak to anyone other than the patient for history (EMS, parent, family,

 police, friend...)? What history was obtained from this source 


@  -No


Did you review nursing and triage notes (agree or disagree)?  Why? 


@  -I reviewed and agree with nursing and triage notes


Were old charts reviewed (outside hosp., previous admission, EMS record, old 

EKG, old radiological studies, urgent care reports/EKG's, nursing home records)?

 Report findings 


@  -No old charts were reviewed


Differential Diagnosis (chest pain, altered mental status, abdominal pain women,

 abdominal pain men, vaginal bleeding, weakness, fever, dyspnea, syncope, 

headache, dizziness, GI bleed, back pain, seizure, CVA, palpatations, mental 

health)? 


@  -Acute CVA, TIA, electrolyte abnormality


EKG interpreted by me (3pts min.).


@  -As above


X-rays interpreted by me (1pt min.).


@  -Chest x-ray was obtained and was interpreted by myself showing no acute 

process.


CT interpreted by me (1pt min.).


@  -CT head was obtained per stroke protocol and was interpreted by myself 

showing no acute intracranial process.


U/S interpreted by me (1pt. min.).


@  -None done


What testing was considered but not performed or refused? (CT, X-rays, U/S, 

labs)? Why?


@  -A CTA of the head and neck was considered however after speaking to the 

neuro interventional list, Dr. Arita, he stated there was no intervention 

needed for an NIH of 2 and therefore did not require premedication as the 

patient had an ALLERGY to iodine.


What meds were considered but not given or refused? Why?


@  -None


Did you discuss the management of the patient with other professionals 

(professionals i.e. , PA, NP, lab, RT, psych nurse, , , 

teacher, , )? Give summary


@  -Dr. Arita contacted per stroke protocol and stated that there was no 

intervention needed at this time.  He also recommended only a CT of the head.  

The admitting team is also contacted regarding patient admission for observation

 for a TIA.


Was smoking cessation discussed for >3mins.?


@  -No


Was critical care preformed (if so, how long)?


@  -Yes, see above


Were there social determinants of health that impacted care today? How? (Home

lessness, low income, unemployed, alcoholism, drug addiction, transportation, 

low edu. Level, literacy, decrease access to med. care, correction, rehab)?


@  -No


Was there de-escalation of care discussed even if they declined (Discuss DNR or 

withdrawal of care, Hospice)? DNR status


@  -No


What co-morbidities impacted this encounter? (DM, HTN, Smoking, COPD, CAD, 

Cancer, CVA, ARF, Chemo, Hep., AIDS, mental health diagnosis, sleep apnea, 

morbid obesity)?


@  -Atrial fibrillation, hypertension, previous CVA


Was patient admitted / discharged? Hospital course, mention meds given and 

route, prescriptions, significant lab abnormalities, going to OR and other 

pertinent info.


@  -The patient was seen and evaluated in emergency department.  Physical exam, 

the patient did have minor sinus speech and initial NIH of 2 was obtained 

therefore a code stroke was called.  The patient was outside the window for 

alteplase.  Workup was obtained and did not show any acute findings and neuro 

interventional denies she has any further intervention at this time.  When 

family arrived, the patient had resolution of his symptoms.  All workup was 

largely within normal limits and the patient likely had a TIA and therefore will

 be admitted under observation for evaluation by neurology.  The patient and his

 family were agreeable to this.  The patient was placed in observation in stable

 condition.


Undiagnosed new problem with uncertain prognosis?


@  -No


Drug Therapy requiring intensive monitoring for toxicity (Heparin, Nitro, Insul

in, Cardizem)?


@  -No


Were any procedures done?


@  -No


Diagnosis/symptom?


@  -TIA


Acute, or Chronic, or Acute on Chronic?


@  -Acute


Uncomplicated (without systemic symptoms) or Complicated (systemic symptoms)?


@  -Uncomplicated


Side effects of treatment?


@  -No


Exacerbation, Progression, or Severe Exacerbation?


@  -No


Poses a threat to life or bodily function? How? (Chest pain, USA, MI, pneumonia,

 PE, COPD, DKA, ARF, appy, cholecystitis, CVA, Diverticulitis, Homicidal, 

Suicidal, threat to staff... and all critical care pts)


@  -No








- Lab Data


Result diagrams: 


                                 08/08/23 19:05





                                 08/08/23 19:05


                                   Lab Results











  08/08/23 08/08/23 08/08/23 Range/Units





  18:31 19:05 19:05 


 


WBC   6.7   (3.8-10.6)  k/uL


 


RBC   3.81 L   (4.30-5.90)  m/uL


 


Hgb   11.7 L   (13.0-17.5)  gm/dL


 


Hct   34.6 L   (39.0-53.0)  %


 


MCV   90.8   (80.0-100.0)  fL


 


MCH   30.6   (25.0-35.0)  pg


 


MCHC   33.7   (31.0-37.0)  g/dL


 


RDW   15.9 H   (11.5-15.5)  %


 


Plt Count   155   (150-450)  k/uL


 


MPV   8.3   


 


Neutrophils %   73   %


 


Lymphocytes %   13   %


 


Monocytes %   7   %


 


Eosinophils %   4   %


 


Basophils %   1   %


 


Neutrophils #   4.9   (1.3-7.7)  k/uL


 


Lymphocytes #   0.9 L   (1.0-4.8)  k/uL


 


Monocytes #   0.5   (0-1.0)  k/uL


 


Eosinophils #   0.3   (0-0.7)  k/uL


 


Basophils #   0.0   (0-0.2)  k/uL


 


PT    10.5  (9.0-12.0)  sec


 


INR    1.0  (<1.2)  


 


APTT    23.5  (22.0-30.0)  sec


 


Sodium     (137-145)  mmol/L


 


Potassium     (3.5-5.1)  mmol/L


 


Chloride     ()  mmol/L


 


Carbon Dioxide     (22-30)  mmol/L


 


Anion Gap     mmol/L


 


BUN     (9-20)  mg/dL


 


Creatinine     (0.66-1.25)  mg/dL


 


Est GFR (CKD-EPI)AfAm     (>60 ml/min/1.73 sqM)  


 


Est GFR (CKD-EPI)NonAf     (>60 ml/min/1.73 sqM)  


 


Glucose     (74-99)  mg/dL


 


POC Glucose (mg/dL)  154 H    ()  mg/dL


 


POC Glu Operater ID  Walker Kenyatta    


 


Calcium     (8.4-10.2)  mg/dL


 


Total Bilirubin     (0.2-1.3)  mg/dL


 


AST     (17-59)  U/L


 


ALT     (4-49)  U/L


 


Alkaline Phosphatase     ()  U/L


 


Creatine Kinase     ()  U/L


 


Troponin I     (0.000-0.034)  ng/mL


 


Total Protein     (6.3-8.2)  g/dL


 


Albumin     (3.5-5.0)  g/dL














  08/08/23 08/08/23 Range/Units





  19:05 19:05 


 


WBC    (3.8-10.6)  k/uL


 


RBC    (4.30-5.90)  m/uL


 


Hgb    (13.0-17.5)  gm/dL


 


Hct    (39.0-53.0)  %


 


MCV    (80.0-100.0)  fL


 


MCH    (25.0-35.0)  pg


 


MCHC    (31.0-37.0)  g/dL


 


RDW    (11.5-15.5)  %


 


Plt Count    (150-450)  k/uL


 


MPV    


 


Neutrophils %    %


 


Lymphocytes %    %


 


Monocytes %    %


 


Eosinophils %    %


 


Basophils %    %


 


Neutrophils #    (1.3-7.7)  k/uL


 


Lymphocytes #    (1.0-4.8)  k/uL


 


Monocytes #    (0-1.0)  k/uL


 


Eosinophils #    (0-0.7)  k/uL


 


Basophils #    (0-0.2)  k/uL


 


PT    (9.0-12.0)  sec


 


INR    (<1.2)  


 


APTT    (22.0-30.0)  sec


 


Sodium  140   (137-145)  mmol/L


 


Potassium  4.5   (3.5-5.1)  mmol/L


 


Chloride  103   ()  mmol/L


 


Carbon Dioxide  27   (22-30)  mmol/L


 


Anion Gap  10   mmol/L


 


BUN  41 H   (9-20)  mg/dL


 


Creatinine  1.77 H   (0.66-1.25)  mg/dL


 


Est GFR (CKD-EPI)AfAm  40   (>60 ml/min/1.73 sqM)  


 


Est GFR (CKD-EPI)NonAf  34   (>60 ml/min/1.73 sqM)  


 


Glucose  133 H   (74-99)  mg/dL


 


POC Glucose (mg/dL)    ()  mg/dL


 


POC Glu Operater ID    


 


Calcium  8.7   (8.4-10.2)  mg/dL


 


Total Bilirubin  0.6   (0.2-1.3)  mg/dL


 


AST  25   (17-59)  U/L


 


ALT  20   (4-49)  U/L


 


Alkaline Phosphatase  128 H   ()  U/L


 


Creatine Kinase  51 L   ()  U/L


 


Troponin I   0.012  (0.000-0.034)  ng/mL


 


Total Protein  6.7   (6.3-8.2)  g/dL


 


Albumin  3.5   (3.5-5.0)  g/dL














Critical Care Time


Critical Care Time: Yes


Total Critical Care Time: 32





Disposition


Clinical Impression: 


 Transient cerebral ischemia





Disposition: ADMITTED AS IP TO THIS [facility]


Condition: Stable


Is patient prescribed a controlled substance at d/c from ED?: No


Time of Disposition: 20:00


Decision to Admit Reason: Admit from EC


Decision Date: 08/08/23


Decision Time: 20:00 Home

## 2024-07-20 ENCOUNTER — HOSPITAL ENCOUNTER (INPATIENT)
Dept: HOSPITAL 47 - EC | Age: 84
LOS: 2 days | Discharge: NURSING, REHAB OR LONG-TERM CARE | DRG: 871 | End: 2024-07-22
Attending: EMERGENCY MEDICINE | Admitting: EMERGENCY MEDICINE
Payer: MEDICARE

## 2024-07-20 DIAGNOSIS — F03.93: ICD-10-CM

## 2024-07-20 DIAGNOSIS — E87.0: ICD-10-CM

## 2024-07-20 DIAGNOSIS — I25.2: ICD-10-CM

## 2024-07-20 DIAGNOSIS — Z86.73: ICD-10-CM

## 2024-07-20 DIAGNOSIS — I95.9: ICD-10-CM

## 2024-07-20 DIAGNOSIS — I45.10: ICD-10-CM

## 2024-07-20 DIAGNOSIS — F32.A: ICD-10-CM

## 2024-07-20 DIAGNOSIS — A41.9: Primary | ICD-10-CM

## 2024-07-20 DIAGNOSIS — I25.10: ICD-10-CM

## 2024-07-20 DIAGNOSIS — Z95.5: ICD-10-CM

## 2024-07-20 DIAGNOSIS — Z96.651: ICD-10-CM

## 2024-07-20 DIAGNOSIS — Z66: ICD-10-CM

## 2024-07-20 DIAGNOSIS — E78.5: ICD-10-CM

## 2024-07-20 DIAGNOSIS — G47.33: ICD-10-CM

## 2024-07-20 DIAGNOSIS — Z79.84: ICD-10-CM

## 2024-07-20 DIAGNOSIS — F03.911: ICD-10-CM

## 2024-07-20 DIAGNOSIS — Z79.82: ICD-10-CM

## 2024-07-20 DIAGNOSIS — E87.3: ICD-10-CM

## 2024-07-20 DIAGNOSIS — I21.A1: ICD-10-CM

## 2024-07-20 DIAGNOSIS — Z20.822: ICD-10-CM

## 2024-07-20 DIAGNOSIS — N18.4: ICD-10-CM

## 2024-07-20 DIAGNOSIS — D63.1: ICD-10-CM

## 2024-07-20 DIAGNOSIS — Z79.899: ICD-10-CM

## 2024-07-20 DIAGNOSIS — I13.0: ICD-10-CM

## 2024-07-20 DIAGNOSIS — K59.00: ICD-10-CM

## 2024-07-20 DIAGNOSIS — I48.91: ICD-10-CM

## 2024-07-20 DIAGNOSIS — E83.41: ICD-10-CM

## 2024-07-20 DIAGNOSIS — I50.41: ICD-10-CM

## 2024-07-20 DIAGNOSIS — J69.0: ICD-10-CM

## 2024-07-20 DIAGNOSIS — Z87.891: ICD-10-CM

## 2024-07-20 DIAGNOSIS — M19.90: ICD-10-CM

## 2024-07-20 DIAGNOSIS — Z51.5: ICD-10-CM

## 2024-07-20 DIAGNOSIS — I35.0: ICD-10-CM

## 2024-07-20 DIAGNOSIS — Z96.642: ICD-10-CM

## 2024-07-20 DIAGNOSIS — Z79.4: ICD-10-CM

## 2024-07-20 DIAGNOSIS — E11.22: ICD-10-CM

## 2024-07-20 DIAGNOSIS — N17.0: ICD-10-CM

## 2024-07-20 DIAGNOSIS — K21.9: ICD-10-CM

## 2024-07-20 LAB
ALBUMIN SERPL-MCNC: 3.2 G/DL (ref 3.5–5)
ALP SERPL-CCNC: 141 U/L (ref 38–126)
ALT SERPL-CCNC: 51 U/L (ref 4–49)
ANION GAP SERPL CALC-SCNC: 18 MMOL/L
APTT BLD: 25.4 SEC (ref 22–30)
AST SERPL-CCNC: 62 U/L (ref 17–59)
BASOPHILS # BLD AUTO: 0 K/UL (ref 0–0.2)
BASOPHILS NFR BLD AUTO: 0 %
BUN SERPL-SCNC: 81 MG/DL (ref 9–20)
CALCIUM SPEC-MCNC: 8.5 MG/DL (ref 8.4–10.2)
CHLORIDE SERPL-SCNC: 105 MMOL/L (ref 98–107)
CO2 BLDA-SCNC: 22 MMOL/L (ref 19–24)
CO2 SERPL-SCNC: 18 MMOL/L (ref 22–30)
EOSINOPHIL # BLD AUTO: 0 K/UL (ref 0–0.7)
EOSINOPHIL NFR BLD AUTO: 0 %
ERYTHROCYTE [DISTWIDTH] IN BLOOD BY AUTOMATED COUNT: 3.33 M/UL (ref 4.3–5.9)
ERYTHROCYTE [DISTWIDTH] IN BLOOD: 16.6 % (ref 11.5–15.5)
GLUCOSE BLD-MCNC: 186 MG/DL (ref 70–110)
GLUCOSE SERPL-MCNC: 278 MG/DL (ref 74–99)
HCO3 BLDA-SCNC: 21 MMOL/L (ref 21–25)
HCT VFR BLD AUTO: 30.3 % (ref 39–53)
HGB BLD-MCNC: 9.8 GM/DL (ref 13–17.5)
INR PPP: 1.2 (ref ?–1.2)
LYMPHOCYTES # SPEC AUTO: 0.5 K/UL (ref 1–4.8)
LYMPHOCYTES NFR SPEC AUTO: 4 %
MCH RBC QN AUTO: 29.5 PG (ref 25–35)
MCHC RBC AUTO-ENTMCNC: 32.4 G/DL (ref 31–37)
MCV RBC AUTO: 91.2 FL (ref 80–100)
MONOCYTES # BLD AUTO: 0.3 K/UL (ref 0–1)
MONOCYTES NFR BLD AUTO: 3 %
NEUTROPHILS # BLD AUTO: 10.3 K/UL (ref 1.3–7.7)
NEUTROPHILS NFR BLD AUTO: 92 %
NT-PROBNP SERPL-MCNC: (no result) PG/ML
PCO2 BLDA: 27 MMHG (ref 35–45)
PH BLDA: 7.51 [PH] (ref 7.35–7.45)
PLATELET # BLD AUTO: 231 K/UL (ref 150–450)
PO2 BLDA: 83 MMHG (ref 83–108)
POTASSIUM SERPL-SCNC: 4.6 MMOL/L (ref 3.5–5.1)
PROT SERPL-MCNC: 6.3 G/DL (ref 6.3–8.2)
PT BLD: 12.7 SEC (ref 10–12.5)
SODIUM SERPL-SCNC: 141 MMOL/L (ref 137–145)
WBC # BLD AUTO: 11.2 K/UL (ref 3.8–10.6)

## 2024-07-20 PROCEDURE — 71046 X-RAY EXAM CHEST 2 VIEWS: CPT

## 2024-07-20 PROCEDURE — 93306 TTE W/DOPPLER COMPLETE: CPT

## 2024-07-20 PROCEDURE — 96366 THER/PROPH/DIAG IV INF ADDON: CPT

## 2024-07-20 PROCEDURE — 85730 THROMBOPLASTIN TIME PARTIAL: CPT

## 2024-07-20 PROCEDURE — 96365 THER/PROPH/DIAG IV INF INIT: CPT

## 2024-07-20 PROCEDURE — 96361 HYDRATE IV INFUSION ADD-ON: CPT

## 2024-07-20 PROCEDURE — 83735 ASSAY OF MAGNESIUM: CPT

## 2024-07-20 PROCEDURE — 94640 AIRWAY INHALATION TREATMENT: CPT

## 2024-07-20 PROCEDURE — 80048 BASIC METABOLIC PNL TOTAL CA: CPT

## 2024-07-20 PROCEDURE — 36600 WITHDRAWAL OF ARTERIAL BLOOD: CPT

## 2024-07-20 PROCEDURE — 85025 COMPLETE CBC W/AUTO DIFF WBC: CPT

## 2024-07-20 PROCEDURE — 80061 LIPID PANEL: CPT

## 2024-07-20 PROCEDURE — 93005 ELECTROCARDIOGRAM TRACING: CPT

## 2024-07-20 PROCEDURE — 85379 FIBRIN DEGRADATION QUANT: CPT

## 2024-07-20 PROCEDURE — 36415 COLL VENOUS BLD VENIPUNCTURE: CPT

## 2024-07-20 PROCEDURE — 99291 CRITICAL CARE FIRST HOUR: CPT

## 2024-07-20 PROCEDURE — 83605 ASSAY OF LACTIC ACID: CPT

## 2024-07-20 PROCEDURE — 71045 X-RAY EXAM CHEST 1 VIEW: CPT

## 2024-07-20 PROCEDURE — 83880 ASSAY OF NATRIURETIC PEPTIDE: CPT

## 2024-07-20 PROCEDURE — 94760 N-INVAS EAR/PLS OXIMETRY 1: CPT

## 2024-07-20 PROCEDURE — 81001 URINALYSIS AUTO W/SCOPE: CPT

## 2024-07-20 PROCEDURE — 87636 SARSCOV2 & INF A&B AMP PRB: CPT

## 2024-07-20 PROCEDURE — 80053 COMPREHEN METABOLIC PANEL: CPT

## 2024-07-20 PROCEDURE — 87040 BLOOD CULTURE FOR BACTERIA: CPT

## 2024-07-20 PROCEDURE — 82805 BLOOD GASES W/O2 SATURATION: CPT

## 2024-07-20 PROCEDURE — 76770 US EXAM ABDO BACK WALL COMP: CPT

## 2024-07-20 PROCEDURE — 84145 PROCALCITONIN (PCT): CPT

## 2024-07-20 PROCEDURE — 96367 TX/PROPH/DG ADDL SEQ IV INF: CPT

## 2024-07-20 PROCEDURE — 84484 ASSAY OF TROPONIN QUANT: CPT

## 2024-07-20 PROCEDURE — 85610 PROTHROMBIN TIME: CPT

## 2024-07-20 PROCEDURE — 96375 TX/PRO/DX INJ NEW DRUG ADDON: CPT

## 2024-07-20 RX ADMIN — IPRATROPIUM BROMIDE AND ALBUTEROL SULFATE STA: .5; 3 SOLUTION RESPIRATORY (INHALATION) at 20:29

## 2024-07-20 RX ADMIN — HEPARIN SODIUM SCH MLS/HR: 10000 INJECTION, SOLUTION INTRAVENOUS at 21:41

## 2024-07-20 RX ADMIN — PIPERACILLIN AND TAZOBACTAM STA MLS/HR: 3; .375 INJECTION, POWDER, FOR SOLUTION INTRAVENOUS at 15:46

## 2024-07-20 RX ADMIN — CEFAZOLIN STA MLS/HR: 330 INJECTION, POWDER, FOR SOLUTION INTRAMUSCULAR; INTRAVENOUS at 15:46

## 2024-07-20 RX ADMIN — NICARDIPINE HYDROCHLORIDE STA MLS/HR: 2.5 INJECTION INTRAVENOUS at 15:47

## 2024-07-20 RX ADMIN — FUROSEMIDE STA MG: 10 INJECTION, SOLUTION INTRAMUSCULAR; INTRAVENOUS at 19:52

## 2024-07-20 RX ADMIN — HEPARIN SODIUM ONE UNIT: 1000 INJECTION, SOLUTION INTRAVENOUS; SUBCUTANEOUS at 21:40

## 2024-07-20 RX ADMIN — LEVOFLOXACIN STA MLS/HR: 750 INJECTION, SOLUTION INTRAVENOUS at 20:42

## 2024-07-20 NOTE — ED
SOB HPI





- General


Chief Complaint: Shortness of Breath


Stated Complaint: SOFÍA


Time Seen by Provider: 07/20/24 13:14


Source: patient, family, EMS, RN notes reviewed, old records reviewed


Mode of arrival: EMS


Limitations: no limitations





- History of Present Illness


Initial Comments: 





83-year-old male here by ambulance after being diagnosed with pneumonia 2 days 

ago he has been on Levaquin since then but has had having a productive cough 

decreased oral intake and getting worse not better.  He is a poor historian 

information gathered from the patient and from his wife who is present.  Per 

paramedics he did maintain adequate vital signs and route.  Also per his wife he

was eating for 5 days ago when he seemed to choke on the food he was eating 

earlier before aspiration.


MD Complaint: shortness of breath, cough





- Related Data


                                Home Medications











 Medication  Instructions  Recorded  Confirmed


 


Aspirin EC [Ecotrin Low Dose] 81 mg PO DAILY@1700 08/23/14 07/20/24


 


Docusate [Colace] 100 mg PO DAILY PRN 12/21/18 07/20/24


 


Metoprolol Succinate (ER) [Toprol 50 mg PO BID@0800,1700 12/21/18 07/20/24





XL]   


 


Atorvastatin [Lipitor] 80 mg PO HS@2100 06/18/20 07/20/24


 


Clopidogrel [Plavix] 75 mg PO HS@2100 06/18/20 07/20/24


 


Fish Oil/Dha/Epa [Fish Oil 1,200 1 cap PO DAILY@0800 06/18/20 07/20/24





mg Fish Oil]   


 


Multivitamins, Thera [Multivitamin 1 tab PO DAILY@1700 07/10/20 07/20/24





(formulary)]   


 


Folic Acid 1 mg PO DAILY@1700 05/06/21 07/20/24


 


Potassium Chloride [Klor-Con M10] 10 meq PO DAILY@1700 05/06/21 07/20/24


 


Ferrous Sulfate [Iron (65  mg PO DAILY@0800 06/09/21 07/20/24





Elemental)]   


 


Isosorbide Mononitrate ER [Imdur] 30 mg PO DAILY@0800 06/09/21 07/20/24


 


Magnesium Hydroxide [Milk of 7,200 mg PO DAILY PRN 06/09/21 07/20/24





Magnesia Concentrate]   


 


bisacodyL [Dulcolax] 10 mg RECTAL DAILY PRN 06/09/21 07/20/24


 


Famotidine [Pepcid] 20 mg PO Q2D@1700 07/07/22 07/20/24


 


Loperamide [Imodium] 2 mg PO Q6H PRN 07/07/22 07/20/24


 


Mag Hydrox/Al Hydrox/Simeth 15 ml PO QID PRN 07/07/22 07/20/24





[Maalox]   


 


Mirabegron [Myrbetriq] 25 mg PO DAILY@0800 07/07/22 07/20/24


 


amLODIPine [Norvasc] 2.5 mg PO DAILY@1700 07/07/22 07/20/24


 


Carboxymethylcellulose Sodium 1 drop BOTH EYES TID@0800,1200,1700 08/08/23 07/20/24





[Refresh Tears]   


 


Dapagliflozin Propanediol [Farxiga] 10 mg PO DAILY@0800 08/08/23 07/20/24


 


Ensure Enlive 120 ml PO TID@0800,1200,1700 08/08/23 07/20/24


 


INSULIN ASPART (NovoLOG) [NovoLOG 3 unit SQ TID@0700,1100,1630 08/08/23 07/20/24





(formulary)]   


 


INSULIN ASPART (NovoLOG) [NovoLOG See Protocol SQ ACHS 08/08/23 07/20/24





(formulary)]   


 


Insulin Glargine [Lantus Vial] 10 unit SQ HS@2130 08/08/23 07/20/24


 


Magnesium Oxide [Mag-Oxide] 200 mg PO BID@0800,1700 08/08/23 07/20/24


 


Memantine [Namenda] 10 mg PO DAILY@0800 08/08/23 07/20/24


 


buPROPion HCL [Wellbutrin XL] 150 mg PO DAILY@0800 08/12/23 07/20/24


 


Epoetin Henri [Procrit] 30,000 unit SQ Q21D 07/20/24 07/20/24


 


Levofloxacin [Levaquin] 250 mg PO DAILY@0800 07/20/24 07/20/24


 


Menthol-Zinc Oxide Oint 1 applic TOPICAL TID 07/20/24 07/20/24





[Calmoseptine Ointment]   


 


guaiFENesin [guaiFENesin Oral 600 mg PO Q6H PRN 07/20/24 07/20/24





Solution]   








                                  Previous Rx's











 Medication  Instructions  Recorded


 


Nitroglycerin Sl Tabs [Nitrostat] 0.4 mg SUBLINGUAL Q5M PRN #20 tab 06/21/20


 


Acetaminophen Tab [Tylenol] 650 mg PO Q6HR PRN  tab 07/01/20











                                    Allergies











Allergy/AdvReac Type Severity Reaction Status Date / Time


 


iodine Allergy Unknown Unknown Verified 08/12/23 16:41














Review of Systems


ROS Statement: 


Those systems with pertinent positive or pertinent negative responses have been 

documented in the HPI.





ROS Other: All systems not noted in ROS Statement are negative.





Past Medical History


Past Medical History: CVA/TIA, Dementia, Diabetes Mellitus, GERD/Reflux, 

Hyperlipidemia, Myocardial Infarction (MI), Osteoarthritis (OA), Renal Disease, 

Sleep Apnea/CPAP/BIPAP


Additional Past Medical History / Comment(s): ANEMIA. MI x 2, heart murmer, no 

cpap used, constipation,j aortic valve stenosis, chronic kidney disease,


Last Myocardial Infarction Date:: 3/28/19


History of Any Multi-Drug Resistant Organisms: None Reported


Past Surgical History: Heart Catheterization With Stent, Joint Replacement, 

Orthopedic Surgery


Additional Past Surgical History / Comment(s): 4 cardiac stents, total right 

knee replacement, monse cataracts with lens implants, left shoulder surgery, left 

hip replacement,


Past Anesthesia/Blood Transfusion Reactions: No Reported Reaction


Date of Last Stent Placement:: 6/2020


Past Psychological History: Depression


Smoking Status: Former smoker


Past Alcohol Use History: None Reported


Past Drug Use History: None Reported





- Past Family History


  ** Father


Family Medical History: Myocardial Infarction (MI)





  ** Mother


Family Medical History: No Reported History





  ** Sister(s)


Family Medical History: Cancer





General Exam





- General Exam Comments


Initial Comments: 





Well-developed well-nourished awake alert male


Limitations: no limitations


General appearance: alert, in no apparent distress


Head exam: Present: atraumatic, normocephalic, normal inspection


Eye exam: Present: normal appearance, PERRL, EOMI.  Absent: scleral icterus, 

conjunctival injection, periorbital swelling


ENT exam: Present: mucous membranes dry


Neck exam: Present: normal inspection, full ROM, other (No stridor JVD or 

bruits)


Respiratory exam: Present: rhonchi (Lateral basilar crackles more so on the 

right and left), decreased breath sounds


Cardiovascular Exam: Present: normal rhythm, bradycardia, normal heart sounds.  

Absent: systolic murmur, diastolic murmur, rubs, gallop, clicks


GI/Abdominal exam: Present: soft, normal bowel sounds.  Absent: distended, 

tenderness, guarding, rebound, rigid


Extremities exam: Present: normal inspection, full ROM, normal capillary refill.

  Absent: tenderness, pedal edema, joint swelling, calf tenderness


Back exam: Present: normal inspection


Neurological exam: Present: alert, oriented X3, CN II-XII intact


Psychiatric exam: Present: normal affect, normal mood


Skin exam: Present: warm, dry, intact, normal color.  Absent: rash





Course


                                   Vital Signs











  07/20/24 07/20/24 07/20/24





  13:29 13:34 15:32


 


Temperature 97.5 F L  


 


Pulse Rate 57 L  67


 


Respiratory 18 22 18





Rate   


 


Blood Pressure 106/58  110/73


 


O2 Sat by Pulse 97  95





Oximetry   














  07/20/24





  16:00


 


Temperature 97.6 F


 


Pulse Rate 72


 


Respiratory 20





Rate 


 


Blood Pressure 141/89


 


O2 Sat by Pulse 95





Oximetry 














Medical Decision Making





- Medical Decision Making





I did discuss the findings with the patient and family as well as with Dr. Perez.  Admitted with cardiology consultation I did discuss the case with Dr. Jaffe the echocardiogram was ordered.  Patient had 2 EKGs done which showed no

 change in configuration.  Patient does have evidence of bilateral pneumonia.  

The patient does have evidence of lactic acidosis metabolic acidosis 3.29 D-

dimer troponin 2.0 BNP 95,200 evidence of acute kidney injury and hyperglycemia 

patient not a candidate for CT a VQ scan will be ordered.  Was pt. sent in by a 

medical professional or institution (, PA, NP, urgent care, hospital, or [facility]...) When possible be specific


@  -No


Did you speak to anyone other than the patient for history (EMS, parent, family,

 police, friend...)? What history was obtained from this source 


@  -, Paramedics


Did you review nursing and triage notes (agree or disagree)?  Why? 


@  -I reviewed and agree with nursing and triage notes


Were old charts reviewed (outside hosp., previous admission, EMS record, old 

EKG, old radiological studies, urgent care reports/EKG's, nursing home records)?

 Report findings 


@  -Old charts were reviewed


Differential Diagnosis (chest pain, altered mental status, abdominal pain women,

 abdominal pain men, vaginal bleeding, weakness, fever, dyspnea, syncope, 

headache, dizziness, GI bleed, back pain, seizure, CVA, palpatations, mental 

health, musculoskeletal)? 


@  -Pneumonia, outpatient treatment failure dehydration]


EKG interpreted by me (3pts min.).


@  -As above interpreted by me sinus rhythm at 76 parable 201 QRS duration 140 

QT/QTc 457/488 right bundle branch block no definitive acute changes as compared

with EKG dated 8/12/2020 3-second EKG shows 84 bpm sinus rhythm interpreted by 

me IL interval 225 QRS duration 143 QT/QTc 443/484 right axis deviation right 

bundle branch block pattern similar to the previous EKG except for a unifocal 

PVC.


X-rays interpreted by me (1pt min.).


@  -Interpreted by me bilateral infiltrates


CT interpreted by me (1pt min.).


@  -None done


U/S interpreted by me (1pt. min.).


@  -None done


What testing was considered but not performed or refused? (CT, X-rays, U/S, 

labs)? Why?


@  -CTAngio but due to acute kidney injury cannot be done at this time


What meds were considered but not given or refused? Why?


@  -None


Did you discuss the management of the patient with other professionals 

(professionals i.e. , PA, NP, lab, RT, psych nurse, , , 

teacher, , )? Give summary


@  -Dr Perez


Was smoking cessation discussed for >3mins.?


@  -No


Was critical care preformed (if so, how long)?


@  -39 minutes


Were there social determinants of health that impacted care today? How? 

(Homelessness, low income, unemployed, alcoholism, drug addiction, 

transportation, low edu. Level, literacy, decrease access to med. care, [facility], 

rehab)?


@  -[facility] patient


Was there de-escalation of care discussed even if they declined (Discuss DNR or 

withdrawal of care, Hospice)? DNR status


@  -No


What co-morbidities impacted this encounter? (DM, HTN, Smoking, COPD, CAD, 

Cancer, CVA, ARF, Chemo, Hep., AIDS, mental health diagnosis, sleep apnea, 

morbid obesity)?


@  -CVA, MI, renal disease, anemia, dementia former alcoholism


Was patient admitted / discharged? Hospital course, mention meds given and 

route, prescriptions, significant lab abnormalities, going to OR and other 

pertinent info.


@  -Hospital course admitted to the hospital for inpatient evaluation and 

treatment cardiology consultation


Undiagnosed new problem with uncertain prognosis?


@  -MI acute kidney injury, elevated BNP, elevated D-dimer


Drug Therapy requiring intensive monitoring for toxicity (Heparin, Nitro, 

Insulin, Cardizem)?


@  -No


Were any procedures done?


@  -No


Diagnosis/symptom?


@  -Bilateral pneumonia, outpatient treatment failure, acute MI, CHF, cytosis 6 

acidosis hyperglycemia


Acute, or Chronic, or Acute on Chronic?


@  -


Uncomplicated (without systemic symptoms) or Complicated (systemic symptoms)?


@  -Complicated


Side effects of treatment?


@  -No


Exacerbation, Progression, or Severe Exacerbation?


@  -No


Poses a threat to life or bodily function? How? (Chest pain, USA, MI, pneumonia,

PE, COPD, DKA, ARF, appy, cholecystitis, CVA, Diverticulitis, Homicidal, 

Suicidal, threat to staff... and all critical care pts)


@  -Potential








- Lab Data


Result diagrams: 


                                 07/20/24 14:02





                                 07/20/24 14:02


                                   Lab Results











  07/20/24 07/20/24 07/20/24 Range/Units





  14:02 14:02 14:02 


 


WBC  11.2 H    (3.8-10.6)  k/uL


 


RBC  3.33 L    (4.30-5.90)  m/uL


 


Hgb  9.8 L    (13.0-17.5)  gm/dL


 


Hct  30.3 L    (39.0-53.0)  %


 


MCV  91.2    (80.0-100.0)  fL


 


MCH  29.5    (25.0-35.0)  pg


 


MCHC  32.4    (31.0-37.0)  g/dL


 


RDW  16.6 H    (11.5-15.5)  %


 


Plt Count  231    (150-450)  k/uL


 


MPV  8.6    


 


Neutrophils %  92    %


 


Lymphocytes %  4    %


 


Monocytes %  3    %


 


Eosinophils %  0    %


 


Basophils %  0    %


 


Neutrophils #  10.3 H    (1.3-7.7)  k/uL


 


Lymphocytes #  0.5 L    (1.0-4.8)  k/uL


 


Monocytes #  0.3    (0-1.0)  k/uL


 


Eosinophils #  0.0    (0-0.7)  k/uL


 


Basophils #  0.0    (0-0.2)  k/uL


 


Hypochromasia  Moderate    


 


Anisocytosis  Slight    


 


PT   12.7 H   (10.0-12.5)  sec


 


INR   1.2 H   (<1.2)  


 


APTT   25.4   (22.0-30.0)  sec


 


D-Dimer   3.29 H   (<0.60)  mg/L FEU


 


Sample Site     


 


ABG pH     (7.35-7.45)  


 


ABG pCO2     (35-45)  mmHg


 


ABG pO2     ()  mmHg


 


ABG HCO3     (21-25)  mmol/L


 


ABG Total CO2     (19-24)  mmol/L


 


ABG O2 Saturation     (94-97)  %


 


ABG Base Excess     mmol/L


 


Reymundo Test     


 


FiO2     %


 


Sodium    141  (137-145)  mmol/L


 


Potassium    4.6  (3.5-5.1)  mmol/L


 


Chloride    105  ()  mmol/L


 


Carbon Dioxide    18 L  (22-30)  mmol/L


 


Anion Gap    18  mmol/L


 


BUN    81 H  (9-20)  mg/dL


 


Creatinine    2.33 H  (0.66-1.25)  mg/dL


 


Est GFR (CKD-EPI)AfAm    29  (>60 ml/min/1.73 sqM)  


 


Est GFR (CKD-EPI)NonAf    25  (>60 ml/min/1.73 sqM)  


 


Glucose    278 H  (74-99)  mg/dL


 


POC Glucose (mg/dL)     ()  mg/dL


 


POC Glu Operater ID     


 


Lactic Ac Sepsis Rflx     


 


Plasma Lactic Acid Khurram     (0.7-2.0)  mmol/L


 


Calcium    8.5  (8.4-10.2)  mg/dL


 


Total Bilirubin    0.9  (0.2-1.3)  mg/dL


 


AST    62 H  (17-59)  U/L


 


ALT    51 H  (4-49)  U/L


 


Alkaline Phosphatase    141 H  ()  U/L


 


Troponin I     (0.000-0.034)  ng/mL


 


NT-Pro-B Natriuret Pep    37694  pg/mL


 


Total Protein    6.3  (6.3-8.2)  g/dL


 


Albumin    3.2 L  (3.5-5.0)  g/dL


 


Influenza Type A (PCR)     (Not Detectd)  


 


Influenza Type B (PCR)     (Not Detectd)  


 


RSV (PCR)     (Not Detectd)  


 


SARS-CoV-2 (PCR)     (Not Detectd)  














  07/20/24 07/20/24 07/20/24 Range/Units





  14:02 14:02 14:02 


 


WBC     (3.8-10.6)  k/uL


 


RBC     (4.30-5.90)  m/uL


 


Hgb     (13.0-17.5)  gm/dL


 


Hct     (39.0-53.0)  %


 


MCV     (80.0-100.0)  fL


 


MCH     (25.0-35.0)  pg


 


MCHC     (31.0-37.0)  g/dL


 


RDW     (11.5-15.5)  %


 


Plt Count     (150-450)  k/uL


 


MPV     


 


Neutrophils %     %


 


Lymphocytes %     %


 


Monocytes %     %


 


Eosinophils %     %


 


Basophils %     %


 


Neutrophils #     (1.3-7.7)  k/uL


 


Lymphocytes #     (1.0-4.8)  k/uL


 


Monocytes #     (0-1.0)  k/uL


 


Eosinophils #     (0-0.7)  k/uL


 


Basophils #     (0-0.2)  k/uL


 


Hypochromasia     


 


Anisocytosis     


 


PT     (10.0-12.5)  sec


 


INR     (<1.2)  


 


APTT     (22.0-30.0)  sec


 


D-Dimer     (<0.60)  mg/L FEU


 


Sample Site     


 


ABG pH     (7.35-7.45)  


 


ABG pCO2     (35-45)  mmHg


 


ABG pO2     ()  mmHg


 


ABG HCO3     (21-25)  mmol/L


 


ABG Total CO2     (19-24)  mmol/L


 


ABG O2 Saturation     (94-97)  %


 


ABG Base Excess     mmol/L


 


Reymundo Test     


 


FiO2     %


 


Sodium     (137-145)  mmol/L


 


Potassium     (3.5-5.1)  mmol/L


 


Chloride     ()  mmol/L


 


Carbon Dioxide     (22-30)  mmol/L


 


Anion Gap     mmol/L


 


BUN     (9-20)  mg/dL


 


Creatinine     (0.66-1.25)  mg/dL


 


Est GFR (CKD-EPI)AfAm     (>60 ml/min/1.73 sqM)  


 


Est GFR (CKD-EPI)NonAf     (>60 ml/min/1.73 sqM)  


 


Glucose     (74-99)  mg/dL


 


POC Glucose (mg/dL)     ()  mg/dL


 


POC Glu Operater ID     


 


Lactic Ac Sepsis Rflx     


 


Plasma Lactic Acid Khurram  3.8 H*    (0.7-2.0)  mmol/L


 


Calcium     (8.4-10.2)  mg/dL


 


Total Bilirubin     (0.2-1.3)  mg/dL


 


AST     (17-59)  U/L


 


ALT     (4-49)  U/L


 


Alkaline Phosphatase     ()  U/L


 


Troponin I   2.000 H*   (0.000-0.034)  ng/mL


 


NT-Pro-B Natriuret Pep     pg/mL


 


Total Protein     (6.3-8.2)  g/dL


 


Albumin     (3.5-5.0)  g/dL


 


Influenza Type A (PCR)    Not Detected  (Not Detectd)  


 


Influenza Type B (PCR)    Not Detected  (Not Detectd)  


 


RSV (PCR)    Not Detected  (Not Detectd)  


 


SARS-CoV-2 (PCR)    Not Detected  (Not Detectd)  














  07/20/24 07/20/24 07/20/24 Range/Units





  14:21 14:59 17:11 


 


WBC     (3.8-10.6)  k/uL


 


RBC     (4.30-5.90)  m/uL


 


Hgb     (13.0-17.5)  gm/dL


 


Hct     (39.0-53.0)  %


 


MCV     (80.0-100.0)  fL


 


MCH     (25.0-35.0)  pg


 


MCHC     (31.0-37.0)  g/dL


 


RDW     (11.5-15.5)  %


 


Plt Count     (150-450)  k/uL


 


MPV     


 


Neutrophils %     %


 


Lymphocytes %     %


 


Monocytes %     %


 


Eosinophils %     %


 


Basophils %     %


 


Neutrophils #     (1.3-7.7)  k/uL


 


Lymphocytes #     (1.0-4.8)  k/uL


 


Monocytes #     (0-1.0)  k/uL


 


Eosinophils #     (0-0.7)  k/uL


 


Basophils #     (0-0.2)  k/uL


 


Hypochromasia     


 


Anisocytosis     


 


PT     (10.0-12.5)  sec


 


INR     (<1.2)  


 


APTT     (22.0-30.0)  sec


 


D-Dimer     (<0.60)  mg/L FEU


 


Sample Site  LRAD    


 


ABG pH  7.51 H    (7.35-7.45)  


 


ABG pCO2  27 L    (35-45)  mmHg


 


ABG pO2  83    ()  mmHg


 


ABG HCO3  21    (21-25)  mmol/L


 


ABG Total CO2  22    (19-24)  mmol/L


 


ABG O2 Saturation  96.9    (94-97)  %


 


ABG Base Excess  -1.4    mmol/L


 


Reymundo Test  Yes    


 


FiO2  21    %


 


Sodium     (137-145)  mmol/L


 


Potassium     (3.5-5.1)  mmol/L


 


Chloride     ()  mmol/L


 


Carbon Dioxide     (22-30)  mmol/L


 


Anion Gap     mmol/L


 


BUN     (9-20)  mg/dL


 


Creatinine     (0.66-1.25)  mg/dL


 


Est GFR (CKD-EPI)AfAm     (>60 ml/min/1.73 sqM)  


 


Est GFR (CKD-EPI)NonAf     (>60 ml/min/1.73 sqM)  


 


Glucose     (74-99)  mg/dL


 


POC Glucose (mg/dL)     ()  mg/dL


 


POC Glu Operater ID     


 


Lactic Ac Sepsis Rflx   Y   


 


Plasma Lactic Acid Khurram    3.8 H*  (0.7-2.0)  mmol/L


 


Calcium     (8.4-10.2)  mg/dL


 


Total Bilirubin     (0.2-1.3)  mg/dL


 


AST     (17-59)  U/L


 


ALT     (4-49)  U/L


 


Alkaline Phosphatase     ()  U/L


 


Troponin I     (0.000-0.034)  ng/mL


 


NT-Pro-B Natriuret Pep     pg/mL


 


Total Protein     (6.3-8.2)  g/dL


 


Albumin     (3.5-5.0)  g/dL


 


Influenza Type A (PCR)     (Not Detectd)  


 


Influenza Type B (PCR)     (Not Detectd)  


 


RSV (PCR)     (Not Detectd)  


 


SARS-CoV-2 (PCR)     (Not Detectd)  














  07/20/24 Range/Units





  19:15 


 


WBC   (3.8-10.6)  k/uL


 


RBC   (4.30-5.90)  m/uL


 


Hgb   (13.0-17.5)  gm/dL


 


Hct   (39.0-53.0)  %


 


MCV   (80.0-100.0)  fL


 


MCH   (25.0-35.0)  pg


 


MCHC   (31.0-37.0)  g/dL


 


RDW   (11.5-15.5)  %


 


Plt Count   (150-450)  k/uL


 


MPV   


 


Neutrophils %   %


 


Lymphocytes %   %


 


Monocytes %   %


 


Eosinophils %   %


 


Basophils %   %


 


Neutrophils #   (1.3-7.7)  k/uL


 


Lymphocytes #   (1.0-4.8)  k/uL


 


Monocytes #   (0-1.0)  k/uL


 


Eosinophils #   (0-0.7)  k/uL


 


Basophils #   (0-0.2)  k/uL


 


Hypochromasia   


 


Anisocytosis   


 


PT   (10.0-12.5)  sec


 


INR   (<1.2)  


 


APTT   (22.0-30.0)  sec


 


D-Dimer   (<0.60)  mg/L FEU


 


Sample Site   


 


ABG pH   (7.35-7.45)  


 


ABG pCO2   (35-45)  mmHg


 


ABG pO2   ()  mmHg


 


ABG HCO3   (21-25)  mmol/L


 


ABG Total CO2   (19-24)  mmol/L


 


ABG O2 Saturation   (94-97)  %


 


ABG Base Excess   mmol/L


 


Reymundo Test   


 


FiO2   %


 


Sodium   (137-145)  mmol/L


 


Potassium   (3.5-5.1)  mmol/L


 


Chloride   ()  mmol/L


 


Carbon Dioxide   (22-30)  mmol/L


 


Anion Gap   mmol/L


 


BUN   (9-20)  mg/dL


 


Creatinine   (0.66-1.25)  mg/dL


 


Est GFR (CKD-EPI)AfAm   (>60 ml/min/1.73 sqM)  


 


Est GFR (CKD-EPI)NonAf   (>60 ml/min/1.73 sqM)  


 


Glucose   (74-99)  mg/dL


 


POC Glucose (mg/dL)  186 H  ()  mg/dL


 


POC Glu Operater ID  Vagts, Suzin  


 


Lactic Ac Sepsis Rflx   


 


Plasma Lactic Acid Khurram   (0.7-2.0)  mmol/L


 


Calcium   (8.4-10.2)  mg/dL


 


Total Bilirubin   (0.2-1.3)  mg/dL


 


AST   (17-59)  U/L


 


ALT   (4-49)  U/L


 


Alkaline Phosphatase   ()  U/L


 


Troponin I   (0.000-0.034)  ng/mL


 


NT-Pro-B Natriuret Pep   pg/mL


 


Total Protein   (6.3-8.2)  g/dL


 


Albumin   (3.5-5.0)  g/dL


 


Influenza Type A (PCR)   (Not Detectd)  


 


Influenza Type B (PCR)   (Not Detectd)  


 


RSV (PCR)   (Not Detectd)  


 


SARS-CoV-2 (PCR)   (Not Detectd)  














Disposition


Clinical Impression: 


 NSTEMI (non-ST elevated myocardial infarction), Acute kidney injury, Pneumonia,

Failure of outpatient treatment, Hyperglycemia, CHF (congestive heart failure)





Disposition: ADMITTED AS IP TO THIS HOSP


Condition: Serious


Referrals: 


Mook Abdalla MD [Primary Care Provider] - 1-2 days


Time of Disposition: 18:00


Decision Date: 07/20/24


Decision Time: 18:00

## 2024-07-20 NOTE — XR
EXAMINATION TYPE: XR chest 2V

 

DATE OF EXAM: 7/20/2024 2:50 PM

 

CLINICAL INDICATION:Male, 83 years old with history of difficulty breathing; [facility]

 

COMPARISON: Chest radiographs from 8/12/2023

 

TECHNIQUE: XR chest 2V Frontal view of the chest.

 

FINDINGS: 

Lungs/Pleura: Increased bibasilar lower lung airspace opacities. There is no evidence of pleural effu
alba, left focal consolidation, or pneumothorax.  

Pulmonary vascularity: Unremarkable.

Heart/mediastinum: Cardiomediastinal silhouette is unremarkable.

Musculoskeletal: No acute osseous pathology.

 

IMPRESSION: 

Low lung volumes with increased bilateral lower lung airspace opacities correlate for pneumonia.

## 2024-07-21 LAB
ALBUMIN SERPL-MCNC: 2.8 G/DL (ref 3.5–5)
ALP SERPL-CCNC: 119 U/L (ref 38–126)
ALT SERPL-CCNC: 46 U/L (ref 4–49)
ANION GAP SERPL CALC-SCNC: 14 MMOL/L
AST SERPL-CCNC: 55 U/L (ref 17–59)
BASOPHILS # BLD AUTO: 0 K/UL (ref 0–0.2)
BASOPHILS NFR BLD AUTO: 0 %
BUN SERPL-SCNC: 87 MG/DL (ref 9–20)
CALCIUM SPEC-MCNC: 8.2 MG/DL (ref 8.4–10.2)
CHLORIDE SERPL-SCNC: 109 MMOL/L (ref 98–107)
CHOLEST SERPL-MCNC: 45 MG/DL (ref 0–200)
CO2 SERPL-SCNC: 22 MMOL/L (ref 22–30)
EOSINOPHIL # BLD AUTO: 0.1 K/UL (ref 0–0.7)
EOSINOPHIL NFR BLD AUTO: 0 %
ERYTHROCYTE [DISTWIDTH] IN BLOOD BY AUTOMATED COUNT: 3.09 M/UL (ref 4.3–5.9)
ERYTHROCYTE [DISTWIDTH] IN BLOOD: 16.7 % (ref 11.5–15.5)
GLUCOSE BLD-MCNC: 197 MG/DL (ref 70–110)
GLUCOSE BLD-MCNC: 211 MG/DL (ref 70–110)
GLUCOSE BLD-MCNC: 213 MG/DL (ref 70–110)
GLUCOSE BLD-MCNC: 216 MG/DL (ref 70–110)
GLUCOSE SERPL-MCNC: 177 MG/DL (ref 74–99)
HCT VFR BLD AUTO: 29.6 % (ref 39–53)
HDLC SERPL-MCNC: 20.7 MG/DL (ref 40–60)
HGB BLD-MCNC: 9.1 GM/DL (ref 13–17.5)
LDLC SERPL CALC-MCNC: 12.4 MG/DL (ref 0–131)
LYMPHOCYTES # SPEC AUTO: 0.4 K/UL (ref 1–4.8)
LYMPHOCYTES NFR SPEC AUTO: 3 %
MAGNESIUM SPEC-SCNC: 2.8 MG/DL (ref 1.6–2.3)
MCH RBC QN AUTO: 29.5 PG (ref 25–35)
MCHC RBC AUTO-ENTMCNC: 30.8 G/DL (ref 31–37)
MCV RBC AUTO: 95.6 FL (ref 80–100)
MONOCYTES # BLD AUTO: 1 K/UL (ref 0–1)
MONOCYTES NFR BLD AUTO: 7 %
NEUTROPHILS # BLD AUTO: 13 K/UL (ref 1.3–7.7)
NEUTROPHILS NFR BLD AUTO: 89 %
PLATELET # BLD AUTO: 177 K/UL (ref 150–450)
POTASSIUM SERPL-SCNC: 4.1 MMOL/L (ref 3.5–5.1)
PROT SERPL-MCNC: 5.7 G/DL (ref 6.3–8.2)
SODIUM SERPL-SCNC: 145 MMOL/L (ref 137–145)
TRIGL SERPL-MCNC: 59.4 MG/DL (ref 0–149)
VLDLC SERPL CALC-MCNC: 11.88 MG/DL (ref 5–40)
WBC # BLD AUTO: 14.7 K/UL (ref 3.8–10.6)

## 2024-07-21 RX ADMIN — THERA TABS SCH: TAB at 19:04

## 2024-07-21 RX ADMIN — Medication SCH: at 08:35

## 2024-07-21 RX ADMIN — INSULIN DETEMIR SCH: 100 INJECTION, SOLUTION SUBCUTANEOUS at 00:21

## 2024-07-21 RX ADMIN — METOPROLOL SUCCINATE SCH: 50 TABLET, EXTENDED RELEASE ORAL at 08:36

## 2024-07-21 RX ADMIN — ISOSORBIDE MONONITRATE SCH: 30 TABLET, EXTENDED RELEASE ORAL at 08:36

## 2024-07-21 RX ADMIN — NITROGLYCERIN SCH INCH: 20 OINTMENT TOPICAL at 00:31

## 2024-07-21 RX ADMIN — BUPROPION HYDROCHLORIDE SCH: 150 TABLET, FILM COATED, EXTENDED RELEASE ORAL at 08:34

## 2024-07-21 RX ADMIN — CEFAZOLIN SCH MLS/HR: 330 INJECTION, POWDER, FOR SOLUTION INTRAMUSCULAR; INTRAVENOUS at 21:35

## 2024-07-21 RX ADMIN — DAPAGLIFLOZIN SCH: 10 TABLET, FILM COATED ORAL at 08:35

## 2024-07-21 RX ADMIN — ASPIRIN 325 MG ORAL TABLET SCH: 325 PILL ORAL at 08:36

## 2024-07-21 RX ADMIN — PIPERACILLIN AND TAZOBACTAM SCH MLS/HR: 3; .375 INJECTION, POWDER, FOR SOLUTION INTRAVENOUS at 12:18

## 2024-07-21 RX ADMIN — IPRATROPIUM BROMIDE AND ALBUTEROL SULFATE SCH ML: .5; 3 SOLUTION RESPIRATORY (INHALATION) at 08:14

## 2024-07-21 RX ADMIN — FOLIC ACID SCH: 1 TABLET ORAL at 19:04

## 2024-07-21 RX ADMIN — MEMANTINE HYDROCHLORIDE SCH: 10 TABLET ORAL at 08:36

## 2024-07-21 RX ADMIN — DEXTRAN 70 AND HYPROMELLOSE 2910 SCH: 1; 3 SOLUTION/ DROPS OPHTHALMIC at 08:34

## 2024-07-21 RX ADMIN — CLOPIDOGREL BISULFATE SCH: 75 TABLET ORAL at 00:21

## 2024-07-21 RX ADMIN — ATORVASTATIN CALCIUM SCH: 80 TABLET, FILM COATED ORAL at 00:21

## 2024-07-21 RX ADMIN — Medication SCH: at 08:36

## 2024-07-21 RX ADMIN — INSULIN ASPART SCH UNIT: 100 INJECTION, SOLUTION INTRAVENOUS; SUBCUTANEOUS at 06:39

## 2024-07-21 RX ADMIN — ASPIRIN SCH: 325 TABLET ORAL at 08:36

## 2024-07-21 NOTE — CA
Transthoracic Echo Report 

 Name: Martin Lau 

 MRN:    [id number] 

 Age:    83     Gender:     M 

 

 :    1940 

 Exam Date:     2024 17:11 

 Exam Location: [facility] 

 Ht (in):     69     Wt (lb):     175 

 Ordering Physician:        Nathaniel Adams MD 

 Attending/Referring Phys: 

 Technician         Carey Murray RDCS 

 Procedure CPT: 

 Indications:       Dyspnea, elevated troponin and D-dimer 

 

 Cardiac Hx: 

 Technical Quality:      Technically difficult study 

 Contrast 1:    Definity                    Total Dose (mL):      2 

 Contrast 2:                                Total Dose (mL): 

 

 MEASUREMENTS  (Male / Female) Normal Values 

 2D ECHO 

 LV Diastolic Diameter PLAX        3.8 cm                4.2 - 5.9 / 3.9 - 5.3 cm 

 LV Systolic Diameter PLAX         2.7 cm                 

 IVS Diastolic Thickness           1.4 cm                0.6 - 1.0 / 0.6 - 0.9 cm 

 LVPW Diastolic Thickness          1.7 cm                0.6 - 1.0 / 0.6 - 0.9 cm 

 LV Relative Wall Thickness        0.8                    

 RV Internal Dim ED PLAX           3.2 cm                 

 LVOT Diameter                     2.1 cm                 

 LA Volume                         113.7 cm???             18 - 58 / 22 - 52 cm??? 

 LA Volume Index                   57.5 cm???/m???           16 - 28 cm???/m??? 

 

 M-MODE 

 Aortic Root Diameter MM           3.0 cm                 

 LA Systolic Diameter MM           3.4 cm                 

 LA Ao Ratio MM                    1.2                    

 AV Cusp Separation MM             1.4 cm                 

 

 DOPPLER 

 AV Peak Velocity                  355.6 cm/s             

 AV Peak Gradient                  50.6 mmHg              

 AV Mean Velocity                  245.6 cm/s             

 AV Mean Gradient                  28.3 mmHg              

 AV Velocity Time Integral         80.4 cm                

 LVOT Peak Velocity                93.0 cm/s              

 LVOT Peak Gradient                3.5 mmHg               

 LVOT Velocity Time Integral       19.1 cm                

 LVOT Stroke Volume                63.6 cm???               

 LVOT Stroke Volume Index          32.6 ml/m???             

 LVOT Cardiac Index                2733.8 cm???/min???m???      

 AV Area Cont Eq vti               0.8 cm???                

 AV Area Cont Eq pk                0.9 cm???                

 MV Area PHT                       3.7 cm???                

 Mitral E Point Velocity           120.8 cm/s             

 Mitral A Point Velocity           33.2 cm/s              

 Mitral E to A Ratio               3.6                    

 MV Deceleration Time              204.1 ms               

 TR Peak Velocity                  240.7 cm/s             

 TR Peak Gradient                  23.2 mmHg              

 Right Ventricular Systolic Press  28.2 mmHg              

 

 

 FINDINGS 

 Left Ventricle 

 Moderately increased left ventricular wall thickness. Severely reduced global  

 left ventricular systolic function. Left ventricular ejection fraction is  

 estimated at 25-30 %. 

 

 Right Ventricle 

 Right ventricle not well visualized. Right ventricular systolic pressure within  

 normal limits. 

 

 Right Atrium 

 Normal right atrial size. 

 

 Left Atrium 

 Severely increased left atrial volume. Mildly increased left atrial area. 

 

 Mitral Valve 

 Mitral valve thickened. Severe mitral annular calcification. Severe mitral  

 regurgitation. Posteriorly directed mitral regurgitation jet. 

 

 Aortic Valve 

 Severe low-flow low-gradient aortic stenosis with a peak velocity of  3.6 m/s,  

 peak gradient 51 mmHg, mean gradient 28 mmHg, and estimated aortic valve area  

 of  0.9 cm??? with a stroke volume index of   64 cc/m2. Trace to mild aortic  

 regurgitation. 

 

 Tricuspid Valve 

 Structurally normal tricuspid valve. Mild tricuspid regurgitation. 

 

 Pulmonic Valve 

 Pulmonic valve not well visualized. 

 

 Pericardium 

 No pericardial effusion. 

 

 Aorta 

 Normal size aortic root and proximal ascending aorta. 

 

 CONCLUSIONS 

 Severe LV dysfunction, increased LV mass 

 Global LV dysfunction 

 Aortic stenosis in the setting of low-flow/severe 

 Previewed by:  

 Dr. Ernie Alvarado MD 

 (Electronically Signed) 

 Final Date:      2024 11:11

## 2024-07-21 NOTE — P.HPIM
History of Present Illness


H&P Date: 07/21/24


This is an 83-year-old male with medical history significant for dementia, 

diabetes mellitus, acid reflux, prior stroke, sleep apnea, aortic valve 

stenosis, chronic kidney disease, anemia, myocardial infarction with prior PCI, 

former smoker.  Patient was brought to the hospital by EMS from our nursing 

where he is a permanent resident with concern for cough with sputum production 

and having decreased oral intake history was gathered from the patient's wife as

he is a poor historian.  Patient has also had an worsening confusion and per 

family they felt like his dementia is likely progressing. Patient was recently 

diagnosed by pneumonia outpatient 2 days ago and he has been on Levaquin on an 

outpatient basis.  X-ray on admission shows bilateral lower lung airspace 

opacities correlate for pneumonia.  Initial blood work reveals a white blood 

cell count of 11.2, hemoglobin 9.8, INR 1.2, D-dimer of 3.29, BUN of 81, 

creatinine of 2.33, elevated LFTs and a troponin level elevated at 0.00, 1.860, 

1.980.  Patient's proBNP is also elevated at 95,200, his viral panel is negative

for influenza RSV and COVID.  Patient was admitted to the hospital with a 

consultation placed to pulmonary and cardiology services, patient has been 

started on IV heparin drip with concern for a non-ST elevation MI.  He was given

a dose of IV Lasix in the ER.  Patient was also given a dose of IV Zosyn and IV 

levofloxacin.  Patient is currently evaluated today on the medical floor he is 

alert x 1 pleasantly confused he is asking when he can leave the hospital.  He 

denies any acute complaints at this time denies that he is having any shortness 

of breath.  He is having some cough. Family has considered hospice have been 

consulted for informational session.





REVIEW OF SYSTEMS: 


CONSTITUTIONAL: No fever, no malaise, no fatigue. 


HEENT: No recent visual problems or hearing problems. Denied any sore throat. 


CARDIOVASCULAR: No chest pain, orthopnea, PND, no palpitations, no syncope. 


PULMONARY: No shortness of breath, no cough, no hemoptysis. 


GASTROINTESTINAL: No diarrhea, no nausea, no vomiting, no abdominal pain. 


NEUROLOGICAL: No headaches, no weakness, no numbness. 


HEMATOLOGICAL: Denies any bleeding or petechiae. 


GENITOURINARY: Denies any burning micturition, frequency, or urgency. 


MUSCULOSKELETAL/RHEUMATOLOGICAL: Denies any joint pain, swelling, or any muscle 

pain. 


ENDOCRINE: Denies any polyuria or polydipsia. 





The rest of the 14-point review of systems is negative.








PHYSICAL EXAMINATION: 





GENERAL: The patient is alert and oriented x1, not in any acute distress. Well 

developed, well nourished. 


HEENT: Pupils are round and equally reacting to light. EOMI. No scleral icterus.

No conjunctival pallor. Normocephalic, atraumatic. No pharyngeal erythema. No 

thyromegaly. 


CARDIOVASCULAR: S1 and S2 present. No murmurs, rubs, or gallops. 


PULMONARY: Chest is clear to auscultation, no wheezing or crackles. 


ABDOMEN: Soft, nontender, nondistended, normoactive bowel sounds. No palpable 

organomegaly. 


MUSCULOSKELETAL: No joint swelling or deformity.


EXTREMITIES: No cyanosis, clubbing, or pedal edema. 


NEUROLOGICAL: Gross neurological examination did not reveal any focal deficits. 


SKIN: No rashes. 





Assessment and plan


Pneumonia with sepsis failed outpatient therapy


Acute congestive heart failure diastolic and systolic dysfunction received a 

dose of IV Lasix


Acute kidney injury secondary to acute tubular necrosis of infection and volume 

overload


Chronic kidney disease


Coronary artery disease with prior myocardial infarction and percutaneous 

intervention


History of stroke


Diabetes mellitus type 2


History of advanced dementia


History of sleep apnea


Advanced dementia 


GI prophylaxis


DVT prophylaxis IV heparin


NO CODE





Plan


Procalcitonin level has been ordered and pending


Speech therapy has been consulted to rule out aspiration


Will continue the patient on IV Zosyn


Pulmonary consultation


Cardiology consultation for the troponin elevation patient will continue on IV 

heparin pending further recommendations


Repeat blood work


Hold Amlodipine


Discussed plan with patients daughter Vonda who is the power of , 

informational session with hospice at 130 today. If they decide on hospice 

family would like patient to return to [facility] with Hospice. 





More than 35 minutes have been spent on assessment and coordination of care with

patient and family.





The impression and plan of care has been dictated by Qiana Richmond Nurse 

Practitioner as directed.





Dr. Jesus MD


I have performed a history and physical examination and medical decision making 

of this patient, discussed the same with the dictator, and agree with the 

dictators assessment and plan as written, documented as a scribe. Based on total

visit time, I have performed more than 50% of this visit.








Past Medical History


Past Medical History: CVA/TIA, Dementia, Diabetes Mellitus, GERD/Reflux, 

Hyperlipidemia, Myocardial Infarction (MI), Osteoarthritis (OA), Renal Disease, 

Sleep Apnea/CPAP/BIPAP


Additional Past Medical History / Comment(s): Anemia, MI x 2, heart murmur, 

sleep apnea (no CPAP use), constipation, aortic valve stenosis, chronic kidney 

disease


Last Myocardial Infarction Date:: 3/28/19


History of Any Multi-Drug Resistant Organisms: None Reported


Past Surgical History: Heart Catheterization With Stent, Joint Replacement, 

Orthopedic Surgery


Additional Past Surgical History / Comment(s): 4 cardiac stents, total right 

knee replacement, monse cataracts with lens implants, left shoulder surgery, left 

hip replacement


Past Anesthesia/Blood Transfusion Reactions: No Reported Reaction


Date of Last Stent Placement:: 6/2020


Past Psychological History: Depression


Smoking Status: Former smoker


Past Alcohol Use History: None Reported


Additional Past Alcohol Use History / Comment(s): Quit smoking 1964, smoked for 

6-7 years


Past Drug Use History: None Reported





- Past Family History


  ** Father


Family Medical History: Myocardial Infarction (MI)





  ** Mother


Family Medical History: No Reported History





  ** Sister(s)


Family Medical History: Cancer





Medications and Allergies


                                Home Medications











 Medication  Instructions  Recorded  Confirmed  Type


 


Aspirin EC [Ecotrin Low Dose] 81 mg PO DAILY@1700 08/23/14 07/20/24 History


 


Docusate [Colace] 100 mg PO DAILY PRN 12/21/18 07/20/24 History


 


Metoprolol Succinate (ER) [Toprol 50 mg PO BID@0800,1700 12/21/18 07/20/24 

History





XL]    


 


Atorvastatin [Lipitor] 80 mg PO HS@2100 06/18/20 07/20/24 History


 


Clopidogrel [Plavix] 75 mg PO HS@2100 06/18/20 07/20/24 History


 


Fish Oil/Dha/Epa [Fish Oil 1,200 1 cap PO DAILY@0800 06/18/20 07/20/24 History





mg Fish Oil]    


 


Nitroglycerin Sl Tabs [Nitrostat] 0.4 mg SUBLINGUAL Q5M PRN #20 tab 06/21/20 07/20/24 Rx


 


Acetaminophen Tab [Tylenol] 650 mg PO Q6HR PRN  tab 07/01/20 07/20/24 Rx


 


Multivitamins, Thera [Multivitamin 1 tab PO DAILY@1700 07/10/20 07/20/24 History





(formulary)]    


 


Folic Acid 1 mg PO DAILY@1700 05/06/21 07/20/24 History


 


Potassium Chloride [Klor-Con M10] 10 meq PO DAILY@1700 05/06/21 07/20/24 History


 


Ferrous Sulfate [Iron (65  mg PO DAILY@0800 06/09/21 07/20/24 History





Elemental)]    


 


Isosorbide Mononitrate ER [Imdur] 30 mg PO DAILY@0800 06/09/21 07/20/24 History


 


Magnesium Hydroxide [Milk of 7,200 mg PO DAILY PRN 06/09/21 07/20/24 History





Magnesia Concentrate]    


 


bisacodyL [Dulcolax] 10 mg RECTAL DAILY PRN 06/09/21 07/20/24 History


 


Famotidine [Pepcid] 20 mg PO Q2D@1700 07/07/22 07/20/24 History


 


Loperamide [Imodium] 2 mg PO Q6H PRN 07/07/22 07/20/24 History


 


Mag Hydrox/Al Hydrox/Simeth 15 ml PO QID PRN 07/07/22 07/20/24 History





[Maalox]    


 


Mirabegron [Myrbetriq] 25 mg PO DAILY@0800 07/07/22 07/20/24 History


 


amLODIPine [Norvasc] 2.5 mg PO DAILY@1700 07/07/22 07/20/24 History


 


Carboxymethylcellulose Sodium 1 drop BOTH EYES TID@0800,1200,1700 08/08/23 07 /20/24 History





[Refresh Tears]    


 


Dapagliflozin Propanediol [Farxiga] 10 mg PO DAILY@0800 08/08/23 07/20/24 

History


 


Ensure Enlive 120 ml PO TID@0800,1200,1700 08/08/23 07/20/24 History


 


INSULIN ASPART (NovoLOG) [NovoLOG 3 unit SQ TID@0700,1100,1630 08/08/23 07/20/24

 History





(formulary)]    


 


INSULIN ASPART (NovoLOG) [NovoLOG See Protocol SQ ACHS 08/08/23 07/20/24 History





(formulary)]    


 


Insulin Glargine [Lantus Vial] 10 unit SQ HS@2130 08/08/23 07/20/24 History


 


Magnesium Oxide [Mag-Oxide] 200 mg PO BID@0800,1700 08/08/23 07/20/24 History


 


Memantine [Namenda] 10 mg PO DAILY@0800 08/08/23 07/20/24 History


 


buPROPion HCL [Wellbutrin XL] 150 mg PO DAILY@0800 08/12/23 07/20/24 History


 


Epoetin Henri [Procrit] 30,000 unit SQ Q21D 07/20/24 07/20/24 History


 


Levofloxacin [Levaquin] 250 mg PO DAILY@0800 07/20/24 07/20/24 History


 


Menthol-Zinc Oxide Oint 1 applic TOPICAL TID 07/20/24 07/20/24 History





[Calmoseptine Ointment]    


 


guaiFENesin [guaiFENesin Oral 600 mg PO Q6H PRN 07/20/24 07/20/24 History





Solution]    








                                    Allergies











Allergy/AdvReac Type Severity Reaction Status Date / Time


 


iodine Allergy Unknown Unknown Verified 08/12/23 16:41














Physical Exam


Vitals: 


                                   Vital Signs











  Temp Pulse Pulse Resp BP BP Pulse Ox


 


 07/21/24 08:31   64     


 


 07/21/24 08:17   60      97


 


 07/21/24 03:35  97.6 F   76  20   110/76  100


 


 07/20/24 23:31  97.6 F   73  27 H   110/66  100


 


 07/20/24 21:36  97.4 F L   71  28 H   111/70  100


 


 07/20/24 20:59   75   17  104/74   97


 


 07/20/24 20:39   82     


 


 07/20/24 20:00   69   20  110/71   94 L


 


 07/20/24 19:45   98   20    94 L


 


 07/20/24 19:00   54 L   20  119/72   93 L


 


 07/20/24 18:00   76   20  114/101   95


 


 07/20/24 17:00   88   20  116/89   93 L


 


 07/20/24 16:00  97.6 F  72   20  141/89   95


 


 07/20/24 15:32   67   18  110/73   95


 


 07/20/24 13:34     22   


 


 07/20/24 13:29  97.5 F L  57 L   18  106/58   97








                                Intake and Output











 07/20/24 07/21/24 07/21/24





 22:59 06:59 14:59


 


Intake Total 10 69.215 


 


Output Total  400 


 


Balance 10 -330.785 


 


Intake:   


 


  IV 10  


 


    Invasive Line 1 10  


 


  Intake, IV Titration  69.215 





  Amount   


 


    Heparin Sod,Pork in 0.45%  69.215 





    NaCl 25,000 unit In 0.45   





    % NaCl 1 250ml.bag @ 12   





    UNITS/KG/HR 9.525 mls/hr   





    IV .Q24H OPAL Rx#:   





    [id number]   


 


Output:   


 


  Urine  400 


 


Other:   


 


  Voiding Method External Catheter External Catheter 


 


  Weight  79.379 kg 














Results


CBC & Chem 7: 


                                 07/21/24 03:45





                                 07/21/24 10:12


Labs: 


                  Abnormal Lab Results - Last 24 Hours (Table)











  07/20/24 07/20/24 07/20/24 Range/Units





  14:02 14:02 14:02 


 


WBC  11.2 H    (3.8-10.6)  k/uL


 


RBC  3.33 L    (4.30-5.90)  m/uL


 


Hgb  9.8 L    (13.0-17.5)  gm/dL


 


Hct  30.3 L    (39.0-53.0)  %


 


RDW  16.6 H    (11.5-15.5)  %


 


Neutrophils #  10.3 H    (1.3-7.7)  k/uL


 


Lymphocytes #  0.5 L    (1.0-4.8)  k/uL


 


PT   12.7 H   (10.0-12.5)  sec


 


INR   1.2 H   (<1.2)  


 


APTT     (22.0-30.0)  sec


 


D-Dimer   3.29 H   (<0.60)  mg/L FEU


 


ABG pH     (7.35-7.45)  


 


ABG pCO2     (35-45)  mmHg


 


Carbon Dioxide    18 L  (22-30)  mmol/L


 


BUN    81 H  (9-20)  mg/dL


 


Creatinine    2.33 H  (0.66-1.25)  mg/dL


 


Glucose    278 H  (74-99)  mg/dL


 


POC Glucose (mg/dL)     ()  mg/dL


 


Plasma Lactic Acid Khurram     (0.7-2.0)  mmol/L


 


AST    62 H  (17-59)  U/L


 


ALT    51 H  (4-49)  U/L


 


Alkaline Phosphatase    141 H  ()  U/L


 


Troponin I     (0.000-0.034)  ng/mL


 


Albumin    3.2 L  (3.5-5.0)  g/dL














  07/20/24 07/20/24 07/20/24 Range/Units





  14:02 14:02 14:21 


 


WBC     (3.8-10.6)  k/uL


 


RBC     (4.30-5.90)  m/uL


 


Hgb     (13.0-17.5)  gm/dL


 


Hct     (39.0-53.0)  %


 


RDW     (11.5-15.5)  %


 


Neutrophils #     (1.3-7.7)  k/uL


 


Lymphocytes #     (1.0-4.8)  k/uL


 


PT     (10.0-12.5)  sec


 


INR     (<1.2)  


 


APTT     (22.0-30.0)  sec


 


D-Dimer     (<0.60)  mg/L FEU


 


ABG pH    7.51 H  (7.35-7.45)  


 


ABG pCO2    27 L  (35-45)  mmHg


 


Carbon Dioxide     (22-30)  mmol/L


 


BUN     (9-20)  mg/dL


 


Creatinine     (0.66-1.25)  mg/dL


 


Glucose     (74-99)  mg/dL


 


POC Glucose (mg/dL)     ()  mg/dL


 


Plasma Lactic Acid Khurram  3.8 H*    (0.7-2.0)  mmol/L


 


AST     (17-59)  U/L


 


ALT     (4-49)  U/L


 


Alkaline Phosphatase     ()  U/L


 


Troponin I   2.000 H*   (0.000-0.034)  ng/mL


 


Albumin     (3.5-5.0)  g/dL














  07/20/24 07/20/24 07/20/24 Range/Units





  17:11 19:15 20:23 


 


WBC     (3.8-10.6)  k/uL


 


RBC     (4.30-5.90)  m/uL


 


Hgb     (13.0-17.5)  gm/dL


 


Hct     (39.0-53.0)  %


 


RDW     (11.5-15.5)  %


 


Neutrophils #     (1.3-7.7)  k/uL


 


Lymphocytes #     (1.0-4.8)  k/uL


 


PT     (10.0-12.5)  sec


 


INR     (<1.2)  


 


APTT     (22.0-30.0)  sec


 


D-Dimer     (<0.60)  mg/L FEU


 


ABG pH     (7.35-7.45)  


 


ABG pCO2     (35-45)  mmHg


 


Carbon Dioxide     (22-30)  mmol/L


 


BUN     (9-20)  mg/dL


 


Creatinine     (0.66-1.25)  mg/dL


 


Glucose     (74-99)  mg/dL


 


POC Glucose (mg/dL)   186 H   ()  mg/dL


 


Plasma Lactic Acid Khurram  3.8 H*   3.5 H*  (0.7-2.0)  mmol/L


 


AST     (17-59)  U/L


 


ALT     (4-49)  U/L


 


Alkaline Phosphatase     ()  U/L


 


Troponin I     (0.000-0.034)  ng/mL


 


Albumin     (3.5-5.0)  g/dL














  07/20/24 07/20/24 07/21/24 Range/Units





  20:23 23:11 03:45 


 


WBC     (3.8-10.6)  k/uL


 


RBC     (4.30-5.90)  m/uL


 


Hgb     (13.0-17.5)  gm/dL


 


Hct     (39.0-53.0)  %


 


RDW     (11.5-15.5)  %


 


Neutrophils #     (1.3-7.7)  k/uL


 


Lymphocytes #     (1.0-4.8)  k/uL


 


PT     (10.0-12.5)  sec


 


INR     (<1.2)  


 


APTT    67.1 H  (22.0-30.0)  sec


 


D-Dimer     (<0.60)  mg/L FEU


 


ABG pH     (7.35-7.45)  


 


ABG pCO2     (35-45)  mmHg


 


Carbon Dioxide     (22-30)  mmol/L


 


BUN     (9-20)  mg/dL


 


Creatinine     (0.66-1.25)  mg/dL


 


Glucose     (74-99)  mg/dL


 


POC Glucose (mg/dL)     ()  mg/dL


 


Plasma Lactic Acid Khurram     (0.7-2.0)  mmol/L


 


AST     (17-59)  U/L


 


ALT     (4-49)  U/L


 


Alkaline Phosphatase     ()  U/L


 


Troponin I  1.860 H*  1.980 H*   (0.000-0.034)  ng/mL


 


Albumin     (3.5-5.0)  g/dL














  07/21/24 Range/Units





  06:00 


 


WBC   (3.8-10.6)  k/uL


 


RBC   (4.30-5.90)  m/uL


 


Hgb   (13.0-17.5)  gm/dL


 


Hct   (39.0-53.0)  %


 


RDW   (11.5-15.5)  %


 


Neutrophils #   (1.3-7.7)  k/uL


 


Lymphocytes #   (1.0-4.8)  k/uL


 


PT   (10.0-12.5)  sec


 


INR   (<1.2)  


 


APTT   (22.0-30.0)  sec


 


D-Dimer   (<0.60)  mg/L FEU


 


ABG pH   (7.35-7.45)  


 


ABG pCO2   (35-45)  mmHg


 


Carbon Dioxide   (22-30)  mmol/L


 


BUN   (9-20)  mg/dL


 


Creatinine   (0.66-1.25)  mg/dL


 


Glucose   (74-99)  mg/dL


 


POC Glucose (mg/dL)  213 H  ()  mg/dL


 


Plasma Lactic Acid Khurram   (0.7-2.0)  mmol/L


 


AST   (17-59)  U/L


 


ALT   (4-49)  U/L


 


Alkaline Phosphatase   ()  U/L


 


Troponin I   (0.000-0.034)  ng/mL


 


Albumin   (3.5-5.0)  g/dL














Thrombosis Risk Factor Assmnt





- Choose All That Apply


Any of the Below Risk Factors Present?: Yes


Each Factor Represents 1 point: Medical pt on bed rest


Other Risk Factors: Yes


Each Risk Factor Represents 3 Points: Age 75 years or older


Other congenital or acquired thrombophilia - If yes, enter type in comment: No


Thrombosis Risk Factor Assessment Total Risk Factor Score: 4


Thrombosis Risk Factor Assessment Level: Moderate Risk





Assessment and Plan


Time with Patient: Greater than 30

## 2024-07-21 NOTE — P.CNPUL
History of Present Illness


Consult date: 07/21/24


Requesting physician: Je Perez


Reason for consult: obstructive sleep apnea, other


Chief complaint: Rule out obstructive sleep apnea syndrome.


History of present illness: 





Pulmonary consult dated July 21st, 2024.


                                                                                

                                                  


83-year-old male seen in the emergency department on July 20.  He was brought 

there by EMS, for shortness of breath.  No history could be obtained from the 

patient.  All of the history is obtained from the medical record.  The ER 

documents that apparently he been having pneumonia for 2 days prior, and was 

receiving Levaquin.  The patient apparently was having a productive cough, with 

decreased oral intake.  He was also potentially aspirating and choking on his 

food.  I believe I was consulted for the possibility of a sleep apnea syndrome. 

Currently, the patient is not wearing his oxygen.  He should be on 2 L.  He is 

on IV heparin.  No IV fluids.  The patient apparently has a history of CVA, 

dementia, diabetes, acid reflux disease, hyperlipidemia, myocardial infarction, 

osteoarthritis, and a prior history of sleep apnea syndrome.  He also apparently

has a history of anemia, myocardial infarction x 2.  He has had stent 

placements.  He apparently has had a total of 4 stents placed.  He is a former 

smoker.  He suffers with depression.  I am not sure of the patient uses CPAP for

his sleep apnea syndrome.  White count 11.2, hemoglobin 9.8, hematocrit 30.3, 

and platelet count 331,000.  PTT is 67.1.  D-dimer 3.29.  A blood gas was done 

and showed a respiratory alkalosis.  pO2 was 83, pCO2 27, pH is 7.51.  Sodium 

141, potassium 4.6, chlorides 105, CO2 18, anion gap 18, BUN 81, creatinine 

2.33.  Glucose 213.  AST 62.  ALT 51.  Troponin was 2, 1.860, and 1.980.  N-

terminal proBNP was 95,200.  He tested negative for influenza, RSV, coronavirus.

 In my opinion, the chest x-ray is consistent with fluid overload.  This is 

especially the case given the N-terminal proBNP being 95,200.





Review of Systems





REVIEW OF SYSTEMS: Nothing could be obtained from the patient.


CONSTITUTIONAL: Weakness.


NEUROLOGIC: [ Negative.]


HEENT:  [ Negative.]


CARDIAC:  [Negative.]


PULMONARY: Shortness of breath.


GI:  [Negative.]


:  [Negative.]


RHEUMATOLOGIC: [ Negative.]


IMMUNOLOGIC: [ Negative.]


ENDOCRINE:  [Negative.  ] 


DERMATOLOGIC: [Negative.]








Past Medical History


Past Medical History: CVA/TIA, Dementia, Diabetes Mellitus, GERD/Reflux, 

Hyperlipidemia, Myocardial Infarction (MI), Osteoarthritis (OA), Renal Disease, 

Sleep Apnea/CPAP/BIPAP


Additional Past Medical History / Comment(s): Anemia, MI x 2, heart murmur, 

sleep apnea (no CPAP use), constipation, aortic valve stenosis, chronic kidney 

disease


Last Myocardial Infarction Date:: 3/28/19


History of Any Multi-Drug Resistant Organisms: None Reported


Past Surgical History: Heart Catheterization With Stent, Joint Replacement, 

Orthopedic Surgery


Additional Past Surgical History / Comment(s): 4 cardiac stents, total right 

knee replacement, monse cataracts with lens implants, left shoulder surgery, left 

hip replacement


Past Anesthesia/Blood Transfusion Reactions: No Reported Reaction


Date of Last Stent Placement:: 6/2020


Past Psychological History: Depression


Smoking Status: Former smoker


Past Alcohol Use History: None Reported


Additional Past Alcohol Use History / Comment(s): Quit smoking 1964, smoked for 

6-7 years


Past Drug Use History: None Reported





- Past Family History


  ** Father


Family Medical History: Myocardial Infarction (MI)





  ** Mother


Family Medical History: No Reported History





  ** Sister(s)


Family Medical History: Cancer





Medications and Allergies


                                Home Medications











 Medication  Instructions  Recorded  Confirmed  Type


 


Aspirin EC [Ecotrin Low Dose] 81 mg PO DAILY@1700 08/23/14 07/20/24 History


 


Docusate [Colace] 100 mg PO DAILY PRN 12/21/18 07/20/24 History


 


Metoprolol Succinate (ER) [Toprol 50 mg PO BID@0800,1700 12/21/18 07/20/24 

History





XL]    


 


Atorvastatin [Lipitor] 80 mg PO HS@2100 06/18/20 07/20/24 History


 


Clopidogrel [Plavix] 75 mg PO HS@2100 06/18/20 07/20/24 History


 


Fish Oil/Dha/Epa [Fish Oil 1,200 1 cap PO DAILY@0800 06/18/20 07/20/24 History





mg Fish Oil]    


 


Nitroglycerin Sl Tabs [Nitrostat] 0.4 mg SUBLINGUAL Q5M PRN #20 tab 06/21/20 07/20/24 Rx


 


Acetaminophen Tab [Tylenol] 650 mg PO Q6HR PRN  tab 07/01/20 07/20/24 Rx


 


Multivitamins, Thera [Multivitamin 1 tab PO DAILY@1700 07/10/20 07/20/24 History





(formulary)]    


 


Folic Acid 1 mg PO DAILY@1700 05/06/21 07/20/24 History


 


Potassium Chloride [Klor-Con M10] 10 meq PO DAILY@1700 05/06/21 07/20/24 History


 


Ferrous Sulfate [Iron (65  mg PO DAILY@0800 06/09/21 07/20/24 History





Elemental)]    


 


Isosorbide Mononitrate ER [Imdur] 30 mg PO DAILY@0800 06/09/21 07/20/24 History


 


Magnesium Hydroxide [Milk of 7,200 mg PO DAILY PRN 06/09/21 07/20/24 History





Magnesia Concentrate]    


 


bisacodyL [Dulcolax] 10 mg RECTAL DAILY PRN 06/09/21 07/20/24 History


 


Famotidine [Pepcid] 20 mg PO Q2D@1700 07/07/22 07/20/24 History


 


Loperamide [Imodium] 2 mg PO Q6H PRN 07/07/22 07/20/24 History


 


Mag Hydrox/Al Hydrox/Simeth 15 ml PO QID PRN 07/07/22 07/20/24 History





[Maalox]    


 


Mirabegron [Myrbetriq] 25 mg PO DAILY@0800 07/07/22 07/20/24 History


 


amLODIPine [Norvasc] 2.5 mg PO DAILY@1700 07/07/22 07/20/24 History


 


Carboxymethylcellulose Sodium 1 drop BOTH EYES TID@0800,1200,1700 08/08/23 07/20/24 History





[Refresh Tears]    


 


Dapagliflozin Propanediol [Farxiga] 10 mg PO DAILY@0800 08/08/23 07/20/24 

History


 


Ensure Enlive 120 ml PO TID@0800,1200,1700 08/08/23 07/20/24 History


 


INSULIN ASPART (NovoLOG) [NovoLOG 3 unit SQ TID@0700,1100,1630 08/08/23 07/20/24

 History





(formulary)]    


 


INSULIN ASPART (NovoLOG) [NovoLOG See Protocol SQ ACHS 08/08/23 07/20/24 History





(formulary)]    


 


Insulin Glargine [Lantus Vial] 10 unit SQ HS@2130 08/08/23 07/20/24 History


 


Magnesium Oxide [Mag-Oxide] 200 mg PO BID@0800,1700 08/08/23 07/20/24 History


 


Memantine [Namenda] 10 mg PO DAILY@0800 08/08/23 07/20/24 History


 


buPROPion HCL [Wellbutrin XL] 150 mg PO DAILY@0800 08/12/23 07/20/24 History


 


Epoetin Henri [Procrit] 30,000 unit SQ Q21D 07/20/24 07/20/24 History


 


Levofloxacin [Levaquin] 250 mg PO DAILY@0800 07/20/24 07/20/24 History


 


Menthol-Zinc Oxide Oint 1 applic TOPICAL TID 07/20/24 07/20/24 History





[Calmoseptine Ointment]    


 


guaiFENesin [guaiFENesin Oral 600 mg PO Q6H PRN 07/20/24 07/20/24 History





Solution]    








                                    Allergies











Allergy/AdvReac Type Severity Reaction Status Date / Time


 


iodine Allergy Unknown Unknown Verified 08/12/23 16:41














Physical Exam


Osteopathic Statement: *.  No significant issues noted on an osteopathic 

structural exam other than those noted in the History and Physical/Consult.


Vitals: 


                                   Vital Signs











  Temp Pulse Pulse Resp BP BP Pulse Ox


 


 07/21/24 03:35  97.6 F   76  20   110/76  100


 


 07/20/24 23:31  97.6 F   73  27 H   110/66  100


 


 07/20/24 21:36  97.4 F L   71  28 H   111/70  100


 


 07/20/24 20:59   75   17  104/74   97


 


 07/20/24 20:39   82     


 


 07/20/24 20:00   69   20  110/71   94 L


 


 07/20/24 19:45   98   20    94 L


 


 07/20/24 19:00   54 L   20  119/72   93 L


 


 07/20/24 18:00   76   20  114/101   95


 


 07/20/24 17:00   88   20  116/89   93 L


 


 07/20/24 16:00  97.6 F  72   20  141/89   95


 


 07/20/24 15:32   67   18  110/73   95


 


 07/20/24 13:34     22   


 


 07/20/24 13:29  97.5 F L  57 L   18  106/58   97








                                Intake and Output











 07/20/24 07/21/24 07/21/24





 22:59 06:59 14:59


 


Intake Total 10 69.215 


 


Output Total  400 


 


Balance 10 -330.785 


 


Intake:   


 


  IV 10  


 


    Invasive Line 1 10  


 


  Intake, IV Titration  69.215 





  Amount   


 


    Heparin Sod,Pork in 0.45%  69.215 





    NaCl 25,000 unit In 0.45   





    % NaCl 1 250ml.bag @ 12   





    UNITS/KG/HR 9.525 mls/hr   





    IV .Q24H [facility] Rx#:   





    [id number]   


 


Output:   


 


  Urine  400 


 


Other:   


 


  Voiding Method External Catheter External Catheter 


 


  Weight  79.379 kg 

















No acute distress, confused, poor historian, not wearing O2.





HEENT examination is grossly unremarkable.  Mucous membranes are dry.





Neck supple.  Full range of motion.  No adenopathy thyromegaly or neck vein 

distention.





Cardiovascular examination reveals regular rhythm rate.  S1-S2 normal.  No S3 or

S4.  No discernible murmur noted.  Heart rate is 76 bpm.





Lungs reveal mild scattered rhonchi.  No wheezes.  No crackles.  He does not 

take deep breaths.  Breath sounds are equal.  Saturations are 100% on 2 L, as 

documented.





Abdomen soft bowel sounds are heard.  No masses or tenderness.





Extremities are intact.  No cyanosis clubbing or edema.





Skin is without rash or lesion.





Neurologic examination is difficult to evaluate at this time.





Results





- Laboratory Findings


CBC and BMP: 


                                 07/20/24 14:02





                                 07/20/24 14:02


ABG











ABG pH  7.51  (7.35-7.45)  H  07/20/24  14:21    


 


ABG pCO2  27 mmHg (35-45)  L  07/20/24  14:21    


 


ABG pO2  83 mmHg ()   07/20/24  14:21    


 


ABG O2 Saturation  96.9 % (94-97)   07/20/24  14:21    





PT/INR, D-dimer











PT  12.7 sec (10.0-12.5)  H  07/20/24  14:02    


 


INR  1.2  (<1.2)  H  07/20/24  14:02    


 


D-Dimer  3.29 mg/L FEU (<0.60)  H  07/20/24  14:02    








Abnormal lab findings: 


                                  Abnormal Labs











  07/20/24 07/20/24 07/20/24





  14:02 14:02 14:02


 


WBC  11.2 H  


 


RBC  3.33 L  


 


Hgb  9.8 L  


 


Hct  30.3 L  


 


RDW  16.6 H  


 


Neutrophils #  10.3 H  


 


Lymphocytes #  0.5 L  


 


PT   12.7 H 


 


INR   1.2 H 


 


APTT   


 


D-Dimer   3.29 H 


 


ABG pH   


 


ABG pCO2   


 


Carbon Dioxide    18 L


 


BUN    81 H


 


Creatinine    2.33 H


 


Glucose    278 H


 


POC Glucose (mg/dL)   


 


Plasma Lactic Acid Khurram   


 


AST    62 H


 


ALT    51 H


 


Alkaline Phosphatase    141 H


 


Troponin I   


 


Albumin    3.2 L














  07/20/24 07/20/24 07/20/24





  14:02 14:02 14:21


 


WBC   


 


RBC   


 


Hgb   


 


Hct   


 


RDW   


 


Neutrophils #   


 


Lymphocytes #   


 


PT   


 


INR   


 


APTT   


 


D-Dimer   


 


ABG pH    7.51 H


 


ABG pCO2    27 L


 


Carbon Dioxide   


 


BUN   


 


Creatinine   


 


Glucose   


 


POC Glucose (mg/dL)   


 


Plasma Lactic Acid Khurram  3.8 H*  


 


AST   


 


ALT   


 


Alkaline Phosphatase   


 


Troponin I   2.000 H* 


 


Albumin   














  07/20/24 07/20/24 07/20/24





  17:11 19:15 20:23


 


WBC   


 


RBC   


 


Hgb   


 


Hct   


 


RDW   


 


Neutrophils #   


 


Lymphocytes #   


 


PT   


 


INR   


 


APTT   


 


D-Dimer   


 


ABG pH   


 


ABG pCO2   


 


Carbon Dioxide   


 


BUN   


 


Creatinine   


 


Glucose   


 


POC Glucose (mg/dL)   186 H 


 


Plasma Lactic Acid Khurram  3.8 H*   3.5 H*


 


AST   


 


ALT   


 


Alkaline Phosphatase   


 


Troponin I   


 


Albumin   














  07/20/24 07/20/24 07/21/24





  20:23 23:11 03:45


 


WBC   


 


RBC   


 


Hgb   


 


Hct   


 


RDW   


 


Neutrophils #   


 


Lymphocytes #   


 


PT   


 


INR   


 


APTT    67.1 H


 


D-Dimer   


 


ABG pH   


 


ABG pCO2   


 


Carbon Dioxide   


 


BUN   


 


Creatinine   


 


Glucose   


 


POC Glucose (mg/dL)   


 


Plasma Lactic Acid Khurram   


 


AST   


 


ALT   


 


Alkaline Phosphatase   


 


Troponin I  1.860 H*  1.980 H* 


 


Albumin   














  07/21/24





  06:00


 


WBC 


 


RBC 


 


Hgb 


 


Hct 


 


RDW 


 


Neutrophils # 


 


Lymphocytes # 


 


PT 


 


INR 


 


APTT 


 


D-Dimer 


 


ABG pH 


 


ABG pCO2 


 


Carbon Dioxide 


 


BUN 


 


Creatinine 


 


Glucose 


 


POC Glucose (mg/dL)  213 H


 


Plasma Lactic Acid Khurram 


 


AST 


 


ALT 


 


Alkaline Phosphatase 


 


Troponin I 


 


Albumin 














- Diagnostic Findings


Chest x-ray: image reviewed





Assessment and Plan


Assessment: 





Probable non-ST segment elevation myocardial infarction, with fluid 

overload/CHF.





Apparent prior diagnosis of obstructive sleep apnea syndrome.





History of CVA.





History of diabetes mellitus.





History of CAD with previous stent placements x 4.





History of myocardial infarction.





History of hyperlipidemia.





Apparent history of sleep apnea syndrome.





Chronic kidney disease.





Aortic valve stenosis.





Previous history of tobacco use.





History of depression.





Multiple other medical problems and comorbidities.


Plan: 





Plan dated July 21, 2024.





Not much history could be obtained from this patient.  The patient apparently 

has a diagnosis of sleep apnea.  He may or may not be using CPAP.  Will need 

outpatient evaluation.  If he does have a CPAP device at home, it should be 

brought in by family members.  Additional recommendations and suggestions are 

forthcoming.  Will follow along.  Prognosis is poor.


Time with Patient: Greater than 30

## 2024-07-21 NOTE — P.CRDCN
History of Present Illness


History of present illness: 





HISTORY OF PRESENT ILLNESS:  





This is a 83-year-old male with a past medical history significant for coronary 

artery disease with previous stenting, hypertension, hyperlipidemia, severe aor

tic stenosis, and former nicotine dependence. Patient follows in the office with

Dr. Mast. We have been asked to see the patient in consultation for elevated 

troponins. Patient examined at the bedside.  The patient is a poor historian and

is unable to give significant history.  There is no family present at the time 

of examination.  According to ER documentation, the patient was recently 

diagnosed with pneumonia and was brought to the hospital due to worsening 

shortness of breath, altered mental status, and decreased oral intake.  The 

patient currently denies any chest pain or pressure.  He denies any shortness of

breath.  The patient was found to have elevated troponins and he was started on 

IV heparin.





DIAGNOSTICS:


- EKG reveals sinus mechanism with right bundle branch block.  No signs of acute

ischemia..


- Chest xray low lung volumes with increased bilateral lower lung airspace opac

ities.  Correlate for pneumonia.


- Laboratory data: WBC 14.7.  Hemoglobin 9.1.  Platelet count 177.  Sodium 141. 

Potassium 4.6.  BUN 81.  Creatinine 2.33.  AST 62.  ALT 51.  proBNP 95,200.  

Troponin 2.0.  1.860.  1.980.


- Current home cardiac medications include metoprolol succinate 50 mg twice a d

ay, amlodipine 2.5 mg daily, Plavix 75 mg at night, Lipitor 80 mg at night, 

Imdur 30 mg daily, aspirin 81 mg daily.


- Most recent echocardiogram obtained in August 2023 reveals ejection fraction 

50%, hypokinesis of mid to basal inferior lateral wall, severe aortic stenosis, 

mild TR. 


- Cardiac catheterization history: June 2020 revealing heavily calcified 

coronary arteries, critical stenosis involving proximal RCA, moderate in-stent 

restenosis of proximal LAD with moderate to significant disease in the proximal 

left circumflex.  Patient underwent angioplasty and stenting of the RCA.





REVIEW OF SYSTEMS: 


At the time of my exam:


Unable to obtain the review of systems secondary to altered mental status





PHYSICAL EXAM: 


VITAL SIGNS: Reviewed.


GENERAL: Well-developed in no acute distress. 


HEENT: Head is normocephalic. Pupils are equal, round. Sclerae anicteric. Mucous

membranes of the mouth are moist. Neck supple. No JVD or thyromegaly


LUNGS: Respirations even and unlabored. Lungs with bilateral rhonchi


HEART: Regular rate and rhythm.  S1 and S2 heard.  Systolic murmur noted


ABDOMEN: Soft. Nondistended. Nontender.


EXTREMITIES: Normal range of motion.  No clubbing or cyanosis.  Peripheral 

pulses intact.  No lower extremity edema


NEUROLOGIC: Awake and alert. Oriented x 1. 





ASSESSMENT: 


Recent outpatient diagnosis of pneumonia on antibiotics


Leukocytosis


Coronary artery disease with previous stenting, most recently to the RCA in 2020


No clinical evidence for fluid overload/CHF on examination, despite elevated 

BNP, patient without edema or crackles on exam


Severe aortic stenosis


Abnormal troponins, flat, likely type II MI secondary to oxygen supply and 

demand mismatch


Acute on chronic kidney disease


Hypertension


Hyperlipidemia


Former nicotine dependence


Reported obstructive sleep apnea





PLAN: 


Obtain 2D echo to assess cardiac structure and function


Continue IV heparin for an additional 24 hours


Continue home cardiac medications


Hospice has been consulted for evaluation


Continue with medical management at this time.  No plan for cardiac 

catheterization.


Further recommendations pending patient course








Nurse practitioner note has been reviewed by physician. Signing provider agrees 

with the documented findings, assessment, and plan of care documented by NP as a

scribe.








Past Medical History


Past Medical History: CVA/TIA, Dementia, Diabetes Mellitus, GERD/Reflux, 

Hyperlipidemia, Myocardial Infarction (MI), Osteoarthritis (OA), Renal Disease, 

Sleep Apnea/CPAP/BIPAP


Additional Past Medical History / Comment(s): Anemia, MI x 2, heart murmur, 

sleep apnea (no CPAP use), constipation, aortic valve stenosis, chronic kidney 

disease


Last Myocardial Infarction Date:: 3/28/19


History of Any Multi-Drug Resistant Organisms: None Reported


Past Surgical History: Heart Catheterization With Stent, Joint Replacement, 

Orthopedic Surgery


Additional Past Surgical History / Comment(s): 4 cardiac stents, total right 

knee replacement, monse cataracts with lens implants, left shoulder surgery, left 

hip replacement


Past Anesthesia/Blood Transfusion Reactions: No Reported Reaction


Date of Last Stent Placement:: 6/2020


Past Psychological History: Depression


Smoking Status: Former smoker


Past Alcohol Use History: None Reported


Additional Past Alcohol Use History / Comment(s): Quit smoking 1964, smoked for 

6-7 years


Past Drug Use History: None Reported





- Past Family History


  ** Father


Family Medical History: Myocardial Infarction (MI)





  ** Mother


Family Medical History: No Reported History





  ** Sister(s)


Family Medical History: Cancer





Medications and Allergies


                                Home Medications











 Medication  Instructions  Recorded  Confirmed  Type


 


Aspirin EC [Ecotrin Low Dose] 81 mg PO DAILY@1700 08/23/14 07/20/24 History


 


Docusate [Colace] 100 mg PO DAILY PRN 12/21/18 07/20/24 History


 


Metoprolol Succinate (ER) [Toprol 50 mg PO BID@0800,1700 12/21/18 07/20/24 

History





XL]    


 


Atorvastatin [Lipitor] 80 mg PO HS@2100 06/18/20 07/20/24 History


 


Clopidogrel [Plavix] 75 mg PO HS@2100 06/18/20 07/20/24 History


 


Fish Oil/Dha/Epa [Fish Oil 1,200 1 cap PO DAILY@0800 06/18/20 07/20/24 History





mg Fish Oil]    


 


Nitroglycerin Sl Tabs [Nitrostat] 0.4 mg SUBLINGUAL Q5M PRN #20 tab 06/21/20 07/20/24 Rx


 


Acetaminophen Tab [Tylenol] 650 mg PO Q6HR PRN  tab 07/01/20 07/20/24 Rx


 


Multivitamins, Thera [Multivitamin 1 tab PO DAILY@1700 07/10/20 07/20/24 History





(formulary)]    


 


Folic Acid 1 mg PO DAILY@1700 05/06/21 07/20/24 History


 


Potassium Chloride [Klor-Con M10] 10 meq PO DAILY@1700 05/06/21 07/20/24 History


 


Ferrous Sulfate [Iron (65  mg PO DAILY@0800 06/09/21 07/20/24 History





Elemental)]    


 


Isosorbide Mononitrate ER [Imdur] 30 mg PO DAILY@0800 06/09/21 07/20/24 History


 


Magnesium Hydroxide [Milk of 7,200 mg PO DAILY PRN 06/09/21 07/20/24 History





Magnesia Concentrate]    


 


bisacodyL [Dulcolax] 10 mg RECTAL DAILY PRN 06/09/21 07/20/24 History


 


Famotidine [Pepcid] 20 mg PO Q2D@1700 07/07/22 07/20/24 History


 


Loperamide [Imodium] 2 mg PO Q6H PRN 07/07/22 07/20/24 History


 


Mag Hydrox/Al Hydrox/Simeth 15 ml PO QID PRN 07/07/22 07/20/24 History





[Maalox]    


 


Mirabegron [Myrbetriq] 25 mg PO DAILY@0800 07/07/22 07/20/24 History


 


amLODIPine [Norvasc] 2.5 mg PO DAILY@1700 07/07/22 07/20/24 History


 


Carboxymethylcellulose Sodium 1 drop BOTH EYES TID@0800,1200,1700 08/08/23 07/20/24 History





[Refresh Tears]    


 


Dapagliflozin Propanediol [Farxiga] 10 mg PO DAILY@0800 08/08/23 07/20/24 

History


 


Ensure Enlive 120 ml PO TID@0800,1200,1700 08/08/23 07/20/24 History


 


INSULIN ASPART (NovoLOG) [NovoLOG 3 unit SQ TID@0700,1100,1630 08/08/23 07/20/24

 History





(formulary)]    


 


INSULIN ASPART (NovoLOG) [NovoLOG See Protocol SQ ACHS 08/08/23 07/20/24 History





(formulary)]    


 


Insulin Glargine [Lantus Vial] 10 unit SQ HS@2130 08/08/23 07/20/24 History


 


Magnesium Oxide [Mag-Oxide] 200 mg PO BID@0800,1700 08/08/23 07/20/24 History


 


Memantine [Namenda] 10 mg PO DAILY@0800 08/08/23 07/20/24 History


 


buPROPion HCL [Wellbutrin XL] 150 mg PO DAILY@0800 08/12/23 07/20/24 History


 


Epoetin Henri [Procrit] 30,000 unit SQ Q21D 07/20/24 07/20/24 History


 


Levofloxacin [Levaquin] 250 mg PO DAILY@0800 07/20/24 07/20/24 History


 


Menthol-Zinc Oxide Oint 1 applic TOPICAL TID 07/20/24 07/20/24 History





[Calmoseptine Ointment]    


 


guaiFENesin [guaiFENesin Oral 600 mg PO Q6H PRN 07/20/24 07/20/24 History





Solution]    








                                    Allergies











Allergy/AdvReac Type Severity Reaction Status Date / Time


 


iodine Allergy Unknown Unknown Verified 08/12/23 16:41














Physical Exam


Vitals: 


                                   Vital Signs











  Temp Pulse Pulse Resp BP BP Pulse Ox


 


 07/21/24 08:31   64     


 


 07/21/24 08:17   60      97


 


 07/21/24 08:00  97.4 F L   90  20   124/78  98


 


 07/21/24 03:35  97.6 F   76  20   110/76  100


 


 07/20/24 23:31  97.6 F   73  27 H   110/66  100


 


 07/20/24 21:36  97.4 F L   71  28 H   111/70  100


 


 07/20/24 20:59   75   17  104/74   97


 


 07/20/24 20:39   82     


 


 07/20/24 20:00   69   20  110/71   94 L


 


 07/20/24 19:45   98   20    94 L


 


 07/20/24 19:00   54 L   20  119/72   93 L


 


 07/20/24 18:00   76   20  114/101   95


 


 07/20/24 17:00   88   20  116/89   93 L


 


 07/20/24 16:00  97.6 F  72   20  141/89   95


 


 07/20/24 15:32   67   18  110/73   95


 


 07/20/24 13:34     22   


 


 07/20/24 13:29  97.5 F L  57 L   18  106/58   97








                                Intake and Output











 07/20/24 07/21/24 07/21/24





 22:59 06:59 14:59


 


Intake Total 10 69.215 


 


Output Total  400 


 


Balance 10 -330.785 


 


Intake:   


 


  IV 10  


 


    Invasive Line 1 10  


 


  Intake, IV Titration  69.215 





  Amount   


 


    Heparin Sod,Pork in 0.45%  69.215 





    NaCl 25,000 unit In 0.45   





    % NaCl 1 250ml.bag @ 12   





    UNITS/KG/HR 9.525 mls/hr   





    IV .Q24H formerly [facility] Rx#:   





    [id number]   


 


Output:   


 


  Urine  400 


 


Other:   


 


  Voiding Method External Catheter External Catheter External Catheter


 


  Weight  79.379 kg 














Results





                                 07/21/24 03:45





                                 07/20/24 14:02


                                 Cardiac Enzymes











  07/20/24 07/20/24 07/20/24 Range/Units





  14:02 14:02 20:23 


 


AST  62 H    (17-59)  U/L


 


Troponin I   2.000 H*  1.860 H*  (0.000-0.034)  ng/mL














  07/20/24 Range/Units





  23:11 


 


AST   (17-59)  U/L


 


Troponin I  1.980 H*  (0.000-0.034)  ng/mL








                                   Coagulation











  07/20/24 07/21/24 07/21/24 Range/Units





  14:02 03:45 10:12 


 


PT  12.7 H    (10.0-12.5)  sec


 


APTT  25.4  67.1 H  49.9 H  (22.0-30.0)  sec








                                     Lipids











  07/21/24 Range/Units





  03:45 


 


Triglycerides  59.40  (0..00)  mg/dL


 


Cholesterol  45.00  (0..00)  mg/dL


 


HDL Cholesterol  20.70 L  (40.00-60.00)  mg/dL


 


Cholesterol/HDL Ratio  2.17  Ratio








                                       CBC











  07/20/24 07/21/24 Range/Units





  14:02 03:45 


 


WBC  11.2 H  14.7 H  (3.8-10.6)  k/uL


 


RBC  3.33 L  3.09 L  (4.30-5.90)  m/uL


 


Hgb  9.8 L  9.1 L  (13.0-17.5)  gm/dL


 


Hct  30.3 L  29.6 L  (39.0-53.0)  %


 


Plt Count  231  177  (150-450)  k/uL








                          Comprehensive Metabolic Panel











  07/20/24 Range/Units





  14:02 


 


Sodium  141  (137-145)  mmol/L


 


Potassium  4.6  (3.5-5.1)  mmol/L


 


Chloride  105  ()  mmol/L


 


Carbon Dioxide  18 L  (22-30)  mmol/L


 


BUN  81 H  (9-20)  mg/dL


 


Creatinine  2.33 H  (0.66-1.25)  mg/dL


 


Glucose  278 H  (74-99)  mg/dL


 


Calcium  8.5  (8.4-10.2)  mg/dL


 


AST  62 H  (17-59)  U/L


 


ALT  51 H  (4-49)  U/L


 


Alkaline Phosphatase  141 H  ()  U/L


 


Total Protein  6.3  (6.3-8.2)  g/dL


 


Albumin  3.2 L  (3.5-5.0)  g/dL








                               Current Medications











Generic Name Dose Route Start Last Admin





  Trade Name Freq  PRN Reason Stop Dose Admin


 


Acetaminophen  650 mg  07/20/24 19:49 





  Acetaminophen Tab 325 Mg Tab  PO  





  Q6HR PRN  





  Mild Pain or Fever > 100.5  


 


Al Hydroxide/Mg Hydroxide  15 ml  07/20/24 19:49 





  Mag Hydrox/Al Hydrox/Simeth 30 Ml Cup  PO  





  QID PRN  





  Heartburn  


 


Albuterol/Ipratropium  3 ml  07/21/24 08:00  07/21/24 08:14





  Ipratropium-Albuterol 3 Ml Neb  INHALATION   3 ml





  RT-QID formerly [facility]   Administration


 


Artificial Tears  1 drops  07/21/24 08:00  07/21/24 08:34





  Artificial Tears-Hypromellose Drops 15 Ml Btl  BOTH EYES   Not Given





  TID@0800,1200,1700 formerly [facility]  


 


Aspirin  81 mg  07/22/24 09:00 





  Aspirin 81 Mg  PO  





  DAILY formerly [facility]  


 


Atorvastatin Calcium  80 mg  07/20/24 21:00  07/21/24 00:21





  Atorvastatin 80 Mg Tab  PO   Not Given





  HS@2100 formerly [facility]  


 


Bisacodyl  10 mg  07/20/24 19:49 





  Bisacodyl 10 Mg Supp  RECTAL  





  DAILY PRN  





  Constipation  


 


Bupropion HCl  150 mg  07/21/24 08:00  07/21/24 08:34





  Bupropion Xl 150 Mg Tab.Er.24h  PO   Not Given





  DAILY@0800 formerly [facility]  


 


Clopidogrel Bisulfate  75 mg  07/20/24 21:00  07/21/24 00:21





  Clopidogrel 75 Mg Tab  PO   Not Given





  HS@2100 formerly [facility]  


 


Docusate Sodium  100 mg  07/20/24 19:49 





  Docusate 100 Mg Cap  PO  





  DAILY PRN  





  Constipation  


 


Famotidine  20 mg  07/22/24 17:00 





  Famotidine 20 Mg Tab  PO  





  Q2D@1700 formerly [facility]  


 


Ferrous Sulfate  325 mg  07/21/24 08:00  07/21/24 08:35





  Ferrous Sulfate 325 Mg Tab  PO   Not Given





  DAILY@0800 formerly [facility]  


 


Folic Acid  1 mg  07/21/24 17:00 





  Folic Acid 1 Mg Tab  PO  





  DAILY@1700 formerly [facility]  


 


Guaifenesin  600 mg  07/20/24 19:49 





  Guaifenesin Syrup 100mg/5ml 200 Mg/10 Ml Cup  PO  





  Q6H PRN  





  Cough  


 


Heparin Sodium/Sodium Chloride  250 mls @ 9.525 mls/hr  07/20/24 20:00  07/21/24

04:57





  25,000 unit/ Sodium Chloride  IV   10 units/kg/hr





  .Q24H formerly [facility]   7.938 mls/hr





    Titration





  Protocol  





  12 UNITS/KG/HR  


 


Piperacillin Sod/Tazobactam  100 mls @ 25 mls/hr  07/21/24 10:00 





  Sod 3.375 gm/ Sodium Chloride  IVPB  





  Q8H formerly [facility]  





  Protocol  


 


Insulin Aspart  3 unit  07/21/24 07:00  07/21/24 06:39





  Insulin Aspart (Novolog) 100 Unit/Ml Vial  SQ   3 unit





  TID@0700,1100,1630 formerly [facility]   Administration


 


Insulin Detemir  10 unit  07/20/24 21:30  07/21/24 00:21





  Insulin Detemir (Levemir) 100 Unit/Ml Syr  SQ   Not Given





  HS@2130 formerly [facility]  


 


Isosorbide Mononitrate  30 mg  07/21/24 08:00  07/21/24 08:36





  Isosorbide Mononitrate Er 30 Mg Tab.Er.24h  PO   Not Given





  DAILY@0800 formerly [facility]  


 


Loperamide HCl  2 mg  07/20/24 19:49 





  Loperamide 2 Mg Cap  PO  





  Q6H PRN  





  Loose Stool  


 


Magnesium Hydroxide  2,400 mg  07/20/24 19:49 





  Magnesium Hydroxide 2,400 Mg/30 Ml Cup  PO  





  DAILY PRN  





  Constipation  


 


Magnesium Oxide  200 mg  07/21/24 08:00  07/21/24 08:36





  Magnesium Oxide 400 Mg Tab  PO   Not Given





  BID@0800,1700 formerly [facility]  


 


Memantine  10 mg  07/21/24 08:00  07/21/24 08:36





  Memantine 10 Mg Tab  PO   Not Given





  DAILY@0800 formerly [facility]  


 


Metoprolol Succinate  50 mg  07/21/24 08:00  07/21/24 08:36





  Metoprolol Succinate (Er) 50 Mg Tab.Er.24h  PO   Not Given





  BID@0800,1700 formerly [facility]  


 


Multivitamins  1 each  07/21/24 17:00 





  Multivitamins, Thera 1 Each Tab  PO  





  DAILY@1700 formerly [facility]  


 


Nitroglycerin  0.4 mg  07/20/24 19:51 





  Nitroglycerin Sl Tabs 0.4 Mg Tab  SUBLINGUAL  





  Q5M PRN  





  Chest Pain  


 


Non-Formulary Medication  25 mg  07/21/24 08:00  07/21/24 08:36





  Mirabegron [Myrbetriq]  PO   Not Given





  DAILY@0800 formerly [facility]  








                                Intake and Output











 07/20/24 07/21/24 07/21/24





 22:59 06:59 14:59


 


Intake Total 10 69.215 


 


Output Total  400 


 


Balance 10 -330.785 


 


Intake:   


 


  IV 10  


 


    Invasive Line 1 10  


 


  Intake, IV Titration  69.215 





  Amount   


 


    Heparin Sod,Pork in 0.45%  69.215 





    NaCl 25,000 unit In 0.45   





    % NaCl 1 250ml.bag @ 12   





    UNITS/KG/HR 9.525 mls/hr   





    IV .Q24H formerly [facility] Rx#:   





    [id number]   


 


Output:   


 


  Urine  400 


 


Other:   


 


  Voiding Method External Catheter External Catheter External Catheter


 


  Weight  79.379 kg 








                                        





                                 07/21/24 03:45 





                                 07/20/24 14:02

## 2024-07-22 VITALS — RESPIRATION RATE: 20 BRPM | TEMPERATURE: 97.5 F | DIASTOLIC BLOOD PRESSURE: 62 MMHG | SYSTOLIC BLOOD PRESSURE: 109 MMHG

## 2024-07-22 VITALS — HEART RATE: 79 BPM

## 2024-07-22 LAB
ANION GAP SERPL CALC-SCNC: 12 MMOL/L
BASOPHILS # BLD AUTO: 0 K/UL (ref 0–0.2)
BASOPHILS NFR BLD AUTO: 0 %
BUN SERPL-SCNC: 88 MG/DL (ref 9–20)
CALCIUM SPEC-MCNC: 8.2 MG/DL (ref 8.4–10.2)
CHLORIDE SERPL-SCNC: 114 MMOL/L (ref 98–107)
CO2 SERPL-SCNC: 24 MMOL/L (ref 22–30)
EOSINOPHIL # BLD AUTO: 0 K/UL (ref 0–0.7)
EOSINOPHIL NFR BLD AUTO: 0 %
ERYTHROCYTE [DISTWIDTH] IN BLOOD BY AUTOMATED COUNT: 3.3 M/UL (ref 4.3–5.9)
ERYTHROCYTE [DISTWIDTH] IN BLOOD: 16.7 % (ref 11.5–15.5)
GLUCOSE BLD-MCNC: 138 MG/DL (ref 70–110)
GLUCOSE BLD-MCNC: 184 MG/DL (ref 70–110)
GLUCOSE BLD-MCNC: 192 MG/DL (ref 70–110)
GLUCOSE BLD-MCNC: 209 MG/DL (ref 70–110)
GLUCOSE SERPL-MCNC: 148 MG/DL (ref 74–99)
GLUCOSE UR QL: (no result)
HCT VFR BLD AUTO: 30.3 % (ref 39–53)
HGB BLD-MCNC: 9.5 GM/DL (ref 13–17.5)
LYMPHOCYTES # SPEC AUTO: 0.6 K/UL (ref 1–4.8)
LYMPHOCYTES NFR SPEC AUTO: 4 %
MAGNESIUM SPEC-SCNC: 2.9 MG/DL (ref 1.6–2.3)
MCH RBC QN AUTO: 28.8 PG (ref 25–35)
MCHC RBC AUTO-ENTMCNC: 31.4 G/DL (ref 31–37)
MCV RBC AUTO: 91.6 FL (ref 80–100)
MONOCYTES # BLD AUTO: 0.5 K/UL (ref 0–1)
MONOCYTES NFR BLD AUTO: 4 %
NEUTROPHILS # BLD AUTO: 13.7 K/UL (ref 1.3–7.7)
NEUTROPHILS NFR BLD AUTO: 91 %
PH UR: 5 [PH] (ref 5–8)
PLATELET # BLD AUTO: 243 K/UL (ref 150–450)
POTASSIUM SERPL-SCNC: 3.7 MMOL/L (ref 3.5–5.1)
RBC UR QL: 1 /HPF (ref 0–5)
SODIUM SERPL-SCNC: 150 MMOL/L (ref 137–145)
SP GR UR: 1.02 (ref 1–1.03)
UROBILINOGEN UR QL STRIP: <2 MG/DL (ref ?–2)
WBC # BLD AUTO: 15 K/UL (ref 3.8–10.6)
WBC #/AREA URNS HPF: 33 /HPF (ref 0–5)

## 2024-07-22 RX ADMIN — ASPIRIN 81 MG CHEWABLE TABLET SCH MG: 81 TABLET CHEWABLE at 10:32

## 2024-07-22 NOTE — XR
EXAMINATION TYPE: XR chest 1V

 

DATE OF EXAM: 7/22/2024

 

COMPARISON: 7/20/2024

 

HISTORY: Shortness of breath

 

TECHNIQUE: Single frontal view of the chest is obtained.

 

FINDINGS:  Bilateral infiltrate and small right effusion. No pneumothorax. Heart size is mildly enlar
ged. Osseous structures are stable with degenerative changes of the spine. Stable left upper lobe juni
cified benign granuloma.

 

IMPRESSION:  

1. Bilateral consolidation and small right effusion correlate for CHF, otherwise, consider pneumonia.

## 2024-07-22 NOTE — P.PN
Subjective


Progress Note Date: 07/22/24


Principal diagnosis: 





Sleep apnea syndrome.





Pulmonary consult dated July 21st, 2024.


                                                                                

                                                  


83-year-old male seen in the emergency department on July 20.  He was brought 

there by EMS, for shortness of breath.  No history could be obtained from the 

patient.  All of the history is obtained from the medical record.  The ER 

documents that apparently he been having pneumonia for 2 days prior, and was 

receiving Levaquin.  The patient apparently was having a productive cough, with 

decreased oral intake.  He was also potentially aspirating and choking on his 

food.  I believe I was consulted for the possibility of a sleep apnea syndrome. 

Currently, the patient is not wearing his oxygen.  He should be on 2 L.  He is 

on IV heparin.  No IV fluids.  The patient apparently has a history of CVA, 

dementia, diabetes, acid reflux disease, hyperlipidemia, myocardial infarction, 

osteoarthritis, and a prior history of sleep apnea syndrome.  He also apparently

has a history of anemia, myocardial infarction x 2.  He has had stent 

placements.  He apparently has had a total of 4 stents placed.  He is a former 

smoker.  He suffers with depression.  I am not sure of the patient uses CPAP for

his sleep apnea syndrome.  White count 11.2, hemoglobin 9.8, hematocrit 30.3, 

and platelet count 331,000.  PTT is 67.1.  D-dimer 3.29.  A blood gas was done 

and showed a respiratory alkalosis.  pO2 was 83, pCO2 27, pH is 7.51.  Sodium 

141, potassium 4.6, chlorides 105, CO2 18, anion gap 18, BUN 81, creatinine 

2.33.  Glucose 213.  AST 62.  ALT 51.  Troponin was 2, 1.860, and 1.980.  N-

terminal proBNP was 95,200.  He tested negative for influenza, RSV, coronavirus.

 In my opinion, the chest x-ray is consistent with fluid overload.  This is 

especially the case given the N-terminal proBNP being 95,200.





Progress note dated July 22, 2024.





The patient is seen today in room 365.  We were consulted for the possibility of

sleep apnea.  The patient will need an outpatient sleep study, either at home, 

or at a sleep center.  The patient is not a particularly good shape, and I am 

not sure whether or not the patient will be able to be set up for this after 

discharge.  Currently, he is on 2 L.  He is on IV heparin.  He is getting saline

at KVO.  White count 15, hemoglobin 9.5, hematocrit 30.3, platelet count normal.

 PTT is 44.2.  Sodium 150, potassium 3.7, chlorides 114, CO2 24, anion gap 12, 

BUN 88, creatinine 2.58.  Calcium 8.2, magnesium 2.9.  The patient should be 

getting D5W for his hypernatremia.





Objective





- Vital Signs


Vital signs: 


                                   Vital Signs











Temp  97.3 F L  07/22/24 08:05


 


Pulse  80   07/22/24 11:32


 


Resp  25 H  07/22/24 08:05


 


BP  130/72   07/22/24 08:05


 


Pulse Ox  96   07/22/24 08:11


 


FiO2      








                                 Intake & Output











 07/21/24 07/22/24 07/22/24





 18:59 06:59 18:59


 


Intake Total  173.126 


 


Output Total 500 400 


 


Balance -500 -226.874 


 


Weight  78.5 kg 


 


Intake:   


 


  IV  10 


 


    Invasive Line 1  10 


 


  Intake, IV Titration  163.126 





  Amount   


 


    Heparin Sod,Pork in 0.45%  163.126 





    NaCl 25,000 unit In 0.45   





    % NaCl 1 250ml.bag @ 12   





    UNITS/KG/HR 9.525 mls/hr   





    IV .Q24H [facility] Rx#:   





    [id number]   


 


Output:   


 


  Urine 500 400 


 


Other:   


 


  Voiding Method External Catheter External Catheter External Catheter














- Exam





No acute distress, confused, poor historian, not wearing O2.  Should be on 2 L.





HEENT examination is grossly unremarkable.  Mucous membranes are dry.





Neck supple.  Full range of motion.  No adenopathy thyromegaly or neck vein 

distention.





Cardiovascular examination reveals regular rhythm rate.  S1-S2 normal.  No S3 or

S4.  No discernible murmur noted.  Heart rate is 80 bpm.





Lungs reveal mild scattered rhonchi.  No wheezes.  No crackles.  He does not 

take deep breaths.  Breath sounds are equal.  Saturations are 96 % on 2 L, as 

documented.





Abdomen soft bowel sounds are heard.  No masses or tenderness.





Extremities are intact.  No cyanosis clubbing or edema.





Skin is without rash or lesion.





Neurologic examination is difficult to evaluate at this time.





- Labs


CBC & Chem 7: 


                                 07/22/24 07:13





                                 07/22/24 07:13


Labs: 


                  Abnormal Lab Results - Last 24 Hours (Table)











  07/21/24 07/21/24 07/21/24 Range/Units





  10:12 16:38 20:02 


 


WBC     (3.8-10.6)  k/uL


 


RBC     (4.30-5.90)  m/uL


 


Hgb     (13.0-17.5)  gm/dL


 


Hct     (39.0-53.0)  %


 


RDW     (11.5-15.5)  %


 


Neutrophils #     (1.3-7.7)  k/uL


 


Lymphocytes #     (1.0-4.8)  k/uL


 


APTT     (22.0-30.0)  sec


 


Sodium     (137-145)  mmol/L


 


Chloride     ()  mmol/L


 


BUN     (9-20)  mg/dL


 


Creatinine     (0.66-1.25)  mg/dL


 


Glucose     (74-99)  mg/dL


 


POC Glucose (mg/dL)   211 H  216 H  ()  mg/dL


 


Calcium     (8.4-10.2)  mg/dL


 


Magnesium     (1.6-2.3)  mg/dL


 


Procalcitonin  3.40 H    (0.02-0.09)  ng/mL














  07/22/24 07/22/24 07/22/24 Range/Units





  02:24 06:10 07:13 


 


WBC     (3.8-10.6)  k/uL


 


RBC     (4.30-5.90)  m/uL


 


Hgb     (13.0-17.5)  gm/dL


 


Hct     (39.0-53.0)  %


 


RDW     (11.5-15.5)  %


 


Neutrophils #     (1.3-7.7)  k/uL


 


Lymphocytes #     (1.0-4.8)  k/uL


 


APTT    44.2 H  (22.0-30.0)  sec


 


Sodium     (137-145)  mmol/L


 


Chloride     ()  mmol/L


 


BUN     (9-20)  mg/dL


 


Creatinine     (0.66-1.25)  mg/dL


 


Glucose     (74-99)  mg/dL


 


POC Glucose (mg/dL)  209 H  184 H   ()  mg/dL


 


Calcium     (8.4-10.2)  mg/dL


 


Magnesium     (1.6-2.3)  mg/dL


 


Procalcitonin     (0.02-0.09)  ng/mL














  07/22/24 07/22/24 Range/Units





  07:13 07:13 


 


WBC  15.0 H   (3.8-10.6)  k/uL


 


RBC  3.30 L   (4.30-5.90)  m/uL


 


Hgb  9.5 L   (13.0-17.5)  gm/dL


 


Hct  30.3 L   (39.0-53.0)  %


 


RDW  16.7 H   (11.5-15.5)  %


 


Neutrophils #  13.7 H   (1.3-7.7)  k/uL


 


Lymphocytes #  0.6 L   (1.0-4.8)  k/uL


 


APTT    (22.0-30.0)  sec


 


Sodium   150 H  (137-145)  mmol/L


 


Chloride   114 H  ()  mmol/L


 


BUN   88 H  (9-20)  mg/dL


 


Creatinine   2.58 H  (0.66-1.25)  mg/dL


 


Glucose   148 H  (74-99)  mg/dL


 


POC Glucose (mg/dL)    ()  mg/dL


 


Calcium   8.2 L  (8.4-10.2)  mg/dL


 


Magnesium   2.9 H  (1.6-2.3)  mg/dL


 


Procalcitonin    (0.02-0.09)  ng/mL








                      Microbiology - Last 24 Hours (Table)











 07/20/24 14:05 Blood Culture - Preliminary





 Blood 














Assessment and Plan


Assessment: 





Probable non-ST segment elevation myocardial infarction, with fluid 

overload/CHF.





Apparent prior diagnosis of obstructive sleep apnea syndrome.





History of CVA.





History of diabetes mellitus.





History of CAD with previous stent placements x 4.





History of myocardial infarction.





History of hyperlipidemia.





Apparent history of sleep apnea syndrome.





Chronic kidney disease.





Aortic valve stenosis.





Previous history of tobacco use.





History of depression.





Multiple other medical problems and comorbidities.


Plan: 





Plan dated July 21, 2024.





Not much history could be obtained from this patient.  The patient apparently 

has a diagnosis of sleep apnea.  He may or may not be using CPAP.  Will need 

outpatient evaluation.  If he does have a CPAP device at home, it should be 

brought in by family members.  Additional recommendations and suggestions are 

forthcoming.  Will follow along.  Prognosis is poor.





Plan dated July 22, 2024.





The patient will eventually need an outpatient sleep study.  It could be done at

home, or at a sleep center.  The patient is 83, and quite debilitated.  It is 

unclear to me whether or not he will be able to have a sleep study.  

Nonetheless, it is an outpatient procedure.  Additional recommendations and 

suggestions are forthcoming.  Prognosis is not particularly good, and we will 

see the patient moving forward, only as needed.


Time with Patient: Less than 30

## 2024-07-22 NOTE — US
EXAMINATION TYPE: US kidneys/renal and bladder

 

DATE OF EXAM: 7/22/2024

 

COMPARISON: US 1/5/2018

 

CLINICAL INDICATION: Male, 83 years old with history of DAPHNEY; DAPHNEY

 

EXAM MEASUREMENTS:

 

Right Kidney:  9.9 x 5.3 x 5.3 cm

Left Kidney: 10.6 x 4.4 x 5.6 cm

 

**Extremely limited exam due to movement, gas, and rib shadow.

 

Right Kidney: No hydronephrosis or masses seen, limited visibility.  

Left Kidney: No hydronephrosis or masses seen, limited visibility.  

Bladder: Not fully distended, appears anechoic.

**Bilateral Jets seen: Only right jet was seen during exam.

 

 

 

IMPRESSION:

Limited exam demonstrates no hydronephrosis or nephrolithiasis

## 2024-07-22 NOTE — P.DS
Providers


Date of admission: 


07/20/24 20:10





Attending physician: 


Je Perez MD





Consults: 





                                        





07/20/24 19:51


Consult Physician Urgent 


   Consulting Provider: Ernie Alvarado


   Consult Reason/Comments: CHF, elevated troponin suspected NSTEMI


   Do you want consulting provider notified?: Already Contacted





07/20/24 22:01


Consult Physician Routine 


   Consulting Provider: Carlitos Huggins


   Consult Reason/Comments: apnea


   Do you want consulting provider notified?: Already Contacted





07/22/24 10:10


Consult Physician Routine 


   Consulting Provider: Anahi Knowles


   Consult Reason/Comments: DAPHNEY, hypernatremia


   Do you want consulting provider notified?: Yes











Primary care physician: 


Mook Abdalla





Hospital Course: 


Final Diagnosis


Pneumonia with sepsis failed outpatient therapy,  likely due to aspiration


Acute congestive heart failure diastolic and systolic dysfunction known EF of 25

to 30% received a dose of IV Lasix


Acute kidney injury secondary to acute tubular necrosis of infection and volume 

overload


Severe aortic stenosis


Troponin elevation this is a type II MI secondary to an oxygen supply and demand

mismatch


Chronic kidney disease stage IV baseline creatinine 1.6-1.7.


Hypernatremia from free water deficit 


New onset atrial fibrillation with rapid ventricular rate currently on IV 

heparin


Coronary artery disease with prior myocardial infarction and percutaneous 

intervention


History of stroke


Diabetes mellitus type 2


History of advanced dementia


History of sleep apnea


Advanced dementia 


Former Smoker





Discharge Disposition


Patient is stable for discharge back to the nursing home today with overall poor

prognosis and patient will be discharged with hospice services in place.  

Patient will continue on dysphagia level 1 pured diet with nectar thick liquids

one-to-one supervision and aspiration precautions.  Patient will continue a 

course of oral antibiotics with oral Augmentin for the next 5 days for the 

aspiration pneumonia.  His procalcitonin level was elevated family wants 

antibiotic coverage on discharge.





Hospital Course


This is an 83-year-old male with medical history significant for dementia, 

diabetes mellitus, acid reflux, prior stroke, sleep apnea, aortic valve 

stenosis, chronic kidney disease, anemia, myocardial infarction with prior PCI, 

former smoker.  Patient was brought to the hospital by EMS from our nursing 

where he is a permanent resident with concern for cough with sputum production 

and having decreased oral intake history was gathered from the patient's wife as

he is a poor historian.  Patient has also had an worsening confusion and per 

family they felt like his dementia is likely progressing. Patient was recently 

diagnosed by pneumonia outpatient 2 days ago and he has been on Levaquin on an 

outpatient basis.  X-ray on admission shows bilateral lower lung airspace 

opacities correlate for pneumonia.  Initial blood work reveals a white blood 

cell count of 11.2, hemoglobin 9.8, INR 1.2, D-dimer of 3.29, BUN of 81, 

creatinine of 2.33, elevated LFTs and a troponin level elevated at 0.00, 1.860, 

1.980.  Patient's proBNP is also elevated at 95,200, his viral panel is negative

for influenza RSV and COVID.  Patient was admitted to the hospital with a 

consultation placed to pulmonary and cardiology services, patient has been 

started on IV heparin drip with concern for a non-ST elevation MI.  He was given

a dose of IV Lasix in the ER.  Patient was also given a dose of IV Zosyn and IV 

levofloxacin.  Patient is currently evaluated today on the medical floor he is 

alert x 1 pleasantly confused he is asking when he can leave the hospital.  He 

denies any acute complaints at this time denies that he is having any shortness 

of breath.  He is having some cough. Family has considered hospice have been 

consulted for informational session decided to bring the patient back to the 

nursing home with hospice today.  A renal ultrasound done which shows no 

hydronephrosis or nephrolithiasis.  Chest x-ray today reveals bilateral 

consolidation and small right pleural effusion correlate for CHF otherwise 

consider pneumonia.  His white blood cell count is 15.0, hemoglobin 9.5, sodium 

of 150, BUN of 88, creatinine of 2.58, procalcitonin level was elevated at 3.40.

 Speech therapy did evaluate this patient and recommended a barium swallow after

discussion with family they do not want any further aggressive testing or 

measures taken.  Patient sodium is up to 150 and this is due to a free water 

deficit.  He did go into atrial fibrillation with a rapid ventricular rate 

overnight.  He does continue on IV heparin which will be discontinued prior to 

discharge.








Please see medication reconciliation for a list of current medications. Thank 

you for allowing us to participate in the care of this patient. 





The impression and plan of care has been dictated by Qiana Richmond, Nurse 

Practitioner as directed.





Dr. Jesus MD


I have performed a hist  Hospice today.ory and physical examination and medical 

decision making of this patient, discussed the same with the dictator, and agree

with the dictators assessment and plan as written, documented as a scribe. Based

on total visit time, I have performed more than 50% of this visit.








Plan - Discharge Summary


New Discharge Prescriptions: 


New


   Amoxic-Pot Clav 875-125Mg [Augmentin 875-125] 1 tab PO BID 5 Days #10 tab





Continue


   Aspirin EC [Ecotrin Low Dose] 81 mg PO DAILY@1700


   Docusate [Colace] 100 mg PO DAILY PRN


     PRN Reason: Constipation


   Metoprolol Succinate (ER) [Toprol XL] 50 mg PO BID@0800,1700


   Atorvastatin [Lipitor] 80 mg PO HS@2100


   Fish Oil/Dha/Epa [Fish Oil 1,200 mg Fish Oil] 1 cap PO DAILY@0800


   Clopidogrel [Plavix] 75 mg PO HS@2100


   Nitroglycerin Sl Tabs [Nitrostat] 0.4 mg SUBLINGUAL Q5M PRN #20 tab


     PRN Reason: Chest Pain


   Acetaminophen Tab [Tylenol] 650 mg PO Q6HR PRN  tab


     PRN Reason: Mild Pain Or Fever > 100.5


   Multivitamins, Thera [Multivitamin (formulary)] 1 tab PO DAILY@1700


   bisacodyL [Dulcolax] 10 mg RECTAL DAILY PRN


     PRN Reason: Constipation


   Magnesium Hydroxide [Milk of Magnesia Concentrate] 7,200 mg PO DAILY PRN


     PRN Reason: Constipation


   Loperamide [Imodium] 2 mg PO Q6H PRN


     PRN Reason: Loose Stool


   Mag Hydrox/Al Hydrox/Simeth [Maalox] 15 ml PO QID PRN


     PRN Reason: Heartburn


   Mirabegron [Myrbetriq] 25 mg PO DAILY@0800


   INSULIN ASPART (NovoLOG) [NovoLOG (formulary)] See Protocol SQ ACHS


   Memantine [Namenda] 10 mg PO DAILY@0800


   Epoetin Henri [Procrit] 30,000 unit SQ Q21D


   guaiFENesin [guaiFENesin Oral Solution] 600 mg PO Q6H PRN


     PRN Reason: Cough


   Menthol-Zinc Oxide Oint [Calmoseptine Ointment] 1 applic TOPICAL TID


   Folic Acid 1 mg PO DAILY@1700


   Potassium Chloride [Klor-Con M10] 10 meq PO DAILY@1700


   Isosorbide Mononitrate ER [Imdur] 30 mg PO DAILY@0800


   Ferrous Sulfate [Iron (65 MG Elemental)] 325 mg PO DAILY@0800


   amLODIPine [Norvasc] 2.5 mg PO DAILY@1700


   Famotidine [Pepcid] 20 mg PO Q2D@1700


   Carboxymethylcellulose Sodium [Refresh Tears] 1 drop BOTH EYES 

TID@0800,1200,1700


   INSULIN ASPART (NovoLOG) [NovoLOG (formulary)] 3 unit SQ TID@0700,1100,1630


   Ensure Enlive 120 ml PO TID@0800,1200,1700


   Magnesium Oxide [Mag-Oxide] 200 mg PO BID@0800,1700


   Insulin Glargine [Lantus Vial] 10 unit SQ HS@2130


   Dapagliflozin Propanediol [Farxiga] 10 mg PO DAILY@0800


   buPROPion HCL [Wellbutrin XL] 150 mg PO DAILY@0800





Discontinued


   Levofloxacin [Levaquin] 250 mg PO DAILY@0800


Discharge Medication List





Aspirin EC [Ecotrin Low Dose] 81 mg PO DAILY@1700 08/23/14 [History]


Docusate [Colace] 100 mg PO DAILY PRN 12/21/18 [History]


Metoprolol Succinate (ER) [Toprol XL] 50 mg PO BID@0800,1700 12/21/18 [History]


Atorvastatin [Lipitor] 80 mg PO HS@2100 06/18/20 [History]


Clopidogrel [Plavix] 75 mg PO HS@2100 06/18/20 [History]


Fish Oil/Dha/Epa [Fish Oil 1,200 mg Fish Oil] 1 cap PO DAILY@0800 06/18/20 

[History]


Nitroglycerin Sl Tabs [Nitrostat] 0.4 mg SUBLINGUAL Q5M PRN #20 tab 06/21/20 

[Rx]


Acetaminophen Tab [Tylenol] 650 mg PO Q6HR PRN  tab 07/01/20 [Rx]


Multivitamins, Thera [Multivitamin (formulary)] 1 tab PO DAILY@1700 07/10/20 

[History]


Folic Acid 1 mg PO DAILY@1700 05/06/21 [History]


Potassium Chloride [Klor-Con M10] 10 meq PO DAILY@1700 05/06/21 [History]


Ferrous Sulfate [Iron (65 MG Elemental)] 325 mg PO DAILY@0800 06/09/21 [History]


Isosorbide Mononitrate ER [Imdur] 30 mg PO DAILY@0800 06/09/21 [History]


Magnesium Hydroxide [Milk of Magnesia Concentrate] 7,200 mg PO DAILY PRN 

06/09/21 [History]


bisacodyL [Dulcolax] 10 mg RECTAL DAILY PRN 06/09/21 [History]


Famotidine [Pepcid] 20 mg PO Q2D@1700 07/07/22 [History]


Loperamide [Imodium] 2 mg PO Q6H PRN 07/07/22 [History]


Mag Hydrox/Al Hydrox/Simeth [Maalox] 15 ml PO QID PRN 07/07/22 [History]


Mirabegron [Myrbetriq] 25 mg PO DAILY@0800 07/07/22 [History]


amLODIPine [Norvasc] 2.5 mg PO DAILY@1700 07/07/22 [History]


Carboxymethylcellulose Sodium [Refresh Tears] 1 drop BOTH EYES 

TID@0800,1200,1700 08/08/23 [History]


Dapagliflozin Propanediol [Farxiga] 10 mg PO DAILY@0800 08/08/23 [History]


Ensure Enlive 120 ml PO TID@0800,1200,1700 08/08/23 [History]


INSULIN ASPART (NovoLOG) [NovoLOG (formulary)] 3 unit SQ TID@0700,1100,1630 

08/08/23 [History]


INSULIN ASPART (NovoLOG) [NovoLOG (formulary)] See Protocol SQ ACHS 08/08/23 

[History]


Insulin Glargine [Lantus Vial] 10 unit SQ HS@2130 08/08/23 [History]


Magnesium Oxide [Mag-Oxide] 200 mg PO BID@0800,1700 08/08/23 [History]


Memantine [Namenda] 10 mg PO DAILY@0800 08/08/23 [History]


buPROPion HCL [Wellbutrin XL] 150 mg PO DAILY@0800 08/12/23 [History]


Epoetin Henri [Procrit] 30,000 unit SQ Q21D 07/20/24 [History]


Menthol-Zinc Oxide Oint [Calmoseptine Ointment] 1 applic TOPICAL TID 07/20/24 

[History]


guaiFENesin [guaiFENesin Oral Solution] 600 mg PO Q6H PRN 07/20/24 [History]


Amoxic-Pot Clav 875-125Mg [Augmentin 875-125] 1 tab PO BID 5 Days #10 tab 

07/22/24 [Rx]








Follow up Appointment(s)/Referral(s): 


Mook Abdalla MD [Primary Care Provider] - 1-2 days


Beth Galloway [NON-STAFF] - 1 Week


[facility] [NON-STAFF] - 1 Week


Activity/Diet/Wound Care/Special Instructions: 


Continue on Nectar thick liquids 1:1 Supervision Aspiration precautions 


Pills crushed in apple sauce 


Discharge Disposition: TRANSFER TO SNF/ECF

## 2024-07-22 NOTE — P.NPCON
History of Present Illness





- Reason for Consult


Consult date: 07/22/24





- History of Present Illness





Patient is an 83-year-old male with a history of chronic kidney disease stage IV

with baseline creatinine of about 1.6-1.7.  


His creatinine on admission was 2.33 and has been increasing, today it is 2.58.


He was admitted for decreased oral intake and cough.  He is being treated for 

pneumonia with sepsis. 


Sodium level high today at 150.


Mildly low blood pressures with systolic in the low 100s.





Past Medical History


Past Medical History: CVA/TIA, Dementia, Diabetes Mellitus, GERD/Reflux, 

Hyperlipidemia, Myocardial Infarction (MI), Osteoarthritis (OA), Renal Disease, 

Sleep Apnea/CPAP/BIPAP


Additional Past Medical History / Comment(s): Anemia, MI x 2, heart murmur, 

sleep apnea (no CPAP use), constipation, aortic valve stenosis, chronic kidney 

disease


Last Myocardial Infarction Date:: 3/28/19


History of Any Multi-Drug Resistant Organisms: None Reported


Past Surgical History: Heart Catheterization With Stent, Joint Replacement, 

Orthopedic Surgery


Additional Past Surgical History / Comment(s): 4 cardiac stents, total right 

knee replacement, monse cataracts with lens implants, left shoulder surgery, left 

hip replacement


Past Anesthesia/Blood Transfusion Reactions: No Reported Reaction


Date of Last Stent Placement:: 6/2020


Past Psychological History: Depression


Smoking Status: Former smoker


Past Alcohol Use History: None Reported


Additional Past Alcohol Use History / Comment(s): Quit smoking 1964, smoked for 

6-7 years


Past Drug Use History: None Reported





- Past Family History


  ** Father


Family Medical History: Myocardial Infarction (MI)





  ** Mother


Family Medical History: No Reported History





  ** Sister(s)


Family Medical History: Cancer





Medications and Allergies


                                Home Medications











 Medication  Instructions  Recorded  Confirmed  Type


 


Aspirin EC [Ecotrin Low Dose] 81 mg PO DAILY@1700 08/23/14 07/20/24 History


 


Docusate [Colace] 100 mg PO DAILY PRN 12/21/18 07/20/24 History


 


Metoprolol Succinate (ER) [Toprol 50 mg PO BID@0800,1700 12/21/18 07/20/24 

History





XL]    


 


Atorvastatin [Lipitor] 80 mg PO HS@2100 06/18/20 07/20/24 History


 


Clopidogrel [Plavix] 75 mg PO HS@2100 06/18/20 07/20/24 History


 


Fish Oil/Dha/Epa [Fish Oil 1,200 1 cap PO DAILY@0800 06/18/20 07/20/24 History





mg Fish Oil]    


 


Nitroglycerin Sl Tabs [Nitrostat] 0.4 mg SUBLINGUAL Q5M PRN #20 tab 06/21/20 07/20/24 Rx


 


Acetaminophen Tab [Tylenol] 650 mg PO Q6HR PRN  tab 07/01/20 07/20/24 Rx


 


Multivitamins, Thera [Multivitamin 1 tab PO DAILY@1700 07/10/20 07/20/24 History





(formulary)]    


 


Folic Acid 1 mg PO DAILY@1700 05/06/21 07/20/24 History


 


Potassium Chloride [Klor-Con M10] 10 meq PO DAILY@1700 05/06/21 07/20/24 History


 


Ferrous Sulfate [Iron (65  mg PO DAILY@0800 06/09/21 07/20/24 History





Elemental)]    


 


Isosorbide Mononitrate ER [Imdur] 30 mg PO DAILY@0800 06/09/21 07/20/24 History


 


Magnesium Hydroxide [Milk of 7,200 mg PO DAILY PRN 06/09/21 07/20/24 History





Magnesia Concentrate]    


 


bisacodyL [Dulcolax] 10 mg RECTAL DAILY PRN 06/09/21 07/20/24 History


 


Famotidine [Pepcid] 20 mg PO Q2D@1700 07/07/22 07/20/24 History


 


Loperamide [Imodium] 2 mg PO Q6H PRN 07/07/22 07/20/24 History


 


Mag Hydrox/Al Hydrox/Simeth 15 ml PO QID PRN 07/07/22 07/20/24 History





[Maalox]    


 


Mirabegron [Myrbetriq] 25 mg PO DAILY@0800 07/07/22 07/20/24 History


 


amLODIPine [Norvasc] 2.5 mg PO DAILY@1700 07/07/22 07/20/24 History


 


Carboxymethylcellulose Sodium 1 drop BOTH EYES TID@0800,1200,1700 08/08/23 07/20/24 History





[Refresh Tears]    


 


Dapagliflozin Propanediol [Farxiga] 10 mg PO DAILY@0800 08/08/23 07/20/24 

History


 


Ensure Enlive 120 ml PO TID@0800,1200,1700 08/08/23 07/20/24 History


 


INSULIN ASPART (NovoLOG) [NovoLOG 3 unit SQ TID@0700,1100,1630 08/08/23 07/20/24

 History





(formulary)]    


 


INSULIN ASPART (NovoLOG) [NovoLOG See Protocol SQ ACHS 08/08/23 07/20/24 History





(formulary)]    


 


Insulin Glargine [Lantus Vial] 10 unit SQ HS@2130 08/08/23 07/20/24 History


 


Magnesium Oxide [Mag-Oxide] 200 mg PO BID@0800,1700 08/08/23 07/20/24 History


 


Memantine [Namenda] 10 mg PO DAILY@0800 08/08/23 07/20/24 History


 


buPROPion HCL [Wellbutrin XL] 150 mg PO DAILY@0800 08/12/23 07/20/24 History


 


Epoetin Henri [Procrit] 30,000 unit SQ Q21D 07/20/24 07/20/24 History


 


Menthol-Zinc Oxide Oint 1 applic TOPICAL TID 07/20/24 07/20/24 History





[Calmoseptine Ointment]    


 


guaiFENesin [guaiFENesin Oral 600 mg PO Q6H PRN 07/20/24 07/20/24 History





Solution]    


 


Amoxic-Pot Clav 875-125Mg 1 tab PO BID 5 Days #10 tab 07/22/24  Rx





[Augmentin 875-125]    








                                    Allergies











Allergy/AdvReac Type Severity Reaction Status Date / Time


 


iodine Allergy Unknown Unknown Verified 08/12/23 16:41














Physical Exam


Vitals: 


                                   Vital Signs











  Temp Pulse Pulse Resp BP BP Pulse Ox


 


 07/22/24 08:25   76     


 


 07/22/24 08:11   73      96


 


 07/22/24 08:05  97.3 F L   52 L  25 H  130/72   99


 


 07/22/24 03:50  98.2 F   92  34 H  143/87   99


 


 07/22/24 02:35    101 H  28 H  120/81   97


 


 07/21/24 23:30  97.3 F L   93  26 H   139/90  98


 


 07/21/24 20:14   72     


 


 07/21/24 20:03   70     


 


 07/21/24 20:00  97.5 F L   99  22   114/75  100


 


 07/21/24 16:14   74     


 


 07/21/24 16:04   70     


 


 07/21/24 16:00    111 H  20   123/83  100


 


 07/21/24 14:00    90  20   


 


 07/21/24 12:00    92  18   114/76  99


 


 07/21/24 11:42   74     


 


 07/21/24 11:32   68     








                                Intake and Output











 07/21/24 07/22/24 07/22/24





 22:59 06:59 14:59


 


Intake Total 10 163.126 


 


Output Total 200 200 


 


Balance -190 -36.874 


 


Intake:   


 


  IV 10  


 


    Invasive Line 1 10  


 


  Intake, IV Titration  163.126 





  Amount   


 


    Heparin Sod,Pork in 0.45%  163.126 





    NaCl 25,000 unit In 0.45   





    % NaCl 1 250ml.bag @ 12   





    UNITS/KG/HR 9.525 mls/hr   





    IV .Q24H [facility] Rx#:   





    [id number]   


 


Output:   


 


  Urine 200 200 


 


Other:   


 


  Voiding Method External Catheter External Catheter External Catheter


 


  Weight  78.5 kg 














Vital signs are stable.


General: Mild respiratory distress.


HEENT:  Head exam is unremarkable. 


Lungs: Bilateral breath sounds present; no rhonchi, wheezes, or rales.


Heart: Rate and rhythm are regular. 


Abdomen: Nontender.


Extremities: No edema present.





Results





- Lab Results


                             Most recent lab results











ABG pH  7.51  (7.35-7.45)  H  07/20/24  14:21    


 


ABG pCO2  27 mmHg (35-45)  L  07/20/24  14:21    


 


ABG pO2  83 mmHg ()   07/20/24  14:21    


 


ABG HCO3  21 mmol/L (21-25)   07/20/24  14:21    


 


ABG O2 Saturation  96.9 % (94-97)   07/20/24  14:21    


 


Calcium  8.2 mg/dL (8.4-10.2)  L  07/22/24  07:13    


 


Magnesium  2.9 mg/dL (1.6-2.3)  H  07/22/24  07:13    














                                 07/22/24 07:13





                                 07/22/24 07:13





Assessment and Plan


Assessment: 





1.  Acute kidney injury.  Nonoliguric ATN likely secondary to hypotension.  

Status post IV fluids.  Repeat CXR to rule out CHF.  Check UA.  Check ultrasound

of kidney.  Rule out urinary retention.


2.  Chronic kidney disease stage IV.  Baseline creatinine 1.6-1.7.  Etiology 

nephrosclerosis. 


3.  CHF exacerbation.  Acute on chronic systolic dysfunction with ejection 

fraction of 25 to 30%.


4.  Hypernatremia secondary to free water deficit.


5.  Pneumonia with sepsis.  On antibiotics.


6.  NSTEMI.  


7.  New onset Afib.


8.  Hypermagnesemia.  On magnesium hydroxide and magnesium oxide.  Discontinue.


Plan: 





Repeat CXR for shortness of breath to rule out fluid overload. 


Obtain UA.


Discontinue magnesium hydroxide and magnesium oxide due to hypermagnesemia.


Encourage free water intake. 


Add D5W.


Perform bladder scan to rule out urinary retention.


Obtain renal ultrasound.  


Repeat labs in the morning.


Avoid nephrotoxins.


Agree with discontinuation of Norvasc.





Agree with residents findings, assessment and plan.

## 2024-07-22 NOTE — P.PN
Subjective


Progress Note Date: 07/22/24





The patient is an 83-year-old gentleman who was admitted to the hospital with 

non-STEMI and decision was to treat the patient medically.  Echo showed impaired

LV function but he went into atrial fibrillation








July 22, 2024


The patient was seen and evaluated this morning.  He does have change in mental 

status and he is somewhat agitated.  Currently is not on any oral medication 

because of concern about oral intake.  He is on heparin IV.  The vitals are 

stable overall.  The examination is remarkable for lethargic gentleman with 

overall stable vital signs and irregular rhythm.  He had distant heart sounds.  





Assessment


Acute non-ST ovation myocardial infarction


Change in mental status


Atrial fibrillation which is new


Renal failure


Multiple comorbid conditions





Plan


Continue current medical regimen including IV heparin


Further recommendation after the speech therapy for possible oral medications


Consider starting the patient on anti-ischemic medications and antiplatelet if 

he can pass all evaluation


Follow-up with the patient





Objective





- Vital Signs


Vital signs: 


                                   Vital Signs











Temp  98.2 F   07/22/24 03:50


 


Pulse  73   07/22/24 08:11


 


Resp  34 H  07/22/24 03:50


 


BP  143/87   07/22/24 03:50


 


Pulse Ox  96   07/22/24 08:11


 


FiO2      








                                 Intake & Output











 07/21/24 07/22/24 07/22/24





 18:59 06:59 18:59


 


Intake Total  173.126 


 


Output Total 500 400 


 


Balance -500 -226.874 


 


Weight  78.5 kg 


 


Intake:   


 


  IV  10 


 


    Invasive Line 1  10 


 


  Intake, IV Titration  163.126 





  Amount   


 


    Heparin Sod,Pork in 0.45%  163.126 





    NaCl 25,000 unit In 0.45   





    % NaCl 1 250ml.bag @ 12   





    UNITS/KG/HR 9.525 mls/hr   





    IV .Q24H [facility] Rx#:   





    [id number]   


 


Output:   


 


  Urine 500 400 


 


Other:   


 


  Voiding Method External Catheter External Catheter 














- Labs


CBC & Chem 7: 


                                 07/22/24 07:13





                                 07/21/24 10:12


Labs: 


                  Abnormal Lab Results - Last 24 Hours (Table)











  07/21/24 07/21/24 07/21/24 Range/Units





  03:45 03:45 10:12 


 


WBC   14.7 H   (3.8-10.6)  k/uL


 


RBC   3.09 L   (4.30-5.90)  m/uL


 


Hgb   9.1 L   (13.0-17.5)  gm/dL


 


Hct   29.6 L   (39.0-53.0)  %


 


MCHC   30.8 L   (31.0-37.0)  g/dL


 


RDW   16.7 H   (11.5-15.5)  %


 


Neutrophils #   13.0 H   (1.3-7.7)  k/uL


 


Lymphocytes #   0.4 L   (1.0-4.8)  k/uL


 


APTT    49.9 H  (22.0-30.0)  sec


 


Chloride     ()  mmol/L


 


BUN     (9-20)  mg/dL


 


Creatinine     (0.66-1.25)  mg/dL


 


Glucose     (74-99)  mg/dL


 


POC Glucose (mg/dL)     ()  mg/dL


 


Calcium     (8.4-10.2)  mg/dL


 


Magnesium     (1.6-2.3)  mg/dL


 


Total Protein     (6.3-8.2)  g/dL


 


Albumin     (3.5-5.0)  g/dL


 


HDL Cholesterol  20.70 L    (40.00-60.00)  mg/dL


 


Procalcitonin     (0.02-0.09)  ng/mL














  07/21/24 07/21/24 07/21/24 Range/Units





  10:12 10:12 11:29 


 


WBC     (3.8-10.6)  k/uL


 


RBC     (4.30-5.90)  m/uL


 


Hgb     (13.0-17.5)  gm/dL


 


Hct     (39.0-53.0)  %


 


MCHC     (31.0-37.0)  g/dL


 


RDW     (11.5-15.5)  %


 


Neutrophils #     (1.3-7.7)  k/uL


 


Lymphocytes #     (1.0-4.8)  k/uL


 


APTT     (22.0-30.0)  sec


 


Chloride   109 H   ()  mmol/L


 


BUN   87 H   (9-20)  mg/dL


 


Creatinine   2.58 H   (0.66-1.25)  mg/dL


 


Glucose   177 H   (74-99)  mg/dL


 


POC Glucose (mg/dL)    197 H  ()  mg/dL


 


Calcium   8.2 L   (8.4-10.2)  mg/dL


 


Magnesium   2.8 H   (1.6-2.3)  mg/dL


 


Total Protein   5.7 L   (6.3-8.2)  g/dL


 


Albumin   2.8 L   (3.5-5.0)  g/dL


 


HDL Cholesterol     (40.00-60.00)  mg/dL


 


Procalcitonin  3.40 H    (0.02-0.09)  ng/mL














  07/21/24 07/21/24 07/22/24 Range/Units





  16:38 20:02 02:24 


 


WBC     (3.8-10.6)  k/uL


 


RBC     (4.30-5.90)  m/uL


 


Hgb     (13.0-17.5)  gm/dL


 


Hct     (39.0-53.0)  %


 


MCHC     (31.0-37.0)  g/dL


 


RDW     (11.5-15.5)  %


 


Neutrophils #     (1.3-7.7)  k/uL


 


Lymphocytes #     (1.0-4.8)  k/uL


 


APTT     (22.0-30.0)  sec


 


Chloride     ()  mmol/L


 


BUN     (9-20)  mg/dL


 


Creatinine     (0.66-1.25)  mg/dL


 


Glucose     (74-99)  mg/dL


 


POC Glucose (mg/dL)  211 H  216 H  209 H  ()  mg/dL


 


Calcium     (8.4-10.2)  mg/dL


 


Magnesium     (1.6-2.3)  mg/dL


 


Total Protein     (6.3-8.2)  g/dL


 


Albumin     (3.5-5.0)  g/dL


 


HDL Cholesterol     (40.00-60.00)  mg/dL


 


Procalcitonin     (0.02-0.09)  ng/mL














  07/22/24 07/22/24 Range/Units





  06:10 07:13 


 


WBC   15.0 H  (3.8-10.6)  k/uL


 


RBC   3.30 L  (4.30-5.90)  m/uL


 


Hgb   9.5 L  (13.0-17.5)  gm/dL


 


Hct   30.3 L  (39.0-53.0)  %


 


MCHC    (31.0-37.0)  g/dL


 


RDW   16.7 H  (11.5-15.5)  %


 


Neutrophils #   13.7 H  (1.3-7.7)  k/uL


 


Lymphocytes #   0.6 L  (1.0-4.8)  k/uL


 


APTT    (22.0-30.0)  sec


 


Chloride    ()  mmol/L


 


BUN    (9-20)  mg/dL


 


Creatinine    (0.66-1.25)  mg/dL


 


Glucose    (74-99)  mg/dL


 


POC Glucose (mg/dL)  184 H   ()  mg/dL


 


Calcium    (8.4-10.2)  mg/dL


 


Magnesium    (1.6-2.3)  mg/dL


 


Total Protein    (6.3-8.2)  g/dL


 


Albumin    (3.5-5.0)  g/dL


 


HDL Cholesterol    (40.00-60.00)  mg/dL


 


Procalcitonin    (0.02-0.09)  ng/mL








                      Microbiology - Last 24 Hours (Table)











 07/20/24 14:05 Blood Culture - Preliminary





 Blood